# Patient Record
Sex: FEMALE | Race: WHITE | NOT HISPANIC OR LATINO | Employment: OTHER | URBAN - NONMETROPOLITAN AREA
[De-identification: names, ages, dates, MRNs, and addresses within clinical notes are randomized per-mention and may not be internally consistent; named-entity substitution may affect disease eponyms.]

---

## 2017-01-19 ENCOUNTER — OFFICE VISIT (OUTPATIENT)
Dept: FAMILY MEDICINE CLINIC | Facility: CLINIC | Age: 62
End: 2017-01-19

## 2017-01-19 VITALS
OXYGEN SATURATION: 98 % | DIASTOLIC BLOOD PRESSURE: 80 MMHG | SYSTOLIC BLOOD PRESSURE: 122 MMHG | HEIGHT: 62 IN | BODY MASS INDEX: 18.58 KG/M2 | HEART RATE: 80 BPM | WEIGHT: 101 LBS

## 2017-01-19 DIAGNOSIS — R64 CACHEXIA (HCC): ICD-10-CM

## 2017-01-19 DIAGNOSIS — F41.1 ANXIETY STATE: Primary | ICD-10-CM

## 2017-01-19 DIAGNOSIS — Z11.59 NEED FOR HEPATITIS C SCREENING TEST: ICD-10-CM

## 2017-01-19 DIAGNOSIS — F90.0 ATTENTION DEFICIT HYPERACTIVITY DISORDER (ADHD), PREDOMINANTLY INATTENTIVE TYPE: ICD-10-CM

## 2017-01-19 DIAGNOSIS — M81.0 OSTEOPOROSIS: ICD-10-CM

## 2017-01-19 DIAGNOSIS — E03.9 ACQUIRED HYPOTHYROIDISM: ICD-10-CM

## 2017-01-19 LAB
T4 FREE SERPL-MCNC: 1.06 NG/DL (ref 0.78–2.19)
TSH SERPL-ACNC: 0.97 UIU/ML (ref 0.46–4.68)

## 2017-01-19 PROCEDURE — 99214 OFFICE O/P EST MOD 30 MIN: CPT | Performed by: FAMILY MEDICINE

## 2017-01-19 PROCEDURE — 96372 THER/PROPH/DIAG INJ SC/IM: CPT | Performed by: FAMILY MEDICINE

## 2017-01-19 RX ORDER — METHYLPHENIDATE HYDROCHLORIDE 10 MG/1
10 TABLET ORAL 2 TIMES DAILY
Qty: 60 TABLET | Refills: 0 | Status: SHIPPED | OUTPATIENT
Start: 2017-01-19 | End: 2017-02-14 | Stop reason: SDUPTHER

## 2017-01-19 RX ORDER — LORAZEPAM 1 MG/1
1 TABLET ORAL 2 TIMES DAILY PRN
Qty: 45 TABLET | Refills: 2 | Status: SHIPPED | OUTPATIENT
Start: 2017-01-19 | End: 2017-04-17 | Stop reason: DRUGHIGH

## 2017-01-19 RX ORDER — ALENDRONATE SODIUM 70 MG/1
70 TABLET ORAL
Qty: 4 TABLET | Refills: 3 | Status: SHIPPED | OUTPATIENT
Start: 2017-01-19 | End: 2017-04-17

## 2017-01-19 RX ADMIN — CYANOCOBALAMIN 1000 MCG: 1000 INJECTION, SOLUTION INTRAMUSCULAR; SUBCUTANEOUS at 14:00

## 2017-01-19 NOTE — PROGRESS NOTES
Subjective:     Daysi Hanna is a 61 y.o. female who presents for follow up of anxiety.       General:  Onset of symptoms: are constant  Duration of symptoms: unknown year(s)  Concerns: multiple medical problems  Stressors: none  Current diagnosis: anxiety disorder  Response to treatment: excellent    Anxiety Symptoms:   Feeling anxious, Feeling worried and Difficulty concentrating  General Anxiety Disorder Symptoms:   Excessive anxiety or worry , Experiencing anxiety lasting more than 6 months, Anxiety is out of proportion to events, Worry is pervasive or difficult to control, Feeling restless, Fatigue, Difficulty concentrating, Feeling irritable and Feeling muscular tension   Panic Attack Symptoms:   Fear of losing emotional control and Sense of impending doom  Panic Disorder Symptoms:   none  Specific Anxiety Symptoms:   none  Associated Symptoms:   Feeling depressed and Lack of energy  Comorbid Conditions:   Hx of depression     Had a DEXA scan in November 2016.  She had osteoporosis at her femoral necks.  She has never been on osteoporosis medication before.  She has lost 5 pounds since last office visit.  She was on a special diet for having a screening colonoscopy.  Multiple polyps were found and recommended for repeat in 3 years.  Needs refills of her ADHD medicine.  Does help her function in her daily life.  No problems associated with the medication or causing insomnia.  No heart palpitations.    Preventative:  Over the past 2 weeks, have you felt down, depressed, or hopeless?No   Over the past 2 weeks, have you felt little interest or pleasure in doing things?No  Clinical depression screening refused by patient.No     On osteoporosis therapy?No     The following portions of the patient's history were reviewed and updated as appropriate: allergies, current medications, past family history, past medical history, past social history, past surgical history and problem list.    Past Medical  Hx:  Past Medical History   Diagnosis Date   • Acquired hypothyroidism    • Allergic rhinitis    • Allergy 04/17/2016     Consult, Allergy (1) - Ordered By: BENIGNO LIM ([facility])    • Anxiety state    • Attention deficit hyperactivity disorder    • Benign essential hypertension    • Chronic pain    • Congestive heart failure      primarily systrolic dysfunction EF 17-20% repeat echo 55%   • Depressive disorder    • Dyslipidemia    • Dysphagia    • Gastroesophageal reflux disease    • Generalized abdominal pain    • History of echocardiogram 03/23/2016     Echocardiogram W/ color flow 02596 (3) - Normal LV function wiht Ef of 60%.Normal RV size and function.Normal diastolic function.No significant valvular regurgitation or stenosis.   • History of echocardiogram 04/27/2012     Echocardiogram W/O color flow 64989 (1) - Normal left ventricular systolic function. EF 55%. No evidence of regional wall motion abnormalities. Abnormal relaxation of the left ventricular myocardium.   • History of EKG 03/07/2016     EKG Tracing and Interpretation 65703 (3) - Ordered By: LILLEI BRADY ([facility])    • History of mammogram 06/05/2013     MAMMOGRAM SCREENING 52068 - [facility] (1) - birads 0    • Hyperlipidemia    • Intraductal carcinoma in situ of breast      hx in past and s/p bilateral simple mastectomies      • Irritable bowel syndrome    • Low back pain    • Malaise and fatigue    • Microalbuminuria 07/16/2015     POS MICROALBUMINURIA RESULT DOC AND REVIEWED 3060F (1) - Ordered By: BENIGNO LIM ([facility])   • Mitral valve regurgitation      Mild-Moderate      • Myopia    • Non-organic sleep disorder    • Osteoporosis    • Pain management 04/17/2016     Consult, Pain Management (1) - Ordered By: BENIGNO LIM ([facility])    • Pneumococcal vaccination indicated 07/08/2016     PNEUMOC VAC/ADMIN/RCVD 4040F (11) - Ordered By: BENIGNO LIM ([facility])   • Primary fibromyalgia syndrome    • Rheumatoid arthritis        Past Surgical Hx:  Past Surgical History    Procedure Laterality Date   • Appendectomy  2008     Appendectomy (1)   • Injection of medication  07/05/2016     B12 (44) - Ordered By: BENIGNO LIM ([facility])    • Breast implant surgery  2000     Breast implantation (1)   • Injection of medication  11/12/2015     Celestone (betamethasone) (1) - Ordered By: BENIGNO LIM ([facility])    • Colonoscopy w/ polypectomy  2008     Colonoscopy remove polyps 51344 (1)    • Colonoscopy  01/20/2014     Colonoscopy, diagnostic (screening) 78772 (1)   • Vascular surgery  12/24/2014     Consult, Vascular Surgery (1) - Ordered By: BENIGNO LIM ([facility])    • Injection of medication  02/11/2016     Kenalog (2) - Ordered By: BENIGNO LIM ([facility])    • Mastectomy  09/05/2013     Mastectomy (2) - Right simple prophylactic mastectomy.   • Pap smear  06/03/2013      PAP SMEAR (1)   • Partial hysterectomy  1998      Partial hyst (1)     • Breast implant removal  2003     Remove breast implant (1)   • Excision lesion  05/01/2014     Remove chest wall lesion (1) - Excision of subcutaneous mass, left chest wall, Subcutaneous mass left chest wall, status post mastectomy.   • Breast biopsy  06/12/2013     Stereotactic breast biopsy (1) - stereotactic left mammotome biopsy with 11 gauge needle.   • Tonsillectomy  1969     Tonsillectomy (1)   • Other surgical history  10/29/2014     SONOGRAM THYROID, NECK 73921 (1) - Ordered By: BENIGNO LIM ([facility])       Health Maintenance:  Health Maintenance   Topic Date Due   • HEPATITIS C SCREENING  08/26/2016   • LIPID PANEL  09/22/2017   • MEDICARE ANNUAL WELLNESS  11/10/2017   • DXA SCAN  12/21/2018   • COLONOSCOPY  01/18/2020   • TDAP/TD VACCINES (2 - Td) 10/16/2024   • INFLUENZA VACCINE  Completed   • ZOSTER VACCINE  Completed       Current Meds:    Current Outpatient Prescriptions:   •  albuterol (PROAIR HFA) 108 (90 BASE) MCG/ACT inhaler, 2 puffs 4 (four) times a day as needed., Disp: , Rfl:   •  Beclomethasone Dipropionate (QNASL) 80 MCG/ACT aerosol solution, 2 sprays into each nostril  daily., Disp: , Rfl:   •  bisoprolol (ZEBETA) 5 MG tablet, Take 1 tablet by mouth Daily. bisoprolol fumarate 5 mg tablet, Disp: 30 tablet, Rfl: 6  •  desloratadine (CLARINEX) 5 MG tablet, Take 1 tablet by mouth Daily., Disp: 90 tablet, Rfl: 3  •  docusate sodium (COLACE) 100 MG capsule, Take 200 mg by mouth every night. at bedtime, Disp: , Rfl:   •  esomeprazole (NEXIUM) 40 MG capsule, Take 1 capsule by mouth Daily. Nexium 40 mg capsule,delayed release, Disp: 90 capsule, Rfl: 3  •  furosemide (LASIX) 20 MG tablet, Take 1 tablet by mouth Daily., Disp: 30 tablet, Rfl: 6  •  gabapentin (NEURONTIN) 300 MG capsule, Take 1 capsule by mouth 2 (two) times a day., Disp: 180 capsule, Rfl: 3  •  hydroxychloroquine (PLAQUENIL) 200 MG tablet, Take 1 tablet by mouth 2 (two) times a day., Disp: 180 tablet, Rfl: 3  •  levothyroxine (SYNTHROID) 88 MCG tablet, One tab PO Daily Name brand only medically necessary; do not substitute, Disp: 90 tablet, Rfl: 3  •  lisinopril (PRINIVIL,ZESTRIL) 2.5 MG tablet, Take 1 tablet by mouth Daily., Disp: 30 tablet, Rfl: 6  •  LORazepam (ATIVAN) 1 MG tablet, Take 1 tablet by mouth 2 (Two) Times a Day As Needed (Generalized anxiety disorder)., Disp: 45 tablet, Rfl: 2  •  lubiprostone (AMITIZA) 24 MCG capsule, Take 1 capsule by mouth 2 (Two) Times a Day., Disp: 180 capsule, Rfl: 3  •  methylphenidate (RITALIN) 10 MG tablet, Take 1 tablet by mouth 2 (Two) Times a Day., Disp: 60 tablet, Rfl: 0  •  metoclopramide (REGLAN) 10 MG tablet, Take 1 tablet by mouth Every 6 (Six) Hours As Needed (nausea)., Disp: 270 tablet, Rfl: 3  •  montelukast (SINGULAIR) 10 MG tablet, 10 mg daily., Disp: , Rfl:   •  oxyCODONE (ROXICODONE) 5 MG immediate release tablet, , Disp: , Rfl: 0  •  OxyCODONE HCl 5 MG tablet , Take 1 tablet by mouth every 6 (six) hours as needed., Disp: , Rfl:   •  OxyCODONE HCl ER (OXYCONTIN) 30 MG tablet extended-release 12 hour, Take 30 mg by mouth 2 (two) times a day. OxyContin 30 mg tablet,crush  resistant,extended release, Disp: , Rfl:   •  polyethylene glycol (MIRALAX) powder, Take 17 g by mouth 2 (Two) Times a Day., Disp: 850 g, Rfl: 3  •  traZODone (DESYREL) 150 MG tablet, Take 1 tablet by mouth Every Night. AT BEDTIME, Disp: 90 tablet, Rfl: 3  •  venlafaxine XR (EFFEXOR-XR) 75 MG 24 hr capsule, Take 1 capsule by mouth 3 (three) times a day. venlafaxine ER 75 mg capsule,extended release 24 hr, Disp: 270 capsule, Rfl: 3  •  alendronate (FOSAMAX) 70 MG tablet, Take 1 tablet by mouth Every 7 (Seven) Days., Disp: 4 tablet, Rfl: 3    Current Facility-Administered Medications:   •  cyanocobalamin injection 1,000 mcg, 1,000 mcg, Intramuscular, Q28 Days, Olimpia Robison MD, 1,000 mcg at 01/19/17 1400    Allergies:  Cephalosporins; Morphine and related; Other; Penicillins; Shellfish-derived products; Zithromax [azithromycin]; and Sulfa antibiotics    Family Hx:  Family History   Problem Relation Age of Onset   • Breast cancer Sister    • Rectal cancer Other      Colorectal cancer   • Heart disease Other    • Hypertension Other    • Thyroid disease Other      Thyroid disorder        Social History:  Social History     Social History   • Marital status:      Spouse name: N/A   • Number of children: N/A   • Years of education: N/A     Occupational History   • Not on file.     Social History Main Topics   • Smoking status: Former Smoker   • Smokeless tobacco: Never Used      Comment: quit 2010   • Alcohol use No   • Drug use: No   • Sexual activity: Not on file     Other Topics Concern   • Not on file     Social History Narrative       Review of Systems  Review of Systems   Constitutional: Positive for fatigue. Negative for fever.   HENT: Negative for ear pain and sore throat.    Eyes: Negative for pain and visual disturbance.   Respiratory: Negative for cough and shortness of breath.    Cardiovascular: Negative for chest pain and palpitations.   Gastrointestinal: Negative for abdominal pain and nausea.  "  Genitourinary: Negative for dysuria.   Musculoskeletal: Negative for arthralgias.   Skin: Negative for rash.   Neurological: Negative for dizziness, weakness and headaches.   Psychiatric/Behavioral: Negative for sleep disturbance.         Objective:     Visit Vitals   • /80 (BP Location: Left arm, Patient Position: Sitting, Cuff Size: Adult)   • Pulse 80   • Ht 62\" (157.5 cm)   • Wt 101 lb (45.8 kg)   • SpO2 98%   • BMI 18.47 kg/m2         General Appearance:    Alert, cooperative, no distress, appears stated age   Head:    Normocephalic, without obvious abnormality, atraumatic   Eyes:    PERRL, conjunctiva/corneas clear, EOM's intact   Ears:    Normal TM's and external ear canals, both ears   Nose:   Nares normal, septum midline, mucosa normal, no drainage    or sinus tenderness   Throat:   Lips, mucosa, and tongue normal; teeth and gums normal   Neck:   Supple, symmetrical, trachea midline, no adenopathy;       Back:     Symmetric, no curvature, ROM normal, full range of motion.     Lungs:     Clear to auscultation bilaterally, respirations unlabored   Chest Wall:    No tenderness or deformity    Heart:    Regular rate and rhythm, S1 and S2 normal, no murmur, rub   or gallop       Abdomen:     Soft, non-tender, bowel sounds active all four quadrants,     no masses, no organomegaly           Extremities:   Extremities normal, atraumatic, no cyanosis or edema   Pulses:   2+ and symmetric all extremities   Skin:   Skin color, texture, turgor normal, no rashes or lesions       Neurologic:   CNII-XII intact, normal strength, sensation            Assessment:     1. Anxiety state    2. Acquired hypothyroidism    3. Need for hepatitis C screening test    4. Cachexia    5. Attention deficit hyperactivity disorder (ADHD), predominantly inattentive type    6. Osteoporosis         Plan:     1.  Rx changes: Fosamax for osteoporosis.  Have asked patient not to take that at the same time as her Nexium.  Have also stated " that she needs to sit upright for 30-45 minutes after taking the medication.  If she has any problems swallowing or feels that the pill is getting stuck she is to inform office.   2.  Education: Provided reassurance. Provided reassurance and Recommended medication management  3.  Compliance at present is estimated to be excellent.  4.  Labwork before leaving today.     5. Follow up: 3 months  6.  ASHLYN # [id number] appropriate 1/19/2017    GOALS:  Weight gain 10 pounds  BARRIERS TO GOALS:  Vegetarian    Preventative:  Recommended:Immunizations are up to date.   patient is a non smoker  does not drink  plan meals, eat breakfast and have 3 meals a day    RISK SCORE: 3         This document has been electronically signed by Olimpia Robison MD on January 19, 2017 2:29 PM

## 2017-01-19 NOTE — MR AVS SNAPSHOT
Daysi Hanna   1/19/2017 1:30 PM   Office Visit    Dept Phone:  [phone]   Encounter #:  [id number]    Provider:  Olimpia Robison MD   Department:  [facility]                Your Full Care Plan              Today's Medication Changes          These changes are accurate as of: 1/19/17  4:14 PM.  If you have any questions, ask your nurse or doctor.               New Medication(s)Ordered:     alendronate 70 MG tablet   Commonly known as:  FOSAMAX   Take 1 tablet by mouth Every 7 (Seven) Days.   Started by:  Olimpia Robison MD            Where to Get Your Medications      These medications were sent to [facility]/pharmacy #[id number] - [place], [place] - [address] [place] [address] - [phone]  - [phone] [address] [address]     Phone:  [phone]     alendronate 70 MG tablet         You can get these medications from any pharmacy     Bring a paper prescription for each of these medications     LORazepam 1 MG tablet    methylphenidate 10 MG tablet                  Your Updated Medication List          This list is accurate as of: 1/19/17  4:14 PM.  Always use your most recent med list.                alendronate 70 MG tablet   Commonly known as:  FOSAMAX   Take 1 tablet by mouth Every 7 (Seven) Days.       bisoprolol 5 MG tablet   Commonly known as:  ZEBeta   Take 1 tablet by mouth Daily. bisoprolol fumarate 5 mg tablet       COLACE 100 MG capsule   Generic drug:  docusate sodium       desloratadine 5 MG tablet   Commonly known as:  CLARINEX   Take 1 tablet by mouth Daily.       esomeprazole 40 MG capsule   Commonly known as:  NEXIUM   Take 1 capsule by mouth Daily. Nexium 40 mg capsule,delayed release       furosemide 20 MG tablet   Commonly known as:  LASIX   Take 1 tablet by mouth Daily.       gabapentin 300 MG capsule   Commonly known as:  NEURONTIN   Take 1 capsule by mouth 2 (two) times a day.       hydroxychloroquine 200  MG tablet   Commonly known as:  PLAQUENIL   Take 1 tablet by mouth 2 (two) times a day.       levothyroxine 88 MCG tablet   Commonly known as:  SYNTHROID   One tab PO Daily Name brand only medically necessary; do not substitute       lisinopril 2.5 MG tablet   Commonly known as:  PRINIVIL,ZESTRIL   Take 1 tablet by mouth Daily.       LORazepam 1 MG tablet   Commonly known as:  ATIVAN   Take 1 tablet by mouth 2 (Two) Times a Day As Needed (Generalized anxiety disorder).       lubiprostone 24 MCG capsule   Commonly known as:  AMITIZA   Take 1 capsule by mouth 2 (Two) Times a Day.       methylphenidate 10 MG tablet   Commonly known as:  RITALIN   Take 1 tablet by mouth 2 (Two) Times a Day.       metoclopramide 10 MG tablet   Commonly known as:  REGLAN   Take 1 tablet by mouth Every 6 (Six) Hours As Needed (nausea).       montelukast 10 MG tablet   Commonly known as:  SINGULAIR       * OxyCODONE HCl 5 MG tablet        * OXYCONTIN 30 MG tablet extended-release 12 hour   Generic drug:  OxyCODONE HCl ER       * oxyCODONE 5 MG immediate release tablet   Commonly known as:  ROXICODONE       polyethylene glycol powder   Commonly known as:  MIRALAX   Take 17 g by mouth 2 (Two) Times a Day.       PROAIR  (90 BASE) MCG/ACT inhaler   Generic drug:  albuterol       QNASL 80 MCG/ACT aerosol solution   Generic drug:  Beclomethasone Dipropionate       traZODone 150 MG tablet   Commonly known as:  DESYREL   Take 1 tablet by mouth Every Night. AT BEDTIME       venlafaxine XR 75 MG 24 hr capsule   Commonly known as:  EFFEXOR-XR   Take 1 capsule by mouth 3 (three) times a day. venlafaxine ER 75 mg capsule,extended release 24 hr       * Notice:  This list has 3 medication(s) that are the same as other medications prescribed for you. Read the directions carefully, and ask your doctor or other care provider to review them with you.            We Performed the Following     Hepatitis C Antibody     T4, Free     TSH       You Were  Diagnosed With        Codes Comments    Anxiety state    -  Primary ICD-10-CM: F41.1  ICD-9-CM: 300.00     Acquired hypothyroidism     ICD-10-CM: E03.9  ICD-9-CM: 244.9     Need for hepatitis C screening test     ICD-10-CM: Z11.59  ICD-9-CM: V73.89     Cachexia     ICD-10-CM: R64  ICD-9-CM: 799.4     Attention deficit hyperactivity disorder (ADHD), predominantly inattentive type     ICD-10-CM: F90.0  ICD-9-CM: 314.00     Osteoporosis     ICD-10-CM: M81.0  ICD-9-CM: 733.00       Medications to be Given to You by a Medical Professional     Due       Frequency    (none) cyanocobalamin injection 1,000 mcg  Every 28 Days      Instructions     None    Patient Instructions History      Upcoming Appointments     Visit Type Date Time Department    OFFICE VISIT 1/19/2017  1:30 PM [facility] FACULTY [facility]    OFFICE VISIT 6/15/2017  1:00 PM [facility] CARDIOLOGY [facility]    NEW PATIENT 6/16/2017  8:00 AM [facility] PAIN MNGT [facility]    VISUAL FIELD 11/15/2017  3:00 PM [facility] OPHTHALMOLOGY [facility]    OV WITH VISUAL FIELD 11/15/2017  3:30 PM [facility] OPHTHALMOLOGY [facility]      MyChart Signup     Our records indicate that you have an active [employer] account.    You can view your After Visit Summary by going to [employer] and logging in with your [facility] username and password.  If you don't have a [facility] username and password but a parent or guardian has access to your record, the parent or guardian should login with their own [facility] [address] username and password and access your record to view the After Visit Summary.    If you have questions, you can email [email or web address] or call [phone] to talk to our [facility] [address] staff.  Remember, [facility] is NOT to be used for urgent needs.  For medical emergencies, dial 911.               Other Info from Your Visit           Your Appointments     Randy 15, 2017  1:00 PM CDT   Office Visit with Stuart Sears MD PhD   [facility] (--)    [address] [place] [address] [address]  [address]  [address] [facility] [address] [phone]           Arrive 15 minutes prior to appointment.            Jun 16, 2017  8:00 AM CDT   New Patient with Erwin Pagan MD   [facility] PAIN MANAGEMENT (--)    [address]  [address] [place] [id number] [phone]           Bring all previous medical records and films, along with current medications and insurance information.            Nov 15, 2017  3:00 PM CST   Visual Field with [facility] OPHTH[facility] OPHTHALMOLOGY (--)    [address] [place] [address] [address]  [address] [place] [id number] [phone]            Nov 15, 2017  3:30 PM CST   office visit with visual contreras with Elmer Blanco MD   [facility] OPHTHALMOLOGY (--)    [address] [place] [address] [address]  [address] [place] [id number] [phone]              Other Notes About Your Plan     Risk Score 3        Allergies     Cephalosporins  Nausea And Vomiting    Morphine And Related  Itching    Other  Nausea And Vomiting    Cipro    Penicillins  Hives    Shellfish-derived Products  Hives, Other (See Comments)    Other reaction(s): SOA    Zithromax [Azithromycin]  Nausea And Vomiting    Sulfa Antibiotics  Hives, Rash    Sulfa (Sulfonamide Antibiotics)      Reason for Visit     Pain chronic, all over     Med Refill     Anxiety     Osteoporosis           Vital Signs     Blood Pressure Pulse Height Weight Oxygen Saturation Body Mass Index    122/80 (BP Location: Left arm, Patient Position: Sitting, Cuff Size: Adult) 80 62\" (157.5 cm) 101 lb (45.8 kg) 98% 18.47 kg/m2    Smoking Status                   Former Smoker           Problems and Diagnoses Noted     Acquired underactive thyroid    Anxiety state    ADHD (attention deficit hyperactivity disorder)    General body deterioration    Osteoporosis    Need for hepatitis C screening test          Medications Administered     cyanocobalamin injection " 1,000 mcg                    Results     TSH      Component Value Standard Range & Units    TSH 0.97 0.46 - 4.68 uIU/ml                T4, Free      Component Value Standard Range & Units    Free T4 1.06 0.78 - 2.19 ng/dl

## 2017-01-20 LAB — HCV AB SERPL QL IA: NEGATIVE

## 2017-01-27 RX ORDER — METOCLOPRAMIDE 10 MG/1
TABLET ORAL
Qty: 90 TABLET | Refills: 3 | Status: SHIPPED | OUTPATIENT
Start: 2017-01-27 | End: 2017-04-17 | Stop reason: SDUPTHER

## 2017-02-14 DIAGNOSIS — F90.0 ATTENTION DEFICIT HYPERACTIVITY DISORDER (ADHD), PREDOMINANTLY INATTENTIVE TYPE: ICD-10-CM

## 2017-02-14 RX ORDER — METHYLPHENIDATE HYDROCHLORIDE 10 MG/1
10 TABLET ORAL 2 TIMES DAILY
Qty: 60 TABLET | Refills: 0 | Status: SHIPPED | OUTPATIENT
Start: 2017-02-14 | End: 2017-03-13 | Stop reason: SDUPTHER

## 2017-03-13 DIAGNOSIS — F90.0 ATTENTION DEFICIT HYPERACTIVITY DISORDER (ADHD), PREDOMINANTLY INATTENTIVE TYPE: ICD-10-CM

## 2017-03-13 RX ORDER — METHYLPHENIDATE HYDROCHLORIDE 10 MG/1
10 TABLET ORAL 2 TIMES DAILY
Qty: 60 TABLET | Refills: 0 | Status: SHIPPED | OUTPATIENT
Start: 2017-03-13 | End: 2017-04-10 | Stop reason: SDUPTHER

## 2017-03-13 NOTE — TELEPHONE ENCOUNTER
Patient phoned needing refills of her ADHD medication.  [facility] #[id number]        This document has been electronically signed by Olimpia Robison MD on March 13, 2017 2:59 PM

## 2017-04-10 DIAGNOSIS — F90.0 ATTENTION DEFICIT HYPERACTIVITY DISORDER (ADHD), PREDOMINANTLY INATTENTIVE TYPE: ICD-10-CM

## 2017-04-10 RX ORDER — METHYLPHENIDATE HYDROCHLORIDE 10 MG/1
10 TABLET ORAL 2 TIMES DAILY
Qty: 60 TABLET | Refills: 0 | Status: SHIPPED | OUTPATIENT
Start: 2017-04-10 | End: 2017-05-08 | Stop reason: SDUPTHER

## 2017-04-17 ENCOUNTER — OFFICE VISIT (OUTPATIENT)
Dept: FAMILY MEDICINE CLINIC | Facility: CLINIC | Age: 62
End: 2017-04-17

## 2017-04-17 VITALS
BODY MASS INDEX: 20.24 KG/M2 | OXYGEN SATURATION: 94 % | DIASTOLIC BLOOD PRESSURE: 60 MMHG | HEIGHT: 62 IN | SYSTOLIC BLOOD PRESSURE: 100 MMHG | WEIGHT: 110 LBS | HEART RATE: 77 BPM

## 2017-04-17 DIAGNOSIS — F41.1 ANXIETY STATE: Primary | ICD-10-CM

## 2017-04-17 DIAGNOSIS — M81.0 OSTEOPOROSIS: ICD-10-CM

## 2017-04-17 DIAGNOSIS — I95.2 HYPOTENSION DUE TO DRUGS: ICD-10-CM

## 2017-04-17 DIAGNOSIS — F90.0 ATTENTION DEFICIT HYPERACTIVITY DISORDER (ADHD), PREDOMINANTLY INATTENTIVE TYPE: ICD-10-CM

## 2017-04-17 PROCEDURE — 99214 OFFICE O/P EST MOD 30 MIN: CPT | Performed by: FAMILY MEDICINE

## 2017-04-17 RX ORDER — LORAZEPAM 0.5 MG/1
0.5 TABLET ORAL 2 TIMES DAILY PRN
Qty: 45 TABLET | Refills: 0 | Status: SHIPPED | OUTPATIENT
Start: 2017-04-17 | End: 2017-05-16 | Stop reason: SDUPTHER

## 2017-04-17 NOTE — PROGRESS NOTES
Subjective:     Daysi Hanna is a 61 y.o. female who presents for follow up of anxiety.  She takes Ativan no more than once a day.  She's been doing this since she was diagnosed with breast cancer.     General:  Onset of symptoms: are constant  Duration of symptoms: unknown year(s)  Concerns: multiple medical problems  Stressors: none  Current diagnosis: anxiety disorder  Response to treatment: excellent    Anxiety Symptoms:   Feeling anxious, Feeling worried and Difficulty concentrating  General Anxiety Disorder Symptoms:   Excessive anxiety or worry , Experiencing anxiety lasting more than 6 months, Anxiety is out of proportion to events, Worry is pervasive or difficult to control, Feeling restless, Fatigue, Difficulty concentrating, Feeling irritable and Feeling muscular tension   Panic Attack Symptoms:   Fear of losing emotional control and Sense of impending doom  Panic Disorder Symptoms:   none  Specific Anxiety Symptoms:   none  Associated Symptoms:   Feeling depressed and Lack of energy  Comorbid Conditions:   Hx of depression     Patient states she is overwhelmingly fatigued.  Her blood pressure at home has been low consistently.  She is asking what blood pressure medicine she could stop.  Dates that when she took Fosamax she was having problems with her esophagus and stomach burning.  She does have history of GERD severely.  She does have osteoporosis at her femoral necks.    Preventative:  Over the past 2 weeks, have you felt down, depressed, or hopeless?No   Over the past 2 weeks, have you felt little interest or pleasure in doing things?No  Clinical depression screening refused by patient.No     On osteoporosis therapy?No     The following portions of the patient's history were reviewed and updated as appropriate: allergies, current medications, past family history, past medical history, past social history, past surgical history and problem list.    Past Medical Hx:  Past Medical  History:   Diagnosis Date   • Acquired hypothyroidism    • Allergic rhinitis    • Allergy 04/17/2016    Consult, Allergy (1) - Ordered By: BENIGNO LIM ([facility])    • Anxiety state    • Attention deficit hyperactivity disorder    • Benign essential hypertension    • Chronic pain    • Congestive heart failure     primarily systrolic dysfunction EF 17-20% repeat echo 55%   • Depressive disorder    • Dyslipidemia    • Dysphagia    • Gastroesophageal reflux disease    • Generalized abdominal pain    • History of echocardiogram 03/23/2016    Echocardiogram W/ color flow 99674 (3) - Normal LV function wiht Ef of 60%.Normal RV size and function.Normal diastolic function.No significant valvular regurgitation or stenosis.   • History of echocardiogram 04/27/2012    Echocardiogram W/O color flow 27678 (1) - Normal left ventricular systolic function. EF 55%. No evidence of regional wall motion abnormalities. Abnormal relaxation of the left ventricular myocardium.   • History of EKG 03/07/2016    EKG Tracing and Interpretation 52238 (3) - Ordered By: LILLIE BRADY ([facility])    • History of mammogram 06/05/2013    MAMMOGRAM SCREENING 12628 - [facility] (1) - birads 0    • Hyperlipidemia    • Intraductal carcinoma in situ of breast     hx in past and s/p bilateral simple mastectomies      • Irritable bowel syndrome    • Low back pain    • Malaise and fatigue    • Microalbuminuria 07/16/2015    POS MICROALBUMINURIA RESULT DOC AND REVIEWED 3060F (1) - Ordered By: BENIGNO LIM ([facility])   • Mitral valve regurgitation     Mild-Moderate      • Myopia    • Non-organic sleep disorder    • Osteoporosis    • Pain management 04/17/2016    Consult, Pain Management (1) - Ordered By: BENIGNO LIM ([facility])    • Pneumococcal vaccination indicated 07/08/2016    PNEUMOC VAC/ADMIN/RCVD 4040F (11) - Ordered By: BENIGNO LIM ([facility])   • Primary fibromyalgia syndrome    • Rheumatoid arthritis        Past Surgical Hx:  Past Surgical History:   Procedure Laterality Date   •  APPENDECTOMY  2008    Appendectomy (1)   • BREAST BIOPSY  06/12/2013    Stereotactic breast biopsy (1) - stereotactic left mammotome biopsy with 11 gauge needle.   • BREAST IMPLANT REMOVAL  2003    Remove breast implant (1)   • BREAST IMPLANT SURGERY  2000    Breast implantation (1)   • COLONOSCOPY  01/20/2014    Colonoscopy, diagnostic (screening) 55049 (1)   • COLONOSCOPY W/ POLYPECTOMY  2008    Colonoscopy remove polyps 61876 (1)    • EXCISION LESION  05/01/2014    Remove chest wall lesion (1) - Excision of subcutaneous mass, left chest wall, Subcutaneous mass left chest wall, status post mastectomy.   • INJECTION OF MEDICATION  07/05/2016    B12 (44) - Ordered By: BENIGNO LIM ([facility])    • INJECTION OF MEDICATION  11/12/2015    Celestone (betamethasone) (1) - Ordered By: BENIGNO LIM ([facility])    • INJECTION OF MEDICATION  02/11/2016    Kenalog (2) - Ordered By: BENIGNO LIM ([facility])    • MASTECTOMY  09/05/2013    Mastectomy (2) - Right simple prophylactic mastectomy.   • OTHER SURGICAL HISTORY  10/29/2014    SONOGRAM THYROID, NECK 29934 (1) - Ordered By: BENIGNO LIM ([facility])   • PAP SMEAR  06/03/2013     PAP SMEAR (1)   • PARTIAL HYSTERECTOMY  1998     Partial hyst (1)     • TONSILLECTOMY  1969    Tonsillectomy (1)   • VASCULAR SURGERY  12/24/2014    Consult, Vascular Surgery (1) - Ordered By: BENIGNO LIM ([facility])        Health Maintenance:  Health Maintenance   Topic Date Due   • LIPID PANEL  09/22/2017   • MEDICARE ANNUAL WELLNESS  11/10/2017   • DXA SCAN  12/21/2018   • COLONOSCOPY  01/18/2020   • TDAP/TD VACCINES (2 - Td) 10/16/2024   • HEPATITIS C SCREENING  Completed   • INFLUENZA VACCINE  Completed   • ZOSTER VACCINE  Completed       Current Meds:    Current Outpatient Prescriptions:   •  albuterol (PROAIR HFA) 108 (90 BASE) MCG/ACT inhaler, 2 puffs 4 (four) times a day as needed., Disp: , Rfl:   •  Beclomethasone Dipropionate (QNASL) 80 MCG/ACT aerosol solution, 2 sprays into each nostril daily., Disp: , Rfl:   •  bisoprolol (ZEBETA) 5 MG  tablet, Take 1 tablet by mouth Daily. bisoprolol fumarate 5 mg tablet, Disp: 30 tablet, Rfl: 6  •  desloratadine (CLARINEX) 5 MG tablet, Take 1 tablet by mouth Daily., Disp: 90 tablet, Rfl: 3  •  docusate sodium (COLACE) 100 MG capsule, Take 200 mg by mouth every night. at bedtime, Disp: , Rfl:   •  esomeprazole (NEXIUM) 40 MG capsule, Take 1 capsule by mouth Daily. Nexium 40 mg capsule,delayed release, Disp: 90 capsule, Rfl: 3  •  gabapentin (NEURONTIN) 300 MG capsule, Take 1 capsule by mouth 2 (two) times a day., Disp: 180 capsule, Rfl: 3  •  hydroxychloroquine (PLAQUENIL) 200 MG tablet, Take 1 tablet by mouth 2 (two) times a day., Disp: 180 tablet, Rfl: 3  •  levothyroxine (SYNTHROID) 88 MCG tablet, One tab PO Daily Name brand only medically necessary; do not substitute, Disp: 90 tablet, Rfl: 3  •  lubiprostone (AMITIZA) 24 MCG capsule, Take 1 capsule by mouth 2 (Two) Times a Day., Disp: 180 capsule, Rfl: 3  •  methylphenidate (RITALIN) 10 MG tablet, Take 1 tablet by mouth 2 (Two) Times a Day., Disp: 60 tablet, Rfl: 0  •  metoclopramide (REGLAN) 10 MG tablet, Take 1 tablet by mouth Every 6 (Six) Hours As Needed (nausea)., Disp: 270 tablet, Rfl: 3  •  montelukast (SINGULAIR) 10 MG tablet, 10 mg daily., Disp: , Rfl:   •  oxyCODONE (ROXICODONE) 5 MG immediate release tablet, , Disp: , Rfl: 0  •  OxyCODONE HCl 5 MG tablet , Take 1 tablet by mouth every 6 (six) hours as needed., Disp: , Rfl:   •  OxyCODONE HCl ER (OXYCONTIN) 30 MG tablet extended-release 12 hour, Take 30 mg by mouth 2 (two) times a day. OxyContin 30 mg tablet,crush resistant,extended release, Disp: , Rfl:   •  polyethylene glycol (MIRALAX) powder, Take 17 g by mouth 2 (Two) Times a Day., Disp: 850 g, Rfl: 3  •  traZODone (DESYREL) 150 MG tablet, Take 1 tablet by mouth Every Night. AT BEDTIME, Disp: 90 tablet, Rfl: 3  •  venlafaxine XR (EFFEXOR-XR) 75 MG 24 hr capsule, Take 1 capsule by mouth 3 (three) times a day. venlafaxine ER 75 mg capsule,extended  release 24 hr, Disp: 270 capsule, Rfl: 3  •  furosemide (LASIX) 20 MG tablet, TAKE 1 TABLET BY MOUTH DAILY., Disp: 30 tablet, Rfl: 6  •  lisinopril (PRINIVIL,ZESTRIL) 2.5 MG tablet, TAKE 1 TABLET BY MOUTH DAILY., Disp: 30 tablet, Rfl: 6  •  LORazepam (ATIVAN) 0.5 MG tablet, Take 1 tablet by mouth 2 (Two) Times a Day As Needed for Anxiety., Disp: 45 tablet, Rfl: 0    Current Facility-Administered Medications:   •  cyanocobalamin injection 1,000 mcg, 1,000 mcg, Intramuscular, Q28 Days, Olimpia Robison MD, 1,000 mcg at 01/19/17 1400    Allergies:  Cephalosporins; Morphine and related; Other; Penicillins; Shellfish-derived products; Zithromax [azithromycin]; and Sulfa antibiotics    Family Hx:  Family History   Problem Relation Age of Onset   • Breast cancer Sister    • Rectal cancer Other      Colorectal cancer   • Heart disease Other    • Hypertension Other    • Thyroid disease Other      Thyroid disorder        Social History:  Social History     Social History   • Marital status:      Spouse name: N/A   • Number of children: N/A   • Years of education: N/A     Occupational History   • Not on file.     Social History Main Topics   • Smoking status: Former Smoker   • Smokeless tobacco: Never Used      Comment: quit 2010   • Alcohol use No   • Drug use: No   • Sexual activity: Not on file     Other Topics Concern   • Not on file     Social History Narrative       Review of Systems  Review of Systems   Constitutional: Positive for fatigue. Negative for fever.   HENT: Negative for ear pain and sore throat.    Eyes: Negative for pain and visual disturbance.   Respiratory: Negative for cough and shortness of breath.    Cardiovascular: Negative for chest pain and palpitations.   Gastrointestinal: Negative for abdominal pain and nausea.   Genitourinary: Negative for dysuria.   Musculoskeletal: Negative for arthralgias.   Skin: Negative for rash.   Neurological: Negative for dizziness, weakness and headaches.  "  Psychiatric/Behavioral: Negative for sleep disturbance.         Objective:     /60 (BP Location: Left arm, Patient Position: Sitting, Cuff Size: Adult)  Pulse 77  Ht 62\" (157.5 cm)  Wt 110 lb (49.9 kg)  SpO2 94%  BMI 20.12 kg/m2      General Appearance:    Alert, cooperative, no distress, appears stated age   Head:    Normocephalic, without obvious abnormality, atraumatic   Eyes:    PERRL, conjunctiva/corneas clear, EOM's intact   Ears:    Normal TM's and external ear canals, both ears   Nose:   Nares normal, septum midline, mucosa normal, no drainage    or sinus tenderness   Throat:   Lips, mucosa, and tongue normal; teeth and gums normal   Neck:   Supple, symmetrical, trachea midline, no adenopathy;       Back:     Symmetric, no curvature, ROM normal, full range of motion.     Lungs:     Clear to auscultation bilaterally, respirations unlabored   Chest Wall:    No tenderness or deformity    Heart:    Regular rate and rhythm, S1 and S2 normal, no murmur, rub   or gallop       Abdomen:     Soft, non-tender, bowel sounds active all four quadrants,     no masses, no organomegaly           Extremities:   Extremities normal, atraumatic, no cyanosis or edema   Pulses:   2+ and symmetric all extremities   Skin:   Skin color, texture, turgor normal, no rashes or lesions       Neurologic:   CNII-XII intact, normal strength, sensation            Assessment:     1. Anxiety state    2. Attention deficit hyperactivity disorder (ADHD), predominantly inattentive type    3. Hypotension due to drugs    4. Osteoporosis         Plan:     1.  Rx changes: Will decrease ativan to 0.5mg BID. Have discussed continuing to taper off the medication completely due to increased risk of death with combination of narcotic medication. Patient unable to tolerate fosamax. Will consider addition of injection medication at next visit. Stop Lisinopril.    2.  Education: Provided reassurance. Provided reassurance and Recommended medication " management  3.  Compliance at present is estimated to be excellent.  4.  Labwork before leaving today.     5. Follow up: 1 month  6.  ASHLYN #[id number] 4/17/2017 appropriate  7. Discussed with Dr. Sears about discontinuation of lisinopril for hypotension.      GOALS:  Weight gain 10 pounds  BARRIERS TO GOALS:  Vegetarian    Preventative:  Recommended:Immunizations are up to date.   patient is a non smoker  does not drink  plan meals, eat breakfast and have 3 meals a day    RISK SCORE: 3         This document has been electronically signed by Olimpia Robison MD on April 20, 2017 8:42 AM

## 2017-04-18 RX ORDER — FUROSEMIDE 20 MG/1
TABLET ORAL
Qty: 30 TABLET | Refills: 6 | Status: SHIPPED | OUTPATIENT
Start: 2017-04-18 | End: 2017-12-13 | Stop reason: SDUPTHER

## 2017-04-18 RX ORDER — LISINOPRIL 2.5 MG/1
TABLET ORAL
Qty: 30 TABLET | Refills: 6 | Status: SHIPPED | OUTPATIENT
Start: 2017-04-18 | End: 2017-06-28

## 2017-04-20 PROBLEM — I95.9 HYPOTENSION: Status: ACTIVE | Noted: 2017-04-20

## 2017-05-01 RX ORDER — BISOPROLOL FUMARATE 5 MG/1
TABLET, FILM COATED ORAL
Qty: 30 TABLET | Refills: 6 | Status: SHIPPED | OUTPATIENT
Start: 2017-05-01 | End: 2017-12-13 | Stop reason: SDUPTHER

## 2017-05-08 DIAGNOSIS — F90.0 ATTENTION DEFICIT HYPERACTIVITY DISORDER (ADHD), PREDOMINANTLY INATTENTIVE TYPE: ICD-10-CM

## 2017-05-08 RX ORDER — METHYLPHENIDATE HYDROCHLORIDE 10 MG/1
10 TABLET ORAL 2 TIMES DAILY
Qty: 60 TABLET | Refills: 0 | Status: SHIPPED | OUTPATIENT
Start: 2017-05-08 | End: 2017-06-07 | Stop reason: SDUPTHER

## 2017-05-16 ENCOUNTER — OFFICE VISIT (OUTPATIENT)
Dept: FAMILY MEDICINE CLINIC | Facility: CLINIC | Age: 62
End: 2017-05-16

## 2017-05-16 VITALS
HEART RATE: 65 BPM | HEIGHT: 62 IN | WEIGHT: 106 LBS | BODY MASS INDEX: 19.51 KG/M2 | DIASTOLIC BLOOD PRESSURE: 60 MMHG | OXYGEN SATURATION: 96 % | SYSTOLIC BLOOD PRESSURE: 100 MMHG

## 2017-05-16 DIAGNOSIS — R53.81 MALAISE AND FATIGUE: ICD-10-CM

## 2017-05-16 DIAGNOSIS — F41.1 ANXIETY STATE: Primary | ICD-10-CM

## 2017-05-16 DIAGNOSIS — R64 CACHEXIA (HCC): ICD-10-CM

## 2017-05-16 DIAGNOSIS — I50.22 CHRONIC SYSTOLIC CONGESTIVE HEART FAILURE (HCC): ICD-10-CM

## 2017-05-16 DIAGNOSIS — R53.83 MALAISE AND FATIGUE: ICD-10-CM

## 2017-05-16 DIAGNOSIS — M81.0 OSTEOPOROSIS: ICD-10-CM

## 2017-05-16 PROCEDURE — 96372 THER/PROPH/DIAG INJ SC/IM: CPT | Performed by: FAMILY MEDICINE

## 2017-05-16 PROCEDURE — 99214 OFFICE O/P EST MOD 30 MIN: CPT | Performed by: FAMILY MEDICINE

## 2017-05-16 RX ORDER — LORAZEPAM 0.5 MG/1
0.5 TABLET ORAL 2 TIMES DAILY PRN
Qty: 45 TABLET | Refills: 2 | Status: SHIPPED | OUTPATIENT
Start: 2017-05-16 | End: 2017-08-15

## 2017-05-16 RX ADMIN — CYANOCOBALAMIN 1000 MCG: 1000 INJECTION, SOLUTION INTRAMUSCULAR; SUBCUTANEOUS at 11:41

## 2017-05-31 DIAGNOSIS — M81.0 OSTEOPOROSIS: Primary | ICD-10-CM

## 2017-06-07 DIAGNOSIS — F90.0 ATTENTION DEFICIT HYPERACTIVITY DISORDER (ADHD), PREDOMINANTLY INATTENTIVE TYPE: ICD-10-CM

## 2017-06-07 RX ORDER — METHYLPHENIDATE HYDROCHLORIDE 10 MG/1
10 TABLET ORAL 2 TIMES DAILY
Qty: 60 TABLET | Refills: 0 | Status: SHIPPED | OUTPATIENT
Start: 2017-06-07 | End: 2017-07-05 | Stop reason: SDUPTHER

## 2017-06-07 NOTE — TELEPHONE ENCOUNTER
Patient has ongoing ADHD. [facility] #[id number]        This document has been electronically signed by Olimpia Robison MD on June 7, 2017 10:44 AM

## 2017-06-16 ENCOUNTER — OFFICE VISIT (OUTPATIENT)
Dept: PAIN MEDICINE | Facility: CLINIC | Age: 62
End: 2017-06-16

## 2017-06-16 ENCOUNTER — APPOINTMENT (OUTPATIENT)
Dept: LAB | Facility: HOSPITAL | Age: 62
End: 2017-06-16

## 2017-06-16 VITALS
SYSTOLIC BLOOD PRESSURE: 120 MMHG | BODY MASS INDEX: 20.63 KG/M2 | WEIGHT: 112.1 LBS | HEIGHT: 62 IN | DIASTOLIC BLOOD PRESSURE: 68 MMHG

## 2017-06-16 DIAGNOSIS — G89.29 CHRONIC LOW BACK PAIN, UNSPECIFIED BACK PAIN LATERALITY, WITH SCIATICA PRESENCE UNSPECIFIED: Primary | ICD-10-CM

## 2017-06-16 DIAGNOSIS — M54.5 CHRONIC LOW BACK PAIN, UNSPECIFIED BACK PAIN LATERALITY, WITH SCIATICA PRESENCE UNSPECIFIED: Primary | ICD-10-CM

## 2017-06-16 DIAGNOSIS — M47.817 LUMBOSACRAL SPONDYLOSIS WITHOUT MYELOPATHY: ICD-10-CM

## 2017-06-16 DIAGNOSIS — M79.18 MYOFACIAL MUSCLE PAIN: ICD-10-CM

## 2017-06-16 DIAGNOSIS — Z79.891 LONG TERM (CURRENT) USE OF OPIATE ANALGESIC: ICD-10-CM

## 2017-06-16 PROBLEM — M54.50 CHRONIC LOW BACK PAIN: Status: ACTIVE | Noted: 2017-06-16

## 2017-06-16 PROCEDURE — 99204 OFFICE O/P NEW MOD 45 MIN: CPT | Performed by: PAIN MEDICINE

## 2017-06-16 PROCEDURE — G0481 DRUG TEST DEF 8-14 CLASSES: HCPCS | Performed by: NURSE PRACTITIONER

## 2017-06-16 PROCEDURE — 80307 DRUG TEST PRSMV CHEM ANLYZR: CPT | Performed by: NURSE PRACTITIONER

## 2017-06-16 NOTE — PROGRESS NOTES
Daysi Hanna is a 61 y.o. female.   1955    HPI:   Location: {pain location:22681}  Quality: {pain descriptor:22012}  Severity: {0-10:17009}/10  Timing: {PAIN ASSESSMENT:59335}  Alleviating: {Pain (activities that relieve):17515}  Aggravating: {agg factors:71853}      The following portions of the patient's history were reviewed by me and updated as appropriate: allergies, current medications, past family history, past medical history, past social history, past surgical history and problem list.    Past Medical History:   Diagnosis Date   • Acquired hypothyroidism    • Allergic rhinitis    • Allergy 04/17/2016    Consult, Allergy (1) - Ordered By: BENIGNO LIM ([facility])    • Anxiety state    • Attention deficit hyperactivity disorder    • Benign essential hypertension    • Chronic pain    • Congestive heart failure     primarily systrolic dysfunction EF 17-20% repeat echo 55%   • Depressive disorder    • Dyslipidemia    • Dysphagia    • Gastroesophageal reflux disease    • Generalized abdominal pain    • History of echocardiogram 03/23/2016    Echocardiogram W/ color flow 31248 (3) - Normal LV function wiht Ef of 60%.Normal RV size and function.Normal diastolic function.No significant valvular regurgitation or stenosis.   • History of echocardiogram 04/27/2012    Echocardiogram W/O color flow 43171 (1) - Normal left ventricular systolic function. EF 55%. No evidence of regional wall motion abnormalities. Abnormal relaxation of the left ventricular myocardium.   • History of EKG 03/07/2016    EKG Tracing and Interpretation 43837 (3) - Ordered By: LILLIE BRADY ([facility])    • History of mammogram 06/05/2013    MAMMOGRAM SCREENING 94638 - [facility] (1) - birads 0    • Hyperlipidemia    • Intraductal carcinoma in situ of breast     hx in past and s/p bilateral simple mastectomies      • Irritable bowel syndrome    • Low back pain    • Malaise and fatigue    • Microalbuminuria 07/16/2015    POS  MICROALBUMINURIA RESULT DOC AND REVIEWED 3060F (1) - Ordered By: BENIGNO LIM ([facility])   • Mitral valve regurgitation     Mild-Moderate      • Myopia    • Non-organic sleep disorder    • Osteoporosis    • Pain management 04/17/2016    Consult, Pain Management (1) - Ordered By: BENIGNO LIM ([facility])    • Pneumococcal vaccination indicated 07/08/2016    PNEUMOC VAC/ADMIN/RCVD 4040F (11) - Ordered By: BENIGNO LMI ([facility])   • Primary fibromyalgia syndrome    • Rheumatoid arthritis        Social History     Social History   • Marital status:      Spouse name: N/A   • Number of children: N/A   • Years of education: N/A     Occupational History   • Not on file.     Social History Main Topics   • Smoking status: Former Smoker   • Smokeless tobacco: Never Used      Comment: quit 2010   • Alcohol use No   • Drug use: No   • Sexual activity: Not on file     Other Topics Concern   • Not on file     Social History Narrative       Family History   Problem Relation Age of Onset   • Breast cancer Sister    • Rectal cancer Other      Colorectal cancer   • Heart disease Other    • Hypertension Other    • Thyroid disease Other      Thyroid disorder         Current Outpatient Prescriptions:   •  albuterol (PROAIR HFA) 108 (90 BASE) MCG/ACT inhaler, 2 puffs 4 (four) times a day as needed., Disp: , Rfl:   •  Beclomethasone Dipropionate (QNASL) 80 MCG/ACT aerosol solution, 2 sprays into each nostril daily., Disp: , Rfl:   •  bisoprolol (ZEBeta) 5 MG tablet, TAKE 1 TABLET BY MOUTH DAILY., Disp: 30 tablet, Rfl: 6  •  desloratadine (CLARINEX) 5 MG tablet, Take 1 tablet by mouth Daily., Disp: 90 tablet, Rfl: 3  •  docusate sodium (COLACE) 100 MG capsule, Take 200 mg by mouth every night. at bedtime, Disp: , Rfl:   •  esomeprazole (NEXIUM) 40 MG capsule, Take 1 capsule by mouth Daily. Nexium 40 mg capsule,delayed release, Disp: 90 capsule, Rfl: 3  •  furosemide (LASIX) 20 MG tablet, TAKE 1 TABLET BY MOUTH DAILY., Disp: 30 tablet, Rfl: 6  •  gabapentin  (NEURONTIN) 300 MG capsule, Take 1 capsule by mouth 2 (two) times a day., Disp: 180 capsule, Rfl: 3  •  hydroxychloroquine (PLAQUENIL) 200 MG tablet, Take 1 tablet by mouth 2 (two) times a day., Disp: 180 tablet, Rfl: 3  •  levothyroxine (SYNTHROID) 88 MCG tablet, One tab PO Daily Name brand only medically necessary; do not substitute, Disp: 90 tablet, Rfl: 3  •  lisinopril (PRINIVIL,ZESTRIL) 2.5 MG tablet, TAKE 1 TABLET BY MOUTH DAILY., Disp: 30 tablet, Rfl: 6  •  LORazepam (ATIVAN) 0.5 MG tablet, Take 1 tablet by mouth 2 (Two) Times a Day As Needed for Anxiety., Disp: 45 tablet, Rfl: 2  •  lubiprostone (AMITIZA) 24 MCG capsule, Take 1 capsule by mouth 2 (Two) Times a Day., Disp: 180 capsule, Rfl: 3  •  methylphenidate (RITALIN) 10 MG tablet, Take 1 tablet by mouth 2 (Two) Times a Day., Disp: 60 tablet, Rfl: 0  •  metoclopramide (REGLAN) 10 MG tablet, Take 1 tablet by mouth Every 6 (Six) Hours As Needed (nausea)., Disp: 270 tablet, Rfl: 3  •  montelukast (SINGULAIR) 10 MG tablet, 10 mg daily., Disp: , Rfl:   •  oxyCODONE (ROXICODONE) 5 MG immediate release tablet, , Disp: , Rfl: 0  •  OxyCODONE HCl 5 MG tablet , Take 1 tablet by mouth every 6 (six) hours as needed., Disp: , Rfl:   •  OxyCODONE HCl ER (OXYCONTIN) 30 MG tablet extended-release 12 hour, Take 30 mg by mouth 2 (two) times a day. OxyContin 30 mg tablet,crush resistant,extended release, Disp: , Rfl:   •  polyethylene glycol (MIRALAX) powder, Take 17 g by mouth 2 (Two) Times a Day., Disp: 850 g, Rfl: 3  •  traZODone (DESYREL) 150 MG tablet, Take 1 tablet by mouth Every Night. AT BEDTIME, Disp: 90 tablet, Rfl: 3  •  venlafaxine XR (EFFEXOR-XR) 75 MG 24 hr capsule, Take 1 capsule by mouth 3 (three) times a day. venlafaxine ER 75 mg capsule,extended release 24 hr, Disp: 270 capsule, Rfl: 3    Current Facility-Administered Medications:   •  cyanocobalamin injection 1,000 mcg, 1,000 mcg, Intramuscular, Q28 Days, Olimpia Robison MD, 1,000 mcg at 05/16/17  1141    Allergies   Allergen Reactions   • Cephalosporins Nausea And Vomiting   • Morphine And Related Itching   • Other Nausea And Vomiting     Cipro   • Penicillins Hives   • Shellfish-Derived Products Hives and Other (See Comments)     Other reaction(s): SOA   • Zithromax [Azithromycin] Nausea And Vomiting   • Sulfa Antibiotics Hives and Rash     Sulfa (Sulfonamide Antibiotics)       Review of Systems  All systems reviewed and negative except for above.    Physical Exam    There are no diagnoses linked to this encounter.    Medication: {meds:63186}    Interventional: {interventional:49050}    Rehab: {rehab:87304}    Behavioral: No aberrant behavior noted. ASHLYN Report #  was reviewed and is consistent with stated history    Urine drug screen {urinalysis:49781}          This document has been electronically signed by Bebe Subramanian MA on June 16, 2017 8:21 AM

## 2017-06-16 NOTE — PROGRESS NOTES
"Daysi Hanna is a 61 y.o. female.   1955    HPI:   Location: lower back  Quality: burning and aching  Severity: 5/10  Timing: constant  Alleviating: pain medication  Aggravating: increased activity      Patient referred to pain management via Dr. Robison related to chronic lower back pain with reported right leg involvement. Pt with on and off lower back pain for years. Pt states 1996, right leg went numb. Pt had MRI done and sent to Neurologists. Physical Therapy and injections with some effectiveness. No loss of bowel or bladder.  Eventually had anterior lumbar back surgery- pt states \"suppose to cut the nerve, but apparently on fusion\". Surgery lasted for a few years, and back pain with right leg pain came back. PCP controlled pain with pain med and some injections up to 2004 (injections not working any more)-- Dr. Hartman ([facility]- anesthesia). Pt seen Dr. Rodriguez until 2013- moved to [place] from [place], Dr. Robison took over and sent to Dr. Milla MIKE. Pt states \" Dr. Loyola moving next month to [place] and drive will be too long\". Pt follows up with Dr. Sears via [facility]. MRI 2008 in [facility]- Patient will bring Disc and summary next visit.       The following portions of the patient's history were reviewed by me and updated as appropriate: allergies, current medications, past family history, past medical history, past social history, past surgical history and problem list.    Past Medical History:   Diagnosis Date   • Acquired hypothyroidism    • Allergic rhinitis    • Allergy 04/17/2016    Consult, Allergy (1) - Ordered By: BENIGNO ROBISON ([facility])    • Anxiety state    • Attention deficit hyperactivity disorder    • Benign essential hypertension    • Chronic pain    • Chronic pain disorder    • Congestive heart failure     primarily systrolic dysfunction EF 17-20% repeat echo 55%   • Depressive disorder    • Dyslipidemia    • Dysphagia    • Gastroesophageal reflux disease    • " Generalized abdominal pain    • History of echocardiogram 03/23/2016    Echocardiogram W/ color flow 88497 (3) - Normal LV function wiht Ef of 60%.Normal RV size and function.Normal diastolic function.No significant valvular regurgitation or stenosis.   • History of echocardiogram 04/27/2012    Echocardiogram W/O color flow 31088 (1) - Normal left ventricular systolic function. EF 55%. No evidence of regional wall motion abnormalities. Abnormal relaxation of the left ventricular myocardium.   • History of EKG 03/07/2016    EKG Tracing and Interpretation 52412 (3) - Ordered By: LILLIE BRADY ([facility])    • History of mammogram 06/05/2013    MAMMOGRAM SCREENING 58147 - [facility] (1) - birads 0    • Hyperlipidemia    • Intraductal carcinoma in situ of breast     hx in past and s/p bilateral simple mastectomies      • Irritable bowel syndrome    • Low back pain    • Malaise and fatigue    • Microalbuminuria 07/16/2015    POS MICROALBUMINURIA RESULT DOC AND REVIEWED 3060F (1) - Ordered By: BENIGNO LIM ([facility])   • Mitral valve regurgitation     Mild-Moderate      • Myopia    • Non-organic sleep disorder    • Osteoporosis    • Pain management 04/17/2016    Consult, Pain Management (1) - Ordered By: BENIGNO LIM ([facility])    • Pneumococcal vaccination indicated 07/08/2016    PNEUMOC VAC/ADMIN/RCVD 4040F (11) - [facility]   • Primary fibromyalgia syndrome    • Rheumatoid arthritis        Social History     Social History   • Marital status:      Spouse name: N/A   • Number of children: N/A   • Years of education: N/A     Occupational History   • Not on file.     Social History Main Topics   • Smoking status: Former Smoker   • Smokeless tobacco: Never Used      Comment: quit 2010   • Alcohol use No   • Drug use: No   • Sexual activity: Defer     Other Topics Concern   • Not on file     Social History Narrative       Family History   Problem Relation Age of Onset   • Breast cancer Sister    • Rectal cancer Other       Colorectal cancer   • Heart disease Other    • Hypertension Other    • Thyroid disease Other      Thyroid disorder   • Hypertension Mother    • Migraines Mother    • Osteoporosis Mother    • Diabetes Father    • Coronary artery disease Father    • Hyperlipidemia Father    • Cancer Maternal Aunt    • COPD Paternal Grandmother    • COPD Paternal Grandfather          Current Outpatient Prescriptions:   •  albuterol (PROAIR HFA) 108 (90 BASE) MCG/ACT inhaler, 2 puffs 4 (four) times a day as needed., Disp: , Rfl:   •  Beclomethasone Dipropionate (QNASL) 80 MCG/ACT aerosol solution, 2 sprays into each nostril daily., Disp: , Rfl:   •  bisoprolol (ZEBeta) 5 MG tablet, TAKE 1 TABLET BY MOUTH DAILY., Disp: 30 tablet, Rfl: 6  •  desloratadine (CLARINEX) 5 MG tablet, Take 1 tablet by mouth Daily., Disp: 90 tablet, Rfl: 3  •  docusate sodium (COLACE) 100 MG capsule, Take 200 mg by mouth every night. at bedtime, Disp: , Rfl:   •  esomeprazole (NEXIUM) 40 MG capsule, Take 1 capsule by mouth Daily. Nexium 40 mg capsule,delayed release, Disp: 90 capsule, Rfl: 3  •  furosemide (LASIX) 20 MG tablet, TAKE 1 TABLET BY MOUTH DAILY., Disp: 30 tablet, Rfl: 6  •  gabapentin (NEURONTIN) 300 MG capsule, Take 1 capsule by mouth 2 (two) times a day., Disp: 180 capsule, Rfl: 3  •  hydroxychloroquine (PLAQUENIL) 200 MG tablet, Take 1 tablet by mouth 2 (two) times a day., Disp: 180 tablet, Rfl: 3  •  levothyroxine (SYNTHROID) 88 MCG tablet, One tab PO Daily Name brand only medically necessary; do not substitute, Disp: 90 tablet, Rfl: 3  •  lisinopril (PRINIVIL,ZESTRIL) 2.5 MG tablet, TAKE 1 TABLET BY MOUTH DAILY., Disp: 30 tablet, Rfl: 6  •  LORazepam (ATIVAN) 0.5 MG tablet, Take 1 tablet by mouth 2 (Two) Times a Day As Needed for Anxiety., Disp: 45 tablet, Rfl: 2  •  lubiprostone (AMITIZA) 24 MCG capsule, Take 1 capsule by mouth 2 (Two) Times a Day., Disp: 180 capsule, Rfl: 3  •  methylphenidate (RITALIN) 10 MG tablet, Take 1 tablet by mouth  2 (Two) Times a Day., Disp: 60 tablet, Rfl: 0  •  metoclopramide (REGLAN) 10 MG tablet, Take 1 tablet by mouth Every 6 (Six) Hours As Needed (nausea)., Disp: 270 tablet, Rfl: 3  •  montelukast (SINGULAIR) 10 MG tablet, 10 mg daily., Disp: , Rfl:   •  OxyCODONE HCl 5 MG tablet , Take 1 tablet by mouth every 6 (six) hours as needed., Disp: , Rfl:   •  OxyCODONE HCl ER (OXYCONTIN) 30 MG tablet extended-release 12 hour, Take 30 mg by mouth 2 (two) times a day. OxyContin 30 mg tablet,crush resistant,extended release, Disp: , Rfl:   •  polyethylene glycol (MIRALAX) powder, Take 17 g by mouth 2 (Two) Times a Day., Disp: 850 g, Rfl: 3  •  traZODone (DESYREL) 150 MG tablet, Take 1 tablet by mouth Every Night. AT BEDTIME, Disp: 90 tablet, Rfl: 3  •  venlafaxine XR (EFFEXOR-XR) 75 MG 24 hr capsule, Take 1 capsule by mouth 3 (three) times a day. venlafaxine ER 75 mg capsule,extended release 24 hr, Disp: 270 capsule, Rfl: 3  •  oxyCODONE (ROXICODONE) 5 MG immediate release tablet, , Disp: , Rfl: 0    Current Facility-Administered Medications:   •  cyanocobalamin injection 1,000 mcg, 1,000 mcg, Intramuscular, Q28 Days, Olimpia Robison MD, 1,000 mcg at 05/16/17 1141    Allergies   Allergen Reactions   • Cephalosporins Nausea And Vomiting   • Morphine And Related Itching   • Other Nausea And Vomiting     Cipro   • Penicillins Hives   • Shellfish-Derived Products Hives and Other (See Comments)     Other reaction(s): SOA   • Zithromax [Azithromycin] Nausea And Vomiting   • Sulfa Antibiotics Hives and Rash     Sulfa (Sulfonamide Antibiotics)       Review of Systems   Cardiovascular:        Chf     Gastrointestinal:        Ulcer     Psychiatric/Behavioral: Positive for dysphoric mood. The patient is nervous/anxious.    All other systems reviewed and are negative.    All review of systems reviewed and negative except for above.    Physical Exam   Constitutional: She is oriented to person, place, and time. She appears well-developed  and well-nourished. No distress.   HENT:   Head: Normocephalic and atraumatic.   Eyes: Conjunctivae and EOM are normal. Pupils are equal, round, and reactive to light.   Neck: Normal range of motion. Neck supple.   Cardiovascular: Normal rate and regular rhythm.    Pulmonary/Chest: Effort normal. No respiratory distress.   Abdominal: Soft.   Musculoskeletal:        Lumbar back: She exhibits decreased range of motion ( Flex 60 deg and 20 deg ext with FL SRI) and tenderness ( Midline and sri SI joint Tenderness).   Neurological: She is alert and oriented to person, place, and time. She has normal reflexes. She displays no tremor. She displays no seizure activity. Coordination and gait normal.   Reflex Scores:       Tricep reflexes are 2+ on the right side and 2+ on the left side.       Bicep reflexes are 2+ on the right side and 2+ on the left side.       Brachioradialis reflexes are 2+ on the right side and 2+ on the left side.       Patellar reflexes are 2+ on the right side and 2+ on the left side.       Achilles reflexes are 2+ on the right side and 2+ on the left side.  Mild SLR sri R greater than left    Upper arm strength 5/5    Lower leg strength 5/5   Skin: Skin is warm and dry. No rash noted. No pallor.   Psychiatric: She has a normal mood and affect. Her behavior is normal. Judgment normal. She expresses no homicidal and no suicidal ideation. She expresses no suicidal plans and no homicidal plans.   Nursing note and vitals reviewed.      Daysi was seen today for back pain.    Diagnoses and all orders for this visit:    Chronic low back pain, unspecified back pain laterality, with sciatica presence unspecified    Lumbosacral spondylosis without myelopathy    Myofacial muscle pain    Long term (current) use of opiate analgesic  -     ToxASSURE Select 13 (MW)        Medication: None at this time. Pt with appt with Dr. Loyola next month.  Patient is basically establish care with Dr. Tapia, states that she  is trying to come here because of his practice leaving to go to [place] and the drive is long.  Informed patient that I need notes and images before Pt is accepted, and a possibility of reduction of medication in future visits related to pain control efficacy.  Patient appears to be very understanding of this discussion, patient wishes to continue and will sign for Dr. Regina malin and obtain images and summaries of lumbar spine from [facility].     Interventional:  Pt will sign for Dr. Milla malin. Pt will obtain 2008 MRI lumbar spine [facility].  Patient is a candidate for lumbar injections which is discussed with patient. MARIS and any neurological impairments discussed with patient that may need of emergency evaluation.      Rehab: PT may be a portion of pain management.     Behavioral: No aberrant behavior noted. ASHLYN Report # [id number]  was reviewed and is consistent with stated history    Urine drug screen Ordered today to test for drugs of abuse and prescribed medications. Will eval with UDS, notes and images by next visit- Must call 3 days before appt to make sure notes arrival.    ORT: 2  Pt with hx Depression. NO SI thoughts or SI attempts.     PHQ-9: 4          This document has been electronically signed by STACY Chavez on June 16, 2017 9:18 PM          This document has been electronically signed by STACY Chavez on June 16, 2017 9:18 PM

## 2017-06-22 ENCOUNTER — TELEPHONE (OUTPATIENT)
Dept: FAMILY MEDICINE CLINIC | Facility: CLINIC | Age: 62
End: 2017-06-22

## 2017-06-23 DIAGNOSIS — I42.9 CARDIOMYOPATHY (HCC): Primary | ICD-10-CM

## 2017-06-23 RX ORDER — LORAZEPAM 0.5 MG/1
TABLET ORAL
Qty: 45 TABLET | Refills: 0 | OUTPATIENT
Start: 2017-06-23

## 2017-06-24 LAB — CONV REPORT SUMMARY: NORMAL

## 2017-06-27 DIAGNOSIS — I50.9 HEART FAILURE, UNSPECIFIED HEART FAILURE CHRONICITY, UNSPECIFIED HEART FAILURE TYPE: Primary | ICD-10-CM

## 2017-06-28 ENCOUNTER — OFFICE VISIT (OUTPATIENT)
Dept: CARDIOLOGY | Facility: CLINIC | Age: 62
End: 2017-06-28

## 2017-06-28 VITALS
HEIGHT: 62 IN | BODY MASS INDEX: 20.89 KG/M2 | SYSTOLIC BLOOD PRESSURE: 130 MMHG | HEART RATE: 77 BPM | OXYGEN SATURATION: 96 % | DIASTOLIC BLOOD PRESSURE: 90 MMHG | WEIGHT: 113.5 LBS

## 2017-06-28 DIAGNOSIS — I34.0 NON-RHEUMATIC MITRAL REGURGITATION: ICD-10-CM

## 2017-06-28 DIAGNOSIS — R07.2 PRECORDIAL PAIN: ICD-10-CM

## 2017-06-28 DIAGNOSIS — I42.8 NONISCHEMIC CARDIOMYOPATHY (HCC): Primary | ICD-10-CM

## 2017-06-28 DIAGNOSIS — R06.09 DYSPNEA ON EXERTION: ICD-10-CM

## 2017-06-28 PROCEDURE — 99214 OFFICE O/P EST MOD 30 MIN: CPT | Performed by: INTERNAL MEDICINE

## 2017-06-28 PROCEDURE — 93000 ELECTROCARDIOGRAM COMPLETE: CPT | Performed by: INTERNAL MEDICINE

## 2017-06-28 NOTE — PROGRESS NOTES
Cardiovascular Medicine   Stuart Sears M.D., Ph.D.    The patient returns to cardiology clinic for follow-up of the following cardiac problems:    PROBLEM LIST:    1. 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 EF initially 15%  b. Recently 55%  2. Dyslipidemia  3. Breast CA  a. Mastectomy  4. Moderate MR--now mild  a. LVEDd: 43mm  b. LVEDs: 26mm  b. EF: 55%  5. HTN  a. DD  6. Noncardiac CP  a. DSE low-risk, 2014  7. HLD  A. Statin intolerance--unclear SEs    Cardiomyopathy and Mild MR  She returns today for her viral cardiomyopathy.  Her EF is now low normal at 55%.   She is tolerating the Bisoprolol. She remains on Lasix.  We discontinued her Lisinopril because of orthostasis. She states that she believes she needs the Lasix because of lower extremity edema.  Her most recent echocardiogram continued to demonstrate mild MR.  Her symptoms have worsened. Her dyspnea has worsened to class III. She has had some mild swelling in her abdomen. She has had intermittent LE edema.     Chest pain  She has been having some intermittent CP. This is non-exertional. This is a pain that actually leaves her chest sore when it leaves.     Current Outpatient Prescriptions on File Prior to Visit   Medication Sig Dispense Refill   • albuterol (PROAIR HFA) 108 (90 BASE) MCG/ACT inhaler 2 puffs 4 (four) times a day as needed.     • Beclomethasone Dipropionate (QNASL) 80 MCG/ACT aerosol solution 2 sprays into each nostril daily.     • bisoprolol (ZEBeta) 5 MG tablet TAKE 1 TABLET BY MOUTH DAILY. 30 tablet 6   • desloratadine (CLARINEX) 5 MG tablet Take 1 tablet by mouth Daily. 90 tablet 3   • docusate sodium (COLACE) 100 MG capsule Take 200 mg by mouth every night. at bedtime     • esomeprazole (NEXIUM) 40 MG capsule Take 1 capsule by mouth Daily. Nexium 40 mg capsule,delayed release 90 capsule 3   • furosemide (LASIX) 20 MG tablet TAKE 1 TABLET BY MOUTH DAILY. 30 tablet 6   • gabapentin (NEURONTIN) 300 MG capsule Take 1 capsule by mouth 2 (two)  times a day. 180 capsule 3   • hydroxychloroquine (PLAQUENIL) 200 MG tablet Take 1 tablet by mouth 2 (two) times a day. 180 tablet 3   • levothyroxine (SYNTHROID) 88 MCG tablet One tab PO Daily Name brand only medically necessary; do not substitute 90 tablet 3   • lisinopril (PRINIVIL,ZESTRIL) 2.5 MG tablet TAKE 1 TABLET BY MOUTH DAILY. 30 tablet 6   • LORazepam (ATIVAN) 0.5 MG tablet Take 1 tablet by mouth 2 (Two) Times a Day As Needed for Anxiety. 45 tablet 2   • lubiprostone (AMITIZA) 24 MCG capsule Take 1 capsule by mouth 2 (Two) Times a Day. 180 capsule 3   • methylphenidate (RITALIN) 10 MG tablet Take 1 tablet by mouth 2 (Two) Times a Day. 60 tablet 0   • metoclopramide (REGLAN) 10 MG tablet Take 1 tablet by mouth Every 6 (Six) Hours As Needed (nausea). 270 tablet 3   • montelukast (SINGULAIR) 10 MG tablet 10 mg daily.     • oxyCODONE (ROXICODONE) 5 MG immediate release tablet   0   • OxyCODONE HCl 5 MG tablet  Take 1 tablet by mouth every 6 (six) hours as needed.     • OxyCODONE HCl ER (OXYCONTIN) 30 MG tablet extended-release 12 hour Take 30 mg by mouth 2 (two) times a day. OxyContin 30 mg tablet,crush resistant,extended release     • polyethylene glycol (MIRALAX) powder Take 17 g by mouth 2 (Two) Times a Day. 850 g 3   • traZODone (DESYREL) 150 MG tablet Take 1 tablet by mouth Every Night. AT BEDTIME 90 tablet 3   • venlafaxine XR (EFFEXOR-XR) 75 MG 24 hr capsule Take 1 capsule by mouth 3 (three) times a day. venlafaxine ER 75 mg capsule,extended release 24 hr 270 capsule 3     Current Facility-Administered Medications on File Prior to Visit   Medication Dose Route Frequency Provider Last Rate Last Dose   • cyanocobalamin injection 1,000 mcg  1,000 mcg Intramuscular Q28 Days Olimpia Robison MD   1,000 mcg at 05/16/17 1141     Allergies   Allergen Reactions   • Cephalosporins Nausea And Vomiting   • Morphine And Related Itching   • Other Nausea And Vomiting     Cipro   • Penicillins Hives   •  Shellfish-Derived Products Hives and Other (See Comments)     Other reaction(s): SOA   • Zithromax [Azithromycin] Nausea And Vomiting   • Sulfa Antibiotics Hives and Rash     Sulfa (Sulfonamide Antibiotics)     Social History     Social History   • Marital status:      Spouse name: N/A   • Number of children: N/A   • Years of education: N/A     Occupational History   • Not on file.     Social History Main Topics   • Smoking status: Former Smoker   • Smokeless tobacco: Never Used      Comment: quit 2010   • Alcohol use No   • Drug use: No   • Sexual activity: Defer     Other Topics Concern   • Not on file     Social History Narrative       Physical Exam:  Vitals:    06/28/17 1453   BP: 130/90   Pulse: 77   SpO2: 96%     Body mass index is 20.76 kg/(m^2).      Physical Exam:  Vitals:    06/28/17 1453   BP: 130/90   Pulse: 77   SpO2: 96%     Body mass index is 20.76 kg/(m^2).    GEN: alert, appears stated age and cooperative  Body Habitus: well-nourished  HEENT: Head: Normocephalic, no lesions, without obvious abnormality.  Neck / Thyroid: Supple, no masses, nodes, nodules or enlargement. No arcus senilis, xanthelasma or xanthomas.   JVP: 6 cm of water at 45 degrees HJR: absent      Carotid:  Upstroke: easily palpated bilaterally Volume: Normal.    Carotid Bruit:  None  Subclavian Bruit: Not present.    Chest:  Normal Excursion: Good    I:E: Good  Pulmonary:clear to auscultation, no wheezes, rales or rhonchi, symmetric air entry      Precordium:  No palpable heaves or thrusts. P2 is not palpable.   Ralph:  normal size and placement Palpable S4: Not present.   Heart rate: normal  Heart Rhythm: regular     Heart Sounds: S1: normal intensity  S2: normal intensity  S3: absent   S4: absent  Opening Snap: absent  A2-OS:  N/A  Pericardial rub: absent    Ejection click: None      Murmurs: Systolic: none  Diastolic: none  Extremity: no edema, cyanosis  Trophic changes: None   Pallor on elevation: Absent.    Rubor on  dependency: None  Pulses: Right radial artery has 2+ (normal) pulse and Left radial artery has 2+ (normal) pulse      DATA:    Holter, 2016  1. Normal average heart rate with sinus mechanism. 2. No evidence of chronotropic incompetence. 3. Normal burden of ventricular and supraventricular ectopic events. 4. Asymptomatic Holter.     EF 55%  Pseudo-normal diastolic dysfunction  Left atrium severely dilated  Mild mitral regurgitation  MPS: Low risk    RECOMMENDATIONS:       1. Viral CM. Initial EF in 2000 15%. Most recently increased to 55%. NYHA stage C; Class III. Symptoms have worsened.   -Bisoprolol, Lasix  -Echo, 6MWT, and PFTs    2.  Mild MR. EF: 55% with known CM. LVIDd-43mm.   Monitor for the development of symptoms  Plan on TTE 2019    3. Chest pain syndrome. The pain complaints are atypical.   The patient is not able to exercise. Reason for inability to exercise: respiratory condition(s): exercise intolerance. EKG is Normal.  -Risks/Benefits discussed.   -A hand out was provided on the type of stress test. Questions answered.   -I have asked the patient to call 911 or be seen in the ED for further CP complaints.   -Will plan on further evaluation with Lexiscan.    4.  Tobacco status: Patient is a former smoker.      Follow-up: One month    Thank you for allowing us to participate in the care of your patient. Please do not hesitate to contact me with any questions.           This document has been electronically signed by Stuart Sears MD PhD on June 28, 2017 2:48 PM

## 2017-07-05 ENCOUNTER — TELEPHONE (OUTPATIENT)
Dept: FAMILY MEDICINE CLINIC | Facility: CLINIC | Age: 62
End: 2017-07-05

## 2017-07-05 DIAGNOSIS — F90.0 ATTENTION DEFICIT HYPERACTIVITY DISORDER (ADHD), PREDOMINANTLY INATTENTIVE TYPE: ICD-10-CM

## 2017-07-05 RX ORDER — METHYLPHENIDATE HYDROCHLORIDE 10 MG/1
10 TABLET ORAL 2 TIMES DAILY
Qty: 60 TABLET | Refills: 0 | Status: SHIPPED | OUTPATIENT
Start: 2017-07-05 | End: 2017-08-02 | Stop reason: SDUPTHER

## 2017-07-05 NOTE — TELEPHONE ENCOUNTER
Patient has ongoing adhd. [facility] #[id number]. Appropriate.        This document has been electronically signed by Olimpia Robison MD on July 5, 2017 2:16 PM

## 2017-07-10 ENCOUNTER — TELEPHONE (OUTPATIENT)
Dept: CARDIOLOGY | Facility: CLINIC | Age: 62
End: 2017-07-10

## 2017-07-10 DIAGNOSIS — R07.2 PRECORDIAL PAIN: Primary | ICD-10-CM

## 2017-07-14 ENCOUNTER — HOSPITAL ENCOUNTER (OUTPATIENT)
Dept: CARDIOLOGY | Facility: HOSPITAL | Age: 62
Discharge: HOME | End: 2017-07-14
Attending: INTERNAL MEDICINE

## 2017-07-14 ENCOUNTER — APPOINTMENT (OUTPATIENT)
Dept: CARDIOLOGY | Facility: HOSPITAL | Age: 62
End: 2017-07-14
Attending: INTERNAL MEDICINE

## 2017-07-14 ENCOUNTER — HOSPITAL ENCOUNTER (OUTPATIENT)
Dept: PULMONOLOGY | Facility: HOSPITAL | Age: 62
Discharge: HOME | End: 2017-07-14
Attending: INTERNAL MEDICINE | Admitting: INTERNAL MEDICINE

## 2017-07-14 LAB
BH CV STRESS BP STAGE 1: NORMAL
BH CV STRESS BP STAGE 2: NORMAL
BH CV STRESS DOSE DOBUTAMINE STAGE 1: 10
BH CV STRESS DOSE DOBUTAMINE STAGE 2: 20
BH CV STRESS DOSE DOBUTAMINE STAGE 3: 30
BH CV STRESS DOSE DOBUTAMINE STAGE 4: 40
BH CV STRESS DURATION MIN STAGE 1: 2
BH CV STRESS DURATION MIN STAGE 2: 2
BH CV STRESS DURATION MIN STAGE 3: 2
BH CV STRESS DURATION MIN STAGE 4: 2
BH CV STRESS DURATION SEC STAGE 1: 0
BH CV STRESS DURATION SEC STAGE 2: 0
BH CV STRESS DURATION SEC STAGE 3: 0
BH CV STRESS DURATION SEC STAGE 4: 0
BH CV STRESS ECHO POST STRESS EJECTION FRACTION EF: 70 %
BH CV STRESS HR STAGE 1: 69
BH CV STRESS HR STAGE 2: 75
BH CV STRESS HR STAGE 3: 126
BH CV STRESS HR STAGE 4: 141
BH CV STRESS PROTOCOL 1: NORMAL
BH CV STRESS RECOVERY BP: NORMAL MMHG
BH CV STRESS RECOVERY HR: 93 BPM
BH CV STRESS STAGE 1: 1
BH CV STRESS STAGE 2: 2
BH CV STRESS STAGE 3: 3
BH CV STRESS STAGE 4: 4
BH CV STRESS STAGE 5: 5
MAXIMAL PREDICTED HEART RATE: 158 BPM
PERCENT MAX PREDICTED HR: 89.24 %
STRESS BASELINE BP: NORMAL MMHG
STRESS BASELINE HR: 71 BPM
STRESS PERCENT HR: 105 %
STRESS POST ESTIMATED WORKLOAD: 1 METS
STRESS POST EXERCISE DUR MIN: 8 MIN
STRESS POST EXERCISE DUR SEC: 40 SEC
STRESS POST PEAK BP: NORMAL MMHG
STRESS POST PEAK HR: 141 BPM
STRESS TARGET HR: 134 BPM

## 2017-07-14 PROCEDURE — 93351 STRESS TTE COMPLETE: CPT | Performed by: INTERNAL MEDICINE

## 2017-07-14 PROCEDURE — 94729 DIFFUSING CAPACITY: CPT | Performed by: INTERNAL MEDICINE

## 2017-07-14 PROCEDURE — 25010000002 DOBUTAMINE PER 250 MG

## 2017-07-14 PROCEDURE — 94010 BREATHING CAPACITY TEST: CPT | Performed by: INTERNAL MEDICINE

## 2017-07-14 PROCEDURE — 94727 GAS DIL/WSHOT DETER LNG VOL: CPT | Performed by: INTERNAL MEDICINE

## 2017-07-14 PROCEDURE — 93350 STRESS TTE ONLY: CPT

## 2017-07-14 PROCEDURE — 93017 CV STRESS TEST TRACING ONLY: CPT

## 2017-07-14 PROCEDURE — 94010 BREATHING CAPACITY TEST: CPT

## 2017-07-14 PROCEDURE — 94727 GAS DIL/WSHOT DETER LNG VOL: CPT

## 2017-07-14 PROCEDURE — 94729 DIFFUSING CAPACITY: CPT

## 2017-07-14 RX ORDER — ATROPINE SULFATE 1 MG/ML
1 INJECTION, SOLUTION INTRAMUSCULAR; INTRAVENOUS; SUBCUTANEOUS ONCE
Status: COMPLETED | OUTPATIENT
Start: 2017-07-14 | End: 2017-07-14

## 2017-07-14 RX ORDER — 0.9 % SODIUM CHLORIDE 0.9 %
10 VIAL (ML) INJECTION AS NEEDED
Status: DISCONTINUED | OUTPATIENT
Start: 2017-07-14 | End: 2017-07-15 | Stop reason: HOSPADM

## 2017-07-14 RX ADMIN — ATROPINE SULFATE 1 MG: 1 INJECTION, SOLUTION INTRAMUSCULAR; INTRAVENOUS; SUBCUTANEOUS at 13:52

## 2017-07-14 RX ADMIN — SODIUM CHLORIDE 10 ML: 9 INJECTION INTRAMUSCULAR; INTRAVENOUS; SUBCUTANEOUS at 13:50

## 2017-07-14 RX ADMIN — SODIUM CHLORIDE 10 ML: 9 INJECTION INTRAMUSCULAR; INTRAVENOUS; SUBCUTANEOUS at 13:53

## 2017-07-14 RX ADMIN — SODIUM CHLORIDE 10 ML: 9 INJECTION INTRAMUSCULAR; INTRAVENOUS; SUBCUTANEOUS at 14:02

## 2017-08-01 ENCOUNTER — TELEPHONE (OUTPATIENT)
Dept: FAMILY MEDICINE CLINIC | Facility: CLINIC | Age: 62
End: 2017-08-01

## 2017-08-01 DIAGNOSIS — F90.0 ATTENTION DEFICIT HYPERACTIVITY DISORDER (ADHD), PREDOMINANTLY INATTENTIVE TYPE: ICD-10-CM

## 2017-08-01 NOTE — TELEPHONE ENCOUNTER
PATIENT CALLED FOR REFILL: RITALIN 10 MG    THANK YOU    Patient has ongoing ADHD. [facility] #[id number]        This document has been electronically signed by Olimpia Robison MD on August 2, 2017 11:18 AM

## 2017-08-02 LAB
BH CV ECHO MEAS - ACS: 1.9 CM
BH CV ECHO MEAS - AO ISTHMUS: 2.4 CM
BH CV ECHO MEAS - AO MAX PG (FULL): 7.9 MMHG
BH CV ECHO MEAS - AO MAX PG: 10.6 MMHG
BH CV ECHO MEAS - AO MEAN PG (FULL): 5 MMHG
BH CV ECHO MEAS - AO MEAN PG: 6 MMHG
BH CV ECHO MEAS - AO ROOT AREA: 7.1 CM^2
BH CV ECHO MEAS - AO ROOT DIAM: 3 CM
BH CV ECHO MEAS - AO V2 MAX: 163 CM/SEC
BH CV ECHO MEAS - AO V2 MEAN: 118 CM/SEC
BH CV ECHO MEAS - AO V2 VTI: 36.6 CM
BH CV ECHO MEAS - ASC AORTA: 2.8 CM
BH CV ECHO MEAS - AVA(I,A): 1.6 CM^2
BH CV ECHO MEAS - AVA(I,D): 1.6 CM^2
BH CV ECHO MEAS - AVA(V,A): 1.8 CM^2
BH CV ECHO MEAS - AVA(V,D): 1.8 CM^2
BH CV ECHO MEAS - EDV(CUBED): 85.2 ML
BH CV ECHO MEAS - EDV(TEICH): 87.7 ML
BH CV ECHO MEAS - EF(CUBED): 68.3 %
BH CV ECHO MEAS - EF(TEICH): 60.1 %
BH CV ECHO MEAS - ESV(CUBED): 27 ML
BH CV ECHO MEAS - ESV(TEICH): 35 ML
BH CV ECHO MEAS - FS: 31.8 %
BH CV ECHO MEAS - IVS/LVPW: 1.1
BH CV ECHO MEAS - IVSD: 1 CM
BH CV ECHO MEAS - LA DIMENSION: 3.6 CM
BH CV ECHO MEAS - LA/AO: 1.2
BH CV ECHO MEAS - LV MASS(C)D: 137.8 GRAMS
BH CV ECHO MEAS - LV MAX PG: 2.8 MMHG
BH CV ECHO MEAS - LV MEAN PG: 1 MMHG
BH CV ECHO MEAS - LV V1 MAX: 83 CM/SEC
BH CV ECHO MEAS - LV V1 MEAN: 54.4 CM/SEC
BH CV ECHO MEAS - LV V1 VTI: 16.9 CM
BH CV ECHO MEAS - LVIDD: 4.4 CM
BH CV ECHO MEAS - LVIDS: 3 CM
BH CV ECHO MEAS - LVOT AREA (M): 3.5 CM^2
BH CV ECHO MEAS - LVOT AREA: 3.5 CM^2
BH CV ECHO MEAS - LVOT DIAM: 2.1 CM
BH CV ECHO MEAS - LVPWD: 0.9 CM
BH CV ECHO MEAS - MR MAX PG: 69.9 MMHG
BH CV ECHO MEAS - MR MAX VEL: 418 CM/SEC
BH CV ECHO MEAS - MV A MAX VEL: 71.6 CM/SEC
BH CV ECHO MEAS - MV DEC SLOPE: 608 CM/SEC^2
BH CV ECHO MEAS - MV E MAX VEL: 89.3 CM/SEC
BH CV ECHO MEAS - MV E/A: 1.2
BH CV ECHO MEAS - MV MAX PG: 4.4 MMHG
BH CV ECHO MEAS - MV MEAN PG: 1 MMHG
BH CV ECHO MEAS - MV P1/2T MAX VEL: 111 CM/SEC
BH CV ECHO MEAS - MV P1/2T: 53.5 MSEC
BH CV ECHO MEAS - MV V2 MAX: 105 CM/SEC
BH CV ECHO MEAS - MV V2 MEAN: 55.2 CM/SEC
BH CV ECHO MEAS - MV V2 VTI: 23.1 CM
BH CV ECHO MEAS - MVA P1/2T LCG: 2 CM^2
BH CV ECHO MEAS - MVA(P1/2T): 4.1 CM^2
BH CV ECHO MEAS - MVA(VTI): 2.5 CM^2
BH CV ECHO MEAS - PA MAX PG: 2.6 MMHG
BH CV ECHO MEAS - PA V2 MAX: 80.2 CM/SEC
BH CV ECHO MEAS - RAP SYSTOLE: 5 MMHG
BH CV ECHO MEAS - RVDD: 2.6 CM
BH CV ECHO MEAS - RVSP: 21.5 MMHG
BH CV ECHO MEAS - SV(AO): 258.7 ML
BH CV ECHO MEAS - SV(CUBED): 58.2 ML
BH CV ECHO MEAS - SV(LVOT): 58.5 ML
BH CV ECHO MEAS - SV(TEICH): 52.7 ML
BH CV ECHO MEAS - TR MAX VEL: 203 CM/SEC

## 2017-08-02 RX ORDER — METHYLPHENIDATE HYDROCHLORIDE 10 MG/1
10 TABLET ORAL 2 TIMES DAILY
Qty: 60 TABLET | Refills: 0 | Status: SHIPPED | OUTPATIENT
Start: 2017-08-02 | End: 2017-08-30 | Stop reason: SDUPTHER

## 2017-08-08 ENCOUNTER — OFFICE VISIT (OUTPATIENT)
Dept: CARDIOLOGY | Facility: CLINIC | Age: 62
End: 2017-08-08

## 2017-08-08 VITALS
SYSTOLIC BLOOD PRESSURE: 130 MMHG | DIASTOLIC BLOOD PRESSURE: 76 MMHG | HEIGHT: 62 IN | HEART RATE: 79 BPM | WEIGHT: 116.38 LBS | OXYGEN SATURATION: 97 % | BODY MASS INDEX: 21.42 KG/M2

## 2017-08-08 DIAGNOSIS — R07.89 CHEST PAIN, NON-CARDIAC: ICD-10-CM

## 2017-08-08 DIAGNOSIS — I34.0 NON-RHEUMATIC MITRAL REGURGITATION: Chronic | ICD-10-CM

## 2017-08-08 DIAGNOSIS — Z86.79 HISTORY OF CARDIOMYOPATHY: Primary | ICD-10-CM

## 2017-08-08 PROCEDURE — 99214 OFFICE O/P EST MOD 30 MIN: CPT | Performed by: NURSE PRACTITIONER

## 2017-08-08 NOTE — PROGRESS NOTES
Subjective:     Results (echo and stress)      Shortness of Breath   This is a chronic problem. The current episode started more than 1 year ago. The problem occurs every several days. The problem has been gradually improving. Associated symptoms include leg swelling. Pertinent negatives include no abdominal pain, chest pain, claudication, fever, rash or wheezing. She has tried body position changes for the symptoms.     PROBLEM LIST:    1. 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 EF initially 15%  b. Recently 55%  2. Dyslipidemia  3. Breast CA  a. Mastectomy  4. Moderate MR--now mild  a. LVEDd: 43mm  b. LVEDs: 26mm  b. EF: 55%  5. HTN  a. DD  6. Noncardiac CP  a. DSE low-risk, 2014  7. HLD  A. Statin intolerance--unclear SEs    Cardiomyopathy and Mild MR  She returns today for her viral cardiomyopathy.  Her EF is now low normal at 55%.   She is tolerating the Bisoprolol. She remains on Lasix.  We discontinued her Lisinopril because of orthostasis. She states that she believes she needs the Lasix because of lower extremity edema.  Her most recent echocardiogram continued to demonstrate mild MR.  Her symptoms have worsened. Her dyspnea has worsened to class III. She has had some mild swelling in her abdomen. She has had intermittent LE edema. All of which are not present at time of examination.    Chest pain  She has been having some intermittent CP. This is non-exertional. The pain comes and goes. It is just a pressure. Her chest can be sore at times after it occurs.     Review of Systems   Constitution: Negative for chills, decreased appetite, fever and weakness.   HENT: Negative.    Eyes: Negative.    Cardiovascular: Positive for dyspnea on exertion and leg swelling. Negative for chest pain, claudication, irregular heartbeat and palpitations.   Respiratory: Negative for cough, shortness of breath and wheezing.    Endocrine: Negative.    Skin: Negative for dry skin, flushing and rash.   Musculoskeletal: Negative for falls and  myalgias.   Gastrointestinal: Negative for abdominal pain, change in bowel habit and melena.   Genitourinary: Negative for frequency and hematuria.   Neurological: Negative for dizziness, light-headedness and loss of balance.   Psychiatric/Behavioral: Negative for altered mental status and memory loss. The patient is not nervous/anxious.        Current Outpatient Prescriptions   Medication Sig Dispense Refill   • albuterol (PROAIR HFA) 108 (90 BASE) MCG/ACT inhaler 2 puffs 4 (four) times a day as needed.     • Beclomethasone Dipropionate (QNASL) 80 MCG/ACT aerosol solution 2 sprays into each nostril daily.     • bisoprolol (ZEBeta) 5 MG tablet TAKE 1 TABLET BY MOUTH DAILY. 30 tablet 6   • desloratadine (CLARINEX) 5 MG tablet Take 1 tablet by mouth Daily. 90 tablet 3   • docusate sodium (COLACE) 100 MG capsule Take 200 mg by mouth every night. at bedtime     • esomeprazole (NEXIUM) 40 MG capsule Take 1 capsule by mouth Daily. Nexium 40 mg capsule,delayed release 90 capsule 3   • furosemide (LASIX) 20 MG tablet TAKE 1 TABLET BY MOUTH DAILY. 30 tablet 6   • gabapentin (NEURONTIN) 300 MG capsule Take 1 capsule by mouth 2 (two) times a day. 180 capsule 3   • hydroxychloroquine (PLAQUENIL) 200 MG tablet Take 1 tablet by mouth 2 (two) times a day. 180 tablet 3   • levothyroxine (SYNTHROID) 88 MCG tablet One tab PO Daily Name brand only medically necessary; do not substitute 90 tablet 3   • LORazepam (ATIVAN) 0.5 MG tablet Take 1 tablet by mouth 2 (Two) Times a Day As Needed for Anxiety. 45 tablet 2   • lubiprostone (AMITIZA) 24 MCG capsule Take 1 capsule by mouth 2 (Two) Times a Day. 180 capsule 3   • methylphenidate (RITALIN) 10 MG tablet Take 1 tablet by mouth 2 (Two) Times a Day. 60 tablet 0   • metoclopramide (REGLAN) 10 MG tablet Take 1 tablet by mouth Every 6 (Six) Hours As Needed (nausea). 270 tablet 3   • montelukast (SINGULAIR) 10 MG tablet 10 mg daily.     • oxyCODONE (ROXICODONE) 5 MG immediate release tablet    "0   • OxyCODONE HCl 5 MG tablet  Take 1 tablet by mouth every 6 (six) hours as needed.     • OxyCODONE HCl ER (OXYCONTIN) 30 MG tablet extended-release 12 hour Take 30 mg by mouth 2 (two) times a day. OxyContin 30 mg tablet,crush resistant,extended release     • polyethylene glycol (MIRALAX) powder Take 17 g by mouth 2 (Two) Times a Day. 850 g 3   • traZODone (DESYREL) 150 MG tablet Take 1 tablet by mouth Every Night. AT BEDTIME 90 tablet 3   • venlafaxine XR (EFFEXOR-XR) 75 MG 24 hr capsule Take 1 capsule by mouth 3 (three) times a day. venlafaxine ER 75 mg capsule,extended release 24 hr 270 capsule 3     Current Facility-Administered Medications   Medication Dose Route Frequency Provider Last Rate Last Dose   • cyanocobalamin injection 1,000 mcg  1,000 mcg Intramuscular Q28 Days Olimpia Robison MD   1,000 mcg at 05/16/17 1141        Objective:     Vitals:    08/08/17 1421   BP: 130/76   BP Location: Left arm   Patient Position: Sitting   Cuff Size: Adult   Pulse: 79   SpO2: 97%   Weight: 116 lb 6 oz (52.8 kg)   Height: 62\" (157.5 cm)          Physical Exam   Constitutional: She is oriented to person, place, and time. She appears well-developed and well-nourished. No distress.   HENT:   Head: Normocephalic.   Neck: No JVD present.   Cardiovascular: Normal rate, regular rhythm, S1 normal, S2 normal, normal heart sounds and intact distal pulses.    No murmur heard.  Pulmonary/Chest: Effort normal and breath sounds normal. No respiratory distress. She has no wheezes. She has no rales.   Abdominal: Soft. Bowel sounds are normal.   Musculoskeletal: Normal range of motion. She exhibits no edema.   Neurological: She is alert and oriented to person, place, and time.   Skin: Skin is warm and dry. No erythema.   Psychiatric: She has a normal mood and affect. Her behavior is normal. Judgment and thought content normal.       Cardiographics  Echocardiogram: 8/2/2017  Interpretation Summary   · Left Ventricle: Left " ventricular systolic function is normal. Estimated EF appears to be in the range of 51 - 55%  · Left ventricular diastolic function is normal.  · Right Ventricle: Normal right ventricular cavity size, wall thickness, systolic function and septal motion noted  · Mild aortic valve regurgitation is present.  · Mild-to-moderate mitral valve regurgitation is present.  · No evidence of pulmonary hypertension is present  · There is no evidence of pericardial effusion         ECG:  Normal sinus rhythm  Septal infarct , age undetermined  Abnormal ECG    Confirmed by FROYLAN BRADY MD     Stress Testing:   Dobutamine Stress Echo 7/14/17  Interpretation Summary   · Dobutamine stress echocardiogram  · Left Ventricle: Left ventricular systolic function is normal. Estimated EF appears to be in the range of 56 - 60%.  · All left ventricular wall segments contract normally.  · Stress Description: A pharmacological stress test was performed using dobutamine without low-level exercise.  · Stress Echo - Peak Stress Findings: Post stress images with adequate visualization of myocardium to assess for ischemia  · Normal stress echo with no significant echocardiographic evidence for myocardial ischemia       PFT's  Study date: 7/14/17     Spirometry  Spirometry demonstrates mild airway obstruction.     Post Bronchodilator  N/A     Lung Volume  Patient difficulty with the testing maneuver makes the lung volume measurements unreliable. Lung volumes normal.     Diffusion  The diffusing capacity for carbon monoxide (DLCO), a reflection of alveolar-capillary gas transport, appears to be milldy reduced. However, hemoglobin (Hb) levels are not available in the pulmonary function laboratory the thus the potential effect of anemia on reducing carbon monoxide uptake cannot be ruled out.     Study Comparison  N/A     Conclusion  Mild obstruction and mildly reduced diffusing capacity. Lung volumes appear normal but patient difficulty with  maneuvers may make results unreliable.    Lab Review   Followed by PCP    The following portions of the patient's history were reviewed and updated as appropriate: allergies, current medications, past family history, past medical history, past social history, past surgical history and problem list.     Assessment/Plan:      Diagnosis Plan   1. History of viral cardiomyopathy  Initial EF in 2000 15%. Most recently increased to 55%. NYHA stage C; Class II.  -Bisoprolol 5mg, Lasix 20mg    Appears to have made a recovery from her CHF. I counseled her on excessive Lasix use. She complains of hypotension and orthostasis- however keeps taking lasix instead of Lisinopril. She has been stable on the same medications for a month. Will continue current regimen.         2. Chest pain, non-cardiac  Dobutamine stress echo negative in 2014. Negative in 2017. No evidence of cardiac source.  She has a chronic pain condition exacerbated by RA, OA, and fibro.       3. Non-rheumatic mitral regurgitation  EF: 55% with known CM. LVIDd-43mm.   Monitor for the development of symptoms  Plan on TTE 2019         Follow up in 6 months or should the need arise to be seen sooner.

## 2017-08-15 ENCOUNTER — OFFICE VISIT (OUTPATIENT)
Dept: FAMILY MEDICINE CLINIC | Facility: CLINIC | Age: 62
End: 2017-08-15

## 2017-08-15 ENCOUNTER — LAB (OUTPATIENT)
Dept: LAB | Facility: HOSPITAL | Age: 62
End: 2017-08-15

## 2017-08-15 VITALS
WEIGHT: 110 LBS | DIASTOLIC BLOOD PRESSURE: 60 MMHG | OXYGEN SATURATION: 97 % | HEIGHT: 62 IN | SYSTOLIC BLOOD PRESSURE: 100 MMHG | BODY MASS INDEX: 20.24 KG/M2 | HEART RATE: 78 BPM

## 2017-08-15 DIAGNOSIS — F41.1 ANXIETY STATE: Primary | ICD-10-CM

## 2017-08-15 DIAGNOSIS — F32.A DEPRESSIVE DISORDER: ICD-10-CM

## 2017-08-15 DIAGNOSIS — Z79.899 MEDICATION MANAGEMENT: ICD-10-CM

## 2017-08-15 DIAGNOSIS — F90.9 ATTENTION DEFICIT HYPERACTIVITY DISORDER (ADHD), UNSPECIFIED ADHD TYPE: Primary | ICD-10-CM

## 2017-08-15 DIAGNOSIS — F90.0 ATTENTION DEFICIT HYPERACTIVITY DISORDER (ADHD), PREDOMINANTLY INATTENTIVE TYPE: ICD-10-CM

## 2017-08-15 DIAGNOSIS — Z79.899 LONG TERM USE OF DRUG: ICD-10-CM

## 2017-08-15 PROCEDURE — G0481 DRUG TEST DEF 8-14 CLASSES: HCPCS | Performed by: FAMILY MEDICINE

## 2017-08-15 PROCEDURE — 96372 THER/PROPH/DIAG INJ SC/IM: CPT | Performed by: FAMILY MEDICINE

## 2017-08-15 PROCEDURE — 99214 OFFICE O/P EST MOD 30 MIN: CPT | Performed by: FAMILY MEDICINE

## 2017-08-15 PROCEDURE — 80307 DRUG TEST PRSMV CHEM ANLYZR: CPT | Performed by: FAMILY MEDICINE

## 2017-08-15 RX ORDER — LORAZEPAM 0.5 MG/1
0.5 TABLET ORAL DAILY
Qty: 45 TABLET | Refills: 2 | Status: SHIPPED | OUTPATIENT
Start: 2017-08-15 | End: 2017-08-15 | Stop reason: SDUPTHER

## 2017-08-15 RX ORDER — LORAZEPAM 0.5 MG/1
0.5 TABLET ORAL DAILY
Qty: 30 TABLET | Refills: 2 | Status: SHIPPED | OUTPATIENT
Start: 2017-08-15 | End: 2017-11-13 | Stop reason: SDUPTHER

## 2017-08-15 RX ORDER — VENLAFAXINE HYDROCHLORIDE 150 MG/1
150 CAPSULE, EXTENDED RELEASE ORAL DAILY
Qty: 90 CAPSULE | Refills: 3 | Status: SHIPPED | OUTPATIENT
Start: 2017-08-15 | End: 2018-08-03 | Stop reason: SDUPTHER

## 2017-08-15 RX ORDER — VENLAFAXINE HYDROCHLORIDE 75 MG/1
75 CAPSULE, EXTENDED RELEASE ORAL DAILY
Qty: 90 CAPSULE | Refills: 3 | Status: SHIPPED | OUTPATIENT
Start: 2017-08-15 | End: 2018-08-03 | Stop reason: SDUPTHER

## 2017-08-15 RX ADMIN — CYANOCOBALAMIN 1000 MCG: 1000 INJECTION, SOLUTION INTRAMUSCULAR; SUBCUTANEOUS at 10:51

## 2017-08-16 PROBLEM — R64 CACHEXIA (HCC): Status: RESOLVED | Noted: 2017-01-19 | Resolved: 2017-08-16

## 2017-08-16 NOTE — PROGRESS NOTES
Subjective:     Daysi Pa is a 61 y.o. female who presents for follow up of anxiety.     General:  Onset of symptoms: are constant  Duration of symptoms: unknown year(s)  Concerns: multiple medical problems  Stressors: none  Current diagnosis: anxiety disorder  Response to treatment: excellent    Anxiety Symptoms:   Feeling anxious, Feeling worried and Difficulty concentrating  General Anxiety Disorder Symptoms:   Excessive anxiety or worry , Experiencing anxiety lasting more than 6 months, Anxiety is out of proportion to events, Worry is pervasive or difficult to control, Feeling restless, Fatigue, Difficulty concentrating, Feeling irritable and Feeling muscular tension   Panic Attack Symptoms:   Fear of losing emotional control and Sense of impending doom  Panic Disorder Symptoms:   none  Specific Anxiety Symptoms:   none  Associated Symptoms:   Feeling depressed and Lack of energy  Comorbid Conditions:   Hx of depression   Patient had low back injections a few days ago and had relief for about 3 days. It has caused her anxiety to increase.  She is still struggling with weight loss and fatigue. She has good days and bad days.  ADHD is well controlled with current dose of ritalin.   Preventative:  Over the past 2 weeks, have you felt down, depressed, or hopeless?No   Over the past 2 weeks, have you felt little interest or pleasure in doing things?No  Clinical depression screening refused by patient.No     On osteoporosis therapy?No     The following portions of the patient's history were reviewed and updated as appropriate: allergies, current medications, past family history, past medical history, past social history, past surgical history and problem list.    Past Medical Hx:  Past Medical History:   Diagnosis Date   • Acquired hypothyroidism    • Allergic rhinitis    • Allergy 04/17/2016    Consult, Allergy (1) - Ordered By: BENIGNO LIM ([facility])    • Anxiety state    • Attention deficit hyperactivity  disorder    • Benign essential hypertension    • Chronic pain    • Chronic pain disorder    • Congestive heart failure     primarily systrolic dysfunction EF 17-20% repeat echo 55%   • Depressive disorder    • Dyslipidemia    • Dysphagia    • Gastroesophageal reflux disease    • Generalized abdominal pain    • History of echocardiogram 03/23/2016    Echocardiogram W/ color flow 12784 (3) - Normal LV function wiht Ef of 60%.Normal RV size and function.Normal diastolic function.No significant valvular regurgitation or stenosis.   • History of echocardiogram 04/27/2012    Echocardiogram W/O color flow 81294 (1) - Normal left ventricular systolic function. EF 55%. No evidence of regional wall motion abnormalities. Abnormal relaxation of the left ventricular myocardium.   • History of EKG 03/07/2016    EKG Tracing and Interpretation 52564 (3) - Ordered By: LILLIE BRADY ([facility])    • History of mammogram 06/05/2013    MAMMOGRAM SCREENING 05594 - [facility] (1) - birads 0    • Hyperlipidemia    • Intraductal carcinoma in situ of breast     hx in past and s/p bilateral simple mastectomies      • Irritable bowel syndrome    • Low back pain    • Malaise and fatigue    • Microalbuminuria 07/16/2015    POS MICROALBUMINURIA RESULT DOC AND REVIEWED 3060F (1) - Ordered By: BENIGNO LIM ([facility])   • Mitral valve regurgitation     Mild-Moderate      • Myopia    • Non-organic sleep disorder    • Osteoporosis    • Pain management 04/17/2016    Consult, Pain Management (1) - Ordered By: BENIGNO LIM ([facility])    • Pneumococcal vaccination indicated 07/08/2016    PNEUMOC VAC/ADMIN/RCVD 4040F (11) - Ordered By: BENIGNO LIM ([facility])   • Primary fibromyalgia syndrome    • Rheumatoid arthritis        Past Surgical Hx:  Past Surgical History:   Procedure Laterality Date   • APPENDECTOMY  2008    Appendectomy (1)   • BREAST BIOPSY  06/12/2013    Stereotactic breast biopsy (1) - stereotactic left mammotome biopsy with 11 gauge needle.   • BREAST IMPLANT  REMOVAL  2003    Remove breast implant (1)   • BREAST IMPLANT SURGERY  2000    Breast implantation (1)   • COLONOSCOPY  01/20/2014    Colonoscopy, diagnostic (screening) 25810 (1)   • COLONOSCOPY W/ POLYPECTOMY  2008    Colonoscopy remove polyps 24114 (1)    • EXCISION LESION  05/01/2014    Remove chest wall lesion (1) - Excision of subcutaneous mass, left chest wall, Subcutaneous mass left chest wall, status post mastectomy.   • INJECTION OF MEDICATION  07/05/2016    B12 (44) - Ordered By: BENIGNO LIM ([facility])    • INJECTION OF MEDICATION  11/12/2015    Celestone (betamethasone) (1) - Ordered By: BENIGNO LIM ([facility])    • INJECTION OF MEDICATION  02/11/2016    Kenalog (2) - Ordered By: BENIGNO LIM ([facility])    • MASTECTOMY  09/05/2013    Mastectomy (2) - Right simple prophylactic mastectomy.   • OTHER SURGICAL HISTORY  10/29/2014    SONOGRAM THYROID, NECK 76711 (1) - Ordered By: BENIGNO LIM ([facility])   • PAP SMEAR  06/03/2013     PAP SMEAR (1)   • PARTIAL HYSTERECTOMY  1998     Partial hyst (1)     • TONSILLECTOMY  1969    Tonsillectomy (1)   • VASCULAR SURGERY  12/24/2014    Consult, Vascular Surgery (1) - Ordered By: BENIGNO LIM ([facility])        Health Maintenance:  Health Maintenance   Topic Date Due   • INFLUENZA VACCINE  09/01/2017   • LIPID PANEL  09/22/2017   • MEDICARE ANNUAL WELLNESS  11/10/2017   • DXA SCAN  12/21/2018   • COLONOSCOPY  01/18/2020   • TDAP/TD VACCINES (2 - Td) 10/16/2024   • HEPATITIS C SCREENING  Completed   • ZOSTER VACCINE  Completed       Current Meds:    Current Outpatient Prescriptions:   •  albuterol (PROAIR HFA) 108 (90 BASE) MCG/ACT inhaler, 2 puffs 4 (four) times a day as needed., Disp: , Rfl:   •  Beclomethasone Dipropionate (QNASL) 80 MCG/ACT aerosol solution, 2 sprays into each nostril daily., Disp: , Rfl:   •  bisoprolol (ZEBeta) 5 MG tablet, TAKE 1 TABLET BY MOUTH DAILY., Disp: 30 tablet, Rfl: 6  •  desloratadine (CLARINEX) 5 MG tablet, Take 1 tablet by mouth Daily., Disp: 90 tablet, Rfl: 3  •  docusate sodium  (COLACE) 100 MG capsule, Take 200 mg by mouth every night. at bedtime, Disp: , Rfl:   •  esomeprazole (NEXIUM) 40 MG capsule, Take 1 capsule by mouth Daily. Nexium 40 mg capsule,delayed release, Disp: 90 capsule, Rfl: 3  •  furosemide (LASIX) 20 MG tablet, TAKE 1 TABLET BY MOUTH DAILY., Disp: 30 tablet, Rfl: 6  •  gabapentin (NEURONTIN) 300 MG capsule, Take 1 capsule by mouth 2 (two) times a day., Disp: 180 capsule, Rfl: 3  •  hydroxychloroquine (PLAQUENIL) 200 MG tablet, Take 1 tablet by mouth 2 (two) times a day., Disp: 180 tablet, Rfl: 3  •  levothyroxine (SYNTHROID) 88 MCG tablet, One tab PO Daily Name brand only medically necessary; do not substitute, Disp: 90 tablet, Rfl: 3  •  lubiprostone (AMITIZA) 24 MCG capsule, Take 1 capsule by mouth 2 (Two) Times a Day., Disp: 180 capsule, Rfl: 3  •  methylphenidate (RITALIN) 10 MG tablet, Take 1 tablet by mouth 2 (Two) Times a Day., Disp: 60 tablet, Rfl: 0  •  metoclopramide (REGLAN) 10 MG tablet, Take 1 tablet by mouth Every 6 (Six) Hours As Needed (nausea)., Disp: 270 tablet, Rfl: 3  •  montelukast (SINGULAIR) 10 MG tablet, 10 mg daily., Disp: , Rfl:   •  oxyCODONE (ROXICODONE) 5 MG immediate release tablet, , Disp: , Rfl: 0  •  OxyCODONE HCl 5 MG tablet , Take 1 tablet by mouth every 6 (six) hours as needed., Disp: , Rfl:   •  OxyCODONE HCl ER (OXYCONTIN) 30 MG tablet extended-release 12 hour, Take 30 mg by mouth 2 (two) times a day. OxyContin 30 mg tablet,crush resistant,extended release, Disp: , Rfl:   •  polyethylene glycol (MIRALAX) powder, Take 17 g by mouth 2 (Two) Times a Day., Disp: 850 g, Rfl: 3  •  traZODone (DESYREL) 150 MG tablet, Take 1 tablet by mouth Every Night. AT BEDTIME, Disp: 90 tablet, Rfl: 3  •  LORazepam (ATIVAN) 0.5 MG tablet, Take 1 tablet by mouth Daily., Disp: 30 tablet, Rfl: 2  •  venlafaxine XR (EFFEXOR XR) 150 MG 24 hr capsule, Take 1 capsule by mouth Daily., Disp: 90 capsule, Rfl: 3  •  venlafaxine XR (EFFEXOR XR) 75 MG 24 hr capsule,  Take 1 capsule by mouth Daily., Disp: 90 capsule, Rfl: 3    Current Facility-Administered Medications:   •  cyanocobalamin injection 1,000 mcg, 1,000 mcg, Intramuscular, Q28 Days, Olimpia Robison MD, 1,000 mcg at 08/15/17 1051    Allergies:  Cephalosporins; Morphine and related; Other; Penicillins; Shellfish-derived products; Zithromax [azithromycin]; and Sulfa antibiotics    Family Hx:  Family History   Problem Relation Age of Onset   • Breast cancer Sister    • Rectal cancer Other      Colorectal cancer   • Heart disease Other    • Hypertension Other    • Thyroid disease Other      Thyroid disorder   • Hypertension Mother    • Migraines Mother    • Osteoporosis Mother    • Diabetes Father    • Coronary artery disease Father    • Hyperlipidemia Father    • Cancer Maternal Aunt    • COPD Paternal Grandmother    • COPD Paternal Grandfather         Social History:  Social History     Social History   • Marital status:      Spouse name: N/A   • Number of children: N/A   • Years of education: N/A     Occupational History   • Not on file.     Social History Main Topics   • Smoking status: Former Smoker   • Smokeless tobacco: Never Used      Comment: quit 2010   • Alcohol use No   • Drug use: No   • Sexual activity: Defer     Other Topics Concern   • Not on file     Social History Narrative       Review of Systems  Review of Systems   Constitutional: Positive for fatigue and unexpected weight change. Negative for fever.   HENT: Negative for ear pain and sore throat.    Eyes: Negative for pain and visual disturbance.   Respiratory: Negative for cough and shortness of breath.    Cardiovascular: Negative for chest pain and palpitations.   Gastrointestinal: Negative for abdominal pain and nausea.   Genitourinary: Negative for dysuria.   Musculoskeletal: Negative for arthralgias.   Skin: Negative for rash.   Neurological: Negative for dizziness, weakness and headaches.   Psychiatric/Behavioral: Negative for sleep  "disturbance. The patient is nervous/anxious.          Objective:     /60 (BP Location: Left arm, Patient Position: Sitting, Cuff Size: Adult)  Pulse 78  Ht 62\" (157.5 cm)  Wt 110 lb (49.9 kg)  SpO2 97%  BMI 20.12 kg/m2      General Appearance:    Alert, cooperative, no distress, appears stated age   Head:    Normocephalic, without obvious abnormality, atraumatic   Eyes:    PERRL, conjunctiva/corneas clear, EOM's intact   Ears:    Normal TM's and external ear canals, both ears   Nose:   Nares normal, mucosa normal, no drainage    or sinus tenderness   Throat:   Lips, mucosa, and tongue normal; teeth and gums normal   Neck:   Supple, symmetrical, trachea midline, no adenopathy;       Back:     Symmetric, no curvature, ROM normal, full range of motion.     Lungs:     Clear to auscultation bilaterally, respirations unlabored   Chest Wall:    No tenderness breasts surgically removed    Heart:    Regular rate and rhythm, S1 and S2 normal, no murmur, rub   or gallop       Abdomen:     Soft, non-tender, bowel sounds active all four quadrants,     no masses, no organomegaly           Extremities:   Extremities normal, atraumatic, no cyanosis or edema clubbing of all hands   Pulses:  2+ dorsalis pedis   Skin:   Skin color, texture, turgor normal, no rashes or lesions       Neurologic:   CNII-XII intact, normal strength, sensation            Assessment:     1. Anxiety state    2. Medication management    3. Attention deficit hyperactivity disorder (ADHD), predominantly inattentive type    4. Depressive disorder         Plan:     1.  Rx changes: Decrease ativan to no more frequent than once a day with intentions of completely discontinuing.    2.  Education: Provided reassurance. Provided reassurance and Recommended medication management  3.  Compliance at present is estimated to be excellent.  4.  Labwork before leaving today.     5. Follow up: 3 months  6.  AS[facility] # [id number] 8/14/2017 appropriate UDS obtained " today          GOALS:  Weight gain 10 pounds  BARRIERS TO GOALS:  Vegetarian    Preventative:  Recommended:Immunizations are up to date.   patient is a non smoker  does not drink  plan meals, eat breakfast and have 3 meals a day    RISK SCORE: 3         This document has been electronically signed by Olimpia Robison MD on August 16, 2017 2:54 PM

## 2017-08-22 RX ORDER — GABAPENTIN 300 MG/1
CAPSULE ORAL
Qty: 180 CAPSULE | OUTPATIENT
Start: 2017-08-22

## 2017-08-22 RX ORDER — GABAPENTIN 300 MG/1
300 CAPSULE ORAL 2 TIMES DAILY
Qty: 60 CAPSULE | Refills: 2 | Status: SHIPPED | OUTPATIENT
Start: 2017-08-22 | End: 2017-11-13 | Stop reason: SDUPTHER

## 2017-08-23 LAB — CONV REPORT SUMMARY: NORMAL

## 2017-08-24 DIAGNOSIS — Z79.899 LONG TERM USE OF DRUG: Primary | ICD-10-CM

## 2017-08-24 PROCEDURE — G0480 DRUG TEST DEF 1-7 CLASSES: HCPCS | Performed by: FAMILY MEDICINE

## 2017-08-30 ENCOUNTER — TELEPHONE (OUTPATIENT)
Dept: FAMILY MEDICINE CLINIC | Facility: CLINIC | Age: 62
End: 2017-08-30

## 2017-08-30 DIAGNOSIS — F90.0 ATTENTION DEFICIT HYPERACTIVITY DISORDER (ADHD), PREDOMINANTLY INATTENTIVE TYPE: ICD-10-CM

## 2017-08-30 LAB
ME-PHENIDATE SERPL-MCNC: 101.5 NG/ML
PPAA SERPL-MCNC: 5917.2 NG/ML
SPECIMEN STATUS: NORMAL

## 2017-08-30 RX ORDER — METHYLPHENIDATE HYDROCHLORIDE 10 MG/1
10 TABLET ORAL 2 TIMES DAILY
Qty: 60 TABLET | Refills: 0 | Status: SHIPPED | OUTPATIENT
Start: 2017-08-30 | End: 2017-09-27 | Stop reason: SDUPTHER

## 2017-08-30 NOTE — TELEPHONE ENCOUNTER
Patient has ongoing ADHD. [facility] #[id number]         This document has been electronically signed by Olimpia Robison MD on August 30, 2017 1:07 PM

## 2017-09-05 ENCOUNTER — PROCEDURE VISIT (OUTPATIENT)
Dept: CARDIOLOGY | Facility: CLINIC | Age: 62
End: 2017-09-05

## 2017-09-05 DIAGNOSIS — I50.22 CHRONIC SYSTOLIC CONGESTIVE HEART FAILURE (HCC): ICD-10-CM

## 2017-09-05 PROCEDURE — 94620 PR PULMONARY STRESS TESTING,SIMPLE: CPT | Performed by: INTERNAL MEDICINE

## 2017-09-05 RX ORDER — VENLAFAXINE HYDROCHLORIDE 75 MG/1
CAPSULE, EXTENDED RELEASE ORAL
Qty: 270 CAPSULE | Refills: 3 | Status: SHIPPED | OUTPATIENT
Start: 2017-09-05 | End: 2017-11-13

## 2017-09-05 RX ORDER — HYDROXYCHLOROQUINE SULFATE 200 MG/1
TABLET, FILM COATED ORAL
Qty: 180 TABLET | Refills: 3 | Status: SHIPPED | OUTPATIENT
Start: 2017-09-05 | End: 2018-08-22 | Stop reason: SDUPTHER

## 2017-09-05 NOTE — PROGRESS NOTES
Daysi Pa  Procedure: 6 Minute Walk Test   9/5/17  Indication:dyspnea    Pretest: BP:118 80               HR:80               Sa02:94               Dyspnea:0               Fatigue:3    Post Test: BP:128/78               HR:80               Sa02:98               Dyspnea:3               Fatigue:3    First 6MWT:yes    Supplemental oxygen during test:no    Stopped before 6 minutes:no    Pauses:none    Results in distance walked:448.46    Did individual experience any pain or discomfort:no    I was present for the above test and agree with the data.     NYHA Functional Class II    Stuart Sears MD PhD

## 2017-09-27 DIAGNOSIS — F90.0 ATTENTION DEFICIT HYPERACTIVITY DISORDER (ADHD), PREDOMINANTLY INATTENTIVE TYPE: ICD-10-CM

## 2017-09-27 RX ORDER — METHYLPHENIDATE HYDROCHLORIDE 10 MG/1
10 TABLET ORAL 2 TIMES DAILY
Qty: 60 TABLET | Refills: 0 | Status: SHIPPED | OUTPATIENT
Start: 2017-09-27 | End: 2017-10-30 | Stop reason: SDUPTHER

## 2017-09-27 RX ORDER — ESOMEPRAZOLE MAGNESIUM 40 MG/1
CAPSULE, DELAYED RELEASE ORAL
Qty: 90 CAPSULE | Refills: 3 | OUTPATIENT
Start: 2017-09-27

## 2017-09-27 NOTE — TELEPHONE ENCOUNTER
Patient has ongoing ADHD. [facility] #[id number] appropriate.        This document has been electronically signed by Olimpia Robison MD on September 27, 2017 3:53 PM   s

## 2017-09-28 RX ORDER — TRAZODONE HYDROCHLORIDE 150 MG/1
TABLET ORAL
Qty: 90 TABLET | Refills: 3 | Status: SHIPPED | OUTPATIENT
Start: 2017-09-28 | End: 2018-09-29 | Stop reason: SDUPTHER

## 2017-10-02 ENCOUNTER — TELEPHONE (OUTPATIENT)
Dept: FAMILY MEDICINE CLINIC | Facility: CLINIC | Age: 62
End: 2017-10-02

## 2017-10-02 RX ORDER — ESOMEPRAZOLE MAGNESIUM 40 MG/1
CAPSULE, DELAYED RELEASE ORAL
Qty: 90 CAPSULE | Refills: 3 | Status: SHIPPED | OUTPATIENT
Start: 2017-10-02 | End: 2018-09-29 | Stop reason: SDUPTHER

## 2017-10-02 NOTE — TELEPHONE ENCOUNTER
DR LIM    PT CALLED NEEDING REFILL ON ESOMETRAZOLE MAGNESIUM. USES [facility], NEEDS 90 DAY SUPPLY OR INS WONT PAY

## 2017-10-24 RX ORDER — LUBIPROSTONE 24 UG/1
CAPSULE, GELATIN COATED ORAL
Qty: 180 CAPSULE | Refills: 3 | Status: SHIPPED | OUTPATIENT
Start: 2017-10-24 | End: 2018-10-02 | Stop reason: SDUPTHER

## 2017-10-30 ENCOUNTER — TELEPHONE (OUTPATIENT)
Dept: FAMILY MEDICINE CLINIC | Facility: CLINIC | Age: 62
End: 2017-10-30

## 2017-10-30 DIAGNOSIS — F90.0 ATTENTION DEFICIT HYPERACTIVITY DISORDER (ADHD), PREDOMINANTLY INATTENTIVE TYPE: ICD-10-CM

## 2017-10-30 RX ORDER — METHYLPHENIDATE HYDROCHLORIDE 10 MG/1
10 TABLET ORAL 2 TIMES DAILY
Qty: 60 TABLET | Refills: 0 | Status: SHIPPED | OUTPATIENT
Start: 2017-10-30 | End: 2017-11-13 | Stop reason: SDUPTHER

## 2017-10-30 NOTE — TELEPHONE ENCOUNTER
Patient has ongoing adhd.   [facility] #[id number]           This document has been electronically signed by Olimpia Robison MD on October 30, 2017 2:52 PM

## 2017-10-31 RX ORDER — DESLORATADINE 5 MG/1
5 TABLET ORAL DAILY
Qty: 90 TABLET | Refills: 3 | Status: SHIPPED | OUTPATIENT
Start: 2017-10-31 | End: 2017-10-31 | Stop reason: SDUPTHER

## 2017-10-31 RX ORDER — DESLORATADINE 5 MG/1
5 TABLET ORAL DAILY
Qty: 90 TABLET | Refills: 3 | Status: SHIPPED | OUTPATIENT
Start: 2017-10-31 | End: 2018-10-22 | Stop reason: SDUPTHER

## 2017-11-02 RX ORDER — LEVOTHYROXINE SODIUM 88 MCG
TABLET ORAL
Qty: 90 TABLET | Refills: 3 | Status: SHIPPED | OUTPATIENT
Start: 2017-11-02 | End: 2018-05-16 | Stop reason: SDUPTHER

## 2017-11-02 RX ORDER — DESLORATADINE 5 MG/1
TABLET ORAL
Qty: 90 TABLET | Refills: 3 | Status: SHIPPED | OUTPATIENT
Start: 2017-11-02 | End: 2017-11-13

## 2017-11-13 ENCOUNTER — APPOINTMENT (OUTPATIENT)
Dept: LAB | Facility: HOSPITAL | Age: 62
End: 2017-11-13

## 2017-11-13 ENCOUNTER — OFFICE VISIT (OUTPATIENT)
Dept: FAMILY MEDICINE CLINIC | Facility: CLINIC | Age: 62
End: 2017-11-13

## 2017-11-13 VITALS
HEIGHT: 62 IN | DIASTOLIC BLOOD PRESSURE: 82 MMHG | BODY MASS INDEX: 22.08 KG/M2 | WEIGHT: 120 LBS | HEART RATE: 73 BPM | OXYGEN SATURATION: 96 % | SYSTOLIC BLOOD PRESSURE: 138 MMHG

## 2017-11-13 DIAGNOSIS — M05.731 RHEUMATOID ARTHRITIS INVOLVING BOTH WRISTS WITH POSITIVE RHEUMATOID FACTOR (HCC): ICD-10-CM

## 2017-11-13 DIAGNOSIS — F90.0 ATTENTION DEFICIT HYPERACTIVITY DISORDER (ADHD), PREDOMINANTLY INATTENTIVE TYPE: ICD-10-CM

## 2017-11-13 DIAGNOSIS — E03.9 ACQUIRED HYPOTHYROIDISM: ICD-10-CM

## 2017-11-13 DIAGNOSIS — E78.2 MIXED HYPERLIPIDEMIA: ICD-10-CM

## 2017-11-13 DIAGNOSIS — M81.0 AGE-RELATED OSTEOPOROSIS WITHOUT CURRENT PATHOLOGICAL FRACTURE: ICD-10-CM

## 2017-11-13 DIAGNOSIS — I10 ESSENTIAL HYPERTENSION: ICD-10-CM

## 2017-11-13 DIAGNOSIS — M05.732 RHEUMATOID ARTHRITIS INVOLVING BOTH WRISTS WITH POSITIVE RHEUMATOID FACTOR (HCC): ICD-10-CM

## 2017-11-13 DIAGNOSIS — F41.1 ANXIETY STATE: Primary | ICD-10-CM

## 2017-11-13 DIAGNOSIS — Z23 NEED FOR IMMUNIZATION AGAINST INFLUENZA: ICD-10-CM

## 2017-11-13 LAB
ALBUMIN SERPL-MCNC: 4.4 G/DL (ref 3.4–4.8)
ALBUMIN UR-MCNC: <0.6 MG/L
ALBUMIN/GLOB SERPL: 1.4 G/DL (ref 1.1–1.8)
ALP SERPL-CCNC: 82 U/L (ref 38–126)
ALT SERPL W P-5'-P-CCNC: 31 U/L (ref 9–52)
ANION GAP SERPL CALCULATED.3IONS-SCNC: 14 MMOL/L (ref 5–15)
ARTICHOKE IGE QN: 155 MG/DL (ref 1–129)
AST SERPL-CCNC: 30 U/L (ref 14–36)
BASOPHILS # BLD AUTO: 0.03 10*3/MM3 (ref 0–0.2)
BASOPHILS NFR BLD AUTO: 0.6 % (ref 0–2)
BILIRUB SERPL-MCNC: 0.3 MG/DL (ref 0.2–1.3)
BUN BLD-MCNC: 8 MG/DL (ref 7–21)
BUN/CREAT SERPL: 9.1 (ref 7–25)
CALCIUM SPEC-SCNC: 9.4 MG/DL (ref 8.4–10.2)
CHLORIDE SERPL-SCNC: 91 MMOL/L (ref 95–110)
CHOLEST SERPL-MCNC: 257 MG/DL (ref 0–199)
CO2 SERPL-SCNC: 30 MMOL/L (ref 22–31)
CREAT BLD-MCNC: 0.88 MG/DL (ref 0.5–1)
CREAT UR-MCNC: 103.1 MG/DL
DEPRECATED RDW RBC AUTO: 40.1 FL (ref 36.4–46.3)
EOSINOPHIL # BLD AUTO: 0.08 10*3/MM3 (ref 0–0.7)
EOSINOPHIL NFR BLD AUTO: 1.5 % (ref 0–7)
ERYTHROCYTE [DISTWIDTH] IN BLOOD BY AUTOMATED COUNT: 12.6 % (ref 11.5–14.5)
GFR SERPL CREATININE-BSD FRML MDRD: 65 ML/MIN/1.73 (ref 45–104)
GLOBULIN UR ELPH-MCNC: 3.1 GM/DL (ref 2.3–3.5)
GLUCOSE BLD-MCNC: 121 MG/DL (ref 60–100)
HCT VFR BLD AUTO: 35.5 % (ref 35–45)
HDLC SERPL-MCNC: 52 MG/DL (ref 60–200)
HGB BLD-MCNC: 12.2 G/DL (ref 12–15.5)
IMM GRANULOCYTES # BLD: 0.02 10*3/MM3 (ref 0–0.02)
IMM GRANULOCYTES NFR BLD: 0.4 % (ref 0–0.5)
LDLC/HDLC SERPL: 2.48 {RATIO} (ref 0–3.22)
LYMPHOCYTES # BLD AUTO: 1.69 10*3/MM3 (ref 0.6–4.2)
LYMPHOCYTES NFR BLD AUTO: 31.7 % (ref 10–50)
MCH RBC QN AUTO: 29.7 PG (ref 26.5–34)
MCHC RBC AUTO-ENTMCNC: 34.4 G/DL (ref 31.4–36)
MCV RBC AUTO: 86.4 FL (ref 80–98)
MICROALBUMIN/CREAT UR: NORMAL MG/G (ref 0–30)
MONOCYTES # BLD AUTO: 0.86 10*3/MM3 (ref 0–0.9)
MONOCYTES NFR BLD AUTO: 16.1 % (ref 0–12)
NEUTROPHILS # BLD AUTO: 2.65 10*3/MM3 (ref 2–8.6)
NEUTROPHILS NFR BLD AUTO: 49.7 % (ref 37–80)
PLATELET # BLD AUTO: 368 10*3/MM3 (ref 150–450)
PMV BLD AUTO: 10.5 FL (ref 8–12)
POTASSIUM BLD-SCNC: 3.5 MMOL/L (ref 3.5–5.1)
PROT SERPL-MCNC: 7.5 G/DL (ref 6.3–8.6)
RBC # BLD AUTO: 4.11 10*6/MM3 (ref 3.77–5.16)
SODIUM BLD-SCNC: 135 MMOL/L (ref 137–145)
T4 FREE SERPL-MCNC: 0.96 NG/DL (ref 0.78–2.19)
TRIGL SERPL-MCNC: 380 MG/DL (ref 20–199)
TSH SERPL DL<=0.05 MIU/L-ACNC: 5.86 MIU/ML (ref 0.46–4.68)
WBC NRBC COR # BLD: 5.33 10*3/MM3 (ref 3.2–9.8)

## 2017-11-13 PROCEDURE — 99214 OFFICE O/P EST MOD 30 MIN: CPT | Performed by: FAMILY MEDICINE

## 2017-11-13 PROCEDURE — 85025 COMPLETE CBC W/AUTO DIFF WBC: CPT | Performed by: FAMILY MEDICINE

## 2017-11-13 PROCEDURE — 36415 COLL VENOUS BLD VENIPUNCTURE: CPT | Performed by: FAMILY MEDICINE

## 2017-11-13 PROCEDURE — 90686 IIV4 VACC NO PRSV 0.5 ML IM: CPT | Performed by: FAMILY MEDICINE

## 2017-11-13 PROCEDURE — 84439 ASSAY OF FREE THYROXINE: CPT | Performed by: FAMILY MEDICINE

## 2017-11-13 PROCEDURE — 84443 ASSAY THYROID STIM HORMONE: CPT | Performed by: FAMILY MEDICINE

## 2017-11-13 PROCEDURE — G0008 ADMIN INFLUENZA VIRUS VAC: HCPCS | Performed by: FAMILY MEDICINE

## 2017-11-13 PROCEDURE — 82570 ASSAY OF URINE CREATININE: CPT | Performed by: FAMILY MEDICINE

## 2017-11-13 PROCEDURE — 80053 COMPREHEN METABOLIC PANEL: CPT | Performed by: FAMILY MEDICINE

## 2017-11-13 PROCEDURE — 82043 UR ALBUMIN QUANTITATIVE: CPT | Performed by: FAMILY MEDICINE

## 2017-11-13 PROCEDURE — 96372 THER/PROPH/DIAG INJ SC/IM: CPT | Performed by: FAMILY MEDICINE

## 2017-11-13 PROCEDURE — 80061 LIPID PANEL: CPT | Performed by: FAMILY MEDICINE

## 2017-11-13 RX ORDER — METOCLOPRAMIDE 10 MG/1
10 TABLET ORAL EVERY 6 HOURS PRN
Qty: 270 TABLET | Refills: 3 | Status: SHIPPED | OUTPATIENT
Start: 2017-11-13 | End: 2018-07-31 | Stop reason: SDUPTHER

## 2017-11-13 RX ORDER — METHYLPHENIDATE HYDROCHLORIDE 10 MG/1
10 TABLET ORAL 2 TIMES DAILY
Qty: 60 TABLET | Refills: 0 | Status: SHIPPED | OUTPATIENT
Start: 2017-11-13 | End: 2017-12-28 | Stop reason: SDUPTHER

## 2017-11-13 RX ORDER — BUSPIRONE HYDROCHLORIDE 10 MG/1
10 TABLET ORAL 2 TIMES DAILY PRN
Qty: 60 TABLET | Refills: 5 | Status: SHIPPED | OUTPATIENT
Start: 2017-11-13 | End: 2018-03-07 | Stop reason: SDUPTHER

## 2017-11-13 RX ORDER — LORAZEPAM 0.5 MG/1
0.5 TABLET ORAL DAILY
Qty: 25 TABLET | Refills: 2 | Status: SHIPPED | OUTPATIENT
Start: 2017-11-13 | End: 2018-05-20

## 2017-11-13 RX ORDER — GABAPENTIN 300 MG/1
300 CAPSULE ORAL 2 TIMES DAILY
Qty: 180 CAPSULE | Refills: 0 | Status: SHIPPED | OUTPATIENT
Start: 2017-11-13 | End: 2018-02-19 | Stop reason: SDUPTHER

## 2017-11-13 RX ADMIN — CYANOCOBALAMIN 1000 MCG: 1000 INJECTION, SOLUTION INTRAMUSCULAR; SUBCUTANEOUS at 09:42

## 2017-11-13 NOTE — PROGRESS NOTES
Subjective:     Daysi Pa is a 62 y.o. female who presents for follow up of anxiety.     General:  Onset of symptoms: are constant  Duration of symptoms: unknown year(s)  Concerns: multiple medical problems  Stressors: none  Current diagnosis: anxiety disorder  Response to treatment: excellent    Anxiety Symptoms:   Feeling anxious, Feeling worried and Difficulty concentrating  General Anxiety Disorder Symptoms:   Excessive anxiety or worry , Experiencing anxiety lasting more than 6 months, Anxiety is out of proportion to events, Worry is pervasive or difficult to control, Feeling restless, Fatigue, Difficulty concentrating, Feeling irritable and Feeling muscular tension   Panic Attack Symptoms:   Fear of losing emotional control and Sense of impending doom  Panic Disorder Symptoms:   none  Specific Anxiety Symptoms:   none  Associated Symptoms:   Feeling depressed and Lack of energy  Comorbid Conditions:   Hx of depression   Patient had a series of low back injections with steroids. It has caused her to gain weight. She feels miserable almost to the point she can't breathe when she bends over because of the weight.    Hypothyroidism  Daysi Pa is a 62 y.o. female who presents for follow up of hypothyroidism. Current symptoms: change in energy level, nervousness and weight changes . Patient denies diarrhea. Symptoms have waxed and waned. She is taking her medication daily.  She just has overwhelming fatigue nearly daily.      Patient desires influenza vaccination.     Patient could not tolerate fosamax due to her esophagitis. She has never heard anything back getting Prolia for her osteoporosis. She has a history of breast cancer as well.    Preventative:  Over the past 2 weeks, have you felt down, depressed, or hopeless?No   Over the past 2 weeks, have you felt little interest or pleasure in doing things?No  Clinical depression screening refused by patient.No     On  osteoporosis therapy?No     The following portions of the patient's history were reviewed and updated as appropriate: allergies, current medications, past family history, past medical history, past social history, past surgical history and problem list.    Past Medical Hx:  Past Medical History:   Diagnosis Date   • Acquired hypothyroidism    • Allergic rhinitis    • Allergy 04/17/2016    Consult, Allergy (1) - Ordered By: BENIGNO LIM ([facility])    • Anxiety state    • Attention deficit hyperactivity disorder    • Benign essential hypertension    • Chronic pain    • Chronic pain disorder    • Congestive heart failure     primarily systrolic dysfunction EF 17-20% repeat echo 55%   • Depressive disorder    • Dyslipidemia    • Dysphagia    • Gastroesophageal reflux disease    • Generalized abdominal pain    • History of echocardiogram 03/23/2016    Echocardiogram W/ color flow 02100 (3) - Normal LV function wiht Ef of 60%.Normal RV size and function.Normal diastolic function.No significant valvular regurgitation or stenosis.   • History of echocardiogram 04/27/2012    Echocardiogram W/O color flow 14047 (1) - Normal left ventricular systolic function. EF 55%. No evidence of regional wall motion abnormalities. Abnormal relaxation of the left ventricular myocardium.   • History of EKG 03/07/2016    EKG Tracing and Interpretation 49711 (3) - Ordered By: LILLIE BRADY ([facility])    • History of mammogram 06/05/2013    MAMMOGRAM SCREENING 42712 - [facility] (1) - birads 0    • Hyperlipidemia    • Intraductal carcinoma in situ of breast     hx in past and s/p bilateral simple mastectomies      • Irritable bowel syndrome    • Low back pain    • Malaise and fatigue    • Microalbuminuria 07/16/2015    POS MICROALBUMINURIA RESULT DOC AND REVIEWED 3060F (1) - Ordered By: BENIGNO LIM ([facility])   • Mitral valve regurgitation     Mild-Moderate      • Myopia    • Non-organic sleep disorder    • Osteoporosis    • Pain management 04/17/2016     Consult, Pain Management (1) - Ordered By: BENIGNO LIM ([facility])    • Pneumococcal vaccination indicated 07/08/2016    PNEUMOC VAC/ADMIN/RCVD 4040F (11) - Ordered By: BENIGNO LIM ([facility])   • Primary fibromyalgia syndrome    • Rheumatoid arthritis        Past Surgical Hx:  Past Surgical History:   Procedure Laterality Date   • APPENDECTOMY  2008    Appendectomy (1)   • BREAST BIOPSY  06/12/2013    Stereotactic breast biopsy (1) - stereotactic left mammotome biopsy with 11 gauge needle.   • BREAST IMPLANT REMOVAL  2003    Remove breast implant (1)   • BREAST IMPLANT SURGERY  2000    Breast implantation (1)   • COLONOSCOPY  01/20/2014    Colonoscopy, diagnostic (screening) 09912 (1)   • COLONOSCOPY W/ POLYPECTOMY  2008    Colonoscopy remove polyps 06319 (1)    • EXCISION LESION  05/01/2014    Remove chest wall lesion (1) - Excision of subcutaneous mass, left chest wall, Subcutaneous mass left chest wall, status post mastectomy.   • INJECTION OF MEDICATION  07/05/2016    B12 (44) - Ordered By: BENIGNO LIM ([facility])    • INJECTION OF MEDICATION  11/12/2015    Celestone (betamethasone) (1) - Ordered By: BENIGNO LIM ([facility])    • INJECTION OF MEDICATION  02/11/2016    Kenalog (2) - Ordered By: BENIGNO LIM ([facility])    • MASTECTOMY  09/05/2013    Mastectomy (2) - Right simple prophylactic mastectomy.   • OTHER SURGICAL HISTORY  10/29/2014    SONOGRAM THYROID, NECK 60428 (1) - Ordered By: BENIGNO LIM ([facility])   • PAP SMEAR  06/03/2013     PAP SMEAR (1)   • PARTIAL HYSTERECTOMY  1998     Partial hyst (1)     • TONSILLECTOMY  1969    Tonsillectomy (1)   • VASCULAR SURGERY  12/24/2014    Consult, Vascular Surgery (1) - Ordered By: BENIGNO LIM ([facility])        Health Maintenance:  Health Maintenance   Topic Date Due   • INFLUENZA VACCINE  08/01/2017   • LIPID PANEL  09/22/2017   • MEDICARE ANNUAL WELLNESS  11/10/2017   • DXA SCAN  12/21/2018   • COLONOSCOPY  01/18/2020   • TDAP/TD VACCINES (2 - Td) 10/16/2024   • HEPATITIS C SCREENING  Completed   • ZOSTER VACCINE  Completed        Current Meds:    Current Outpatient Prescriptions:   •  AMITIZA 24 MCG capsule, TAKE 1 CAPSULE BY MOUTH 2 (TWO) TIMES A DAY., Disp: 180 capsule, Rfl: 3  •  bisoprolol (ZEBeta) 5 MG tablet, TAKE 1 TABLET BY MOUTH DAILY., Disp: 30 tablet, Rfl: 6  •  desloratadine (CLARINEX) 5 MG tablet, Take 1 tablet by mouth Daily., Disp: 90 tablet, Rfl: 3  •  docusate sodium (COLACE) 100 MG capsule, Take 200 mg by mouth every night. at bedtime, Disp: , Rfl:   •  esomeprazole (nexIUM) 40 MG capsule, TAKE ONE CAPSULE BY MOUTH EVERY DAY, Disp: 90 capsule, Rfl: 3  •  furosemide (LASIX) 20 MG tablet, TAKE 1 TABLET BY MOUTH DAILY., Disp: 30 tablet, Rfl: 6  •  gabapentin (NEURONTIN) 300 MG capsule, Take 1 capsule by mouth 2 (Two) Times a Day., Disp: 180 capsule, Rfl: 0  •  hydroxychloroquine (PLAQUENIL) 200 MG tablet, TAKE 1 TABLET BY MOUTH 2 (TWO) TIMES A DAY., Disp: 180 tablet, Rfl: 3  •  LORazepam (ATIVAN) 0.5 MG tablet, Take 1 tablet by mouth Daily., Disp: 25 tablet, Rfl: 2  •  methylphenidate (RITALIN) 10 MG tablet, Take 1 tablet by mouth 2 (Two) Times a Day., Disp: 60 tablet, Rfl: 0  •  metoclopramide (REGLAN) 10 MG tablet, Take 1 tablet by mouth Every 6 (Six) Hours As Needed (nausea)., Disp: 270 tablet, Rfl: 3  •  montelukast (SINGULAIR) 10 MG tablet, 10 mg daily., Disp: , Rfl:   •  OxyCODONE HCl 5 MG tablet , Take 1 tablet by mouth every 6 (six) hours as needed., Disp: , Rfl:   •  OxyCODONE HCl ER (OXYCONTIN) 30 MG tablet extended-release 12 hour, Take 30 mg by mouth 2 (two) times a day. OxyContin 30 mg tablet,crush resistant,extended release, Disp: , Rfl:   •  polyethylene glycol (MIRALAX) powder, Take 17 g by mouth 2 (Two) Times a Day., Disp: 850 g, Rfl: 3  •  SYNTHROID 88 MCG tablet, TAKE 1 TABLET BY MOUTH EVERY DAY, Disp: 90 tablet, Rfl: 3  •  traZODone (DESYREL) 150 MG tablet, TAKE 1 TABLET BY MOUTH EVERY NIGHT. AT BEDTIME, Disp: 90 tablet, Rfl: 3  •  venlafaxine XR (EFFEXOR XR) 150 MG 24 hr capsule, Take 1 capsule by  mouth Daily., Disp: 90 capsule, Rfl: 3  •  venlafaxine XR (EFFEXOR XR) 75 MG 24 hr capsule, Take 1 capsule by mouth Daily., Disp: 90 capsule, Rfl: 3  •  albuterol (PROAIR HFA) 108 (90 BASE) MCG/ACT inhaler, 2 puffs 4 (four) times a day as needed., Disp: , Rfl:   •  busPIRone (BUSPAR) 10 MG tablet, Take 1 tablet by mouth 2 (Two) Times a Day As Needed (anxiety)., Disp: 60 tablet, Rfl: 5    Current Facility-Administered Medications:   •  cyanocobalamin injection 1,000 mcg, 1,000 mcg, Intramuscular, Q28 Days, Olimpia Robison MD, 1,000 mcg at 11/13/17 0942    Allergies:  Cephalosporins; Morphine and related; Other; Penicillins; Shellfish-derived products; Zithromax [azithromycin]; and Sulfa antibiotics    Family Hx:  Family History   Problem Relation Age of Onset   • Breast cancer Sister    • Rectal cancer Other      Colorectal cancer   • Heart disease Other    • Hypertension Other    • Thyroid disease Other      Thyroid disorder   • Hypertension Mother    • Migraines Mother    • Osteoporosis Mother    • Diabetes Father    • Coronary artery disease Father    • Hyperlipidemia Father    • Cancer Maternal Aunt    • COPD Paternal Grandmother    • COPD Paternal Grandfather         Social History:  Social History     Social History   • Marital status:      Spouse name: N/A   • Number of children: N/A   • Years of education: N/A     Occupational History   • Not on file.     Social History Main Topics   • Smoking status: Former Smoker   • Smokeless tobacco: Never Used      Comment: quit 2010   • Alcohol use No   • Drug use: No   • Sexual activity: Defer     Other Topics Concern   • Not on file     Social History Narrative       Review of Systems  Review of Systems   Constitutional: Positive for fatigue and unexpected weight change. Negative for fever.   HENT: Negative for ear pain and sore throat.    Eyes: Negative for pain and visual disturbance.   Respiratory: Negative for cough and shortness of breath.   "  Cardiovascular: Negative for chest pain and palpitations.   Gastrointestinal: Negative for abdominal pain and nausea.   Genitourinary: Negative for dysuria.   Musculoskeletal: Positive for arthralgias, back pain, joint swelling and myalgias.   Skin: Negative for rash.   Neurological: Negative for dizziness, weakness and headaches.   Psychiatric/Behavioral: Negative for sleep disturbance. The patient is nervous/anxious.          Objective:     /82 (BP Location: Left arm, Patient Position: Sitting, Cuff Size: Adult)  Pulse 73  Ht 62\" (157.5 cm)  Wt 120 lb (54.4 kg)  SpO2 96%  BMI 21.95 kg/m2      General Appearance:    Alert, cooperative, no distress, appears stated age   Head:    Normocephalic, without obvious abnormality, atraumatic   Eyes:    PERRL, conjunctiva/corneas clear, EOM's intact   Ears:    Normal TM's and external ear canals, both ears   Nose:   Nares normal, mucosa normal, no drainage    or sinus tenderness   Throat:   Lips, mucosa, and tongue normal; teeth and gums normal   Neck:   Supple, symmetrical, trachea midline, no adenopathy;       Back:     Symmetric, no curvature, ROM normal but with pain, full range of motion.     Lungs:     Clear to auscultation bilaterally, respirations unlabored   Chest Wall:    No tenderness breasts surgically removed    Heart:    Regular rate and rhythm, S1 and S2 normal, no murmur, rub   or gallop       Abdomen:     Soft, non-tender, bowel sounds active all four quadrants,     no masses, no organomegaly           Extremities:   atraumatic, no cyanosis or edema clubbing of fingers of both  hands   Pulses:  2+ dorsalis pedis   Skin:   Skin color, texture, turgor normal, no rashes or lesions       Neurologic:   CNII-XII intact, normal strength, sensation            Assessment:     1. Anxiety state    2. Need for immunization against influenza    3. Acquired hypothyroidism    4. Essential hypertension    5. Mixed hyperlipidemia    6. Attention deficit " hyperactivity disorder (ADHD), predominantly inattentive type    7. Age-related osteoporosis without current pathological fracture    8. Rheumatoid arthritis involving both wrists with positive rheumatoid factor         Plan:     1.  Rx changes: Start buspar up to 3 times a day. Ativan will be decreased to 25 pills for the  month with transition to buspar.     2.  Education: Provided reassurance. Provided reassurance and Recommended medication management  3.  Compliance at present is estimated to be excellent.  4.  Labwork before leaving today.     5. Follow up: 3 months  6.  ASHLYN # [id number] appropriate 11/13/2017  7. Office to try to get Prolia for patient.            GOALS:Less anxiety  BARRIERS TO GOALS:  Chronicity and multiple medical problems    Preventative:  Recommended:Immunizations are up to date.   patient is a non smoker  does not drink  plan meals, eat breakfast and have 3 meals a day    RISK SCORE: 3         This document has been electronically signed by Olimpia Robison MD on November 13, 2017 2:42 PM

## 2017-11-17 ENCOUNTER — INFUSION (OUTPATIENT)
Dept: ONCOLOGY | Facility: HOSPITAL | Age: 62
End: 2017-11-17

## 2017-11-17 DIAGNOSIS — R73.9 HYPERGLYCEMIA: Primary | ICD-10-CM

## 2017-11-17 DIAGNOSIS — M81.0 AGE-RELATED OSTEOPOROSIS WITHOUT CURRENT PATHOLOGICAL FRACTURE: Primary | ICD-10-CM

## 2017-11-17 DIAGNOSIS — E03.9 ACQUIRED HYPOTHYROIDISM: ICD-10-CM

## 2017-11-17 PROCEDURE — 96372 THER/PROPH/DIAG INJ SC/IM: CPT | Performed by: HOSPITALIST

## 2017-11-17 PROCEDURE — 25010000002 DENOSUMAB 60 MG/ML SOLUTION: Performed by: FAMILY MEDICINE

## 2017-11-17 RX ADMIN — DENOSUMAB 60 MG: 60 INJECTION SUBCUTANEOUS at 08:18

## 2017-12-13 RX ORDER — FUROSEMIDE 20 MG/1
20 TABLET ORAL DAILY
Qty: 30 TABLET | Refills: 3 | Status: SHIPPED | OUTPATIENT
Start: 2017-12-13 | End: 2018-07-02 | Stop reason: SDUPTHER

## 2017-12-13 RX ORDER — BISOPROLOL FUMARATE 5 MG/1
5 TABLET, FILM COATED ORAL DAILY
Qty: 30 TABLET | Refills: 6 | Status: SHIPPED | OUTPATIENT
Start: 2017-12-13 | End: 2018-07-02 | Stop reason: SDUPTHER

## 2017-12-14 ENCOUNTER — LAB (OUTPATIENT)
Dept: LAB | Facility: HOSPITAL | Age: 62
End: 2017-12-14

## 2017-12-14 DIAGNOSIS — R73.9 HYPERGLYCEMIA: ICD-10-CM

## 2017-12-14 DIAGNOSIS — E03.9 ACQUIRED HYPOTHYROIDISM: ICD-10-CM

## 2017-12-14 LAB
HBA1C MFR BLD: 5.6 % (ref 4–5.6)
T4 FREE SERPL-MCNC: 1.09 NG/DL (ref 0.78–2.19)
TSH SERPL DL<=0.05 MIU/L-ACNC: 1.96 MIU/ML (ref 0.46–4.68)

## 2017-12-14 PROCEDURE — 84439 ASSAY OF FREE THYROXINE: CPT

## 2017-12-14 PROCEDURE — 83036 HEMOGLOBIN GLYCOSYLATED A1C: CPT

## 2017-12-14 PROCEDURE — 36415 COLL VENOUS BLD VENIPUNCTURE: CPT

## 2017-12-14 PROCEDURE — 84443 ASSAY THYROID STIM HORMONE: CPT

## 2017-12-19 RX ORDER — POLYETHYLENE GLYCOL 3350 17 G/17G
POWDER, FOR SOLUTION ORAL
Qty: 1530 G | Refills: 2 | Status: ON HOLD | OUTPATIENT
Start: 2017-12-19 | End: 2020-01-21

## 2017-12-26 ENCOUNTER — TELEPHONE (OUTPATIENT)
Dept: FAMILY MEDICINE CLINIC | Facility: CLINIC | Age: 62
End: 2017-12-26

## 2017-12-27 NOTE — TELEPHONE ENCOUNTER
This cannot be called into the pharmacy. Find out if she wants to get it when she comes to appointment on Thursday. Otherwise I can print it out today.          This document has been electronically signed by Olimpia Robison MD on December 27, 2017 9:19 AM

## 2017-12-28 ENCOUNTER — OFFICE VISIT (OUTPATIENT)
Dept: FAMILY MEDICINE CLINIC | Facility: CLINIC | Age: 62
End: 2017-12-28

## 2017-12-28 VITALS
DIASTOLIC BLOOD PRESSURE: 80 MMHG | OXYGEN SATURATION: 98 % | BODY MASS INDEX: 22.63 KG/M2 | TEMPERATURE: 98.9 F | SYSTOLIC BLOOD PRESSURE: 110 MMHG | HEART RATE: 97 BPM | WEIGHT: 123 LBS | HEIGHT: 62 IN

## 2017-12-28 DIAGNOSIS — F90.0 ATTENTION DEFICIT HYPERACTIVITY DISORDER (ADHD), PREDOMINANTLY INATTENTIVE TYPE: ICD-10-CM

## 2017-12-28 DIAGNOSIS — J10.1 INFLUENZA A: Primary | ICD-10-CM

## 2017-12-28 DIAGNOSIS — J06.9 ACUTE URI: ICD-10-CM

## 2017-12-28 LAB
EXPIRATION DATE: ABNORMAL
FLUAV AG NPH QL: POSITIVE
FLUBV AG NPH QL: NEGATIVE
INTERNAL CONTROL: ABNORMAL
Lab: ABNORMAL

## 2017-12-28 PROCEDURE — 99214 OFFICE O/P EST MOD 30 MIN: CPT | Performed by: FAMILY MEDICINE

## 2017-12-28 PROCEDURE — 87804 INFLUENZA ASSAY W/OPTIC: CPT | Performed by: FAMILY MEDICINE

## 2017-12-28 RX ORDER — GUAIFENESIN AND CODEINE PHOSPHATE 100; 10 MG/5ML; MG/5ML
10 SOLUTION ORAL 3 TIMES DAILY PRN
Qty: 180 ML | Refills: 1 | Status: SHIPPED | OUTPATIENT
Start: 2017-12-28 | End: 2018-01-29

## 2017-12-28 RX ORDER — METHYLPHENIDATE HYDROCHLORIDE 10 MG/1
10 TABLET ORAL EVERY 12 HOURS SCHEDULED
Qty: 60 TABLET | Refills: 0 | Status: SHIPPED | OUTPATIENT
Start: 2017-12-28 | End: 2018-01-29 | Stop reason: SDUPTHER

## 2017-12-28 NOTE — PROGRESS NOTES
Subjective:     Daysi Pa is a 62 y.o. female   Upper Respiratory Infection  Patient complains of symptoms of a URI. Symptoms include achiness, congestion, low grade fever and non productive cough. Onset of symptoms was 6 days ago, and has been unchanged since that time. Treatment to date: none. Patient had sick contact of her .  Patient did received influenza vaccination.        Patient also needs refills for her adhd which helps her focus. She is stable on ritalin.           Preventative:  Over the past 2 weeks, have you felt down, depressed, or hopeless?No   Over the past 2 weeks, have you felt little interest or pleasure in doing things?No  Clinical depression screening refused by patient.No     On osteoporosis therapy?No     The following portions of the patient's history were reviewed and updated as appropriate: allergies, current medications, past family history, past medical history, past social history, past surgical history and problem list.    Past Medical Hx:  Past Medical History:   Diagnosis Date   • Acquired hypothyroidism    • Allergic rhinitis    • Allergy 04/17/2016    Consult, Allergy (1) - Ordered By: BENIGNO LIM ([facility])    • Anxiety state    • Attention deficit hyperactivity disorder    • Benign essential hypertension    • Chronic pain    • Chronic pain disorder    • Congestive heart failure     primarily systrolic dysfunction EF 17-20% repeat echo 55%   • Depressive disorder    • Dyslipidemia    • Dysphagia    • Gastroesophageal reflux disease    • Generalized abdominal pain    • History of echocardiogram 03/23/2016    Echocardiogram W/ color flow 97546 (3) - Normal LV function wiht Ef of 60%.Normal RV size and function.Normal diastolic function.No significant valvular regurgitation or stenosis.   • History of echocardiogram 04/27/2012    Echocardiogram W/O color flow 69712 (1) - Normal left ventricular systolic function. EF 55%. No evidence of regional wall motion  abnormalities. Abnormal relaxation of the left ventricular myocardium.   • History of EKG 03/07/2016    EKG Tracing and Interpretation 51969 (3) - Ordered By: LILLIE BRADY ([facility])    • History of mammogram 06/05/2013    MAMMOGRAM SCREENING 00244 - [facility] (1) - birads 0    • Hyperlipidemia    • Intraductal carcinoma in situ of breast     hx in past and s/p bilateral simple mastectomies      • Irritable bowel syndrome    • Low back pain    • Malaise and fatigue    • Microalbuminuria 07/16/2015    POS MICROALBUMINURIA RESULT DOC AND REVIEWED 3060F (1) - Ordered By: BENIGNO LIM ([facility])   • Mitral valve regurgitation     Mild-Moderate      • Myopia    • Non-organic sleep disorder    • Osteoporosis    • Pain management 04/17/2016    Consult, Pain Management (1) - Ordered By: BENIGNO LIM ([facility])    • Pneumococcal vaccination indicated 07/08/2016    PNEUMOC VAC/ADMIN/RCVD 4040F (11) - Ordered By: BENIGNO LIM ([facility])   • Primary fibromyalgia syndrome    • Rheumatoid arthritis        Past Surgical Hx:  Past Surgical History:   Procedure Laterality Date   • APPENDECTOMY  2008    Appendectomy (1)   • BREAST BIOPSY  06/12/2013    Stereotactic breast biopsy (1) - stereotactic left mammotome biopsy with 11 gauge needle.   • BREAST IMPLANT REMOVAL  2003    Remove breast implant (1)   • BREAST IMPLANT SURGERY  2000    Breast implantation (1)   • COLONOSCOPY  01/20/2014    Colonoscopy, diagnostic (screening) 60066 (1)   • COLONOSCOPY W/ POLYPECTOMY  2008    Colonoscopy remove polyps 36616 (1)    • EXCISION LESION  05/01/2014    Remove chest wall lesion (1) - Excision of subcutaneous mass, left chest wall, Subcutaneous mass left chest wall, status post mastectomy.   • INJECTION OF MEDICATION  07/05/2016    B12 (44) - Ordered By: BENIGNO LIM ([facility])    • INJECTION OF MEDICATION  11/12/2015    Celestone (betamethasone) (1) - Ordered By: BENIGNO LIM ([facility])    • INJECTION OF MEDICATION  02/11/2016    Kenalog (2) - Ordered By: BENIGNO LIM ([facility])    • MASTECTOMY   09/05/2013    Mastectomy (2) - Right simple prophylactic mastectomy.   • OTHER SURGICAL HISTORY  10/29/2014    SONOGRAM THYROID, NECK 54843 (1) - Ordered By: BENIGNO LIM ([facility])   • PAP SMEAR  06/03/2013     PAP SMEAR (1)   • PARTIAL HYSTERECTOMY  1998     Partial hyst (1)     • TONSILLECTOMY  1969    Tonsillectomy (1)   • VASCULAR SURGERY  12/24/2014    Consult, Vascular Surgery (1) - Ordered By: BENIGNO LIM ([facility])        Health Maintenance:  Health Maintenance   Topic Date Due   • MEDICARE ANNUAL WELLNESS  11/10/2017   • LIPID PANEL  11/13/2018   • DXA SCAN  12/21/2018   • COLONOSCOPY  01/18/2020   • TDAP/TD VACCINES (2 - Td) 10/16/2024   • HEPATITIS C SCREENING  Completed   • INFLUENZA VACCINE  Completed   • ZOSTER VACCINE  Completed       Current Meds:    Current Outpatient Prescriptions:   •  albuterol (PROAIR HFA) 108 (90 BASE) MCG/ACT inhaler, 2 puffs 4 (four) times a day as needed., Disp: , Rfl:   •  AMITIZA 24 MCG capsule, TAKE 1 CAPSULE BY MOUTH 2 (TWO) TIMES A DAY., Disp: 180 capsule, Rfl: 3  •  bisoprolol (ZEBeta) 5 MG tablet, Take 1 tablet by mouth Daily., Disp: 30 tablet, Rfl: 6  •  busPIRone (BUSPAR) 10 MG tablet, Take 1 tablet by mouth 2 (Two) Times a Day As Needed (anxiety)., Disp: 60 tablet, Rfl: 5  •  desloratadine (CLARINEX) 5 MG tablet, Take 1 tablet by mouth Daily., Disp: 90 tablet, Rfl: 3  •  docusate sodium (COLACE) 100 MG capsule, Take 200 mg by mouth every night. at bedtime, Disp: , Rfl:   •  esomeprazole (nexIUM) 40 MG capsule, TAKE ONE CAPSULE BY MOUTH EVERY DAY, Disp: 90 capsule, Rfl: 3  •  furosemide (LASIX) 20 MG tablet, Take 1 tablet by mouth Daily., Disp: 30 tablet, Rfl: 3  •  gabapentin (NEURONTIN) 300 MG capsule, Take 1 capsule by mouth 2 (Two) Times a Day., Disp: 180 capsule, Rfl: 0  •  hydroxychloroquine (PLAQUENIL) 200 MG tablet, TAKE 1 TABLET BY MOUTH 2 (TWO) TIMES A DAY., Disp: 180 tablet, Rfl: 3  •  LORazepam (ATIVAN) 0.5 MG tablet, Take 1 tablet by mouth Daily., Disp: 25 tablet, Rfl:  2  •  methylphenidate (RITALIN) 10 MG tablet, Take 1 tablet by mouth Every 12 (Twelve) Hours., Disp: 60 tablet, Rfl: 0  •  metoclopramide (REGLAN) 10 MG tablet, Take 1 tablet by mouth Every 6 (Six) Hours As Needed (nausea)., Disp: 270 tablet, Rfl: 3  •  montelukast (SINGULAIR) 10 MG tablet, 10 mg daily., Disp: , Rfl:   •  OxyCODONE HCl 5 MG tablet , Take 1 tablet by mouth every 6 (six) hours as needed., Disp: , Rfl:   •  OxyCODONE HCl ER (OXYCONTIN) 30 MG tablet extended-release 12 hour, Take 30 mg by mouth 2 (two) times a day. OxyContin 30 mg tablet,crush resistant,extended release, Disp: , Rfl:   •  pitavastatin calcium (LIVALO) 2 MG tablet tablet, Take 1 tablet by mouth Every Night., Disp: 30 tablet, Rfl: 1  •  polyethylene glycol (MIRALAX) powder, TAKE 17 GRAMS TWICE A DAY, Disp: 1530 g, Rfl: 2  •  SYNTHROID 88 MCG tablet, TAKE 1 TABLET BY MOUTH EVERY DAY, Disp: 90 tablet, Rfl: 3  •  traZODone (DESYREL) 150 MG tablet, TAKE 1 TABLET BY MOUTH EVERY NIGHT. AT BEDTIME, Disp: 90 tablet, Rfl: 3  •  venlafaxine XR (EFFEXOR XR) 150 MG 24 hr capsule, Take 1 capsule by mouth Daily., Disp: 90 capsule, Rfl: 3  •  venlafaxine XR (EFFEXOR XR) 75 MG 24 hr capsule, Take 1 capsule by mouth Daily., Disp: 90 capsule, Rfl: 3  •  guaifenesin-codeine (GUAIFENESIN AC) 100-10 MG/5ML liquid, Take 10 mL by mouth 3 (Three) Times a Day As Needed for Cough., Disp: 180 mL, Rfl: 1    Current Facility-Administered Medications:   •  cyanocobalamin injection 1,000 mcg, 1,000 mcg, Intramuscular, Q28 Days, Olimpia Robison MD, 1,000 mcg at 11/13/17 0942    Allergies:  Cephalosporins; Morphine and related; Other; Penicillins; Shellfish-derived products; Zithromax [azithromycin]; and Sulfa antibiotics    Family Hx:  Family History   Problem Relation Age of Onset   • Breast cancer Sister    • Rectal cancer Other      Colorectal cancer   • Heart disease Other    • Hypertension Other    • Thyroid disease Other      Thyroid disorder   • Hypertension  "Mother    • Migraines Mother    • Osteoporosis Mother    • Diabetes Father    • Coronary artery disease Father    • Hyperlipidemia Father    • Cancer Maternal Aunt    • COPD Paternal Grandmother    • COPD Paternal Grandfather         Social History:  Social History     Social History   • Marital status:      Spouse name: N/A   • Number of children: N/A   • Years of education: N/A     Occupational History   • Not on file.     Social History Main Topics   • Smoking status: Former Smoker   • Smokeless tobacco: Never Used      Comment: quit 2010   • Alcohol use No   • Drug use: No   • Sexual activity: Defer     Other Topics Concern   • Not on file     Social History Narrative       Review of Systems  Review of Systems   Constitutional: Positive for chills, fatigue, fever and unexpected weight change.   HENT: Negative for ear pain and sore throat.    Eyes: Negative for pain and visual disturbance.   Respiratory: Positive for cough. Negative for shortness of breath.    Cardiovascular: Negative for chest pain and palpitations.   Gastrointestinal: Negative for abdominal pain and nausea.   Genitourinary: Negative for dysuria.   Musculoskeletal: Positive for arthralgias, back pain, joint swelling and myalgias.   Skin: Negative for rash.   Neurological: Negative for dizziness, weakness and headaches.   Psychiatric/Behavioral: Negative for sleep disturbance. The patient is nervous/anxious.          Objective:     /80  Pulse 97  Temp 98.9 °F (37.2 °C)  Ht 157.5 cm (62\")  Wt 55.8 kg (123 lb)  SpO2 98%  BMI 22.5 kg/m2      General Appearance:    Alert, cooperative, no distress, appears stated age, appears ill   Head:    Normocephalic, without obvious abnormality, atraumatic   Eyes:    PERRL, conjunctiva/corneas clear, EOM's intact   Ears:    Normal TM's and external ear canals, both ears   Nose:   Nares normal, mucosa normal, no drainage    or sinus tenderness   Throat:   Lips, mucosa, and tongue normal; teeth " and gums normal, slightly dry oral mucosa   Neck:   Supple, symmetrical, trachea midline, no adenopathy;       Back:     Symmetric, no curvature, ROM normal but with pain, full range of motion.     Lungs:     Clear to auscultation bilaterally, respirations unlabored   Chest Wall:    No tenderness breasts surgically removed    Heart:    Regular rate and rhythm, S1 and S2 normal, no murmur, rub   or gallop       Abdomen:     Soft, non-tender, bowel sounds active all four quadrants,     no masses, no organomegaly           Extremities:   atraumatic, no cyanosis or edema clubbing of fingers of both  hands   Pulses:  2+ dorsalis pedis   Skin:   Skin color, texture, turgor normal, no rashes or lesions       Neurologic:   CNII-XII intact, normal strength, sensation            Assessment:     1. Influenza A    2. Attention deficit hyperactivity disorder (ADHD), predominantly inattentive type    3. Acute URI         Plan:     1.  Rx changes: Patient is outside of the window for tamiflu. Codeine cough syrup and supportive care.  Refilled ritalin.    2.  Education: Provided reassurance. Provided reassurance and Recommended medication management  3.  Compliance at present is estimated to be excellent.  4.   Follow up: as scheduled  5.   ASHLYN # [id number] appropriate 12/26/2017      GOALS:Less body aches  BARRIERS TO GOALS:  Acute illness  Preventative:  Recommended medicare wellness, subsequent visit.   Recommended:Immunizations are up to date.   patient is a non smoker  does not drink  plan meals, eat breakfast and have 3 meals a day    RISK SCORE: 3         This document has been electronically signed by Olimpia Robison MD on December 28, 2017 1:45 PM

## 2018-01-11 ENCOUNTER — TELEPHONE (OUTPATIENT)
Dept: FAMILY MEDICINE CLINIC | Facility: CLINIC | Age: 63
End: 2018-01-11

## 2018-01-11 NOTE — TELEPHONE ENCOUNTER
Patient called and requested a refill on her Livalo to [facility]. She requested that we send in a 90 day supply because that is what ins prefers.     Thank You.

## 2018-01-29 ENCOUNTER — OFFICE VISIT (OUTPATIENT)
Dept: FAMILY MEDICINE CLINIC | Facility: CLINIC | Age: 63
End: 2018-01-29

## 2018-01-29 VITALS
BODY MASS INDEX: 22.08 KG/M2 | HEART RATE: 89 BPM | SYSTOLIC BLOOD PRESSURE: 138 MMHG | HEIGHT: 62 IN | WEIGHT: 120 LBS | DIASTOLIC BLOOD PRESSURE: 82 MMHG | OXYGEN SATURATION: 98 %

## 2018-01-29 DIAGNOSIS — Z87.891 PERSONAL HISTORY OF TOBACCO USE: ICD-10-CM

## 2018-01-29 DIAGNOSIS — Z00.00 MEDICARE ANNUAL WELLNESS VISIT, SUBSEQUENT: Primary | ICD-10-CM

## 2018-01-29 DIAGNOSIS — Z79.899 LONG-TERM USE OF HIGH-RISK MEDICATION: ICD-10-CM

## 2018-01-29 DIAGNOSIS — F90.0 ATTENTION DEFICIT HYPERACTIVITY DISORDER (ADHD), PREDOMINANTLY INATTENTIVE TYPE: ICD-10-CM

## 2018-01-29 PROCEDURE — G0439 PPPS, SUBSEQ VISIT: HCPCS | Performed by: FAMILY MEDICINE

## 2018-01-29 RX ORDER — METHYLPHENIDATE HYDROCHLORIDE 10 MG/1
10 TABLET ORAL EVERY 12 HOURS SCHEDULED
Qty: 60 TABLET | Refills: 0 | Status: SHIPPED | OUTPATIENT
Start: 2018-01-29 | End: 2018-02-16 | Stop reason: SDUPTHER

## 2018-01-29 NOTE — PROGRESS NOTES
QUICK REFERENCE INFORMATION:  The ABCs of the Annual Wellness Visit    Subsequent Medicare Wellness Visit    HEALTH RISK ASSESSMENT    1955    Recent Hospitalizations:  No hospitalization(s) within the last year..        Current Medical Providers:  Patient Care Team:  Olimpia Robison MD as PCP - General  Stuart Sears MD PhD as Consulting Physician (Cardiology)  Gordon Tapia MD as Anesthesiologist (Pain Medicine)  Manny Santos MD as Surgeon (General Surgery)  Jay Aragon MD as Consulting Physician (Gastroenterology)        Smoking Status:  History   Smoking Status   • Former Smoker   Smokeless Tobacco   • Never Used     Comment: quit 2010       Alcohol Consumption:  History   Alcohol Use No       Depression Screen:   PHQ-2/PHQ-9 Depression Screening 1/29/2018   Little interest or pleasure in doing things 0   Feeling down, depressed, or hopeless 0   Trouble falling or staying asleep, or sleeping too much 1   Feeling tired or having little energy 2   Poor appetite or overeating 0   Feeling bad about yourself - or that you are a failure or have let yourself or your family down 0   Trouble concentrating on things, such as reading the newspaper or watching television 0   Moving or speaking so slowly that other people could have noticed. Or the opposite - being so fidgety or restless that you have been moving around a lot more than usual 0   Thoughts that you would be better off dead, or of hurting yourself in some way 0   Total Score 3   If you checked off any problems, how difficult have these problems made it for you to do your work, take care of things at home, or get along with other people? Not difficult at all       Health Habits and Functional and Cognitive Screening:  Functional & Cognitive Status 1/29/2018   Do  you have difficulty preparing food and eating? No   Do you have difficulty bathing yourself, getting dressed or grooming yourself? No   Do you have difficulty using the toilet? No   Do you have difficulty moving around from place to place? No   Do you have trouble with steps or getting out of a bed or a chair? No   In the past year have you fallen or experienced a near fall? No   Do you need help using the phone?  No   Are you deaf or do you have serious difficulty hearing?  No   Do you need help with transportation? No   Do you need help shopping? No   Do you need help preparing meals?  No   Do you need help with housework?  No   Do you need help with laundry? No   Do you need help taking your medications? No   Do you need help managing money? No   Do you have difficulty concentrating, remembering or making decisions? -           Does the patient have evidence of cognitive impairment? No    Aspirin use counseling: Does not need ASA (and currently is not on it)      Recent Lab Results:  CMP:  Lab Results   Component Value Date    BUN 8 11/13/2017    CREATININE 0.88 11/13/2017    EGFRIFNONA 65 11/13/2017    BCR 9.1 11/13/2017     (L) 11/13/2017    K 3.5 11/13/2017    CO2 30.0 11/13/2017    CALCIUM 9.4 11/13/2017    ALBUMIN 4.40 11/13/2017    LABIL2 1.4 11/13/2017    BILITOT 0.3 11/13/2017    ALKPHOS 82 11/13/2017    AST 30 11/13/2017    ALT 31 11/13/2017     Lipid Panel:  Lab Results   Component Value Date    CHOL 257 (H) 11/13/2017    TRIG 380 (H) 11/13/2017    HDL 52 (L) 11/13/2017    LDLCALC 101 09/22/2016    LDLDIRECT 155 (H) 11/13/2017    LDLHDL 2.48 11/13/2017     HbA1c:  Lab Results   Component Value Date    HGBA1C 5.6 12/14/2017       Visual Acuity:  No exam data present   Patient scheduled to see Ophthalmologist, last eye exam 11/2016    Age-appropriate Screening Schedule:  Refer to the list below for future screening recommendations based on patient's age, sex and/or medical conditions. Orders for  these recommended tests are listed in the plan section. The patient has been provided with a written plan.    Health Maintenance   Topic Date Due   • LIPID PANEL  11/13/2018   • DXA SCAN  12/21/2018   • COLONOSCOPY  01/18/2020   • TDAP/TD VACCINES (2 - Td) 10/16/2024   • INFLUENZA VACCINE  Completed   • ZOSTER VACCINE  Completed        Subjective   History of Present Illness    Daysi Pa is a 62 y.o. female who presents for an Subsequent Wellness Visit.    The following portions of the patient's history were reviewed and updated as appropriate: allergies, current medications, past family history, past medical history, past social history, past surgical history and problem list.    Outpatient Medications Prior to Visit   Medication Sig Dispense Refill   • albuterol (PROAIR HFA) 108 (90 BASE) MCG/ACT inhaler 2 puffs 4 (four) times a day as needed.     • AMITIZA 24 MCG capsule TAKE 1 CAPSULE BY MOUTH 2 (TWO) TIMES A DAY. 180 capsule 3   • bisoprolol (ZEBeta) 5 MG tablet Take 1 tablet by mouth Daily. 30 tablet 6   • busPIRone (BUSPAR) 10 MG tablet Take 1 tablet by mouth 2 (Two) Times a Day As Needed (anxiety). 60 tablet 5   • desloratadine (CLARINEX) 5 MG tablet Take 1 tablet by mouth Daily. 90 tablet 3   • docusate sodium (COLACE) 100 MG capsule Take 200 mg by mouth every night. at bedtime     • esomeprazole (nexIUM) 40 MG capsule TAKE ONE CAPSULE BY MOUTH EVERY DAY 90 capsule 3   • furosemide (LASIX) 20 MG tablet Take 1 tablet by mouth Daily. 30 tablet 3   • gabapentin (NEURONTIN) 300 MG capsule Take 1 capsule by mouth 2 (Two) Times a Day. 180 capsule 0   • hydroxychloroquine (PLAQUENIL) 200 MG tablet TAKE 1 TABLET BY MOUTH 2 (TWO) TIMES A DAY. 180 tablet 3   • LORazepam (ATIVAN) 0.5 MG tablet Take 1 tablet by mouth Daily. 25 tablet 2   • metoclopramide (REGLAN) 10 MG tablet Take 1 tablet by mouth Every 6 (Six) Hours As Needed (nausea). 270 tablet 3   • montelukast (SINGULAIR) 10 MG tablet 10 mg  daily.     • OxyCODONE HCl 5 MG tablet  Take 1 tablet by mouth every 6 (six) hours as needed.     • OxyCODONE HCl ER (OXYCONTIN) 30 MG tablet extended-release 12 hour Take 30 mg by mouth 2 (two) times a day. OxyContin 30 mg tablet,crush resistant,extended release     • pitavastatin calcium (LIVALO) 2 MG tablet tablet Take 1 tablet by mouth Every Night. 90 tablet 3   • polyethylene glycol (MIRALAX) powder TAKE 17 GRAMS TWICE A DAY 1530 g 2   • SYNTHROID 88 MCG tablet TAKE 1 TABLET BY MOUTH EVERY DAY (Patient taking differently: TAKE 1 TABLET BY MOUTH EVERY DAY (name brand only)) 90 tablet 3   • traZODone (DESYREL) 150 MG tablet TAKE 1 TABLET BY MOUTH EVERY NIGHT. AT BEDTIME 90 tablet 3   • venlafaxine XR (EFFEXOR XR) 150 MG 24 hr capsule Take 1 capsule by mouth Daily. 90 capsule 3   • venlafaxine XR (EFFEXOR XR) 75 MG 24 hr capsule Take 1 capsule by mouth Daily. 90 capsule 3   • methylphenidate (RITALIN) 10 MG tablet Take 1 tablet by mouth Every 12 (Twelve) Hours. 60 tablet 0   • guaifenesin-codeine (GUAIFENESIN AC) 100-10 MG/5ML liquid Take 10 mL by mouth 3 (Three) Times a Day As Needed for Cough. 180 mL 1     Facility-Administered Medications Prior to Visit   Medication Dose Route Frequency Provider Last Rate Last Dose   • cyanocobalamin injection 1,000 mcg  1,000 mcg Intramuscular Q28 Days Olimpia Robison MD   1,000 mcg at 11/13/17 0942       Patient Active Problem List   Diagnosis   • Heart failure   • Hyperlipidemia   • Hypertension   • Mitral regurgitation   • Rheumatoid arthritis   • Primary fibromyalgia syndrome   • Osteoporosis   • Malaise and fatigue   • Irritable bowel syndrome   • Intraductal carcinoma in situ of breast   • Gastroesophageal reflux disease   • Dyslipidemia   • Depressive disorder   • Congestive heart failure   • Chronic pain   • Attention deficit hyperactivity disorder   • Anxiety state   • Allergic rhinitis   • Acquired hypothyroidism   • Hypotension   • Chronic low back pain   •  "Lumbosacral spondylosis without myelopathy   • Myofacial muscle pain   • Long term (current) use of opiate analgesic   • Chest pain, non-cardiac   • History of cardiomyopathy       Advance Care Planning:  has NO advance directive - information provided to the patient today    Identification of Risk Factors:  Risk factors include: weight , increased fall risk, chronic pain and polypharmacy.    Review of Systems    Compared to one year ago, the patient feels her physical health is worse.  Compared to one year ago, the patient feels her mental health is the same.    Objective     Physical Exam    Vitals:    01/29/18 1529   BP: 138/82   BP Location: Right arm   Cuff Size: Adult   Pulse: 89   SpO2: 98%   Weight: 54.4 kg (120 lb)   Height: 157.5 cm (62.01\")   PainSc:   6   PainLoc: Generalized       Body mass index is 21.94 kg/(m^2).  Discussed the patient's BMI with her. BMI is within normal parameters. No follow-up required.    Assessment/Plan   Patient Self-Management and Personalized Health Advice  The patient has been provided with information about: designing advance directives and preventive services including:   · Advance directive.    Visit Diagnoses:    ICD-10-CM ICD-9-CM   1. Medicare annual wellness visit, subsequent Z00.00 V70.0   2. Personal history of tobacco use Z87.891 V15.82   3. Long-term use of high-risk medication Z79.899 V58.69   4. Attention deficit hyperactivity disorder (ADHD), predominantly inattentive type F90.0 314.00       Orders Placed This Encounter   Procedures   • CT Chest Low Dose Wo     Standing Status:   Future     Standing Expiration Date:   1/29/2019   • Ambulatory Referral to Ophthalmology     Referral Priority:   Routine     Referral Type:   Consultation     Referral Reason:   Specialty Services Required     Referred to Provider:   Cesar Cordon MD     Requested Specialty:   Ophthalmology   Patient has a 30 pack year history of smoking. Quit in 2010. She would be willing to " have further studies if required if something found on low dose CT.   She has been given the [facility] brochure on Low Dose Lung CT.     Outpatient Encounter Prescriptions as of 1/29/2018   Medication Sig Dispense Refill   • albuterol (PROAIR HFA) 108 (90 BASE) MCG/ACT inhaler 2 puffs 4 (four) times a day as needed.     • AMITIZA 24 MCG capsule TAKE 1 CAPSULE BY MOUTH 2 (TWO) TIMES A DAY. 180 capsule 3   • bisoprolol (ZEBeta) 5 MG tablet Take 1 tablet by mouth Daily. 30 tablet 6   • busPIRone (BUSPAR) 10 MG tablet Take 1 tablet by mouth 2 (Two) Times a Day As Needed (anxiety). 60 tablet 5   • desloratadine (CLARINEX) 5 MG tablet Take 1 tablet by mouth Daily. 90 tablet 3   • docusate sodium (COLACE) 100 MG capsule Take 200 mg by mouth every night. at bedtime     • esomeprazole (nexIUM) 40 MG capsule TAKE ONE CAPSULE BY MOUTH EVERY DAY 90 capsule 3   • furosemide (LASIX) 20 MG tablet Take 1 tablet by mouth Daily. 30 tablet 3   • gabapentin (NEURONTIN) 300 MG capsule Take 1 capsule by mouth 2 (Two) Times a Day. 180 capsule 0   • hydroxychloroquine (PLAQUENIL) 200 MG tablet TAKE 1 TABLET BY MOUTH 2 (TWO) TIMES A DAY. 180 tablet 3   • LORazepam (ATIVAN) 0.5 MG tablet Take 1 tablet by mouth Daily. 25 tablet 2   • methylphenidate (RITALIN) 10 MG tablet Take 1 tablet by mouth Every 12 (Twelve) Hours. 60 tablet 0   • metoclopramide (REGLAN) 10 MG tablet Take 1 tablet by mouth Every 6 (Six) Hours As Needed (nausea). 270 tablet 3   • montelukast (SINGULAIR) 10 MG tablet 10 mg daily.     • OxyCODONE HCl 5 MG tablet  Take 1 tablet by mouth every 6 (six) hours as needed.     • OxyCODONE HCl ER (OXYCONTIN) 30 MG tablet extended-release 12 hour Take 30 mg by mouth 2 (two) times a day. OxyContin 30 mg tablet,crush resistant,extended release     • pitavastatin calcium (LIVALO) 2 MG tablet tablet Take 1 tablet by mouth Every Night. 90 tablet 3   • polyethylene glycol (MIRALAX) powder TAKE 17 GRAMS TWICE A DAY 1530 g 2   •  SYNTHROID 88 MCG tablet TAKE 1 TABLET BY MOUTH EVERY DAY (Patient taking differently: TAKE 1 TABLET BY MOUTH EVERY DAY (name brand only)) 90 tablet 3   • traZODone (DESYREL) 150 MG tablet TAKE 1 TABLET BY MOUTH EVERY NIGHT. AT BEDTIME 90 tablet 3   • venlafaxine XR (EFFEXOR XR) 150 MG 24 hr capsule Take 1 capsule by mouth Daily. 90 capsule 3   • venlafaxine XR (EFFEXOR XR) 75 MG 24 hr capsule Take 1 capsule by mouth Daily. 90 capsule 3   • [DISCONTINUED] methylphenidate (RITALIN) 10 MG tablet Take 1 tablet by mouth Every 12 (Twelve) Hours. 60 tablet 0   • [DISCONTINUED] guaifenesin-codeine (GUAIFENESIN AC) 100-10 MG/5ML liquid Take 10 mL by mouth 3 (Three) Times a Day As Needed for Cough. 180 mL 1     Facility-Administered Encounter Medications as of 1/29/2018   Medication Dose Route Frequency Provider Last Rate Last Dose   • cyanocobalamin injection 1,000 mcg  1,000 mcg Intramuscular Q28 Days Olimpia Robison MD   1,000 mcg at 11/13/17 0942     [facility] #[id number] 1/29/2018 appropriate with stated history     Reviewed use of high risk medication in the elderly: yes  Reviewed for potential of harmful drug interactions in the elderly: yes    Follow Up:  Return in about 1 year (around 1/29/2019) for Medicare Wellness.     An After Visit Summary and PPPS with all of these plans were given to the patient.  Please refer to scanned documents for full details of office visit.    Goals Addressed        Patient Stated    • Exercise 150 minutes per week (moderate activity) (pt-stated)                  Barriers to goal: RA and chronic pain        Risk Score 3        This document has been electronically signed by Olimpia Robison MD on January 30, 2018 7:34 AM

## 2018-02-06 ENCOUNTER — APPOINTMENT (OUTPATIENT)
Dept: CT IMAGING | Facility: HOSPITAL | Age: 63
End: 2018-02-06
Attending: FAMILY MEDICINE

## 2018-02-14 ENCOUNTER — APPOINTMENT (OUTPATIENT)
Dept: CT IMAGING | Facility: HOSPITAL | Age: 63
End: 2018-02-14
Attending: FAMILY MEDICINE

## 2018-02-16 ENCOUNTER — OFFICE VISIT (OUTPATIENT)
Dept: FAMILY MEDICINE CLINIC | Facility: CLINIC | Age: 63
End: 2018-02-16

## 2018-02-16 VITALS
SYSTOLIC BLOOD PRESSURE: 120 MMHG | HEART RATE: 77 BPM | OXYGEN SATURATION: 98 % | DIASTOLIC BLOOD PRESSURE: 70 MMHG | HEIGHT: 62 IN | WEIGHT: 120 LBS | BODY MASS INDEX: 22.08 KG/M2

## 2018-02-16 DIAGNOSIS — F90.0 ATTENTION DEFICIT HYPERACTIVITY DISORDER (ADHD), PREDOMINANTLY INATTENTIVE TYPE: ICD-10-CM

## 2018-02-16 DIAGNOSIS — F41.1 ANXIETY STATE: Primary | ICD-10-CM

## 2018-02-16 PROCEDURE — 99214 OFFICE O/P EST MOD 30 MIN: CPT | Performed by: FAMILY MEDICINE

## 2018-02-16 RX ORDER — METHYLPHENIDATE HYDROCHLORIDE 10 MG/1
10 TABLET ORAL EVERY 12 HOURS SCHEDULED
Qty: 60 TABLET | Refills: 0 | Status: SHIPPED | OUTPATIENT
Start: 2018-02-16 | End: 2018-03-22 | Stop reason: SDUPTHER

## 2018-02-16 NOTE — PROGRESS NOTES
Subjective:     Daysi Pa is a 62 y.o. female who presents for follow up of ADHD. She is currently taking ritalin which helps her focus to complete her daily tasks.  She feels the medication is working well.      She continues to follow pain management in [place], [place]. The group she sees will be coming to [place]. She is interested in transferring back to [facility].      Anxiety  Patient is here for evaluation of anxiety.  She has the following anxiety symptoms: none. Onset of symptoms was approximately several years ago.  Symptoms have been stable since that time. She denies current suicidal and homicidal ideation. Family history significant for no psychiatric illness.Possible organic causes contributing are: none. Risk factors: negative life event cancer Previous treatment includes medication Ativan.   She complains of the following medication side effects: none. She feels she is doing well with her anxiety at this point.      The following portions of the patient's history were reviewed and updated as appropriate: allergies, current medications, past family history, past medical history, past social history, past surgical history and problem list.    Past Medical Hx:  Past Medical History:   Diagnosis Date   • Acquired hypothyroidism    • Allergic rhinitis    • Allergy 04/17/2016    Consult, Allergy (1) - Ordered By: BENIGNO LIM ([facility])    • Anxiety state    • Attention deficit hyperactivity disorder    • Benign essential hypertension    • Chronic pain    • Chronic pain disorder    • Congestive heart failure     primarily systrolic dysfunction EF 17-20% repeat echo 55%   • Depressive disorder    • Dyslipidemia    • Dysphagia    • Gastroesophageal reflux disease    • Generalized abdominal pain    • History of echocardiogram 03/23/2016    Echocardiogram W/ color flow 29685 (3) - Normal LV function wiht Ef of 60%.Normal RV size and function.Normal diastolic function.No significant valvular  regurgitation or stenosis.   • History of echocardiogram 04/27/2012    Echocardiogram W/O color flow 82448 (1) - Normal left ventricular systolic function. EF 55%. No evidence of regional wall motion abnormalities. Abnormal relaxation of the left ventricular myocardium.   • History of EKG 03/07/2016    EKG Tracing and Interpretation 44137 (3) - Ordered By: LILLIE BRADY ([facility])    • History of mammogram 06/05/2013    MAMMOGRAM SCREENING 36603 - [facility] (1) - birads 0    • Hyperlipidemia    • Intraductal carcinoma in situ of breast     hx in past and s/p bilateral simple mastectomies      • Irritable bowel syndrome    • Low back pain    • Malaise and fatigue    • Microalbuminuria 07/16/2015    POS MICROALBUMINURIA RESULT DOC AND REVIEWED 3060F (1) - Ordered By: BENIGNO LIM ([facility])   • Mitral valve regurgitation     Mild-Moderate      • Myopia    • Non-organic sleep disorder    • Osteoporosis    • Pain management 04/17/2016    Consult, Pain Management (1) - Ordered By: BENIGNO LIM ([facility])    • Pneumococcal vaccination indicated 07/08/2016    PNEUMOC VAC/ADMIN/RCVD 4040F (11) - Ordered By: BENIGNO LIM ([facility])   • Primary fibromyalgia syndrome    • Rheumatoid arthritis        Past Surgical Hx:  Past Surgical History:   Procedure Laterality Date   • APPENDECTOMY  2008    Appendectomy (1)   • BREAST BIOPSY  06/12/2013    Stereotactic breast biopsy (1) - stereotactic left mammotome biopsy with 11 gauge needle.   • BREAST IMPLANT REMOVAL  2003    Remove breast implant (1)   • BREAST IMPLANT SURGERY  2000    Breast implantation (1)   • COLONOSCOPY  01/20/2014    Colonoscopy, diagnostic (screening) 46045 (1)   • COLONOSCOPY W/ POLYPECTOMY  2008    Colonoscopy remove polyps 69981 (1)    • EXCISION LESION  05/01/2014    Remove chest wall lesion (1) - Excision of subcutaneous mass, left chest wall, Subcutaneous mass left chest wall, status post mastectomy.   • INJECTION OF MEDICATION  07/05/2016    B12 (44) - Ordered By: BENIGNO LIM ([facility])     • INJECTION OF MEDICATION  11/12/2015    Celestone (betamethasone) (1) - Ordered By: BENIGNO LIM ([facility])    • INJECTION OF MEDICATION  02/11/2016    Kenalog (2) - Ordered By: BENIGNO LIM ([facility])    • MASTECTOMY  09/05/2013    Mastectomy (2) - Right simple prophylactic mastectomy.   • OTHER SURGICAL HISTORY  10/29/2014    SONOGRAM THYROID, NECK 92928 (1) - Ordered By: BENIGNO LIM ([facility])   • PAP SMEAR  06/03/2013     PAP SMEAR (1)   • PARTIAL HYSTERECTOMY  1998     Partial hyst (1)     • TONSILLECTOMY  1969    Tonsillectomy (1)   • VASCULAR SURGERY  12/24/2014    Consult, Vascular Surgery (1) - Ordered By: BENIGNO LIM ([facility])        Health Maintenance:  Health Maintenance   Topic Date Due   • LIPID PANEL  11/13/2018   • DXA SCAN  12/21/2018   • MEDICARE ANNUAL WELLNESS  01/29/2019   • COLONOSCOPY  01/18/2020   • TDAP/TD VACCINES (2 - Td) 10/16/2024   • HEPATITIS C SCREENING  Completed   • INFLUENZA VACCINE  Completed   • ZOSTER VACCINE  Completed       Current Meds:    Current Outpatient Prescriptions:   •  albuterol (PROAIR HFA) 108 (90 BASE) MCG/ACT inhaler, 2 puffs 4 (four) times a day as needed., Disp: , Rfl:   •  AMITIZA 24 MCG capsule, TAKE 1 CAPSULE BY MOUTH 2 (TWO) TIMES A DAY., Disp: 180 capsule, Rfl: 3  •  bisoprolol (ZEBeta) 5 MG tablet, Take 1 tablet by mouth Daily., Disp: 30 tablet, Rfl: 6  •  busPIRone (BUSPAR) 10 MG tablet, Take 1 tablet by mouth 2 (Two) Times a Day As Needed (anxiety)., Disp: 60 tablet, Rfl: 5  •  desloratadine (CLARINEX) 5 MG tablet, Take 1 tablet by mouth Daily., Disp: 90 tablet, Rfl: 3  •  docusate sodium (COLACE) 100 MG capsule, Take 200 mg by mouth every night. at bedtime, Disp: , Rfl:   •  esomeprazole (nexIUM) 40 MG capsule, TAKE ONE CAPSULE BY MOUTH EVERY DAY, Disp: 90 capsule, Rfl: 3  •  furosemide (LASIX) 20 MG tablet, Take 1 tablet by mouth Daily., Disp: 30 tablet, Rfl: 3  •  gabapentin (NEURONTIN) 300 MG capsule, Take 1 capsule by mouth 2 (Two) Times a Day., Disp: 180 capsule, Rfl: 0  •   hydroxychloroquine (PLAQUENIL) 200 MG tablet, TAKE 1 TABLET BY MOUTH 2 (TWO) TIMES A DAY., Disp: 180 tablet, Rfl: 3  •  LORazepam (ATIVAN) 0.5 MG tablet, Take 1 tablet by mouth Daily., Disp: 25 tablet, Rfl: 2  •  methylphenidate (RITALIN) 10 MG tablet, Take 1 tablet by mouth Every 12 (Twelve) Hours., Disp: 60 tablet, Rfl: 0  •  metoclopramide (REGLAN) 10 MG tablet, Take 1 tablet by mouth Every 6 (Six) Hours As Needed (nausea)., Disp: 270 tablet, Rfl: 3  •  montelukast (SINGULAIR) 10 MG tablet, 10 mg daily., Disp: , Rfl:   •  OxyCODONE HCl 5 MG tablet , Take 1 tablet by mouth every 6 (six) hours as needed., Disp: , Rfl:   •  OxyCODONE HCl ER (OXYCONTIN) 30 MG tablet extended-release 12 hour, Take 30 mg by mouth 2 (two) times a day. OxyContin 30 mg tablet,crush resistant,extended release, Disp: , Rfl:   •  pitavastatin calcium (LIVALO) 2 MG tablet tablet, Take 1 tablet by mouth Every Night., Disp: 90 tablet, Rfl: 3  •  polyethylene glycol (MIRALAX) powder, TAKE 17 GRAMS TWICE A DAY, Disp: 1530 g, Rfl: 2  •  SYNTHROID 88 MCG tablet, TAKE 1 TABLET BY MOUTH EVERY DAY (Patient taking differently: TAKE 1 TABLET BY MOUTH EVERY DAY (name brand only)), Disp: 90 tablet, Rfl: 3  •  traZODone (DESYREL) 150 MG tablet, TAKE 1 TABLET BY MOUTH EVERY NIGHT. AT BEDTIME, Disp: 90 tablet, Rfl: 3  •  venlafaxine XR (EFFEXOR XR) 150 MG 24 hr capsule, Take 1 capsule by mouth Daily., Disp: 90 capsule, Rfl: 3  •  venlafaxine XR (EFFEXOR XR) 75 MG 24 hr capsule, Take 1 capsule by mouth Daily., Disp: 90 capsule, Rfl: 3    Current Facility-Administered Medications:   •  cyanocobalamin injection 1,000 mcg, 1,000 mcg, Intramuscular, Q28 Days, Olimpia Robison MD, 1,000 mcg at 11/13/17 0942    Allergies:  Cephalosporins; Morphine and related; Other; Penicillins; Shellfish-derived products; Zithromax [azithromycin]; and Sulfa antibiotics    Family Hx:  Family History   Problem Relation Age of Onset   • Breast cancer Sister    • Rectal cancer Other  "     Colorectal cancer   • Heart disease Other    • Hypertension Other    • Thyroid disease Other      Thyroid disorder   • Hypertension Mother    • Migraines Mother    • Osteoporosis Mother    • Diabetes Father    • Coronary artery disease Father    • Hyperlipidemia Father    • Cancer Maternal Aunt    • COPD Paternal Grandmother    • COPD Paternal Grandfather         Social History:  Social History     Social History   • Marital status:      Spouse name: N/A   • Number of children: N/A   • Years of education: N/A     Occupational History   • Not on file.     Social History Main Topics   • Smoking status: Former Smoker     Years: 30.00   • Smokeless tobacco: Never Used      Comment: PT SMOKED FOR 30 YEARS AND QUIT IN 2010   • Alcohol use No   • Drug use: No   • Sexual activity: Defer     Other Topics Concern   • Not on file     Social History Narrative       Review of Systems  Review of Systems   Constitutional: Positive for fatigue and unexpected weight change. Negative for fever.   HENT: Negative for ear pain and sore throat.    Eyes: Negative for pain and visual disturbance.   Respiratory: Negative for cough and shortness of breath.    Cardiovascular: Negative for chest pain and palpitations.   Gastrointestinal: Negative for abdominal pain and nausea.   Genitourinary: Negative for dysuria.   Musculoskeletal: Positive for arthralgias, back pain, joint swelling and myalgias.   Skin: Negative for rash.   Neurological: Negative for dizziness, weakness and headaches.   Psychiatric/Behavioral: Negative for sleep disturbance. The patient is nervous/anxious.          Objective:     /70 (BP Location: Right arm, Cuff Size: Adult)  Pulse 77  Ht 157.5 cm (62.01\")  Wt 54.4 kg (120 lb)  SpO2 98%  BMI 21.94 kg/m2      General Appearance:    Alert, cooperative, no distress, appears stated age   Head:    Normocephalic, without obvious abnormality, atraumatic   Eyes:    PERRL, conjunctiva/corneas clear, EOM's " intact   Ears:    Normal TM's and external ear canals, both ears   Nose:   Nares normal, mucosa normal, no drainage    or sinus tenderness   Throat:   Lips, mucosa, and tongue normal; teeth and gums normal   Neck:   Supple, symmetrical, trachea midline, no adenopathy;       Back:     Symmetric, no curvature, ROM normal but with pain, full range of motion.     Lungs:     Clear to auscultation bilaterally, respirations unlabored   Chest Wall:    No tenderness breasts surgically removed    Heart:    Regular rate and rhythm, S1 and S2 normal, no murmur, rub   or gallop       Abdomen:     Soft, non-tender, bowel sounds active all four quadrants,     no masses, no organomegaly           Extremities:   atraumatic, no cyanosis or edema, clubbing of fingers of both  hands   Pulses:  2+ dorsalis pedis   Skin:   Skin color, texture, turgor normal, no rashes or lesions       Neurologic:   CNII-XII intact, normal strength, sensation            Assessment:     1. Anxiety state    2. Attention deficit hyperactivity disorder (ADHD), predominantly inattentive type         Plan:     1.  Rx changes: Continue current dose of ritalin.    2.  Education: Provided reassurance. Provided reassurance and Recommended medication management  3.  Compliance at present is estimated to be excellent.  4. Follow up: 3 months and as needed  5.  AS[facility] # [id number] appropriate 02/15/2018            GOALS:Less anxiety  BARRIERS TO GOALS:  Chronicity and multiple medical problems    Preventative:  Recommended:Immunizations are up to date.   patient is a non smoker  does not drink  plan meals, eat breakfast and have 3 meals a day    RISK SCORE: 3         This document has been electronically signed by Olimpia Robison MD on February 17, 2018 2:49 PM

## 2018-02-19 ENCOUNTER — TELEPHONE (OUTPATIENT)
Dept: FAMILY MEDICINE CLINIC | Facility: CLINIC | Age: 63
End: 2018-02-19

## 2018-02-19 RX ORDER — GABAPENTIN 300 MG/1
300 CAPSULE ORAL 2 TIMES DAILY
Qty: 180 CAPSULE | Refills: 0 | Status: SHIPPED | OUTPATIENT
Start: 2018-02-19 | End: 2018-05-16 | Stop reason: SDUPTHER

## 2018-02-19 NOTE — TELEPHONE ENCOUNTER
Please let her know that rx was faxed to pharmacy.        This document has been electronically signed by Olimpia Robison MD on February 19, 2018 3:54 PM   s

## 2018-02-19 NOTE — TELEPHONE ENCOUNTER
Patient forgot to ask for her gabapentin at her recent office visit. Will print out rx and have MA fax it to the pharmacy.        This document has been electronically signed by Olimpia Robison MD on February 19, 2018 3:36 PM

## 2018-02-28 ENCOUNTER — TELEPHONE (OUTPATIENT)
Dept: GENERAL RADIOLOGY | Facility: HOSPITAL | Age: 63
End: 2018-02-28

## 2018-03-07 RX ORDER — BUSPIRONE HYDROCHLORIDE 10 MG/1
10 TABLET ORAL 2 TIMES DAILY PRN
Qty: 180 TABLET | Refills: 3 | Status: SHIPPED | OUTPATIENT
Start: 2018-03-07 | End: 2019-08-05

## 2018-03-19 RX ORDER — GABAPENTIN 300 MG/1
CAPSULE ORAL
Qty: 180 CAPSULE | Refills: 0 | OUTPATIENT
Start: 2018-03-19

## 2018-03-21 ENCOUNTER — TELEPHONE (OUTPATIENT)
Dept: FAMILY MEDICINE CLINIC | Facility: CLINIC | Age: 63
End: 2018-03-21

## 2018-03-21 DIAGNOSIS — F90.0 ATTENTION DEFICIT HYPERACTIVITY DISORDER (ADHD), PREDOMINANTLY INATTENTIVE TYPE: ICD-10-CM

## 2018-03-22 RX ORDER — METHYLPHENIDATE HYDROCHLORIDE 10 MG/1
10 TABLET ORAL EVERY 12 HOURS SCHEDULED
Qty: 60 TABLET | Refills: 0 | Status: SHIPPED | OUTPATIENT
Start: 2018-03-22 | End: 2018-04-20 | Stop reason: SDUPTHER

## 2018-03-22 NOTE — TELEPHONE ENCOUNTER
Patient has ongoing ADHD. [facility] #[id number]  At 03/22/2018 17:54:55  EST manual process.         This document has been electronically signed by Olimpia Robison MD on March 22, 2018 4:56 PM

## 2018-03-22 NOTE — TELEPHONE ENCOUNTER
Call that rx is ready to be picked up.        This document has been electronically signed by Olimpia Robison MD on March 22, 2018 5:03 PM

## 2018-04-17 ENCOUNTER — TELEPHONE (OUTPATIENT)
Dept: FAMILY MEDICINE CLINIC | Facility: CLINIC | Age: 63
End: 2018-04-17

## 2018-04-17 DIAGNOSIS — F90.0 ATTENTION DEFICIT HYPERACTIVITY DISORDER (ADHD), PREDOMINANTLY INATTENTIVE TYPE: ICD-10-CM

## 2018-04-20 ENCOUNTER — TELEPHONE (OUTPATIENT)
Dept: FAMILY MEDICINE CLINIC | Facility: CLINIC | Age: 63
End: 2018-04-20

## 2018-04-20 RX ORDER — METHYLPHENIDATE HYDROCHLORIDE 10 MG/1
10 TABLET ORAL EVERY 12 HOURS SCHEDULED
Qty: 60 TABLET | Refills: 0 | Status: SHIPPED | OUTPATIENT
Start: 2018-04-20 | End: 2018-05-16 | Stop reason: SDUPTHER

## 2018-04-20 NOTE — TELEPHONE ENCOUNTER
Patient has ongoing ADHD. [facility] is a manual process.  [facility] #[id number] on 4/20/2018. 15:28        This document has been electronically signed by Olimpia Robison MD on April 20, 2018 3:28 PM

## 2018-05-15 DIAGNOSIS — M81.0 AGE-RELATED OSTEOPOROSIS WITHOUT CURRENT PATHOLOGICAL FRACTURE: Primary | ICD-10-CM

## 2018-05-16 ENCOUNTER — OFFICE VISIT (OUTPATIENT)
Dept: FAMILY MEDICINE CLINIC | Facility: CLINIC | Age: 63
End: 2018-05-16

## 2018-05-16 ENCOUNTER — APPOINTMENT (OUTPATIENT)
Dept: LAB | Facility: HOSPITAL | Age: 63
End: 2018-05-16

## 2018-05-16 ENCOUNTER — LAB (OUTPATIENT)
Dept: LAB | Facility: HOSPITAL | Age: 63
End: 2018-05-16

## 2018-05-16 VITALS
HEART RATE: 71 BPM | DIASTOLIC BLOOD PRESSURE: 80 MMHG | WEIGHT: 119 LBS | HEIGHT: 62 IN | BODY MASS INDEX: 21.9 KG/M2 | SYSTOLIC BLOOD PRESSURE: 128 MMHG | OXYGEN SATURATION: 97 %

## 2018-05-16 DIAGNOSIS — F90.0 ATTENTION DEFICIT HYPERACTIVITY DISORDER (ADHD), PREDOMINANTLY INATTENTIVE TYPE: Primary | ICD-10-CM

## 2018-05-16 DIAGNOSIS — E03.9 ACQUIRED HYPOTHYROIDISM: ICD-10-CM

## 2018-05-16 DIAGNOSIS — E53.8 B12 DEFICIENCY: ICD-10-CM

## 2018-05-16 DIAGNOSIS — M81.0 AGE-RELATED OSTEOPOROSIS WITHOUT CURRENT PATHOLOGICAL FRACTURE: ICD-10-CM

## 2018-05-16 DIAGNOSIS — R06.83 SNORING: ICD-10-CM

## 2018-05-16 DIAGNOSIS — Z79.899 LONG TERM USE OF DRUG: ICD-10-CM

## 2018-05-16 LAB
ALBUMIN SERPL-MCNC: 4.4 G/DL (ref 3.4–4.8)
ALBUMIN/GLOB SERPL: 1.5 G/DL (ref 1.1–1.8)
ALP SERPL-CCNC: 62 U/L (ref 38–126)
ALT SERPL W P-5'-P-CCNC: 27 U/L (ref 9–52)
ANION GAP SERPL CALCULATED.3IONS-SCNC: 12 MMOL/L (ref 5–15)
AST SERPL-CCNC: 26 U/L (ref 14–36)
BILIRUB SERPL-MCNC: 0.2 MG/DL (ref 0.2–1.3)
BUN BLD-MCNC: 8 MG/DL (ref 7–21)
BUN/CREAT SERPL: 9.8 (ref 7–25)
CALCIUM SPEC-SCNC: 9.6 MG/DL (ref 8.4–10.2)
CHLORIDE SERPL-SCNC: 94 MMOL/L (ref 95–110)
CO2 SERPL-SCNC: 31 MMOL/L (ref 22–31)
CREAT BLD-MCNC: 0.82 MG/DL (ref 0.5–1)
GFR SERPL CREATININE-BSD FRML MDRD: 70 ML/MIN/1.73 (ref 45–104)
GLOBULIN UR ELPH-MCNC: 2.9 GM/DL (ref 2.3–3.5)
GLUCOSE BLD-MCNC: 91 MG/DL (ref 60–100)
MAGNESIUM SERPL-MCNC: 2 MG/DL (ref 1.6–2.3)
PHOSPHATE SERPL-MCNC: 5.2 MG/DL (ref 2.4–4.4)
POTASSIUM BLD-SCNC: 3.9 MMOL/L (ref 3.5–5.1)
PROT SERPL-MCNC: 7.3 G/DL (ref 6.3–8.6)
SODIUM BLD-SCNC: 137 MMOL/L (ref 137–145)
T4 FREE SERPL-MCNC: 0.87 NG/DL (ref 0.78–2.19)
TSH SERPL DL<=0.05 MIU/L-ACNC: 1.97 MIU/ML (ref 0.46–4.68)

## 2018-05-16 PROCEDURE — G0481 DRUG TEST DEF 8-14 CLASSES: HCPCS | Performed by: FAMILY MEDICINE

## 2018-05-16 PROCEDURE — 36415 COLL VENOUS BLD VENIPUNCTURE: CPT | Performed by: FAMILY MEDICINE

## 2018-05-16 PROCEDURE — 96372 THER/PROPH/DIAG INJ SC/IM: CPT | Performed by: FAMILY MEDICINE

## 2018-05-16 PROCEDURE — 84443 ASSAY THYROID STIM HORMONE: CPT | Performed by: FAMILY MEDICINE

## 2018-05-16 PROCEDURE — 80307 DRUG TEST PRSMV CHEM ANLYZR: CPT | Performed by: FAMILY MEDICINE

## 2018-05-16 PROCEDURE — 84439 ASSAY OF FREE THYROXINE: CPT | Performed by: FAMILY MEDICINE

## 2018-05-16 PROCEDURE — 99214 OFFICE O/P EST MOD 30 MIN: CPT | Performed by: FAMILY MEDICINE

## 2018-05-16 PROCEDURE — 83735 ASSAY OF MAGNESIUM: CPT

## 2018-05-16 PROCEDURE — 84100 ASSAY OF PHOSPHORUS: CPT

## 2018-05-16 PROCEDURE — 80053 COMPREHEN METABOLIC PANEL: CPT

## 2018-05-16 RX ORDER — CYANOCOBALAMIN 1000 UG/ML
1000 INJECTION, SOLUTION INTRAMUSCULAR; SUBCUTANEOUS ONCE
Status: COMPLETED | OUTPATIENT
Start: 2018-05-16 | End: 2018-05-16

## 2018-05-16 RX ORDER — METHYLPHENIDATE HYDROCHLORIDE 10 MG/1
10 TABLET ORAL EVERY 12 HOURS SCHEDULED
Qty: 60 TABLET | Refills: 0 | Status: SHIPPED | OUTPATIENT
Start: 2018-05-16 | End: 2018-06-21 | Stop reason: SDUPTHER

## 2018-05-16 RX ORDER — LEVOTHYROXINE SODIUM 88 MCG
88 TABLET ORAL DAILY
Qty: 90 TABLET | Refills: 3 | Status: SHIPPED | OUTPATIENT
Start: 2018-05-16 | End: 2019-07-02 | Stop reason: SDUPTHER

## 2018-05-16 RX ORDER — GABAPENTIN 300 MG/1
300 CAPSULE ORAL 2 TIMES DAILY
Qty: 180 CAPSULE | Refills: 0 | Status: SHIPPED | OUTPATIENT
Start: 2018-05-16 | End: 2018-09-17 | Stop reason: SDUPTHER

## 2018-05-16 RX ADMIN — CYANOCOBALAMIN 1000 MCG: 1000 INJECTION, SOLUTION INTRAMUSCULAR; SUBCUTANEOUS at 10:17

## 2018-05-16 NOTE — PROGRESS NOTES
Subjective:     Daysi Pa is a 62 y.o. female who presents for follow up of ADHD. She is currently taking ritalin which helps her focus to complete her daily tasks.  She feels the medication is working well.      She continues to be seen in Pain Management for pain related to her RA. She has weaned completely off the ativan at this point.   Sleep   She presents for a sleep evaluation. She complains of snoring, decreased concentration, excessive daytime sleepiness, awakening in the middle of the night because of urination, feels sleepy during the day, she dozes off and jerks to wake up. She wake up with a dry mouth and a headache.   Symptoms began several years ago, unchanged since that time.  She denies noisy environment. Previous evaluation and treatment has included none.    The following portions of the patient's history were reviewed and updated as appropriate: allergies, current medications, past family history, past medical history, past social history, past surgical history and problem list.    Past Medical Hx:  Past Medical History:   Diagnosis Date   • Acquired hypothyroidism    • Allergic rhinitis    • Allergy 04/17/2016    Consult, Allergy (1) - Ordered By: BENIGNO LIM ([facility])    • Anxiety state    • Attention deficit hyperactivity disorder    • Benign essential hypertension    • Chronic pain    • Chronic pain disorder    • Congestive heart failure     primarily systrolic dysfunction EF 17-20% repeat echo 55%   • Depressive disorder    • Dyslipidemia    • Dysphagia    • Gastroesophageal reflux disease    • Generalized abdominal pain    • History of echocardiogram 03/23/2016    Echocardiogram W/ color flow 43261 (3) - Normal LV function wiht Ef of 60%.Normal RV size and function.Normal diastolic function.No significant valvular regurgitation or stenosis.   • History of echocardiogram 04/27/2012    Echocardiogram W/O color flow 78729 (1) - Normal left ventricular systolic function. EF 55%. No  evidence of regional wall motion abnormalities. Abnormal relaxation of the left ventricular myocardium.   • History of EKG 03/07/2016    EKG Tracing and Interpretation 44987 (3) - Ordered By: LILLIE BRADY ([facility])    • History of mammogram 06/05/2013    MAMMOGRAM SCREENING 22281 - [facility] (1) - birads 0    • Hyperlipidemia    • Intraductal carcinoma in situ of breast     hx in past and s/p bilateral simple mastectomies      • Irritable bowel syndrome    • Low back pain    • Malaise and fatigue    • Microalbuminuria 07/16/2015    POS MICROALBUMINURIA RESULT DOC AND REVIEWED 3060F (1) - Ordered By: BENIGNO LIM ([facility])   • Mitral valve regurgitation     Mild-Moderate      • Myopia    • Non-organic sleep disorder    • Osteoporosis    • Pain management 04/17/2016    Consult, Pain Management (1) - Ordered By: BENIGNO LIM ([facility])    • Pneumococcal vaccination indicated 07/08/2016    PNEUMOC VAC/ADMIN/RCVD 4040F (11) - Ordered By: BENIGNO LIM ([facility])   • Primary fibromyalgia syndrome    • Rheumatoid arthritis        Past Surgical Hx:  Past Surgical History:   Procedure Laterality Date   • APPENDECTOMY  2008    Appendectomy (1)   • BREAST BIOPSY  06/12/2013    Stereotactic breast biopsy (1) - stereotactic left mammotome biopsy with 11 gauge needle.   • BREAST IMPLANT REMOVAL  2003    Remove breast implant (1)   • BREAST IMPLANT SURGERY  2000    Breast implantation (1)   • COLONOSCOPY  01/20/2014    Colonoscopy, diagnostic (screening) 57899 (1)   • COLONOSCOPY W/ POLYPECTOMY  2008    Colonoscopy remove polyps 05159 (1)    • EXCISION LESION  05/01/2014    Remove chest wall lesion (1) - Excision of subcutaneous mass, left chest wall, Subcutaneous mass left chest wall, status post mastectomy.   • INJECTION OF MEDICATION  07/05/2016    B12 (44) - Ordered By: BENIGNO LIM ([facility])    • INJECTION OF MEDICATION  11/12/2015    Celestone (betamethasone) (1) - Ordered By: BENIGNO LIM ([facility])    • INJECTION OF MEDICATION  02/11/2016    Kenalog (2) - Ordered  By: BENIGNO LIM ([facility])    • INJECTION OF MEDICATION  11/17/2017    Prolia   • MASTECTOMY  09/05/2013    Mastectomy (2) - Right simple prophylactic mastectomy.   • OTHER SURGICAL HISTORY  10/29/2014    SONOGRAM THYROID, NECK 90581 (1) - Ordered By: BENIGNO LIM ([facility])   • PAP SMEAR  06/03/2013     PAP SMEAR (1)   • PARTIAL HYSTERECTOMY  1998     Partial hyst (1)     • TONSILLECTOMY  1969    Tonsillectomy (1)   • VASCULAR SURGERY  12/24/2014    Consult, Vascular Surgery (1) - Ordered By: BENIGNO LIM ([facility])        Health Maintenance:  Health Maintenance   Topic Date Due   • ZOSTER VACCINE (2 of 3) 11/26/2014   • INFLUENZA VACCINE  08/01/2018   • LIPID PANEL  11/13/2018   • DXA SCAN  12/21/2018   • MEDICARE ANNUAL WELLNESS  01/29/2019   • COLONOSCOPY  01/18/2020   • TDAP/TD VACCINES (2 - Td) 10/16/2024   • HEPATITIS C SCREENING  Completed       Current Meds:    Current Outpatient Prescriptions:   •  albuterol (PROAIR HFA) 108 (90 BASE) MCG/ACT inhaler, 2 puffs 4 (four) times a day as needed., Disp: , Rfl:   •  AMITIZA 24 MCG capsule, TAKE 1 CAPSULE BY MOUTH 2 (TWO) TIMES A DAY., Disp: 180 capsule, Rfl: 3  •  bisoprolol (ZEBeta) 5 MG tablet, Take 1 tablet by mouth Daily., Disp: 30 tablet, Rfl: 6  •  busPIRone (BUSPAR) 10 MG tablet, Take 1 tablet by mouth 2 (Two) Times a Day As Needed (anxiety)., Disp: 180 tablet, Rfl: 3  •  desloratadine (CLARINEX) 5 MG tablet, Take 1 tablet by mouth Daily., Disp: 90 tablet, Rfl: 3  •  docusate sodium (COLACE) 100 MG capsule, Take 200 mg by mouth every night. at bedtime, Disp: , Rfl:   •  esomeprazole (nexIUM) 40 MG capsule, TAKE ONE CAPSULE BY MOUTH EVERY DAY, Disp: 90 capsule, Rfl: 3  •  furosemide (LASIX) 20 MG tablet, Take 1 tablet by mouth Daily., Disp: 30 tablet, Rfl: 3  •  gabapentin (NEURONTIN) 300 MG capsule, Take 1 capsule by mouth 2 (Two) Times a Day., Disp: 180 capsule, Rfl: 0  •  hydroxychloroquine (PLAQUENIL) 200 MG tablet, TAKE 1 TABLET BY MOUTH 2 (TWO) TIMES A DAY., Disp: 180 tablet,  Rfl: 3  •  methylphenidate (RITALIN) 10 MG tablet, Take 1 tablet by mouth Every 12 (Twelve) Hours., Disp: 60 tablet, Rfl: 0  •  metoclopramide (REGLAN) 10 MG tablet, Take 1 tablet by mouth Every 6 (Six) Hours As Needed (nausea)., Disp: 270 tablet, Rfl: 3  •  montelukast (SINGULAIR) 10 MG tablet, 10 mg daily., Disp: , Rfl:   •  OxyCODONE HCl 5 MG tablet , Take 1 tablet by mouth every 6 (six) hours as needed., Disp: , Rfl:   •  OxyCODONE HCl ER (OXYCONTIN) 30 MG tablet extended-release 12 hour, Take 30 mg by mouth 2 (two) times a day. OxyContin 30 mg tablet,crush resistant,extended release, Disp: , Rfl:   •  pitavastatin calcium (LIVALO) 2 MG tablet tablet, Take 1 tablet by mouth Every Night., Disp: 90 tablet, Rfl: 3  •  polyethylene glycol (MIRALAX) powder, TAKE 17 GRAMS TWICE A DAY, Disp: 1530 g, Rfl: 2  •  SYNTHROID 88 MCG tablet, Take 1 tablet by mouth Daily. Brand name medically necessary, Disp: 90 tablet, Rfl: 3  •  traZODone (DESYREL) 150 MG tablet, TAKE 1 TABLET BY MOUTH EVERY NIGHT. AT BEDTIME, Disp: 90 tablet, Rfl: 3  •  venlafaxine XR (EFFEXOR XR) 150 MG 24 hr capsule, Take 1 capsule by mouth Daily., Disp: 90 capsule, Rfl: 3  •  venlafaxine XR (EFFEXOR XR) 75 MG 24 hr capsule, Take 1 capsule by mouth Daily., Disp: 90 capsule, Rfl: 3    Current Facility-Administered Medications:   •  cyanocobalamin injection 1,000 mcg, 1,000 mcg, Intramuscular, Q28 Days, Olimpia Robison MD, 1,000 mcg at 11/13/17 0942    Allergies:  Cephalosporins; Morphine and related; Other; Penicillins; Shellfish-derived products; Zithromax [azithromycin]; and Sulfa antibiotics    Family Hx:  Family History   Problem Relation Age of Onset   • Breast cancer Sister    • Rectal cancer Other         Colorectal cancer   • Heart disease Other    • Hypertension Other    • Thyroid disease Other         Thyroid disorder   • Hypertension Mother    • Migraines Mother    • Osteoporosis Mother    • Diabetes Father    • Coronary artery disease Father   "  • Hyperlipidemia Father    • Cancer Maternal Aunt    • COPD Paternal Grandmother    • COPD Paternal Grandfather         Social History:  Social History     Social History   • Marital status:      Spouse name: N/A   • Number of children: N/A   • Years of education: N/A     Occupational History   • Not on file.     Social History Main Topics   • Smoking status: Former Smoker     Years: 30.00   • Smokeless tobacco: Never Used      Comment: PT SMOKED FOR 30 YEARS AND QUIT IN 2010   • Alcohol use No   • Drug use: No   • Sexual activity: Defer     Other Topics Concern   • Not on file     Social History Narrative   • No narrative on file       Review of Systems  Review of Systems   Constitutional: Positive for fatigue and unexpected weight change. Negative for fever.   HENT: Negative for ear pain and sore throat.    Eyes: Negative for pain and visual disturbance.   Respiratory: Negative for cough and shortness of breath.    Cardiovascular: Negative for chest pain and palpitations.   Gastrointestinal: Negative for abdominal pain and nausea.   Genitourinary: Negative for dysuria.   Musculoskeletal: Positive for arthralgias, back pain, joint swelling and myalgias.   Skin: Negative for rash.   Neurological: Negative for dizziness, weakness and headaches.   Psychiatric/Behavioral: Negative for sleep disturbance. The patient is nervous/anxious.          Objective:     /80 (BP Location: Right arm, Cuff Size: Adult)   Pulse 71   Ht 157.5 cm (62.01\")   Wt 54 kg (119 lb)   SpO2 97%   BMI 21.76 kg/m²       General Appearance:    Alert, cooperative, no distress, appears stated age   Head:    Normocephalic, without obvious abnormality, atraumatic   Eyes:    PERRL, conjunctiva/corneas clear, EOM's intact   Ears:    Normal TM's and external ear canals, both ears   Nose:   Nares normal, mucosa normal, no drainage    or sinus tenderness   Throat:   Lips, mucosa, and tongue normal; teeth and gums normal   Neck:   Supple, " symmetrical, trachea midline, no adenopathy;       Back:     Symmetric, no curvature, ROM normal but with pain, full range of motion.     Lungs:     Clear to auscultation bilaterally, respirations unlabored   Chest Wall:    No tenderness breasts surgically removed    Heart:    Regular rate and rhythm, S1 and S2 normal, no murmur, rub   or gallop       Abdomen:     Soft, non-tender, bowel sounds active all four quadrants,     no masses, no organomegaly           Extremities:   atraumatic, no cyanosis or edema, clubbing of fingers of both  hands   Pulses:  2+ dorsalis pedis   Skin:   Skin color, texture, turgor normal, no rashes or lesions       Neurologic:   CNII-XII intact, normal strength, sensation            Assessment:     1. Attention deficit hyperactivity disorder (ADHD), predominantly inattentive type    2. Snoring    3. Long term use of drug    4. Acquired hypothyroidism    5. B12 deficiency         Plan:     1.  Rx changes: Continue current medications.    2.  Education: Provided reassurance. Provided reassurance and Recommended medication management  3.  Compliance at present is estimated to be excellent.  4. Follow up: 3 months and as needed  5.  [facility] # [id number] appropriate 05/16/2018  6. Referral to Sleep Medicine for possible sleep apnea.   7. Labwork before leaving today.           Goals        Exercise    • Exercise 150 minutes per week (moderate activity) (pt-stated)            Barriers to goal: RA and chronic pain         Lifestyle    • Other (pt-stated)            Less fatigue  Barriers to goals: Multiple medical problems                Preventative:  Patient's Body mass index is 21.76 kg/m². BMI is within normal parameters. No follow-up required.  Recommended:Will need shingrix.   patient is a non smoker  does not drink  plan meals, eat breakfast and have 3 meals a day    RISK SCORE: 3         This document has been electronically signed by Olimpia Robison MD on May 20, 2018 8:13 PM

## 2018-05-18 ENCOUNTER — INFUSION (OUTPATIENT)
Dept: ONCOLOGY | Facility: HOSPITAL | Age: 63
End: 2018-05-18

## 2018-05-18 DIAGNOSIS — M81.0 AGE-RELATED OSTEOPOROSIS WITHOUT CURRENT PATHOLOGICAL FRACTURE: Primary | ICD-10-CM

## 2018-05-18 PROCEDURE — 96372 THER/PROPH/DIAG INJ SC/IM: CPT | Performed by: INTERNAL MEDICINE

## 2018-05-18 PROCEDURE — 25010000002 DENOSUMAB 60 MG/ML SOLUTION: Performed by: FAMILY MEDICINE

## 2018-05-18 RX ADMIN — DENOSUMAB 60 MG: 60 INJECTION SUBCUTANEOUS at 08:25

## 2018-05-21 LAB
7-AMINOCLONAZEPAM UR: NOT DETECTED NG/MG CREAT
A-OH ALPRAZ/CREAT UR: NOT DETECTED NG/MG CREAT
ALFENTANIL UR QL: NOT DETECTED NG/MG CREAT
ALPHA-HYDROXYTRIAZOLAM, URINE: NOT DETECTED NG/MG CREAT
AMOBARBITAL UR: NOT DETECTED
AMPHET UR QL CFM: NOT DETECTED NG/MG CREAT
AMPHETAMINES UR QL SCN: NEGATIVE
BARBITAL UR QL CFM: NOT DETECTED
BARBITURATES UR QL SCN: NEGATIVE
BENZODIAZ UR QL SCN: NEGATIVE
BENZOYLECGONINE UR: NOT DETECTED NG/MG CREAT
BUPRENORPHINE UR QL: NEGATIVE
BUPRENORPHINE UR QL: NOT DETECTED NG/MG CREAT
BUTABARBITAL UR QL: NOT DETECTED
BUTALBITAL UR QL: NOT DETECTED
CANNABINOIDS UR QL CFM: NEGATIVE
CLONAZEPAM UR QL: NOT DETECTED NG/MG CREAT
COCAETHYLENE UR QL CFM: NOT DETECTED NG/MG CREAT
COCAINE UR QL CFM: NEGATIVE
CODEINE UR QL: NOT DETECTED NG/MG CREAT
CONV COCAINE, UR: NOT DETECTED NG/MG CREAT
CONV REPORT SUMMARY: NORMAL
CREAT 24H UR-MCNC: 42 MG/DL
DESALKYLFLURAZ/CREAT UR: NOT DETECTED NG/MG CREAT
DIAZEPAM UR-MCNC: NOT DETECTED NG/MG CREAT
DIHYDROCODEINE UR: NOT DETECTED NG/MG CREAT
EDDP SERPL QL: NOT DETECTED NG/MG CREAT
ETHANOL SCREEN URINE (REF): NEGATIVE
ETHANOL UR-MCNC: NOT DETECTED G/DL
FENTANYL UR QL: NEGATIVE
FENTANYL+NORFENTANYL UR QL SCN: NOT DETECTED NG/MG CREAT
HX OF MEDICATION USE: NORMAL
HYDROCODONE UR QL: NOT DETECTED NG/MG CREAT
HYDROMORPHONE UR QL: NOT DETECTED NG/MG CREAT
LEVEL OF DETECTION:: NORMAL
LORAZEPAM UR-MCNC: NOT DETECTED NG/MG CREAT
LORAZEPAM/CREAT UR: NOT DETECTED NG/MG CREAT
MDA SERPLBLD-MCNC: NOT DETECTED NG/MG CREAT
MDMA UR QL SCN: NOT DETECTED NG/MG CREAT
MEPHOBARBITAL UR QL CFM: NOT DETECTED
METHADONE BLD QL SCN: NEGATIVE
METHADONE, URINE: NOT DETECTED NG/MG CREAT
METHAMPHETAMINE UR: NOT DETECTED NG/MG CREAT
MIDAZOLAM UR-MCNC: NOT DETECTED NG/MG CREAT
MORPHINE UR QL: NOT DETECTED NG/MG CREAT
N-DESMETHYLTRAMADOL, U: NOT DETECTED
NARCOTICS UR: NEGATIVE
NORBUPRENORPHINE SERPLBLD-MCNC: NOT DETECTED NG/MG CREAT
NORCODEINE UR-MCNC: NOT DETECTED NG/MG CREAT
NORDIAZEPAM SERPL CFM-MCNC: NOT DETECTED NG/MG CREAT
NORFENTANYL UR: NOT DETECTED NG/MG CREAT
NOROXYMORPHONE: 424 NG/MG CREAT
O-DESMETHYLTRAMADOL, UR: NOT DETECTED
OPIATES UR QL CFM: NEGATIVE
OPIATES, URINE, NORHYDROCODONE: NOT DETECTED NG/MG CREAT
OPIATES, URINE, NOROXYCODONE: NORMAL NG/MG CREAT
OXAZEPAM UR QL: NOT DETECTED NG/MG CREAT
OXYCODONE UR QL: NORMAL
OXYCODONE UR: 1548 NG/MG CREAT
OXYMORPHONE UR: 1495 NG/MG CREAT
PENTOBARBITAL UR: NOT DETECTED
PHENOBARB UR QL: NOT DETECTED
SECOBARBITAL UR QL: NOT DETECTED
SUFENTANIL UR QL: NOT DETECTED NG/MG CREAT
TAPENTADOL SERPLBLD-MCNC: NEGATIVE NG/ML
TAPENTADOL UR-MCNC: NOT DETECTED NG/MG CREAT
TEMAZEPAM UR QL CFM: NOT DETECTED NG/MG CREAT
THC UR CFM-MCNC: NOT DETECTED NG/MG CREAT
THIOPENTAL UR QL CFM: NOT DETECTED
TRAMADOL UR: NOT DETECTED

## 2018-06-18 ENCOUNTER — HOSPITAL ENCOUNTER (OUTPATIENT)
Dept: GENERAL RADIOLOGY | Facility: HOSPITAL | Age: 63
Discharge: HOME | End: 2018-06-18
Admitting: PAIN MEDICINE

## 2018-06-18 DIAGNOSIS — M54.41 RIGHT-SIDED LOW BACK PAIN WITH SCIATICA, SCIATICA LATERALITY UNSPECIFIED, UNSPECIFIED CHRONICITY: ICD-10-CM

## 2018-06-18 PROCEDURE — 72110 X-RAY EXAM L-2 SPINE 4/>VWS: CPT

## 2018-06-19 ENCOUNTER — TELEPHONE (OUTPATIENT)
Dept: FAMILY MEDICINE CLINIC | Facility: CLINIC | Age: 63
End: 2018-06-19

## 2018-06-19 DIAGNOSIS — F90.0 ATTENTION DEFICIT HYPERACTIVITY DISORDER (ADHD), PREDOMINANTLY INATTENTIVE TYPE: ICD-10-CM

## 2018-06-21 RX ORDER — METHYLPHENIDATE HYDROCHLORIDE 10 MG/1
10 TABLET ORAL EVERY 12 HOURS SCHEDULED
Qty: 60 TABLET | Refills: 0 | Status: SHIPPED | OUTPATIENT
Start: 2018-06-21 | End: 2018-07-18 | Stop reason: SDUPTHER

## 2018-06-21 NOTE — TELEPHONE ENCOUNTER
Patient has ongoing ADHD. [facility] #[id number] 6/20/2018 appropriate.        This document has been electronically signed by Olimpia Robison MD on June 21, 2018 6:58 AM

## 2018-07-02 RX ORDER — FUROSEMIDE 20 MG/1
20 TABLET ORAL DAILY
Qty: 30 TABLET | Refills: 3 | Status: SHIPPED | OUTPATIENT
Start: 2018-07-02 | End: 2018-10-05 | Stop reason: SDUPTHER

## 2018-07-02 RX ORDER — BISOPROLOL FUMARATE 5 MG/1
5 TABLET, FILM COATED ORAL DAILY
Qty: 30 TABLET | Refills: 3 | Status: SHIPPED | OUTPATIENT
Start: 2018-07-02 | End: 2019-10-09 | Stop reason: SDUPTHER

## 2018-07-17 ENCOUNTER — TELEPHONE (OUTPATIENT)
Dept: FAMILY MEDICINE CLINIC | Facility: CLINIC | Age: 63
End: 2018-07-17

## 2018-07-17 DIAGNOSIS — F90.0 ATTENTION DEFICIT HYPERACTIVITY DISORDER (ADHD), PREDOMINANTLY INATTENTIVE TYPE: ICD-10-CM

## 2018-07-18 RX ORDER — METHYLPHENIDATE HYDROCHLORIDE 10 MG/1
10 TABLET ORAL EVERY 12 HOURS SCHEDULED
Qty: 60 TABLET | Refills: 0 | Status: SHIPPED | OUTPATIENT
Start: 2018-07-18 | End: 2018-08-17 | Stop reason: SDUPTHER

## 2018-07-18 NOTE — TELEPHONE ENCOUNTER
Patient has ongoing ADHD.  [facility] #[id number]   Appropriate        This document has been electronically signed by Olimpia Robison MD on July 18, 2018 7:52 AM

## 2018-07-31 RX ORDER — METOCLOPRAMIDE 10 MG/1
TABLET ORAL
Qty: 270 TABLET | Refills: 2 | Status: SHIPPED | OUTPATIENT
Start: 2018-07-31 | End: 2019-02-14 | Stop reason: SDUPTHER

## 2018-08-03 RX ORDER — VENLAFAXINE HYDROCHLORIDE 75 MG/1
75 CAPSULE, EXTENDED RELEASE ORAL DAILY
Qty: 90 CAPSULE | Refills: 3 | Status: SHIPPED | OUTPATIENT
Start: 2018-08-03 | End: 2018-10-26 | Stop reason: SDUPTHER

## 2018-08-03 RX ORDER — VENLAFAXINE HYDROCHLORIDE 150 MG/1
150 CAPSULE, EXTENDED RELEASE ORAL DAILY
Qty: 90 CAPSULE | Refills: 3 | Status: SHIPPED | OUTPATIENT
Start: 2018-08-03 | End: 2018-10-26 | Stop reason: SDUPTHER

## 2018-08-13 ENCOUNTER — TELEPHONE (OUTPATIENT)
Dept: FAMILY MEDICINE CLINIC | Facility: CLINIC | Age: 63
End: 2018-08-13

## 2018-08-16 ENCOUNTER — TELEPHONE (OUTPATIENT)
Dept: FAMILY MEDICINE CLINIC | Facility: CLINIC | Age: 63
End: 2018-08-16

## 2018-08-16 NOTE — TELEPHONE ENCOUNTER
PT CALLED AGAIN ABOUT HER methylphenidate (RITALIN) 10 MG tablet. SHE WILL BE OUT TOMORROW.    THANKS,  LEIGH

## 2018-08-16 NOTE — TELEPHONE ENCOUNTER
Called the patient at the request of Dr. De Souza to let her know that she would need to be seen in order to get the medication refilled she requested. Dr. De Souza said he would be more than happy to fill the medication, just to tell the patient to come to walkins either today or tomorrow.     Pt was very disagreeable to this and proceeded to become very upset that she couldn't get her medicine and that she couldn't get an appointment with Dr. Robison.

## 2018-08-17 ENCOUNTER — OFFICE VISIT (OUTPATIENT)
Dept: FAMILY MEDICINE CLINIC | Facility: CLINIC | Age: 63
End: 2018-08-17

## 2018-08-17 VITALS
HEIGHT: 62 IN | BODY MASS INDEX: 23.41 KG/M2 | OXYGEN SATURATION: 99 % | WEIGHT: 127.2 LBS | DIASTOLIC BLOOD PRESSURE: 80 MMHG | SYSTOLIC BLOOD PRESSURE: 122 MMHG | HEART RATE: 71 BPM

## 2018-08-17 DIAGNOSIS — F90.0 ATTENTION DEFICIT HYPERACTIVITY DISORDER (ADHD), PREDOMINANTLY INATTENTIVE TYPE: ICD-10-CM

## 2018-08-17 RX ORDER — METHYLPHENIDATE HYDROCHLORIDE 10 MG/1
10 TABLET ORAL EVERY 12 HOURS SCHEDULED
Qty: 60 TABLET | Refills: 0 | Status: SHIPPED | OUTPATIENT
Start: 2018-08-17 | End: 2018-09-17 | Stop reason: SDUPTHER

## 2018-08-17 NOTE — PROGRESS NOTES
Subjective:     Daysi Pa is a 62 y.o. female who presents for follow up for ADHD    Patient reports that her symptoms are currently stable on her current dose of ADHD medications. She is on Ritalin 10 mg with good response. She reports that she can tell when she has not been on her medications, she has been out for several days, and that she has improved focus and concentration with treatment important for daily activities.   Patient reports that she continues to have pain from her RA for which she follows with pain management. She reports all over discomfort rated a 6/10 today in multiple joints.        Past Medical Hx:  Past Medical History:   Diagnosis Date   • Acquired hypothyroidism    • Allergic rhinitis    • Allergy 04/17/2016    Consult, Allergy (1) - Ordered By: BENIGNO LIM ([facility])    • Anxiety state    • Attention deficit hyperactivity disorder    • Benign essential hypertension    • Chronic pain    • Chronic pain disorder    • Congestive heart failure (CMS/HCC)     primarily systrolic dysfunction EF 17-20% repeat echo 55%   • Depressive disorder    • Dyslipidemia    • Dysphagia    • Gastroesophageal reflux disease    • Generalized abdominal pain    • History of echocardiogram 03/23/2016    Echocardiogram W/ color flow 87484 (3) - Normal LV function wiht Ef of 60%.Normal RV size and function.Normal diastolic function.No significant valvular regurgitation or stenosis.   • History of echocardiogram 04/27/2012    Echocardiogram W/O color flow 45347 (1) - Normal left ventricular systolic function. EF 55%. No evidence of regional wall motion abnormalities. Abnormal relaxation of the left ventricular myocardium.   • History of EKG 03/07/2016    EKG Tracing and Interpretation 11943 (3) - Ordered By: LILLIE BRADY ([facility])    • History of mammogram 06/05/2013    MAMMOGRAM SCREENING 49128 - [facility] (1) - birads 0    • Hyperlipidemia    • Intraductal carcinoma in situ of breast     hx in past  and s/p bilateral simple mastectomies      • Irritable bowel syndrome    • Low back pain    • Malaise and fatigue    • Microalbuminuria 07/16/2015    POS MICROALBUMINURIA RESULT DOC AND REVIEWED 3060F (1) - Ordered By: BENIGNO LIM ([facility])   • Mitral valve regurgitation     Mild-Moderate      • Myopia    • Non-organic sleep disorder    • Osteoporosis    • Pain management 04/17/2016    Consult, Pain Management (1) - Ordered By: BENIGNO LIM ([facility])    • Pneumococcal vaccination indicated 07/08/2016    PNEUMOC VAC/ADMIN/RCVD 4040F (11) - Ordered By: BENIGNO LIM ([facility])   • Primary fibromyalgia syndrome    • Rheumatoid arthritis (CMS/HCC)        Past Surgical Hx:  Past Surgical History:   Procedure Laterality Date   • APPENDECTOMY  2008    Appendectomy (1)   • BREAST BIOPSY  06/12/2013    Stereotactic breast biopsy (1) - stereotactic left mammotome biopsy with 11 gauge needle.   • BREAST IMPLANT REMOVAL  2003    Remove breast implant (1)   • BREAST IMPLANT SURGERY  2000    Breast implantation (1)   • COLONOSCOPY  01/20/2014    Colonoscopy, diagnostic (screening) 69925 (1)   • COLONOSCOPY W/ POLYPECTOMY  2008    Colonoscopy remove polyps 36406 (1)    • EXCISION LESION  05/01/2014    Remove chest wall lesion (1) - Excision of subcutaneous mass, left chest wall, Subcutaneous mass left chest wall, status post mastectomy.   • INJECTION OF MEDICATION  07/05/2016    B12 (44) - Ordered By: BENIGNO LIM ([facility])    • INJECTION OF MEDICATION  11/12/2015    Celestone (betamethasone) (1) - Ordered By: BENIGNO LIM ([facility])    • INJECTION OF MEDICATION  02/11/2016    Kenalog (2) - [facility]    • INJECTION OF MEDICATION  11/17/2017    Prolia   • MASTECTOMY  09/05/2013    Mastectomy (2) - Right simple prophylactic mastectomy.   • OTHER SURGICAL HISTORY  10/29/2014    SONOGRAM THYROID, NECK 05523 (1) - [facility]   • PAP SMEAR  06/03/2013     PAP SMEAR (1)   • PARTIAL HYSTERECTOMY  1998     Partial hyst (1)     • TONSILLECTOMY  1969     Tonsillectomy (1)   • VASCULAR SURGERY  12/24/2014    Consult, Vascular Surgery (1) - Ordered By: BENIGNO LIM ([facility])        Health Maintenance:  Health Maintenance   Topic Date Due   • ZOSTER VACCINE (2 of 3) 11/26/2014   • INFLUENZA VACCINE  08/01/2018   • LIPID PANEL  11/13/2018   • DXA SCAN  12/21/2018   • MEDICARE ANNUAL WELLNESS  01/29/2019   • COLONOSCOPY  01/18/2020   • TDAP/TD VACCINES (2 - Td) 10/16/2024   • HEPATITIS C SCREENING  Completed       Current Meds:    Current Outpatient Prescriptions:   •  albuterol (PROAIR HFA) 108 (90 BASE) MCG/ACT inhaler, 2 puffs 4 (four) times a day as needed., Disp: , Rfl:   •  AMITIZA 24 MCG capsule, TAKE 1 CAPSULE BY MOUTH 2 (TWO) TIMES A DAY., Disp: 180 capsule, Rfl: 3  •  bisoprolol (ZEBeta) 5 MG tablet, Take 1 tablet by mouth Daily., Disp: 30 tablet, Rfl: 3  •  busPIRone (BUSPAR) 10 MG tablet, Take 1 tablet by mouth 2 (Two) Times a Day As Needed (anxiety)., Disp: 180 tablet, Rfl: 3  •  desloratadine (CLARINEX) 5 MG tablet, Take 1 tablet by mouth Daily., Disp: 90 tablet, Rfl: 3  •  docusate sodium (COLACE) 100 MG capsule, Take 200 mg by mouth every night. at bedtime, Disp: , Rfl:   •  esomeprazole (nexIUM) 40 MG capsule, TAKE ONE CAPSULE BY MOUTH EVERY DAY, Disp: 90 capsule, Rfl: 3  •  furosemide (LASIX) 20 MG tablet, Take 1 tablet by mouth Daily., Disp: 30 tablet, Rfl: 3  •  gabapentin (NEURONTIN) 300 MG capsule, Take 1 capsule by mouth 2 (Two) Times a Day., Disp: 180 capsule, Rfl: 0  •  hydroxychloroquine (PLAQUENIL) 200 MG tablet, TAKE 1 TABLET BY MOUTH 2 (TWO) TIMES A DAY., Disp: 180 tablet, Rfl: 3  •  methylphenidate (RITALIN) 10 MG tablet, Take 1 tablet by mouth Every 12 (Twelve) Hours., Disp: 60 tablet, Rfl: 0  •  metoclopramide (REGLAN) 10 MG tablet, TAKE 1 TABLET BY MOUTH EVERY 6 HOURS AS NEEDED (NAUSEA)., Disp: 270 tablet, Rfl: 2  •  montelukast (SINGULAIR) 10 MG tablet, 10 mg daily., Disp: , Rfl:   •  OxyCODONE HCl 5 MG tablet , Take 1 tablet by mouth every 6  (six) hours as needed., Disp: , Rfl:   •  OxyCODONE HCl ER (OXYCONTIN) 30 MG tablet extended-release 12 hour, Take 30 mg by mouth 2 (two) times a day. OxyContin 30 mg tablet,crush resistant,extended release, Disp: , Rfl:   •  pitavastatin calcium (LIVALO) 2 MG tablet tablet, Take 1 tablet by mouth Every Night., Disp: 90 tablet, Rfl: 3  •  polyethylene glycol (MIRALAX) powder, TAKE 17 GRAMS TWICE A DAY, Disp: 1530 g, Rfl: 2  •  SYNTHROID 88 MCG tablet, Take 1 tablet by mouth Daily. Brand name medically necessary, Disp: 90 tablet, Rfl: 3  •  traZODone (DESYREL) 150 MG tablet, TAKE 1 TABLET BY MOUTH EVERY NIGHT. AT BEDTIME, Disp: 90 tablet, Rfl: 3  •  venlafaxine XR (EFFEXOR-XR) 150 MG 24 hr capsule, TAKE 1 CAPSULE BY MOUTH DAILY., Disp: 90 capsule, Rfl: 3  •  venlafaxine XR (EFFEXOR-XR) 75 MG 24 hr capsule, TAKE 1 CAPSULE BY MOUTH DAILY., Disp: 90 capsule, Rfl: 3    Current Facility-Administered Medications:   •  cyanocobalamin injection 1,000 mcg, 1,000 mcg, Intramuscular, Q28 Days[facility], 1,000 mcg at 11/13/17 0942    Allergies:  Cephalosporins; Erythromycin; Morphine and related; Other; Penicillins; Shellfish-derived products; Zithromax [azithromycin]; Zolpidem; Hydrocodone-acetaminophen; and Sulfa antibiotics    Family Hx:  Family History   Problem Relation Age of Onset   • Breast cancer Sister    • Rectal cancer Other         Colorectal cancer   • Heart disease Other    • Hypertension Other    • Thyroid disease Other         Thyroid disorder   • Hypertension Mother    • Migraines Mother    • Osteoporosis Mother    • Diabetes Father    • Coronary artery disease Father    • Hyperlipidemia Father    • Cancer Maternal Aunt    • COPD Paternal Grandmother    • COPD Paternal Grandfather         Social History:  Social History     Social History   • Marital status:      Spouse name: N/A   • Number of children: N/A   • Years of education: N/A     Occupational History   • Not on file.     Social  "History Main Topics   • Smoking status: Former Smoker     Years: 30.00     Quit date: 8/17/2008   • Smokeless tobacco: Never Used      Comment: PT SMOKED FOR 30 YEARS AND QUIT IN 2010   • Alcohol use No   • Drug use: No   • Sexual activity: Defer     Other Topics Concern   • Not on file     Social History Narrative   • No narrative on file       Review of Systems  Review of Systems   Constitutional: Negative for activity change, appetite change, fatigue and fever.   HENT: Negative for ear pain and sore throat.    Eyes: Negative for pain and visual disturbance.   Respiratory: Negative for cough and shortness of breath.    Cardiovascular: Negative for chest pain and palpitations.   Gastrointestinal: Negative for abdominal pain and nausea.   Endocrine: Negative for cold intolerance and heat intolerance.   Genitourinary: Negative for difficulty urinating and dysuria.   Musculoskeletal: Positive for arthralgias and myalgias. Negative for gait problem.   Skin: Negative for color change and rash.   Neurological: Negative for dizziness, weakness and headaches.   Hematological: Negative for adenopathy. Does not bruise/bleed easily.   Psychiatric/Behavioral: Positive for sleep disturbance. Negative for agitation and confusion.       Objective:     /80   Pulse 71   Ht 157.5 cm (62\")   Wt 57.7 kg (127 lb 3.2 oz)   SpO2 99%   Breastfeeding? No   BMI 23.27 kg/m²     Physical Exam   Constitutional: She is oriented to person, place, and time. She appears well-developed and well-nourished.   HENT:   Head: Normocephalic and atraumatic.   Eyes: Pupils are equal, round, and reactive to light. Conjunctivae, EOM and lids are normal.   Neck: Normal range of motion. Neck supple.   Cardiovascular: Normal rate, regular rhythm and normal heart sounds.  Exam reveals no gallop and no friction rub.    No murmur heard.  Pulmonary/Chest: Effort normal and breath sounds normal.   Abdominal: Soft. Normal appearance and bowel sounds are " normal. There is no tenderness.   Musculoskeletal: Normal range of motion.   Neurological: She is alert and oriented to person, place, and time.   Skin: Skin is warm, dry and intact.   Psychiatric: She has a normal mood and affect. Her speech is normal and behavior is normal. Judgment and thought content normal. Cognition and memory are normal.       No exam data present  Assessment/Plan:     Daysi was seen today for adhd.    Diagnoses and all orders for this visit:    Attention deficit hyperactivity disorder (ADHD), predominantly inattentive type  -     methylphenidate (RITALIN) 10 MG tablet; Take 1 tablet by mouth Every 12 (Twelve) Hours.           Follow-up:     Return in 3 months (on 11/17/2018), or with Dr. Robison.      Goals        Patient Stated    • Exercise 150 minutes per week (moderate activity) (pt-stated)            Barriers to goal: RA and chronic pain      • Other (pt-stated)            Less fatigue  Barriers to goals: Multiple medical problems            Preventative:    Vaccines Recommended at this visit:   Shingrix    Screenings Recommended at this visit:  No Screenings offered today. Patient is up to date on all screenings at this time.     Smoking Status:  Patient is a former smoker.    Alcohol Intake:  Patient does not drink    Patient's Body mass index is 23.27 kg/m². BMI is within normal parameters. No follow-up required.        RISK SCORE: 4      Signature:  Radha Pollack MD [facility] PGY3  [place] [address]  Office: [phone]          This document has been electronically signed by Radha Pollack MD on August 21, 2018 3:34 PM

## 2018-08-22 RX ORDER — HYDROXYCHLOROQUINE SULFATE 200 MG/1
TABLET, FILM COATED ORAL
Qty: 180 TABLET | Refills: 3 | Status: SHIPPED | OUTPATIENT
Start: 2018-08-22 | End: 2019-05-30 | Stop reason: SDUPTHER

## 2018-09-06 DIAGNOSIS — I42.9 CARDIOMYOPATHY, UNSPECIFIED TYPE (HCC): Primary | ICD-10-CM

## 2018-09-07 ENCOUNTER — OFFICE VISIT (OUTPATIENT)
Dept: CARDIOLOGY | Facility: CLINIC | Age: 63
End: 2018-09-07

## 2018-09-07 VITALS
WEIGHT: 123.3 LBS | HEART RATE: 78 BPM | BODY MASS INDEX: 22.69 KG/M2 | OXYGEN SATURATION: 96 % | DIASTOLIC BLOOD PRESSURE: 76 MMHG | SYSTOLIC BLOOD PRESSURE: 116 MMHG | HEIGHT: 62 IN

## 2018-09-07 DIAGNOSIS — I42.8 NONISCHEMIC CARDIOMYOPATHY (HCC): Primary | ICD-10-CM

## 2018-09-07 DIAGNOSIS — I35.1 NONRHEUMATIC AORTIC VALVE INSUFFICIENCY: ICD-10-CM

## 2018-09-07 DIAGNOSIS — I34.0 NON-RHEUMATIC MITRAL REGURGITATION: Chronic | ICD-10-CM

## 2018-09-07 PROBLEM — I95.9 HYPOTENSION: Status: RESOLVED | Noted: 2017-04-20 | Resolved: 2018-09-07

## 2018-09-07 PROBLEM — R07.89 CHEST PAIN, NON-CARDIAC: Status: RESOLVED | Noted: 2017-08-08 | Resolved: 2018-09-07

## 2018-09-07 PROBLEM — Z86.79 HISTORY OF CARDIOMYOPATHY: Status: RESOLVED | Noted: 2017-08-08 | Resolved: 2018-09-07

## 2018-09-07 PROCEDURE — 99214 OFFICE O/P EST MOD 30 MIN: CPT | Performed by: INTERNAL MEDICINE

## 2018-09-07 PROCEDURE — 93000 ELECTROCARDIOGRAM COMPLETE: CPT | Performed by: INTERNAL MEDICINE

## 2018-09-07 NOTE — PROGRESS NOTES
Cardiovascular Medicine   Stuart Sears M.D., Ph.D., [facility]    The patient returns to cardiology clinic for follow-up of the following cardiac problems:    PROBLEM LIST:    1. 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 EF initially 15%  b. Recently 55%  2. Dyslipidemia  3. Breast CA  a. Mastectomy  4. MR  5. JD Pa is a 62 y.o. female who returns to clinic today.  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  She has been able to tolerate bisoprolol but no addition of ace inhibition because of orthostasis.  She does continue to use furosemide because of lower extremity edema.  Patient has stable dyspnea.  Stable lower extremity edema.  She's medication compliant.  Denies side effects.  Most recent TTE showed normalization of left ventricular ejection fraction.  Concerning her mitral regurgitation and aortic insufficiency her last TTE was in 2017.  Her next surveillance TTE is due in 2019.        Current Outpatient Prescriptions on File Prior to Visit   Medication Sig Dispense Refill   • albuterol (PROAIR HFA) 108 (90 BASE) MCG/ACT inhaler 2 puffs 4 (four) times a day as needed.     • AMITIZA 24 MCG capsule TAKE 1 CAPSULE BY MOUTH 2 (TWO) TIMES A DAY. 180 capsule 3   • bisoprolol (ZEBeta) 5 MG tablet Take 1 tablet by mouth Daily. 30 tablet 3   • busPIRone (BUSPAR) 10 MG tablet Take 1 tablet by mouth 2 (Two) Times a Day As Needed (anxiety). 180 tablet 3   • desloratadine (CLARINEX) 5 MG tablet Take 1 tablet by mouth Daily. 90 tablet 3   • docusate sodium (COLACE) 100 MG capsule Take 200 mg by mouth every night. at bedtime     • esomeprazole (nexIUM) 40 MG capsule TAKE ONE CAPSULE BY MOUTH EVERY DAY 90 capsule 3   • furosemide (LASIX) 20 MG tablet Take 1 tablet by mouth Daily. 30 tablet 3   • gabapentin (NEURONTIN) 300 MG capsule Take 1 capsule by mouth 2 (Two) Times a Day. 180 capsule 0   • hydroxychloroquine (PLAQUENIL) 200 MG tablet TAKE 1 TABLET BY MOUTH 2 (TWO) TIMES A DAY. 180 tablet 3   •  methylphenidate (RITALIN) 10 MG tablet Take 1 tablet by mouth Every 12 (Twelve) Hours. 60 tablet 0   • metoclopramide (REGLAN) 10 MG tablet TAKE 1 TABLET BY MOUTH EVERY 6 HOURS AS NEEDED (NAUSEA). 270 tablet 2   • montelukast (SINGULAIR) 10 MG tablet 10 mg daily.     • OxyCODONE HCl 5 MG tablet  Take 1 tablet by mouth every 6 (six) hours as needed.     • OxyCODONE HCl ER (OXYCONTIN) 30 MG tablet extended-release 12 hour Take 30 mg by mouth 2 (two) times a day. OxyContin 30 mg tablet,crush resistant,extended release     • pitavastatin calcium (LIVALO) 2 MG tablet tablet Take 1 tablet by mouth Every Night. 90 tablet 3   • polyethylene glycol (MIRALAX) powder TAKE 17 GRAMS TWICE A DAY 1530 g 2   • SYNTHROID 88 MCG tablet Take 1 tablet by mouth Daily. Brand name medically necessary 90 tablet 3   • traZODone (DESYREL) 150 MG tablet TAKE 1 TABLET BY MOUTH EVERY NIGHT. AT BEDTIME 90 tablet 3   • venlafaxine XR (EFFEXOR-XR) 150 MG 24 hr capsule TAKE 1 CAPSULE BY MOUTH DAILY. 90 capsule 3   • venlafaxine XR (EFFEXOR-XR) 75 MG 24 hr capsule TAKE 1 CAPSULE BY MOUTH DAILY. 90 capsule 3     Current Facility-Administered Medications on File Prior to Visit   Medication Dose Route Frequency Provider Last Rate Last Dose   • cyanocobalamin injection 1,000 mcg  1,000 mcg Intramuscular Q28 Days Olimpia Robison MD   1,000 mcg at 11/13/17 0942     Allergies   Allergen Reactions   • Cephalosporins Nausea And Vomiting   • Erythromycin Nausea Only   • Morphine And Related Itching   • Other Nausea And Vomiting     Cipro   • Penicillins Hives   • Shellfish-Derived Products Hives and Other (See Comments)     Other reaction(s): SOA   • Zithromax [Azithromycin] Nausea And Vomiting and Hives   • Zolpidem Unknown (See Comments)   • Hydrocodone-Acetaminophen Anxiety   • Sulfa Antibiotics Hives and Rash     Sulfa (Sulfonamide Antibiotics)     Social History     Social History   • Marital status:      Spouse name: N/A   • Number of  children: N/A   • Years of education: N/A     Occupational History   • Not on file.     Social History Main Topics   • Smoking status: Former Smoker     Years: 30.00     Quit date: 8/17/2008   • Smokeless tobacco: Never Used      Comment: PT SMOKED FOR 30 YEARS AND QUIT IN 2010   • Alcohol use No   • Drug use: No   • Sexual activity: Defer     Other Topics Concern   • Not on file     Social History Narrative   • No narrative on file         Physical Exam:  Vitals:    09/07/18 0802   BP: 116/76   Pulse: 78   SpO2: 96%     Body mass index is 22.55 kg/m².    GEN: alert, appears stated age and cooperative  Body Habitus: well-nourished  HEENT: Head: Normocephalic, no lesions, without obvious abnormality.  JVP: 6 cm of water at 45 degrees HJR: absent      [place]:  normal size and placement Palpable S4: Not present.   Heart rate: normal  Heart Rhythm: regular     Heart Sounds: S1: normal intensity  S2: normal intensity  S3: absent   S4: absent  Opening Snap: absent  A2-OS:  N/A  Pericardial rub: absent    Ejection click: None      Murmurs: Systolic: none  Diastolic: none  Extremity: no edema, cyanosis      DATA:        EKG: NSR. NS T wave abnormality.       RECOMMENDATIONS:        Diagnosis Plan   1. Nonischemic cardiomyopathy (CMS/HCC).  NYHA stage C.  Functional class II.  Today, euvolemic and perfused.  She's had normalization of her left ventricular ejection fraction.   · Bisoprolol, furosemide   · Low sodium diet   · Call for concerning symptoms    2. Multi-valvular disease: Mitral regurgitation and aortic insufficiency.  ACC stage B.   Clinical surveillance, TTE 2019

## 2018-09-07 NOTE — PATIENT INSTRUCTIONS
Dr. Sears has recommended a Six-Minute Walk Test    What Is the Six-Minute Walk Test?    The American [employer] Society describes the six-minute walk test as a measure of functional status or fitness. It is used as a simple measure of aerobic exercise capacity. The results of this test may or may not lead your doctor to do more sophisticated measures of your heart and lung function. During this test, you walk at your normal pace for six minutes. This test can be used to monitor your response to treatments for heart, lung and other health problems. This test is commonly used for people with pulmonary hypertension, interstitial lung disease, pre-lung transplant evaluation or COPD.      What to Expect When Doing the Test      Preparing for your test:  · Wear clothes and shoes that are comfortable.  · You may use your usual walking aids such as a cane or walker, if needed.  · It is okay to eat a light meal prior to your test.  · Take your usual medications.  · Do not exercise within two hours of testing.      During the test:  · The nilsa will measure your blood pressure, pulse and oxygen level usually with a pulse oximeter before you start to walk.  · You should be given the following instructions: The object of the test is to walk as far as possible for six minutes. You will walk at your normal pace to a chair or cone, and turn around. And you continue to walk back and forth for six minutes.  · Let the staff know if you are having chest pain or breathing difficulty.  · It is acceptable to slow down, rest or stop. After every minute interval, you will be given an update.      Safety:  · The nilsa will watch to see if you have breathing difficulty or chest pain.  · Oxygen and other supplies will be nearby if you need them.      Understanding the Results  The results of your test are then compared to what is known to be normal for people in your weight, height, gender and age categories. They can be used to estimate  response to treatment, especially if repeated after a time interval, for instance six months or a year later. After your test, your provider may change your medication or exercise program based on your results.      What Are the Risks?  This is a low-risk medical evaluation. Medical help is easily available while the test is being done.          This content was developed in partnership with the [employer], the philanthropic arm of the [employer].  Approved by Scientific and Medical Editorial Review Panel. Last reviewed May 31, 2017.

## 2018-09-11 ENCOUNTER — TELEPHONE (OUTPATIENT)
Dept: FAMILY MEDICINE CLINIC | Facility: CLINIC | Age: 63
End: 2018-09-11

## 2018-09-17 ENCOUNTER — APPOINTMENT (OUTPATIENT)
Dept: LAB | Facility: HOSPITAL | Age: 63
End: 2018-09-17

## 2018-09-17 ENCOUNTER — OFFICE VISIT (OUTPATIENT)
Dept: FAMILY MEDICINE CLINIC | Facility: CLINIC | Age: 63
End: 2018-09-17

## 2018-09-17 VITALS
SYSTOLIC BLOOD PRESSURE: 110 MMHG | WEIGHT: 117 LBS | BODY MASS INDEX: 21.53 KG/M2 | OXYGEN SATURATION: 96 % | DIASTOLIC BLOOD PRESSURE: 60 MMHG | HEART RATE: 81 BPM | HEIGHT: 62 IN

## 2018-09-17 DIAGNOSIS — R53.83 MALAISE AND FATIGUE: ICD-10-CM

## 2018-09-17 DIAGNOSIS — Z79.899 LONG TERM USE OF DRUG: ICD-10-CM

## 2018-09-17 DIAGNOSIS — M05.732 RHEUMATOID ARTHRITIS INVOLVING BOTH WRISTS WITH POSITIVE RHEUMATOID FACTOR (HCC): ICD-10-CM

## 2018-09-17 DIAGNOSIS — F90.0 ATTENTION DEFICIT HYPERACTIVITY DISORDER (ADHD), PREDOMINANTLY INATTENTIVE TYPE: Primary | ICD-10-CM

## 2018-09-17 DIAGNOSIS — M05.731 RHEUMATOID ARTHRITIS INVOLVING BOTH WRISTS WITH POSITIVE RHEUMATOID FACTOR (HCC): ICD-10-CM

## 2018-09-17 DIAGNOSIS — R53.81 MALAISE AND FATIGUE: ICD-10-CM

## 2018-09-17 PROCEDURE — 99214 OFFICE O/P EST MOD 30 MIN: CPT | Performed by: FAMILY MEDICINE

## 2018-09-17 PROCEDURE — 80307 DRUG TEST PRSMV CHEM ANLYZR: CPT | Performed by: FAMILY MEDICINE

## 2018-09-17 PROCEDURE — 96372 THER/PROPH/DIAG INJ SC/IM: CPT | Performed by: FAMILY MEDICINE

## 2018-09-17 PROCEDURE — G0481 DRUG TEST DEF 8-14 CLASSES: HCPCS | Performed by: FAMILY MEDICINE

## 2018-09-17 RX ORDER — GABAPENTIN 300 MG/1
300 CAPSULE ORAL 2 TIMES DAILY
Qty: 60 CAPSULE | Refills: 0 | Status: SHIPPED | OUTPATIENT
Start: 2018-09-17 | End: 2018-10-17 | Stop reason: SDUPTHER

## 2018-09-17 RX ORDER — METHYLPHENIDATE HYDROCHLORIDE 10 MG/1
10 TABLET ORAL EVERY 12 HOURS SCHEDULED
Qty: 60 TABLET | Refills: 0 | Status: SHIPPED | OUTPATIENT
Start: 2018-09-17 | End: 2018-10-11 | Stop reason: SDUPTHER

## 2018-09-17 RX ADMIN — CYANOCOBALAMIN 1000 MCG: 1000 INJECTION, SOLUTION INTRAMUSCULAR; SUBCUTANEOUS at 11:15

## 2018-09-17 NOTE — PROGRESS NOTES
Subjective:     Daysi Pa is a 63 y.o. female who presents for follow up of ADHD. She is currently taking ritalin which helps her focus to complete her daily tasks.  She feels the medication is working well.     She has intentionally lost about 10 pounds since May 2018.    She continues to be seen in Pain Management for pain related to her RA. She has been receiving injections which she feels really helps her.    Sleep   She presents for a sleep evaluation. She complains of snoring, decreased concentration, excessive daytime sleepiness, awakening in the middle of the night because of urination, feels sleepy during the day, she dozes off and jerks to wake up. She wake up with a dry mouth and a headache.   Symptoms began several years ago, unchanged since that time.  She denies noisy environment. Previous evaluation and treatment has included none. Unfortunately, she had to cancel her sleep medicine appointment due to a death in the family.  She has not rescheduled the appointment yet.     The following portions of the patient's history were reviewed and updated as appropriate: allergies, current medications, past family history, past medical history, past social history, past surgical history and problem list.    Past Medical Hx:  Past Medical History:   Diagnosis Date   • Acquired hypothyroidism    • Allergic rhinitis    • Allergy 04/17/2016    Consult, Allergy (1) - Ordered By: BENIGNO LIM ([facility])    • Anxiety state    • Attention deficit hyperactivity disorder    • Benign essential hypertension    • Chronic pain    • Chronic pain disorder    • Congestive heart failure (CMS/HCC)     primarily systrolic dysfunction EF 17-20% repeat echo 55%   • Depressive disorder    • Dyslipidemia    • Dysphagia    • Gastroesophageal reflux disease    • Generalized abdominal pain    • History of echocardiogram 03/23/2016    Echocardiogram W/ color flow 76401 (3) - Normal LV function wiht Ef of 60%.Normal RV size and  function.Normal diastolic function.No significant valvular regurgitation or stenosis.   • History of echocardiogram 04/27/2012    Echocardiogram W/O color flow 38366 (1) - Normal left ventricular systolic function. EF 55%. No evidence of regional wall motion abnormalities. Abnormal relaxation of the left ventricular myocardium.   • History of EKG 03/07/2016    EKG Tracing and Interpretation 71339 (3) - Ordered By: LILLIE BRADY ([facility])    • History of mammogram 06/05/2013    MAMMOGRAM SCREENING 49519 - [facility] (1) - birads 0    • Hyperlipidemia    • Intraductal carcinoma in situ of breast     hx in past and s/p bilateral simple mastectomies      • Irritable bowel syndrome    • Low back pain    • Malaise and fatigue    • Microalbuminuria 07/16/2015    POS MICROALBUMINURIA RESULT DOC AND REVIEWED 3060F (1) - Ordered By: BENIGNO LIM ([facility])   • Mitral valve regurgitation     Mild-Moderate      • Myopia    • Non-organic sleep disorder    • Osteoporosis    • Pain management 04/17/2016    Consult, Pain Management (1) - Ordered By: BENIGNO LIM ([facility])    • Pneumococcal vaccination indicated 07/08/2016    PNEUMOC VAC/ADMIN/RCVD 4040F (11) - Ordered By: BENIGNO LIM ([facility])   • Primary fibromyalgia syndrome    • Rheumatoid arthritis (CMS/HCC)        Past Surgical Hx:  Past Surgical History:   Procedure Laterality Date   • APPENDECTOMY  2008    Appendectomy (1)   • BREAST BIOPSY  06/12/2013    Stereotactic breast biopsy (1) - stereotactic left mammotome biopsy with 11 gauge needle.   • BREAST IMPLANT REMOVAL  2003    Remove breast implant (1)   • BREAST IMPLANT SURGERY  2000    Breast implantation (1)   • COLONOSCOPY  01/20/2014    Colonoscopy, diagnostic (screening) 40001 (1)   • COLONOSCOPY W/ POLYPECTOMY  2008    Colonoscopy remove polyps 02034 (1)    • EXCISION LESION  05/01/2014    Remove chest wall lesion (1) - Excision of subcutaneous mass, left chest wall, Subcutaneous mass left chest wall, status post mastectomy.   •  INJECTION OF MEDICATION  07/05/2016    B12 (44) - Ordered By: BENIGNO LIM ([facility])    • INJECTION OF MEDICATION  11/12/2015    Celestone (betamethasone) (1) - Ordered By: BENIGNO LIM ([facility])    • INJECTION OF MEDICATION  02/11/2016    Kenalog (2) - Ordered By: BENIGNO LIM ([facility])    • INJECTION OF MEDICATION  11/17/2017    Prolia   • MASTECTOMY  09/05/2013    Mastectomy (2) - Right simple prophylactic mastectomy.   • OTHER SURGICAL HISTORY  10/29/2014    SONOGRAM THYROID, NECK 23234 (1) - Ordered By: BENIGNO LIM ([facility])   • PAP SMEAR  06/03/2013     PAP SMEAR (1)   • PARTIAL HYSTERECTOMY  1998     Partial hyst (1)     • TONSILLECTOMY  1969    Tonsillectomy (1)   • VASCULAR SURGERY  12/24/2014    Consult, Vascular Surgery (1) - Ordered By: BENIGNO LIM ([facility])        Health Maintenance:  Health Maintenance   Topic Date Due   • ZOSTER VACCINE (2 of 3) 11/26/2014   • INFLUENZA VACCINE  08/01/2018   • LIPID PANEL  11/13/2018   • DXA SCAN  12/21/2018   • MEDICARE ANNUAL WELLNESS  01/29/2019   • COLONOSCOPY  01/18/2020   • TDAP/TD VACCINES (2 - Td) 10/16/2024   • HEPATITIS C SCREENING  Completed       Current Meds:    Current Outpatient Prescriptions:   •  albuterol (PROAIR HFA) 108 (90 BASE) MCG/ACT inhaler, 2 puffs 4 (four) times a day as needed., Disp: , Rfl:   •  AMITIZA 24 MCG capsule, TAKE 1 CAPSULE BY MOUTH 2 (TWO) TIMES A DAY., Disp: 180 capsule, Rfl: 3  •  bisoprolol (ZEBeta) 5 MG tablet, Take 1 tablet by mouth Daily., Disp: 30 tablet, Rfl: 3  •  busPIRone (BUSPAR) 10 MG tablet, Take 1 tablet by mouth 2 (Two) Times a Day As Needed (anxiety)., Disp: 180 tablet, Rfl: 3  •  desloratadine (CLARINEX) 5 MG tablet, Take 1 tablet by mouth Daily., Disp: 90 tablet, Rfl: 3  •  docusate sodium (COLACE) 100 MG capsule, Take 200 mg by mouth every night. at bedtime, Disp: , Rfl:   •  esomeprazole (nexIUM) 40 MG capsule, TAKE ONE CAPSULE BY MOUTH EVERY DAY, Disp: 90 capsule, Rfl: 3  •  furosemide (LASIX) 20 MG tablet, Take 1 tablet by mouth Daily., Disp: 30  tablet, Rfl: 3  •  gabapentin (NEURONTIN) 300 MG capsule, Take 1 capsule by mouth 2 (Two) Times a Day., Disp: 60 capsule, Rfl: 0  •  hydroxychloroquine (PLAQUENIL) 200 MG tablet, TAKE 1 TABLET BY MOUTH 2 (TWO) TIMES A DAY., Disp: 180 tablet, Rfl: 3  •  methylphenidate (RITALIN) 10 MG tablet, Take 1 tablet by mouth Every 12 (Twelve) Hours., Disp: 60 tablet, Rfl: 0  •  metoclopramide (REGLAN) 10 MG tablet, TAKE 1 TABLET BY MOUTH EVERY 6 HOURS AS NEEDED (NAUSEA)., Disp: 270 tablet, Rfl: 2  •  montelukast (SINGULAIR) 10 MG tablet, 10 mg daily., Disp: , Rfl:   •  OxyCODONE HCl 5 MG tablet , Take 1 tablet by mouth every 6 (six) hours as needed., Disp: , Rfl:   •  OxyCODONE HCl ER (OXYCONTIN) 30 MG tablet extended-release 12 hour, Take 30 mg by mouth 2 (two) times a day. OxyContin 30 mg tablet,crush resistant,extended release, Disp: , Rfl:   •  pitavastatin calcium (LIVALO) 2 MG tablet tablet, Take 1 tablet by mouth Every Night., Disp: 90 tablet, Rfl: 3  •  polyethylene glycol (MIRALAX) powder, TAKE 17 GRAMS TWICE A DAY, Disp: 1530 g, Rfl: 2  •  SYNTHROID 88 MCG tablet, Take 1 tablet by mouth Daily. Brand name medically necessary, Disp: 90 tablet, Rfl: 3  •  traZODone (DESYREL) 150 MG tablet, TAKE 1 TABLET BY MOUTH EVERY NIGHT. AT BEDTIME, Disp: 90 tablet, Rfl: 3  •  venlafaxine XR (EFFEXOR-XR) 150 MG 24 hr capsule, TAKE 1 CAPSULE BY MOUTH DAILY., Disp: 90 capsule, Rfl: 3  •  venlafaxine XR (EFFEXOR-XR) 75 MG 24 hr capsule, TAKE 1 CAPSULE BY MOUTH DAILY., Disp: 90 capsule, Rfl: 3    Current Facility-Administered Medications:   •  cyanocobalamin injection 1,000 mcg, 1,000 mcg, Intramuscular, Q28 Days, [facility], Olimpia Linares MD, 1,000 mcg at 09/17/18 1115    Allergies:  Cephalosporins; Erythromycin; Morphine and related; Other; Penicillins; Shellfish-derived products; Zithromax [azithromycin]; Zolpidem; Hydrocodone-acetaminophen; and Sulfa antibiotics    Family Hx:  Family History   Problem Relation Age of Onset   • Breast  "cancer Sister    • Rectal cancer Other         Colorectal cancer   • Heart disease Other    • Hypertension Other    • Thyroid disease Other         Thyroid disorder   • Hypertension Mother    • Migraines Mother    • Osteoporosis Mother    • Diabetes Father    • Coronary artery disease Father    • Hyperlipidemia Father    • Cancer Maternal Aunt    • COPD Paternal Grandmother    • COPD Paternal Grandfather         Social History:  Social History     Social History   • Marital status:      Spouse name: N/A   • Number of children: N/A   • Years of education: N/A     Occupational History   • Not on file.     Social History Main Topics   • Smoking status: Former Smoker     Years: 30.00     Quit date: 8/17/2008   • Smokeless tobacco: Never Used      Comment: PT SMOKED FOR 30 YEARS AND QUIT IN 2010   • Alcohol use No   • Drug use: No   • Sexual activity: Defer     Other Topics Concern   • Not on file     Social History Narrative   • No narrative on file       Review of Systems  Review of Systems   Constitutional: Positive for fatigue and unexpected weight change. Negative for fever.   HENT: Negative for ear pain and sore throat.    Eyes: Negative for pain and visual disturbance.   Respiratory: Negative for cough and shortness of breath.    Cardiovascular: Negative for chest pain and palpitations.   Gastrointestinal: Negative for abdominal pain and nausea.   Genitourinary: Negative for dysuria.   Musculoskeletal: Positive for arthralgias, back pain, joint swelling and myalgias.   Skin: Negative for rash.   Neurological: Negative for dizziness, weakness and headaches.   Psychiatric/Behavioral: Positive for sleep disturbance. The patient is nervous/anxious.          Objective:     /60 (BP Location: Right arm)   Pulse 81   Ht 157.5 cm (62.01\")   Wt 53.1 kg (117 lb)   SpO2 96%   BMI 21.39 kg/m²       General Appearance:    Alert, cooperative, no distress, appears stated age   Head:    Normocephalic, without " obvious abnormality, atraumatic   Eyes:    PERRL, conjunctiva/corneas clear, EOM's intact   Ears:    Normal TM's and external ear canals, both ears   Nose:   Nares normal, mucosa normal, no drainage    or sinus tenderness   Throat:   Lips, mucosa, and tongue normal; teeth and gums normal   Neck:   Supple, symmetrical, trachea midline, no adenopathy;       Back:     Symmetric, no curvature, ROM normal but with pain, full range of motion.     Lungs:     Clear to auscultation bilaterally, respirations unlabored   Chest Wall:    No tenderness breasts surgically removed    Heart:    Regular rate and rhythm, S1 and S2 normal, no murmur, rub   or gallop       Abdomen:     Soft, non-tender, bowel sounds active all four quadrants,     no masses, no organomegaly           Extremities:   atraumatic, no cyanosis or edema, clubbing of fingers of both  hands   Pulses:  2+ dorsalis pedis   Skin:   Skin color, texture, turgor normal, no rashes or lesions       Neurologic:   CNII-XII intact, normal strength, sensation            Assessment:     1. Attention deficit hyperactivity disorder (ADHD), predominantly inattentive type    2. Long term use of drug    3. Malaise and fatigue    4. Rheumatoid arthritis involving both wrists with positive rheumatoid factor (CMS/[facility])         Plan:     1.  Rx changes: Continue current medications.    2.  Education: Provided reassurance. Provided reassurance and Recommended medication management  3.  Compliance at present is estimated to be excellent.  4. Follow up: 3 months and as needed  5.  [facility] # [id number] appropriate with history 09/17/2018  6. Strongly encouraged to reschedule sleep medicine appointment.         Goals        Exercise    • Exercise 150 minutes per week (moderate activity) (pt-stated)            Barriers to goal: RA and chronic pain         Lifestyle    • Other (pt-stated)            Less fatigue  Barriers to goals: Multiple medical problems                 Preventative:  Patient's Body mass index is 21.39 kg/m². BMI is within normal parameters. No follow-up required.  Recommended:Influenza and Will need shingrix.   patient is a non smoker  does not drink  plan meals, eat breakfast and have 3 meals a day    RISK SCORE: 3         This document has been electronically signed by Olimpia Robison MD on September 27, 2018 7:17 AM

## 2018-09-22 LAB — CONV REPORT SUMMARY: NORMAL

## 2018-09-25 DIAGNOSIS — Z79.899 LONG TERM USE OF DRUG: Primary | ICD-10-CM

## 2018-10-01 RX ORDER — ESOMEPRAZOLE MAGNESIUM 40 MG/1
CAPSULE, DELAYED RELEASE ORAL
Qty: 90 CAPSULE | Refills: 3 | Status: SHIPPED | OUTPATIENT
Start: 2018-10-01 | End: 2019-09-30 | Stop reason: SDUPTHER

## 2018-10-01 RX ORDER — TRAZODONE HYDROCHLORIDE 150 MG/1
TABLET ORAL
Qty: 90 TABLET | Refills: 3 | Status: SHIPPED | OUTPATIENT
Start: 2018-10-01 | End: 2019-10-07 | Stop reason: SDUPTHER

## 2018-10-02 RX ORDER — LUBIPROSTONE 24 UG/1
CAPSULE, GELATIN COATED ORAL
Qty: 180 CAPSULE | Refills: 3 | Status: SHIPPED | OUTPATIENT
Start: 2018-10-02 | End: 2019-09-30 | Stop reason: SDUPTHER

## 2018-10-05 RX ORDER — FUROSEMIDE 20 MG/1
TABLET ORAL
Qty: 30 TABLET | Refills: 3 | Status: SHIPPED | OUTPATIENT
Start: 2018-10-05 | End: 2019-08-28 | Stop reason: SDUPTHER

## 2018-10-11 DIAGNOSIS — F90.0 ATTENTION DEFICIT HYPERACTIVITY DISORDER (ADHD), PREDOMINANTLY INATTENTIVE TYPE: ICD-10-CM

## 2018-10-11 RX ORDER — METHYLPHENIDATE HYDROCHLORIDE 10 MG/1
10 TABLET ORAL EVERY 12 HOURS SCHEDULED
Qty: 60 TABLET | Refills: 0 | Status: SHIPPED | OUTPATIENT
Start: 2018-10-11 | End: 2018-11-09 | Stop reason: SDUPTHER

## 2018-10-11 NOTE — PROGRESS NOTES
Patient has ongoing ADHD and continues to benefit from medication. [facility] #[id number] appropriate 10/10/2018. Have sent rx electronically to [facility].      This document has been electronically signed by Olimpia Robison MD on October 11, 2018 6:51 AM

## 2018-10-17 RX ORDER — GABAPENTIN 300 MG/1
300 CAPSULE ORAL 2 TIMES DAILY
Qty: 180 CAPSULE | Refills: 0 | Status: SHIPPED | OUTPATIENT
Start: 2018-10-17

## 2018-10-22 RX ORDER — DESLORATADINE 5 MG/1
5 TABLET ORAL DAILY
Qty: 90 TABLET | Refills: 3 | Status: SHIPPED | OUTPATIENT
Start: 2018-10-22 | End: 2020-01-02

## 2018-10-26 RX ORDER — VENLAFAXINE HYDROCHLORIDE 150 MG/1
150 CAPSULE, EXTENDED RELEASE ORAL DAILY
Qty: 90 CAPSULE | Refills: 3 | Status: SHIPPED | OUTPATIENT
Start: 2018-10-26 | End: 2019-11-20 | Stop reason: SDUPTHER

## 2018-10-26 RX ORDER — VENLAFAXINE HYDROCHLORIDE 75 MG/1
75 CAPSULE, EXTENDED RELEASE ORAL DAILY
Qty: 90 CAPSULE | Refills: 3 | Status: SHIPPED | OUTPATIENT
Start: 2018-10-26 | End: 2019-11-20 | Stop reason: SDUPTHER

## 2018-11-07 ENCOUNTER — TELEPHONE (OUTPATIENT)
Dept: FAMILY MEDICINE CLINIC | Facility: CLINIC | Age: 63
End: 2018-11-07

## 2018-11-07 DIAGNOSIS — F90.0 ATTENTION DEFICIT HYPERACTIVITY DISORDER (ADHD), PREDOMINANTLY INATTENTIVE TYPE: ICD-10-CM

## 2018-11-09 RX ORDER — METHYLPHENIDATE HYDROCHLORIDE 10 MG/1
10 TABLET ORAL EVERY 12 HOURS SCHEDULED
Qty: 60 TABLET | Refills: 0 | Status: SHIPPED | OUTPATIENT
Start: 2018-11-09 | End: 2018-12-11 | Stop reason: SDUPTHER

## 2018-11-09 NOTE — TELEPHONE ENCOUNTER
Patient has ongoing ADHD.  Continues to do well with the medication. [facility] #[id number] 11/09/2018. Sent electronically.      This document has been electronically signed by Olimpia Robison MD on November 9, 2018 2:38 PM

## 2018-11-19 ENCOUNTER — TELEPHONE (OUTPATIENT)
Dept: ONCOLOGY | Facility: HOSPITAL | Age: 63
End: 2018-11-19

## 2018-11-19 ENCOUNTER — INFUSION (OUTPATIENT)
Dept: ONCOLOGY | Facility: HOSPITAL | Age: 63
End: 2018-11-19

## 2018-11-19 NOTE — CODE DOCUMENTATION
Spoke with pt about prolia orders. Told her there were currently no orders in for her prolia and that we are trying to get a hold of dr lei to put new orders in if she wants pt to continue with prolia. She states that she does and she sees her every three months. Pt told she was more than welcome to wait in the lobby while we got ahold of her or she can reschedule. She states that she will reschedule and gave number of [phone] to contact her.

## 2018-11-19 NOTE — TELEPHONE ENCOUNTER
Pt informed that a message had been sent to dr lei earlier this morning and just spoke to her nurse at her office and left a message with her. She states understanding and states to just call her back when we get them.

## 2018-11-26 ENCOUNTER — TELEPHONE (OUTPATIENT)
Dept: ONCOLOGY | Facility: HOSPITAL | Age: 63
End: 2018-11-26

## 2018-11-26 ENCOUNTER — DOCUMENTATION (OUTPATIENT)
Dept: ONCOLOGY | Facility: HOSPITAL | Age: 63
End: 2018-11-26

## 2018-11-26 DIAGNOSIS — M81.0 OSTEOPOROSIS, UNSPECIFIED OSTEOPOROSIS TYPE, UNSPECIFIED PATHOLOGICAL FRACTURE PRESENCE: Primary | ICD-10-CM

## 2018-11-26 NOTE — TELEPHONE ENCOUNTER
Spoke to pt about the update on her prolia orders from dr lei office. Let her know that we have spoke to her office several times and dr lei is out of office today and they wont be able to schedule her until we get the orders but that we will get her back in for her shot as soon as we get those. She states understanding.

## 2018-11-26 NOTE — CODE DOCUMENTATION
11/26/2018 0909: Spoke with dr lei nurse, Inna, who states she spoke with joey last Wednesday on 11/21 who told her that the pt had already received her shot. Explained that I was not sure of the confusion but that the pt has not had injection since may and she is due for it now. She states that dr lei stated and that they dont know how to put the order in that they dont use therapy plans much. Explained that it would have to be put in has a therapy plan and then dr lei would be able to sign it. She also stated that dr lei is out of office today so it will be tmrw before she can get the plan entered.

## 2018-12-04 ENCOUNTER — APPOINTMENT (OUTPATIENT)
Dept: ONCOLOGY | Facility: HOSPITAL | Age: 63
End: 2018-12-04

## 2018-12-10 ENCOUNTER — TELEPHONE (OUTPATIENT)
Dept: FAMILY MEDICINE CLINIC | Facility: CLINIC | Age: 63
End: 2018-12-10

## 2018-12-10 DIAGNOSIS — F90.0 ATTENTION DEFICIT HYPERACTIVITY DISORDER (ADHD), PREDOMINANTLY INATTENTIVE TYPE: ICD-10-CM

## 2018-12-10 NOTE — TELEPHONE ENCOUNTER
Pt called and is needing a refill on methylphenidate (RITALIN) 10 MG tablet and would like it called into [facility].     Thank You,    Rosy

## 2018-12-11 RX ORDER — METHYLPHENIDATE HYDROCHLORIDE 10 MG/1
10 TABLET ORAL EVERY 12 HOURS SCHEDULED
Qty: 60 TABLET | Refills: 0 | Status: SHIPPED | OUTPATIENT
Start: 2018-12-11 | End: 2019-01-07 | Stop reason: SDUPTHER

## 2018-12-11 NOTE — TELEPHONE ENCOUNTER
Patient has ongoing ADHD. [facility] #[id number]   Appropriate      This document has been electronically signed by Olimpia Robison MD on December 11, 2018 2:30 PM

## 2018-12-12 ENCOUNTER — INFUSION (OUTPATIENT)
Dept: ONCOLOGY | Facility: HOSPITAL | Age: 63
End: 2018-12-12

## 2018-12-12 DIAGNOSIS — M81.0 OSTEOPOROSIS, UNSPECIFIED OSTEOPOROSIS TYPE, UNSPECIFIED PATHOLOGICAL FRACTURE PRESENCE: Primary | ICD-10-CM

## 2018-12-12 DIAGNOSIS — M81.0 AGE-RELATED OSTEOPOROSIS WITHOUT CURRENT PATHOLOGICAL FRACTURE: ICD-10-CM

## 2018-12-12 LAB
ALBUMIN SERPL-MCNC: 4.7 G/DL (ref 3.4–4.8)
ALBUMIN/GLOB SERPL: 1.7 G/DL (ref 1.1–1.8)
ALP SERPL-CCNC: 80 U/L (ref 38–126)
ALT SERPL W P-5'-P-CCNC: 17 U/L (ref 9–52)
ANION GAP SERPL CALCULATED.3IONS-SCNC: 12 MMOL/L (ref 5–15)
AST SERPL-CCNC: 21 U/L (ref 14–36)
BILIRUB SERPL-MCNC: 0.3 MG/DL (ref 0.2–1.3)
BUN BLD-MCNC: 10 MG/DL (ref 7–21)
BUN/CREAT SERPL: 12.8 (ref 7–25)
CALCIUM SPEC-SCNC: 9.4 MG/DL (ref 8.4–10.2)
CHLORIDE SERPL-SCNC: 93 MMOL/L (ref 95–110)
CO2 SERPL-SCNC: 30 MMOL/L (ref 22–31)
CREAT BLD-MCNC: 0.78 MG/DL (ref 0.5–1)
GFR SERPL CREATININE-BSD FRML MDRD: 74 ML/MIN/1.73 (ref 45–104)
GLOBULIN UR ELPH-MCNC: 2.7 GM/DL (ref 2.3–3.5)
GLUCOSE BLD-MCNC: 80 MG/DL (ref 60–100)
MAGNESIUM SERPL-MCNC: 2.2 MG/DL (ref 1.6–2.3)
PHOSPHATE SERPL-MCNC: 4.3 MG/DL (ref 2.4–4.4)
POTASSIUM BLD-SCNC: 3.7 MMOL/L (ref 3.5–5.1)
PROT SERPL-MCNC: 7.4 G/DL (ref 6.3–8.6)
SODIUM BLD-SCNC: 135 MMOL/L (ref 137–145)

## 2018-12-12 PROCEDURE — 80053 COMPREHEN METABOLIC PANEL: CPT | Performed by: FAMILY MEDICINE

## 2018-12-12 PROCEDURE — 36415 COLL VENOUS BLD VENIPUNCTURE: CPT | Performed by: NURSE PRACTITIONER

## 2018-12-12 PROCEDURE — 84100 ASSAY OF PHOSPHORUS: CPT | Performed by: FAMILY MEDICINE

## 2018-12-12 PROCEDURE — 25010000002 DENOSUMAB 60 MG/ML SOLUTION: Performed by: NURSE PRACTITIONER

## 2018-12-12 PROCEDURE — 96372 THER/PROPH/DIAG INJ SC/IM: CPT | Performed by: NURSE PRACTITIONER

## 2018-12-12 PROCEDURE — 83735 ASSAY OF MAGNESIUM: CPT | Performed by: FAMILY MEDICINE

## 2018-12-12 RX ADMIN — DENOSUMAB 60 MG: 60 INJECTION SUBCUTANEOUS at 09:55

## 2018-12-12 NOTE — PATIENT INSTRUCTIONS
Denosumab injection  What is this medicine?  DENOSUMAB (den oh michelle mab) slows bone breakdown. Prolia is used to treat osteoporosis in women after menopause and in men. Xgeva is used to treat a high calcium level due to cancer and to prevent bone fractures and other bone problems caused by multiple myeloma or cancer bone metastases. Xgeva is also used to treat giant cell tumor of the bone.  This medicine may be used for other purposes; ask your health care provider or pharmacist if you have questions.  COMMON BRAND NAME(S): Prolia, XGEVA  What should I tell my health care provider before I take this medicine?  They need to know if you have any of these conditions:  -dental disease  -having surgery or tooth extraction  -infection  -kidney disease  -low levels of calcium or Vitamin D in the blood  -malnutrition  -on hemodialysis  -skin conditions or sensitivity  -thyroid or parathyroid disease  -an unusual reaction to denosumab, other medicines, foods, dyes, or preservatives  -pregnant or trying to get pregnant  -breast-feeding  How should I use this medicine?  This medicine is for injection under the skin. It is given by a health care professional in a hospital or clinic setting.  If you are getting Prolia, a special MedGuide will be given to you by the pharmacist with each prescription and refill. Be sure to read this information carefully each time.  For Prolia, talk to your pediatrician regarding the use of this medicine in children. Special care may be needed. For Xgeva, talk to your pediatrician regarding the use of this medicine in children. While this drug may be prescribed for children as young as 13 years for selected conditions, precautions do apply.  Overdosage: If you think you have taken too much of this medicine contact a poison control center or emergency room at once.  NOTE: This medicine is only for you. Do not share this medicine with others.  What if I miss a dose?  It is important not to miss your  dose. Call your doctor or health care professional if you are unable to keep an appointment.  What may interact with this medicine?  Do not take this medicine with any of the following medications:  -other medicines containing denosumab  This medicine may also interact with the following medications:  -medicines that lower your chance of fighting infection  -steroid medicines like prednisone or cortisone  This list may not describe all possible interactions. Give your health care provider a list of all the medicines, herbs, non-prescription drugs, or dietary supplements you use. Also tell them if you smoke, drink alcohol, or use illegal drugs. Some items may interact with your medicine.  What should I watch for while using this medicine?  Visit your doctor or health care professional for regular checks on your progress. Your doctor or health care professional may order blood tests and other tests to see how you are doing.  Call your doctor or health care professional for advice if you get a fever, chills or sore throat, or other symptoms of a cold or flu. Do not treat yourself. This drug may decrease your body's ability to fight infection. Try to avoid being around people who are sick.  You should make sure you get enough calcium and vitamin D while you are taking this medicine, unless your doctor tells you not to. Discuss the foods you eat and the vitamins you take with your health care professional.  See your dentist regularly. Brush and floss your teeth as directed. Before you have any dental work done, tell your dentist you are receiving this medicine.  Do not become pregnant while taking this medicine or for 5 months after stopping it. Talk with your doctor or health care professional about your birth control options while taking this medicine. Women should inform their doctor if they wish to become pregnant or think they might be pregnant. There is a potential for serious side effects to an unborn child. Talk  to your health care professional or pharmacist for more information.  What side effects may I notice from receiving this medicine?  Side effects that you should report to your doctor or health care professional as soon as possible:  -allergic reactions like skin rash, itching or hives, swelling of the face, lips, or tongue  -bone pain  -breathing problems  -dizziness  -jaw pain, especially after dental work  -redness, blistering, peeling of the skin  -signs and symptoms of infection like fever or chills; cough; sore throat; pain or trouble passing urine  -signs of low calcium like fast heartbeat, muscle cramps or muscle pain; pain, tingling, numbness in the hands or feet; seizures  -unusual bleeding or bruising  -unusually weak or tired  Side effects that usually do not require medical attention (report to your doctor or health care professional if they continue or are bothersome):  -constipation  -diarrhea  -headache  -joint pain  -loss of appetite  -muscle pain  -runny nose  -tiredness  -upset stomach  This list may not describe all possible side effects. Call your doctor for medical advice about side effects. You may report side effects to FDA at 3-253-FDA-3377.  Where should I keep my medicine?  This medicine is only given in a clinic, doctor's office, or other health care setting and will not be stored at home.  NOTE: This sheet is a summary. It may not cover all possible information. If you have questions about this medicine, talk to your doctor, pharmacist, or health care provider.  © 2018 [employer] (2018-01-09 19:17:21)

## 2018-12-17 ENCOUNTER — APPOINTMENT (OUTPATIENT)
Dept: LAB | Facility: HOSPITAL | Age: 63
End: 2018-12-17

## 2018-12-17 ENCOUNTER — OFFICE VISIT (OUTPATIENT)
Dept: FAMILY MEDICINE CLINIC | Facility: CLINIC | Age: 63
End: 2018-12-17

## 2018-12-17 VITALS
WEIGHT: 128.13 LBS | DIASTOLIC BLOOD PRESSURE: 78 MMHG | SYSTOLIC BLOOD PRESSURE: 122 MMHG | HEART RATE: 65 BPM | OXYGEN SATURATION: 98 % | HEIGHT: 62 IN | BODY MASS INDEX: 23.58 KG/M2

## 2018-12-17 DIAGNOSIS — K58.1 IRRITABLE BOWEL SYNDROME WITH CONSTIPATION: ICD-10-CM

## 2018-12-17 DIAGNOSIS — F90.0 ATTENTION DEFICIT HYPERACTIVITY DISORDER (ADHD), PREDOMINANTLY INATTENTIVE TYPE: Primary | ICD-10-CM

## 2018-12-17 DIAGNOSIS — E78.2 MIXED HYPERLIPIDEMIA: ICD-10-CM

## 2018-12-17 DIAGNOSIS — R53.83 MALAISE AND FATIGUE: ICD-10-CM

## 2018-12-17 DIAGNOSIS — Z79.899 LONG TERM USE OF DRUG: ICD-10-CM

## 2018-12-17 DIAGNOSIS — E03.9 ACQUIRED HYPOTHYROIDISM: ICD-10-CM

## 2018-12-17 DIAGNOSIS — R53.81 MALAISE AND FATIGUE: ICD-10-CM

## 2018-12-17 DIAGNOSIS — E53.8 B12 DEFICIENCY: ICD-10-CM

## 2018-12-17 LAB
ARTICHOKE IGE QN: 137 MG/DL (ref 1–129)
CHOLEST SERPL-MCNC: 252 MG/DL (ref 0–199)
DEPRECATED RDW RBC AUTO: 38.2 FL (ref 36.4–46.3)
ERYTHROCYTE [DISTWIDTH] IN BLOOD BY AUTOMATED COUNT: 12.4 % (ref 11.5–14.5)
HCT VFR BLD AUTO: 36 % (ref 35–45)
HDLC SERPL-MCNC: 62 MG/DL (ref 60–200)
HGB BLD-MCNC: 12.6 G/DL (ref 12–15.5)
LDLC/HDLC SERPL: 2.33 {RATIO} (ref 0–3.22)
MCH RBC QN AUTO: 29.8 PG (ref 26.5–34)
MCHC RBC AUTO-ENTMCNC: 35 G/DL (ref 31.4–36)
MCV RBC AUTO: 85.1 FL (ref 80–98)
PLATELET # BLD AUTO: 371 10*3/MM3 (ref 150–450)
PMV BLD AUTO: 11.3 FL (ref 8–12)
RBC # BLD AUTO: 4.23 10*6/MM3 (ref 3.77–5.16)
T4 FREE SERPL-MCNC: 1.06 NG/DL (ref 0.78–2.19)
TRIGL SERPL-MCNC: 227 MG/DL (ref 20–199)
TSH SERPL DL<=0.05 MIU/L-ACNC: 1.61 MIU/ML (ref 0.46–4.68)
WBC NRBC COR # BLD: 5.78 10*3/MM3 (ref 3.2–9.8)

## 2018-12-17 PROCEDURE — 36415 COLL VENOUS BLD VENIPUNCTURE: CPT | Performed by: FAMILY MEDICINE

## 2018-12-17 PROCEDURE — G0481 DRUG TEST DEF 8-14 CLASSES: HCPCS | Performed by: FAMILY MEDICINE

## 2018-12-17 PROCEDURE — 99214 OFFICE O/P EST MOD 30 MIN: CPT | Performed by: FAMILY MEDICINE

## 2018-12-17 PROCEDURE — 84439 ASSAY OF FREE THYROXINE: CPT | Performed by: FAMILY MEDICINE

## 2018-12-17 PROCEDURE — 84443 ASSAY THYROID STIM HORMONE: CPT | Performed by: FAMILY MEDICINE

## 2018-12-17 PROCEDURE — 80061 LIPID PANEL: CPT | Performed by: FAMILY MEDICINE

## 2018-12-17 PROCEDURE — 85027 COMPLETE CBC AUTOMATED: CPT | Performed by: FAMILY MEDICINE

## 2018-12-17 PROCEDURE — 80307 DRUG TEST PRSMV CHEM ANLYZR: CPT | Performed by: FAMILY MEDICINE

## 2018-12-17 RX ORDER — CYANOCOBALAMIN 1000 UG/ML
1000 INJECTION, SOLUTION INTRAMUSCULAR; SUBCUTANEOUS ONCE
Status: COMPLETED | OUTPATIENT
Start: 2018-12-17 | End: 2019-02-27

## 2018-12-17 RX ORDER — CYANOCOBALAMIN 1000 UG/ML
1000 INJECTION, SOLUTION INTRAMUSCULAR; SUBCUTANEOUS
Status: DISCONTINUED | OUTPATIENT
Start: 2018-12-17 | End: 2018-12-17

## 2018-12-20 LAB
7-AMINOCLONAZEPAM UR: NOT DETECTED NG/MG CREAT
A-OH ALPRAZ/CREAT UR: NOT DETECTED NG/MG CREAT
ALFENTANIL UR QL: NOT DETECTED NG/MG CREAT
ALPHA-HYDROXYTRIAZOLAM, URINE: NOT DETECTED NG/MG CREAT
AMOBARBITAL UR: NOT DETECTED
AMPHET UR QL CFM: NOT DETECTED NG/MG CREAT
AMPHETAMINES UR QL SCN: NEGATIVE
BARBITAL UR QL CFM: NOT DETECTED
BARBITURATES UR QL SCN: NEGATIVE
BENZODIAZ UR QL SCN: NEGATIVE
BENZOYLECGONINE UR: NOT DETECTED NG/MG CREAT
BUPRENORPHINE UR QL: NEGATIVE
BUPRENORPHINE UR QL: NOT DETECTED NG/MG CREAT
BUTABARBITAL UR QL: NOT DETECTED
BUTALBITAL UR QL: NOT DETECTED
CANNABINOIDS UR QL CFM: NEGATIVE
CLONAZEPAM UR QL: NOT DETECTED NG/MG CREAT
COCAETHYLENE UR QL CFM: NOT DETECTED NG/MG CREAT
COCAINE UR QL CFM: NEGATIVE
CODEINE UR QL: NOT DETECTED NG/MG CREAT
CONV COCAINE, UR: NOT DETECTED NG/MG CREAT
CONV REPORT SUMMARY: NORMAL
CREAT 24H UR-MCNC: 31 MG/DL
DESALKYLFLURAZ/CREAT UR: NOT DETECTED NG/MG CREAT
DIAZEPAM UR-MCNC: NOT DETECTED NG/MG CREAT
DIHYDROCODEINE UR: NOT DETECTED NG/MG CREAT
EDDP SERPL QL: NOT DETECTED NG/MG CREAT
ETHANOL SCREEN URINE (REF): NEGATIVE
ETHANOL UR-MCNC: NOT DETECTED G/DL
FENTANYL UR QL: NEGATIVE
FENTANYL+NORFENTANYL UR QL SCN: NOT DETECTED NG/MG CREAT
HX OF MEDICATION USE: NORMAL
HYDROCODONE UR QL: NOT DETECTED NG/MG CREAT
HYDROMORPHONE UR QL: NOT DETECTED NG/MG CREAT
LEVEL OF DETECTION:: NORMAL
LORAZEPAM UR-MCNC: NOT DETECTED NG/MG CREAT
LORAZEPAM/CREAT UR: NOT DETECTED NG/MG CREAT
MDA SERPLBLD-MCNC: NOT DETECTED NG/MG CREAT
MDMA UR QL SCN: NOT DETECTED NG/MG CREAT
MEPHOBARBITAL UR QL CFM: NOT DETECTED
METHADONE BLD QL SCN: NEGATIVE
METHADONE, URINE: NOT DETECTED NG/MG CREAT
METHAMPHETAMINE UR: NOT DETECTED NG/MG CREAT
MIDAZOLAM UR-MCNC: NOT DETECTED NG/MG CREAT
MORPHINE UR QL: NOT DETECTED NG/MG CREAT
N-DESMETHYLTRAMADOL, U: NOT DETECTED NG/MG CREAT
NARCOTICS UR: NEGATIVE
NORBUPRENORPHINE SERPLBLD-MCNC: NOT DETECTED NG/MG CREAT
NORCODEINE UR-MCNC: NOT DETECTED NG/MG CREAT
NORDIAZEPAM SERPL CFM-MCNC: NOT DETECTED NG/MG CREAT
NORFENTANYL UR: NOT DETECTED NG/MG CREAT
NOROXYMORPHONE: 174 NG/MG CREAT
O-DESMETHYLTRAMADOL, UR: NOT DETECTED NG/MG CREAT
OPIATES UR QL CFM: NEGATIVE
OPIATES, URINE, NORHYDROCODONE: NOT DETECTED NG/MG CREAT
OPIATES, URINE, NOROXYCODONE: 5655 NG/MG CREAT
OXAZEPAM UR QL: NOT DETECTED NG/MG CREAT
OXYCODONE UR QL: NORMAL
OXYCODONE UR: 710 NG/MG CREAT
OXYMORPHONE UR: 390 NG/MG CREAT
PENTOBARBITAL UR: NOT DETECTED
PHENOBARB UR QL: NOT DETECTED
SECOBARBITAL UR QL: NOT DETECTED
SUFENTANIL UR QL: NOT DETECTED NG/MG CREAT
TAPENTADOL SERPLBLD-MCNC: NEGATIVE NG/ML
TAPENTADOL UR-MCNC: NOT DETECTED NG/MG CREAT
TEMAZEPAM UR QL CFM: NOT DETECTED NG/MG CREAT
THC UR CFM-MCNC: NOT DETECTED NG/MG CREAT
THIOPENTAL UR QL CFM: NOT DETECTED
TRAMADOL UR: NOT DETECTED NG/MG CREAT

## 2018-12-20 NOTE — PROGRESS NOTES
Subjective:     Daysi Pa is a 63 y.o. female who presents for follow up of ADHD. She is currently taking ritalin which helps her focus to complete her daily tasks.  She feels the medication is working well.   She has gained 11 pounds since last office.      She continues to be seen in Pain Management for pain related to her RA. She has been receiving injections which she feels really helps her.  She does have constipation with hard stools. She may have a bowel movement every other day. She is a vegetarian.  She is not taking any fiber supplementation.      She had to go off her livalo for making her not feel right.  She does have a shell fish allergy and is concerned about taking OTC fish oil due to oyster shells being used.  She is due for her cholesterol to be checked.      The following portions of the patient's history were reviewed and updated as appropriate: allergies, current medications, past family history, past medical history, past social history, past surgical history and problem list.    Past Medical Hx:  Past Medical History:   Diagnosis Date   • Acquired hypothyroidism    • Allergic rhinitis    • Allergy 04/17/2016    Consult, Allergy (1) - Ordered By: BENIGNO LIM ([facility])    • Anxiety state    • Attention deficit hyperactivity disorder    • Benign essential hypertension    • Chronic pain    • Chronic pain disorder    • Congestive heart failure (CMS/HCC)     primarily systrolic dysfunction EF 17-20% repeat echo 55%   • Depressive disorder    • Dyslipidemia    • Dysphagia    • Gastroesophageal reflux disease    • Generalized abdominal pain    • History of echocardiogram 03/23/2016    Echocardiogram W/ color flow 20967 (3) - Normal LV function wiht Ef of 60%.Normal RV size and function.Normal diastolic function.No significant valvular regurgitation or stenosis.   • History of echocardiogram 04/27/2012    Echocardiogram W/O color flow 10223 (1) - Normal left ventricular systolic function.  EF 55%. No evidence of regional wall motion abnormalities. Abnormal relaxation of the left ventricular myocardium.   • History of EKG 03/07/2016    EKG Tracing and Interpretation 66390 (3) - Ordered By: LILLIE BRADY ([facility])    • History of mammogram 06/05/2013    MAMMOGRAM SCREENING 76349 - [facility] (1) - birads 0    • Hyperlipidemia    • Intraductal carcinoma in situ of breast     hx in past and s/p bilateral simple mastectomies      • Irritable bowel syndrome    • Low back pain    • Malaise and fatigue    • Microalbuminuria 07/16/2015    POS MICROALBUMINURIA RESULT DOC AND REVIEWED 3060F (1) - Ordered By: BENIGNO LIM ([facility])   • Mitral valve regurgitation     Mild-Moderate      • Myopia    • Non-organic sleep disorder    • Osteoporosis    • Pain management 04/17/2016    Consult, Pain Management (1) - Ordered By: BENIGNO LIM ([facility])    • Pneumococcal vaccination indicated 07/08/2016    PNEUMOC VAC/ADMIN/RCVD 4040F (11) - Ordered By: BENIGNO LIM ([facility])   • Primary fibromyalgia syndrome    • Rheumatoid arthritis (CMS/HCC)        Past Surgical Hx:  Past Surgical History:   Procedure Laterality Date   • APPENDECTOMY  2008    Appendectomy (1)   • BREAST BIOPSY  06/12/2013    Stereotactic breast biopsy (1) - stereotactic left mammotome biopsy with 11 gauge needle.   • BREAST IMPLANT REMOVAL  2003    Remove breast implant (1)   • BREAST IMPLANT SURGERY  2000    Breast implantation (1)   • COLONOSCOPY  01/20/2014    Colonoscopy, diagnostic (screening) 05829 (1)   • COLONOSCOPY W/ POLYPECTOMY  2008    Colonoscopy remove polyps 40613 (1)    • EXCISION LESION  05/01/2014    Remove chest wall lesion (1) - Excision of subcutaneous mass, left chest wall, Subcutaneous mass left chest wall, status post mastectomy.   • INJECTION OF MEDICATION  07/05/2016    B12 (44) - Ordered By: BENIGNO LIM ([facility])    • INJECTION OF MEDICATION  11/12/2015    Celestone (betamethasone) (1) - Ordered By: BENIGNO LIM ([facility])    • INJECTION OF MEDICATION  02/11/2016     Kenalog (2) - Ordered By: BENIGNO LIM ([facility])    • INJECTION OF MEDICATION  11/17/2017    Prolia   • MASTECTOMY  09/05/2013    Mastectomy (2) - Right simple prophylactic mastectomy.   • OTHER SURGICAL HISTORY  10/29/2014    SONOGRAM THYROID, NECK 19029 (1) - Ordered By: BENIGNO LIM ([facility])   • PAP SMEAR  06/03/2013     PAP SMEAR (1)   • PARTIAL HYSTERECTOMY  1998     Partial hyst (1)     • TONSILLECTOMY  1969    Tonsillectomy (1)   • VASCULAR SURGERY  12/24/2014    Consult, Vascular Surgery (1) - Ordered By: BENIGNO LIM ([facility])        Health Maintenance:  Health Maintenance   Topic Date Due   • HEPATITIS A VACCINE ADULT (1 of 2) 09/22/1973   • ZOSTER VACCINE (2 of 3) 11/26/2014   • DXA SCAN  12/21/2018   • MEDICARE ANNUAL WELLNESS  01/29/2019   • LIPID PANEL  12/17/2019   • COLONOSCOPY  01/18/2020   • TDAP/TD VACCINES (2 - Td) 10/16/2024   • HEPATITIS C SCREENING  Completed   • INFLUENZA VACCINE  Completed       Current Meds:    Current Outpatient Medications:   •  albuterol (PROAIR HFA) 108 (90 BASE) MCG/ACT inhaler, 2 puffs 4 (four) times a day as needed., Disp: , Rfl:   •  AMITIZA 24 MCG capsule, TAKE 1 CAPSULE BY MOUTH TWICE A DAY, Disp: 180 capsule, Rfl: 3  •  bisoprolol (ZEBeta) 5 MG tablet, Take 1 tablet by mouth Daily., Disp: 30 tablet, Rfl: 3  •  busPIRone (BUSPAR) 10 MG tablet, Take 1 tablet by mouth 2 (Two) Times a Day As Needed (anxiety)., Disp: 180 tablet, Rfl: 3  •  desloratadine (CLARINEX) 5 MG tablet, Take 1 tablet by mouth Daily., Disp: 90 tablet, Rfl: 3  •  docusate sodium (COLACE) 100 MG capsule, Take 200 mg by mouth every night. at bedtime, Disp: , Rfl:   •  esomeprazole (nexIUM) 40 MG capsule, TAKE ONE CAPSULE BY MOUTH EVERY DAY, Disp: 90 capsule, Rfl: 3  •  furosemide (LASIX) 20 MG tablet, TAKE 1 TABLET BY MOUTH EVERY DAY, Disp: 30 tablet, Rfl: 3  •  gabapentin (NEURONTIN) 300 MG capsule, Take 1 capsule by mouth 2 (Two) Times a Day., Disp: 180 capsule, Rfl: 0  •  hydroxychloroquine (PLAQUENIL) 200 MG tablet,  TAKE 1 TABLET BY MOUTH 2 (TWO) TIMES A DAY., Disp: 180 tablet, Rfl: 3  •  methylphenidate (RITALIN) 10 MG tablet, Take 1 tablet by mouth Every 12 (Twelve) Hours., Disp: 60 tablet, Rfl: 0  •  metoclopramide (REGLAN) 10 MG tablet, TAKE 1 TABLET BY MOUTH EVERY 6 HOURS AS NEEDED (NAUSEA)., Disp: 270 tablet, Rfl: 2  •  montelukast (SINGULAIR) 10 MG tablet, 10 mg daily., Disp: , Rfl:   •  OxyCODONE HCl 5 MG tablet , Take 1 tablet by mouth every 6 (six) hours as needed., Disp: , Rfl:   •  OxyCODONE HCl ER (OXYCONTIN) 30 MG tablet extended-release 12 hour, Take 30 mg by mouth 2 (two) times a day. OxyContin 30 mg tablet,crush resistant,extended release, Disp: , Rfl:   •  polyethylene glycol (MIRALAX) powder, TAKE 17 GRAMS TWICE A DAY, Disp: 1530 g, Rfl: 2  •  SYNTHROID 88 MCG tablet, Take 1 tablet by mouth Daily. Brand name medically necessary, Disp: 90 tablet, Rfl: 3  •  traZODone (DESYREL) 150 MG tablet, TAKE 1 TABLET BY MOUTH EVERY NIGHT AT BEDTIME, Disp: 90 tablet, Rfl: 3  •  venlafaxine XR (EFFEXOR-XR) 150 MG 24 hr capsule, Take 1 capsule by mouth Daily., Disp: 90 capsule, Rfl: 3  •  venlafaxine XR (EFFEXOR-XR) 75 MG 24 hr capsule, Take 1 capsule by mouth Daily., Disp: 90 capsule, Rfl: 3    Current Facility-Administered Medications:   •  cyanocobalamin injection 1,000 mcg, 1,000 mcg, Intramuscular, Once, Olimpia Robison MD    Allergies:  Cephalosporins; Erythromycin; Morphine and related; Other; Penicillins; Shellfish-derived products; Zithromax [azithromycin]; Zolpidem; Hydrocodone-acetaminophen; and Sulfa antibiotics    Family Hx:  Family History   Problem Relation Age of Onset   • Breast cancer Sister    • Rectal cancer Other         Colorectal cancer   • Heart disease Other    • Hypertension Other    • Thyroid disease Other         Thyroid disorder   • Hypertension Mother    • Migraines Mother    • Osteoporosis Mother    • Diabetes Father    • Coronary artery disease Father    • Hyperlipidemia Father    • Cancer  "Maternal Aunt    • COPD Paternal Grandmother    • COPD Paternal Grandfather         Social History:  Social History     Socioeconomic History   • Marital status:      Spouse name: Not on file   • Number of children: Not on file   • Years of education: Not on file   • Highest education level: Not on file   Social Needs   • Financial resource strain: Not on file   • Food insecurity - worry: Not on file   • Food insecurity - inability: Not on file   • Transportation needs - medical: Not on file   • Transportation needs - non-medical: Not on file   Occupational History   • Not on file   Tobacco Use   • Smoking status: Former Smoker     Years: 30.00     Last attempt to quit: 8/17/2008     Years since quitting: 10.3   • Smokeless tobacco: Never Used   • Tobacco comment: PT SMOKED FOR 30 YEARS AND QUIT IN 2010   Substance and Sexual Activity   • Alcohol use: No   • Drug use: No   • Sexual activity: Defer   Other Topics Concern   • Not on file   Social History Narrative   • Not on file       Review of Systems  Review of Systems   Constitutional: Positive for fatigue. Negative for fever.   HENT: Negative for ear pain and sore throat.    Eyes: Negative for pain and visual disturbance.   Respiratory: Negative for cough and shortness of breath.    Cardiovascular: Negative for chest pain and palpitations.   Gastrointestinal: Positive for constipation. Negative for abdominal pain and nausea.   Genitourinary: Negative for dysuria.   Musculoskeletal: Positive for arthralgias, back pain, joint swelling and myalgias.   Skin: Negative for rash.   Neurological: Negative for dizziness, weakness and headaches.         Objective:     /78   Pulse 65   Ht 157.5 cm (62.01\")   Wt 58.1 kg (128 lb 2 oz)   SpO2 98%   BMI 23.43 kg/m²       General Appearance:    Alert, cooperative, no distress, appears stated age   Head:    Normocephalic, without obvious abnormality, atraumatic   Eyes:    PERRL, conjunctiva/corneas clear, EOM's " intact   Ears:    Normal TM's and external ear canals, both ears   Nose:   Nares normal, mucosa normal, no drainage    or sinus tenderness   Throat:   Lips, mucosa, and tongue normal; teeth and gums normal   Neck:   Supple, symmetrical, trachea midline, no adenopathy;       Back:     Symmetric, no curvature, ROM normal but with pain, full range of motion.     Lungs:     Clear to auscultation bilaterally, respirations unlabored   Chest Wall:    No tenderness breasts surgically removed    Heart:    Regular rate and rhythm, S1 and S2 normal, no murmur, rub   or gallop       Abdomen:     Soft, non-tender, bowel sounds active all four quadrants,     no masses, no organomegaly           Extremities:   atraumatic, no cyanosis or edema, clubbing of fingers of both  hands   Pulses:  2+ dorsalis pedis   Skin:   Skin color, texture, turgor normal, no rashes or lesions       Neurologic:   CNII-XII intact, normal strength, sensation            Assessment:     1. Attention deficit hyperactivity disorder (ADHD), predominantly inattentive type    2. Long term use of drug    3. B12 deficiency    4. Acquired hypothyroidism    5. Malaise and fatigue    6. Mixed hyperlipidemia    7. Irritable bowel syndrome with constipation         Plan:     1.  Rx changes: Continue current medications.  Add OTC fiber supplementation. Stay off statin due to intolerance of multiple medications. Will check lipid panel, if elevated may consider repatha.   2.  Education: Provided reassurance. Provided reassurance and Recommended medication management  3.  Compliance at present is estimated to be excellent.  4. Follow up: 3 months and as needed  5.  [facility] # [id number] appropriate with history 12/17/2018 UDS today  6. Labwork before leaving today.         Goals        Exercise    • Exercise 150 minutes per week (moderate activity) (pt-stated)      Barriers to goal: RA and chronic pain         Lifestyle    • Other (pt-stated)      Less fatigue  Barriers to  goals: Multiple medical problems                Preventative:  Patient's Body mass index is 23.43 kg/m². BMI is within normal parameters. No follow-up required.  Recommended:Hep A and Will need shingrix.   patient is a non smoker  does not drink  plan meals, eat breakfast and have 3 meals a day    RISK SCORE: 3       This document has been electronically signed by Olimpia Robison MD on December 20, 2018 7:32 PM

## 2019-01-07 ENCOUNTER — TELEPHONE (OUTPATIENT)
Dept: FAMILY MEDICINE CLINIC | Facility: CLINIC | Age: 64
End: 2019-01-07

## 2019-01-07 DIAGNOSIS — F90.0 ATTENTION DEFICIT HYPERACTIVITY DISORDER (ADHD), PREDOMINANTLY INATTENTIVE TYPE: ICD-10-CM

## 2019-01-07 RX ORDER — METHYLPHENIDATE HYDROCHLORIDE 10 MG/1
10 TABLET ORAL EVERY 12 HOURS SCHEDULED
Qty: 60 TABLET | Refills: 0 | Status: SHIPPED | OUTPATIENT
Start: 2019-01-07 | End: 2019-02-04 | Stop reason: SDUPTHER

## 2019-01-07 NOTE — TELEPHONE ENCOUNTER
Pt called and is needing a refill on methylphenidate (RITALIN) 10 MG tablet and would like for it to be called into [facility] in [place], [place].      Thank you,    Rosy

## 2019-01-08 NOTE — PROGRESS NOTES
Patient has ongoing ADHD.  [facility] #[id number] 1/7/2019, manual process at this time.      This document has been electronically signed by Olimpia Robison MD on January 7, 2019 9:06 PM

## 2019-02-04 ENCOUNTER — TELEPHONE (OUTPATIENT)
Dept: FAMILY MEDICINE CLINIC | Facility: CLINIC | Age: 64
End: 2019-02-04

## 2019-02-04 DIAGNOSIS — F90.0 ATTENTION DEFICIT HYPERACTIVITY DISORDER (ADHD), PREDOMINANTLY INATTENTIVE TYPE: ICD-10-CM

## 2019-02-04 RX ORDER — METHYLPHENIDATE HYDROCHLORIDE 10 MG/1
10 TABLET ORAL EVERY 12 HOURS SCHEDULED
Qty: 60 TABLET | Refills: 0 | Status: SHIPPED | OUTPATIENT
Start: 2019-02-04 | End: 2019-02-27 | Stop reason: SDUPTHER

## 2019-02-04 NOTE — PROGRESS NOTES
Patient has ongoing ADHD.  Juancho is manual process #[id number] 2/4/2019   Never had aberrant Juancho.  Have sent electronically.        This document has been electronically signed by Olimpia Robison MD on February 4, 2019 4:21 PM

## 2019-02-04 NOTE — TELEPHONE ENCOUNTER
Pt called and is needing refills on her methylphenidate (RITALIN) 10 MG tablet and would like for it to be called into [facility] in [place], [place].      Thank you,      Rosy

## 2019-02-14 RX ORDER — METOCLOPRAMIDE 10 MG/1
10 TABLET ORAL 4 TIMES DAILY
Qty: 270 TABLET | Refills: 3 | Status: ON HOLD | OUTPATIENT
Start: 2019-02-14 | End: 2020-01-21

## 2019-02-27 ENCOUNTER — OFFICE VISIT (OUTPATIENT)
Dept: FAMILY MEDICINE CLINIC | Facility: CLINIC | Age: 64
End: 2019-02-27

## 2019-02-27 VITALS
OXYGEN SATURATION: 97 % | HEART RATE: 82 BPM | HEIGHT: 62 IN | DIASTOLIC BLOOD PRESSURE: 82 MMHG | WEIGHT: 126 LBS | SYSTOLIC BLOOD PRESSURE: 132 MMHG | BODY MASS INDEX: 23.19 KG/M2

## 2019-02-27 DIAGNOSIS — M81.0 AGE-RELATED OSTEOPOROSIS WITHOUT CURRENT PATHOLOGICAL FRACTURE: ICD-10-CM

## 2019-02-27 DIAGNOSIS — Z00.00 MEDICARE ANNUAL WELLNESS VISIT, SUBSEQUENT: Primary | ICD-10-CM

## 2019-02-27 DIAGNOSIS — M05.731 RHEUMATOID ARTHRITIS INVOLVING BOTH WRISTS WITH POSITIVE RHEUMATOID FACTOR (HCC): ICD-10-CM

## 2019-02-27 DIAGNOSIS — F90.0 ATTENTION DEFICIT HYPERACTIVITY DISORDER (ADHD), PREDOMINANTLY INATTENTIVE TYPE: ICD-10-CM

## 2019-02-27 DIAGNOSIS — M05.732 RHEUMATOID ARTHRITIS INVOLVING BOTH WRISTS WITH POSITIVE RHEUMATOID FACTOR (HCC): ICD-10-CM

## 2019-02-27 DIAGNOSIS — Z71.89 ADVANCED CARE PLANNING/COUNSELING DISCUSSION: ICD-10-CM

## 2019-02-27 PROCEDURE — 99497 ADVNCD CARE PLAN 30 MIN: CPT | Performed by: FAMILY MEDICINE

## 2019-02-27 PROCEDURE — 96372 THER/PROPH/DIAG INJ SC/IM: CPT | Performed by: FAMILY MEDICINE

## 2019-02-27 PROCEDURE — 90750 HZV VACC RECOMBINANT IM: CPT | Performed by: FAMILY MEDICINE

## 2019-02-27 PROCEDURE — 90471 IMMUNIZATION ADMIN: CPT | Performed by: FAMILY MEDICINE

## 2019-02-27 PROCEDURE — G0439 PPPS, SUBSEQ VISIT: HCPCS | Performed by: FAMILY MEDICINE

## 2019-02-27 RX ORDER — METHYLPHENIDATE HYDROCHLORIDE 10 MG/1
10 TABLET ORAL EVERY 12 HOURS SCHEDULED
Qty: 60 TABLET | Refills: 0 | Status: SHIPPED | OUTPATIENT
Start: 2019-02-27 | End: 2019-04-01 | Stop reason: SDUPTHER

## 2019-02-27 RX ADMIN — CYANOCOBALAMIN 1000 MCG: 1000 INJECTION, SOLUTION INTRAMUSCULAR; SUBCUTANEOUS at 09:42

## 2019-03-18 ENCOUNTER — DOCUMENTATION (OUTPATIENT)
Dept: FAMILY MEDICINE CLINIC | Facility: CLINIC | Age: 64
End: 2019-03-18

## 2019-03-18 NOTE — PROGRESS NOTES
Patient notified that her Bone denisty has increased but still is not  normal and she is to stay on the same medications.

## 2019-03-24 NOTE — ACP (ADVANCE CARE PLANNING)
2/27/2019 Have spent 16 minutes in conversation with the patient discussing advanced care planning. Patient currently has no documents that discuss her wishes. She has not really thought much about her wishes nor has she really discussed them with her .  Have given her an ACP packet with phone numbers to follow up if she chooses to complete her documentation with us.      This document has been electronically signed by Olimpia Robison MD on March 24, 2019 4:21 PM

## 2019-04-01 ENCOUNTER — TELEPHONE (OUTPATIENT)
Dept: FAMILY MEDICINE CLINIC | Facility: CLINIC | Age: 64
End: 2019-04-01

## 2019-04-01 DIAGNOSIS — F90.0 ATTENTION DEFICIT HYPERACTIVITY DISORDER (ADHD), PREDOMINANTLY INATTENTIVE TYPE: ICD-10-CM

## 2019-04-01 RX ORDER — METHYLPHENIDATE HYDROCHLORIDE 10 MG/1
10 TABLET ORAL EVERY 12 HOURS SCHEDULED
Qty: 60 TABLET | Refills: 0 | Status: SHIPPED | OUTPATIENT
Start: 2019-04-01 | End: 2019-05-01 | Stop reason: SDUPTHER

## 2019-04-01 NOTE — TELEPHONE ENCOUNTER
Pt needs refill on methylphenidate (RITALIN) 10 MG tablet. [facility].    Thanks  Anamaria

## 2019-04-29 ENCOUNTER — TELEPHONE (OUTPATIENT)
Dept: FAMILY MEDICINE CLINIC | Facility: CLINIC | Age: 64
End: 2019-04-29

## 2019-05-01 DIAGNOSIS — F90.0 ATTENTION DEFICIT HYPERACTIVITY DISORDER (ADHD), PREDOMINANTLY INATTENTIVE TYPE: ICD-10-CM

## 2019-05-01 RX ORDER — METHYLPHENIDATE HYDROCHLORIDE 10 MG/1
10 TABLET ORAL EVERY 12 HOURS SCHEDULED
Qty: 60 TABLET | Refills: 0 | Status: SHIPPED | OUTPATIENT
Start: 2019-05-01 | End: 2019-05-29 | Stop reason: SDUPTHER

## 2019-05-01 NOTE — PROGRESS NOTES
Patient has ongoing ADHD.  [facility] #[id number] appropriate 5/1/2019.       This document has been electronically signed by Olimpia Robison MD on May 1, 2019 3:09 PM

## 2019-05-08 ENCOUNTER — OFFICE VISIT (OUTPATIENT)
Dept: FAMILY MEDICINE CLINIC | Facility: CLINIC | Age: 64
End: 2019-05-08

## 2019-05-08 VITALS
BODY MASS INDEX: 23.19 KG/M2 | HEART RATE: 84 BPM | OXYGEN SATURATION: 98 % | WEIGHT: 126 LBS | DIASTOLIC BLOOD PRESSURE: 80 MMHG | HEIGHT: 62 IN | SYSTOLIC BLOOD PRESSURE: 120 MMHG

## 2019-05-08 DIAGNOSIS — E53.8 B12 DEFICIENCY: ICD-10-CM

## 2019-05-08 DIAGNOSIS — M25.511 ACUTE PAIN OF RIGHT SHOULDER: ICD-10-CM

## 2019-05-08 DIAGNOSIS — F90.0 ATTENTION DEFICIT HYPERACTIVITY DISORDER (ADHD), PREDOMINANTLY INATTENTIVE TYPE: Primary | ICD-10-CM

## 2019-05-08 DIAGNOSIS — D48.5 NEOPLASM OF UNCERTAIN BEHAVIOR OF SKIN OF FOREARM: ICD-10-CM

## 2019-05-08 PROCEDURE — 96372 THER/PROPH/DIAG INJ SC/IM: CPT | Performed by: FAMILY MEDICINE

## 2019-05-08 PROCEDURE — 90471 IMMUNIZATION ADMIN: CPT | Performed by: FAMILY MEDICINE

## 2019-05-08 PROCEDURE — 90750 HZV VACC RECOMBINANT IM: CPT | Performed by: FAMILY MEDICINE

## 2019-05-08 PROCEDURE — 99214 OFFICE O/P EST MOD 30 MIN: CPT | Performed by: FAMILY MEDICINE

## 2019-05-08 RX ORDER — METHYLPHENIDATE HYDROCHLORIDE 10 MG/1
10 TABLET ORAL EVERY 12 HOURS SCHEDULED
Qty: 60 TABLET | Refills: 0 | Status: CANCELLED | OUTPATIENT
Start: 2019-05-08

## 2019-05-08 RX ORDER — GABAPENTIN 300 MG/1
300 CAPSULE ORAL 2 TIMES DAILY
Qty: 180 CAPSULE | Refills: 0 | Status: CANCELLED | OUTPATIENT
Start: 2019-05-08

## 2019-05-08 RX ORDER — CYANOCOBALAMIN 1000 UG/ML
1000 INJECTION, SOLUTION INTRAMUSCULAR; SUBCUTANEOUS
Status: DISCONTINUED | OUTPATIENT
Start: 2019-05-08 | End: 2020-06-30

## 2019-05-08 RX ADMIN — CYANOCOBALAMIN 1000 MCG: 1000 INJECTION, SOLUTION INTRAMUSCULAR; SUBCUTANEOUS at 09:56

## 2019-05-08 NOTE — PROGRESS NOTES
Subjective:     Daysi Pa is a 63 y.o. female who presents for follow up of ADHD. She is currently taking ritalin which helps her focus to complete her daily tasks.  She feels the medication is working well.   Her weight remains stable.      She continues to be seen in Pain Management for pain related to her RA. She has been receiving injections which she feels really helps her.    Above history reviewed and remains unchanged.         Shoulder Pain  Patient complains of right shoulder pain. The symptoms began several weeks ago. Aggravating factors: repetitive activity, vacuuming,house chores. Pain is located around the acromioclavicular (AC) joint and diffusely throughout the shoulder. Discomfort is described as aching. Symptoms are exacerbated by repetitive movements and overhead movements. Evaluation to date: none. Therapy to date includes: nothing specific.   Skin Lesion  Patient complains of a skin lesion of the forearm. The lesion has been present for 1 year. Lesion has changed in several months. Symptoms associated with the lesion are: increasing diameter. Patient denies pain, drainage, infection. She has one lesion on her let arm and one on her right arm that is darkening and becoming rougher.         The following portions of the patient's history were reviewed and updated as appropriate: allergies, current medications, past family history, past medical history, past social history, past surgical history and problem list.    Past Medical Hx:  Past Medical History:   Diagnosis Date   • Acquired hypothyroidism    • Allergic rhinitis    • Allergy 04/17/2016    Consult, Allergy (1) - Ordered By: BENIGNO LIM ([facility])    • Anxiety state    • Attention deficit hyperactivity disorder    • Benign essential hypertension    • Chronic pain    • Chronic pain disorder    • Congestive heart failure (CMS/HCC)     primarily systrolic dysfunction EF 17-20% repeat echo 55%   • Depressive disorder    • Dyslipidemia     • Dysphagia    • Gastroesophageal reflux disease    • Generalized abdominal pain    • History of echocardiogram 03/23/2016    Echocardiogram W/ color flow 14964 (3) - Normal LV function wiht Ef of 60%.Normal RV size and function.Normal diastolic function.No significant valvular regurgitation or stenosis.   • History of echocardiogram 04/27/2012    Echocardiogram W/O color flow 10364 (1) - Normal left ventricular systolic function. EF 55%. No evidence of regional wall motion abnormalities. Abnormal relaxation of the left ventricular myocardium.   • History of EKG 03/07/2016    EKG Tracing and Interpretation 58062 (3) - Ordered By: LILLIE BRADY ([facility])    • History of mammogram 06/05/2013    MAMMOGRAM SCREENING 14876 - [facility] (1) - birads 0    • Hyperlipidemia    • Intraductal carcinoma in situ of breast     hx in past and s/p bilateral simple mastectomies      • Irritable bowel syndrome    • Low back pain    • Malaise and fatigue    • Microalbuminuria 07/16/2015    POS MICROALBUMINURIA RESULT DOC AND REVIEWED 3060F (1) - Ordered By: BENIGNO LIM ([facility])   • Mitral valve regurgitation     Mild-Moderate      • Myopia    • Non-organic sleep disorder    • Osteoporosis    • Pain management 04/17/2016    Consult, Pain Management (1) - Ordered By: BENIGNO LIM ([facility])    • Pneumococcal vaccination indicated 07/08/2016    PNEUMOC VAC/ADMIN/RCVD 4040F (11) - Ordered By: BENIGNO LIM ([facility])   • Primary fibromyalgia syndrome    • Rheumatoid arthritis (CMS/HCC)        Past Surgical Hx:  Past Surgical History:   Procedure Laterality Date   • APPENDECTOMY  2008    Appendectomy (1)   • BREAST BIOPSY  06/12/2013    Stereotactic breast biopsy (1) - stereotactic left mammotome biopsy with 11 gauge needle.   • BREAST IMPLANT REMOVAL  2003    Remove breast implant (1)   • BREAST IMPLANT SURGERY  2000    Breast implantation (1)   • COLONOSCOPY  01/20/2014    Colonoscopy, diagnostic (screening) 11858 (1)   • COLONOSCOPY W/ POLYPECTOMY  2008     Colonoscopy remove polyps 83292 (1)    • EXCISION LESION  05/01/2014    Remove chest wall lesion (1) - Excision of subcutaneous mass, left chest wall, Subcutaneous mass left chest wall, status post mastectomy.   • INJECTION OF MEDICATION  07/05/2016    B12 (44) - Ordered By: BENIGNO LIM ([facility])    • INJECTION OF MEDICATION  11/12/2015    Celestone (betamethasone) (1) - Ordered By: BENIGNO LIM ([facility])    • INJECTION OF MEDICATION  02/11/2016    Kenalog (2) - Ordered By: BENIGNO LIM ([facility])    • INJECTION OF MEDICATION  11/17/2017    Prolia   • MASTECTOMY  09/05/2013    Mastectomy (2) - Right simple prophylactic mastectomy.   • OTHER SURGICAL HISTORY  10/29/2014    SONOGRAM THYROID, NECK 63041 (1) - Ordered By: BENIGNO LIM ([facility])   • PAP SMEAR  06/03/2013     PAP SMEAR (1)   • PARTIAL HYSTERECTOMY  1998     Partial hyst (1)     • TONSILLECTOMY  1969    Tonsillectomy (1)   • VASCULAR SURGERY  12/24/2014    Consult, Vascular Surgery (1) - Ordered By: BENIGNO LIM ([facility])        Health Maintenance:  Health Maintenance   Topic Date Due   • ZOSTER VACCINE (3 of 3) 04/24/2019   • INFLUENZA VACCINE  08/01/2019   • LIPID PANEL  12/17/2019   • COLONOSCOPY  01/18/2020   • MEDICARE ANNUAL WELLNESS  02/27/2020   • DXA SCAN  03/11/2021   • TDAP/TD VACCINES (2 - Td) 10/16/2024   • HEPATITIS C SCREENING  Completed       Current Meds:    Current Outpatient Medications:   •  albuterol (PROAIR HFA) 108 (90 BASE) MCG/ACT inhaler, 2 puffs 4 (four) times a day as needed., Disp: , Rfl:   •  AMITIZA 24 MCG capsule, TAKE 1 CAPSULE BY MOUTH TWICE A DAY, Disp: 180 capsule, Rfl: 3  •  bisoprolol (ZEBeta) 5 MG tablet, Take 1 tablet by mouth Daily., Disp: 30 tablet, Rfl: 3  •  busPIRone (BUSPAR) 10 MG tablet, Take 1 tablet by mouth 2 (Two) Times a Day As Needed (anxiety)., Disp: 180 tablet, Rfl: 3  •  desloratadine (CLARINEX) 5 MG tablet, Take 1 tablet by mouth Daily., Disp: 90 tablet, Rfl: 3  •  docusate sodium (COLACE) 100 MG capsule, Take 200 mg by mouth every night. at  bedtime, Disp: , Rfl:   •  esomeprazole (nexIUM) 40 MG capsule, TAKE ONE CAPSULE BY MOUTH EVERY DAY, Disp: 90 capsule, Rfl: 3  •  furosemide (LASIX) 20 MG tablet, TAKE 1 TABLET BY MOUTH EVERY DAY, Disp: 30 tablet, Rfl: 3  •  gabapentin (NEURONTIN) 300 MG capsule, Take 1 capsule by mouth 2 (Two) Times a Day., Disp: 180 capsule, Rfl: 0  •  hydroxychloroquine (PLAQUENIL) 200 MG tablet, TAKE 1 TABLET BY MOUTH 2 (TWO) TIMES A DAY., Disp: 180 tablet, Rfl: 3  •  methylphenidate (RITALIN) 10 MG tablet, Take 1 tablet by mouth Every 12 (Twelve) Hours., Disp: 60 tablet, Rfl: 0  •  metoclopramide (REGLAN) 10 MG tablet, Take 1 tablet by mouth 4 (Four) Times a Day., Disp: 270 tablet, Rfl: 3  •  montelukast (SINGULAIR) 10 MG tablet, 10 mg daily., Disp: , Rfl:   •  OxyCODONE HCl 5 MG tablet , Take 1 tablet by mouth every 6 (six) hours as needed., Disp: , Rfl:   •  OxyCODONE HCl ER (OXYCONTIN) 30 MG tablet extended-release 12 hour, Take 30 mg by mouth 2 (two) times a day. OxyContin 30 mg tablet,crush resistant,extended release, Disp: , Rfl:   •  polyethylene glycol (MIRALAX) powder, TAKE 17 GRAMS TWICE A DAY, Disp: 1530 g, Rfl: 2  •  SYNTHROID 88 MCG tablet, Take 1 tablet by mouth Daily. Brand name medically necessary, Disp: 90 tablet, Rfl: 3  •  traZODone (DESYREL) 150 MG tablet, TAKE 1 TABLET BY MOUTH EVERY NIGHT AT BEDTIME, Disp: 90 tablet, Rfl: 3  •  venlafaxine XR (EFFEXOR-XR) 150 MG 24 hr capsule, Take 1 capsule by mouth Daily., Disp: 90 capsule, Rfl: 3  •  venlafaxine XR (EFFEXOR-XR) 75 MG 24 hr capsule, Take 1 capsule by mouth Daily., Disp: 90 capsule, Rfl: 3    Allergies:  Cephalosporins; Erythromycin; Morphine and related; Other; Penicillins; Shellfish-derived products; Zithromax [azithromycin]; Zolpidem; Hydrocodone-acetaminophen; and Sulfa antibiotics    Family Hx:  Family History   Problem Relation Age of Onset   • Breast cancer Sister    • Rectal cancer Other         Colorectal cancer   • Heart disease Other    •  "Hypertension Other    • Thyroid disease Other         Thyroid disorder   • Hypertension Mother    • Migraines Mother    • Osteoporosis Mother    • Diabetes Father    • Coronary artery disease Father    • Hyperlipidemia Father    • Cancer Maternal Aunt    • COPD Paternal Grandmother    • COPD Paternal Grandfather         Social History:  Social History     Socioeconomic History   • Marital status:      Spouse name: Not on file   • Number of children: Not on file   • Years of education: Not on file   • Highest education level: Not on file   Tobacco Use   • Smoking status: Former Smoker     Years: 30.00     Last attempt to quit: 8/17/2008     Years since quitting: 10.7   • Smokeless tobacco: Never Used   • Tobacco comment: PT SMOKED FOR 30 YEARS AND QUIT IN 2010   Substance and Sexual Activity   • Alcohol use: No   • Drug use: No   • Sexual activity: Defer       Review of Systems  Review of Systems   Constitutional: Positive for fatigue. Negative for fever.   HENT: Negative for ear pain and sore throat.    Eyes: Negative for pain and visual disturbance.   Respiratory: Negative for cough and shortness of breath.    Cardiovascular: Negative for chest pain and palpitations.   Gastrointestinal: Positive for constipation. Negative for abdominal pain and nausea.   Genitourinary: Negative for dysuria.   Musculoskeletal: Positive for arthralgias, back pain, joint swelling and myalgias.   Skin: Positive for color change. Negative for rash.        1 lesion on each forearm   Neurological: Negative for dizziness, weakness and headaches.         Objective:     /80 (BP Location: Left arm)   Pulse 84   Ht 157.5 cm (62.01\")   Wt 57.2 kg (126 lb)   SpO2 98%   BMI 23.04 kg/m²     Physical Exam   Constitutional: She is oriented to person, place, and time. She appears well-developed and well-nourished.   HENT:   Head: Normocephalic and atraumatic.   Right Ear: Hearing, tympanic membrane, external ear and ear canal " normal.   Left Ear: Hearing, tympanic membrane, external ear and ear canal normal.   Nose: Nose normal.   Mouth/Throat: Oropharynx is clear and moist.   Eyes: Conjunctivae and EOM are normal. Pupils are equal, round, and reactive to light.   Neck: Normal range of motion. Neck supple.   Cardiovascular: Normal rate, regular rhythm and normal heart sounds.   Pulmonary/Chest: Effort normal and breath sounds normal.   Abdominal: Soft. Bowel sounds are normal. She exhibits no distension. There is no tenderness.   Musculoskeletal: Normal range of motion.        Right shoulder: She exhibits tenderness, bony tenderness, pain and decreased strength.   Neurological: She is alert and oriented to person, place, and time.   Skin: Skin is warm and dry.        Psychiatric: She has a normal mood and affect. Her speech is normal and behavior is normal. Judgment and thought content normal. Cognition and memory are normal.                                Assessment:     No diagnosis found.     Plan:     1.  Rx changes: Continue current medications.    2.  Education: Provided reassurance. Provided reassurance and Recommended medication management  3.  Compliance at present is estimated to be excellent.  4.  Follow up: 3 months and as needed  5.  ASHLYN # [id number] 05/08/2019 appropriate with history  6. Right shoulder xray before leaving today.  7. Schedule with procedure clinic for removal of two skin lesions on forearms.   8. Shingrix and B12 injection today.         Goals        Diet    • Eat more fruits and vegetables (pt-stated)      Barriers to goals: None         Exercise    • Exercise 150 minutes per week (moderate activity) (pt-stated)      Barriers to goal: RA and chronic pain         Lifestyle    • Other (pt-stated)      Less fatigue  Barriers to goals: Multiple medical problems                Preventative:  Patient's Body mass index is 23.04 kg/m². BMI is within normal parameters. No follow-up required.  Recommended:Second  shingrix today.   patient is a non smoker  does not drink  plan meals, eat breakfast and have 3 meals a day    RISK SCORE: 3       This document has been electronically signed by Olimpia Robison MD on May 8, 2019 8:53 AM

## 2019-05-10 ENCOUNTER — DOCUMENTATION (OUTPATIENT)
Dept: FAMILY MEDICINE CLINIC | Facility: CLINIC | Age: 64
End: 2019-05-10

## 2019-05-10 NOTE — PROGRESS NOTES
I called Mrs. Hopson to let her know her Shoulder xray showed Arthritis. Dr Robison had sent the result to [facility] but it was not read.

## 2019-05-13 ENCOUNTER — HOSPITAL ENCOUNTER (OUTPATIENT)
Dept: PHYSICAL THERAPY | Facility: HOSPITAL | Age: 64
Setting detail: THERAPIES SERIES
Discharge: HOME | End: 2019-05-13

## 2019-05-13 ENCOUNTER — TRANSCRIBE ORDERS (OUTPATIENT)
Dept: PHYSICAL THERAPY | Facility: HOSPITAL | Age: 64
End: 2019-05-13

## 2019-05-13 DIAGNOSIS — M25.511 RIGHT SHOULDER PAIN, UNSPECIFIED CHRONICITY: Primary | ICD-10-CM

## 2019-05-13 PROCEDURE — 97161 PT EVAL LOW COMPLEX 20 MIN: CPT

## 2019-05-13 PROCEDURE — 97110 THERAPEUTIC EXERCISES: CPT

## 2019-05-13 NOTE — THERAPY EVALUATION
Outpatient Physical Therapy Ortho Initial Evaluation  [facility]     Patient Name: Daysi Pa  : 1955  MRN: [id number]  Today's Date: 2019      Visit Date: 2019    Patient Active Problem List   Diagnosis   • Hyperlipidemia   • Mitral regurgitation   • Rheumatoid arthritis (CMS/HCC)   • Primary fibromyalgia syndrome   • Osteoporosis   • Malaise and fatigue   • Irritable bowel syndrome with constipation   • Intraductal carcinoma in situ of breast   • Gastroesophageal reflux disease   • Dyslipidemia   • Depressive disorder   • Chronic pain   • Attention deficit hyperactivity disorder   • Anxiety state   • Allergic rhinitis   • Acquired hypothyroidism   • Chronic low back pain   • Lumbosacral spondylosis without myelopathy   • Myofacial muscle pain   • Long term (current) use of opiate analgesic   • Snoring        Past Medical History:   Diagnosis Date   • Acquired hypothyroidism    • Allergic rhinitis    • Allergy 2016    Consult, Allergy (1) - Ordered By: BENIGNO LIM ([facility])    • Anxiety state    • Attention deficit hyperactivity disorder    • Benign essential hypertension    • Chronic pain    • Chronic pain disorder    • Congestive heart failure (CMS/HCC)     primarily systrolic dysfunction EF 17-20% repeat echo 55%   • Depressive disorder    • Dyslipidemia    • Dysphagia    • Gastroesophageal reflux disease    • Generalized abdominal pain    • History of echocardiogram 2016    Echocardiogram W/ color flow 81901 (3) - Normal LV function wiht Ef of 60%.Normal RV size and function.Normal diastolic function.No significant valvular regurgitation or stenosis.   • History of echocardiogram 2012    Echocardiogram W/O color flow 58269 (1) - Normal left ventricular systolic function. EF 55%. No evidence of regional wall motion abnormalities. Abnormal relaxation of the left ventricular myocardium.   • History of EKG 2016    EKG Tracing and Interpretation 63506 (3)  - Ordered By: LILLIE BRADY ([facility])    • History of mammogram 06/05/2013    MAMMOGRAM SCREENING 69055 - [facility] (1) - birads 0    • Hyperlipidemia    • Intraductal carcinoma in situ of breast     hx in past and s/p bilateral simple mastectomies      • Irritable bowel syndrome    • Low back pain    • Malaise and fatigue    • Microalbuminuria 07/16/2015    POS MICROALBUMINURIA RESULT DOC AND REVIEWED 3060F (1) - Ordered By: BENIGNO LIM ([facility])   • Mitral valve regurgitation     Mild-Moderate      • Myopia    • Non-organic sleep disorder    • Osteoporosis    • Pain management 04/17/2016    Consult, Pain Management (1) - Ordered By: BENIGNO LIM ([facility])    • Pneumococcal vaccination indicated 07/08/2016    PNEUMOC VAC/ADMIN/RCVD 4040F (11) - Ordered By: BENIGNO LIM ([facility])   • Primary fibromyalgia syndrome    • Rheumatoid arthritis (CMS/HCC)         Past Surgical History:   Procedure Laterality Date   • APPENDECTOMY  2008    Appendectomy (1)   • BREAST BIOPSY  06/12/2013    Stereotactic breast biopsy (1) - stereotactic left mammotome biopsy with 11 gauge needle.   • BREAST IMPLANT REMOVAL  2003    Remove breast implant (1)   • BREAST IMPLANT SURGERY  2000    Breast implantation (1)   • COLONOSCOPY  01/20/2014    Colonoscopy, diagnostic (screening) 99966 (1)   • COLONOSCOPY W/ POLYPECTOMY  2008    Colonoscopy remove polyps 31646 (1)    • EXCISION LESION  05/01/2014    Remove chest wall lesion (1) - Excision of subcutaneous mass, left chest wall, Subcutaneous mass left chest wall, status post mastectomy.   • INJECTION OF MEDICATION  07/05/2016    B12 (44) - Ordered By: BENIGNO LIM ([facility])    • INJECTION OF MEDICATION  11/12/2015    Celestone (betamethasone) (1) - Ordered By: BENIGNO LIM ([facility])    • INJECTION OF MEDICATION  02/11/2016    Kenalog (2) - Ordered By: BENIGNO LIM ([facility])    • INJECTION OF MEDICATION  11/17/2017    Prolia   • MASTECTOMY  09/05/2013    Mastectomy (2) - Right simple prophylactic mastectomy.   • OTHER SURGICAL HISTORY   10/29/2014    SONOGRAM THYROID, NECK 73161 (1) - Ordered By: BENIGNO LIM ([facility])   • PAP SMEAR  06/03/2013     PAP SMEAR (1)   • PARTIAL HYSTERECTOMY  1998     Partial hyst (1)     • TONSILLECTOMY  1969    Tonsillectomy (1)   • VASCULAR SURGERY  12/24/2014    Consult, Vascular Surgery (1) - Ordered By: BENIGNO LIM ([facility])        Visit Dx:     ICD-10-CM ICD-9-CM   1. Right shoulder pain, unspecified chronicity M25.511 719.41         Patient History     Row Name 05/13/19 0800             History    Brief Description of Current Complaint  The pt states her R shoulder started hurting her 6-8 mos ago. She recalls no specific JERE. She states that ADLs including, sitting and reaching, dusting, pulling clothes out of the washer, and washing her hair all cause pain. She states her pain starts in the shoulder but goes down into the muscle of her upper arm. She states that if she obtains some exercises and can find out what is going she can probably do a lot of her care on her own. 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   -MW      Patient/Caregiver Goals  Relieve pain;Improve mobility  -MW      Hand Dominance  right-handed  -MW      What clinical tests have you had for this problem?  X-ray  -MW         Pain     Pain Location  Shoulder  -MW      Pain at Present  7  -MW      Pain at Worst  10  -MW      What Performance Factors Make the Current Problem(s) BETTER?  moving the joint around at times  -MW      Is your sleep disturbed?  Yes  -MW      Difficulties with ADL's?  yes  -MW      Difficulties with recreational activities?  yes  -MW         Fall Risk Assessment    Any falls in the past year:  No  -MW         Daily Activities    Primary Language  English  -MW      Pt Participated in POC and Goals  Yes  -MW        User Key  (r) = Recorded By, (t) = Taken By, (c) = Cosigned By    Initials Name Provider Type    Janeth Burns PT Physical Therapist          PT Ortho     Row Name 05/13/19 0800       Subjective Comments    Subjective  Comments  See hx  -MW       Precautions and Contraindications    Precautions  hx of breast cx, CHF  -MW       Posture/Observations    Posture/Observations Comments  Pt has forward head, rounded shoulders, decreased UE muscle mass, and decreased thoracic Kyphosis. The pt is severely TTP throughout R shoulder. The pt has pain w/ all attempted PROM of the R shoulder. The pt has decreased R C8 myotome. Pt has very guarded movements of the R UE. Attempted RTC, impingement, and biceps lesion testing all provoked pain.   -MW       Right Upper Ext    Rt Shoulder Abduction AROM  125 deg in scaption w/ pain from 70 deg to 110 deg  -MW    Rt Shoulder Flexion AROM  severe pain at 90 deg  -MW    Rt Shoulder External Rotation AROM  back of head  -MW    Rt Shoulder Internal Rotation AROM  sacrum  -MW       MMT (Manual Muscle Testing)    General MMT Comments  R shoulder grossly 4/5, L shoulder grossly 4+/5  -MW      User Key  (r) = Recorded By, (t) = Taken By, (c) = Cosigned By    Initials Name Provider Type    MW Janeth Ferro, PT Physical Therapist                            PT OP Goals     Row Name 05/13/19 0800          Long Term Goals    LTG Date to Achieve  06/10/19  -MW     LTG 1  pt will improve QuickDASH score by 10 pts  -MW     LTG 1 Progress  New  -MW     LTG 2  pt will improve AROM of the R shoulder to WFL for all planes  -MW     LTG 2 Progress  New  -MW     LTG 3  pr will report <4/10 R shoulder pain w/ ADLs  -MW     LTG 3 Progress  New  -MW        Time Calculation    PT Goal Re-Cert Due Date  06/03/19  -MW       User Key  (r) = Recorded By, (t) = Taken By, (c) = Cosigned By    Initials Name Provider Type    Janeth Burns, PT Physical Therapist          PT Assessment/Plan     Row Name 05/13/19 0800          PT Assessment    Functional Limitations  Decreased safety during functional activities;Performance in self-care ADL  -MW     Impairments  Muscle strength;Pain;Posture;Range of motion  -     Assessment  Comments  The pt presented today for eval and tx of R shoulder pain and responded well. Sxs/signs consistent w/ possible adhesive capsulitis of the R shoulder w/ underlying shoulder pathology. Shoulder pathological diagnosis inconclusive at this time. The pt has decreased ROM, decreased strength, and pain that are impacting their functional ability at this time. The pt would benefit from PT services to address these deficits and to ensure pt's safe recovery to PLOF and improved QOL. HEP implemented today and pt demonstrated understanding of HEP. Based on today's session and pt's motivation to improve, I anticipate she will do well w/ rehab. If she fails to improve w/ conservative tx measures it will warrant referral back to MD for f/u imaging. The pt has a significant smoking hx, as well as hx of breast cancer. Will need to monitor her sxs closely for any evolving/worsening of sxs that may suggest non-mechanical pathology.   -MW     Please refer to paper survey for additional self-reported information  Yes  -MW     Rehab Potential  Fair  -MW     Patient/caregiver participated in establishment of treatment plan and goals  Yes  -MW     Patient would benefit from skilled therapy intervention  Yes  -MW        PT Plan    PT Frequency  1x/week  -MW     Predicted Duration of Therapy Intervention (Therapy Eval)  4 weeks w/ further TBD  -MW     Planned CPT's?  PT EVAL LOW COMPLEXITY: 90162;PT RE-EVAL: 88837;PT THER PROC EA 15 MIN: 70689;PT THER ACT EA 15 MIN: 54013;PT MANUAL THERAPY EA 15 MIN: 04368;PT NEUROMUSC RE-EDUCATION EA 15 MIN: 53823;PT SELF CARE/HOME MGMT/TRAIN EA 15: 16164  -MW     PT Plan Comments  ROM focus first. Develop HEP each visit. RTC strengthening w/ TB once ROM improves. NO MODALITIES.   -MW       User Key  (r) = Recorded By, (t) = Taken By, (c) = Cosigned By    Initials Name Provider Type    Janeth Burns, PT Physical Therapist            Exercises     Row Name 05/13/19 0800             Subjective  Comments    Subjective Comments  See hx  -MW         Exercise 1    Exercise Name 1  table walkouts  -MW      Sets 1  1  -MW      Reps 1  10  -MW         Exercise 2    Exercise Name 2  Pulleys  -MW      Cueing 2  Demo  -MW      Sets 2  1  -MW      Reps 2  15  -MW      Additional Comments  slow pull  -MW         Exercise 3    Exercise Name 3  HEP development  -MW      Time 3  4'  -MW      Additional Comments  HO provided  -MW        User Key  (r) = Recorded By, (t) = Taken By, (c) = Cosigned By    Initials Name Provider Type    Janeth Burns, PT Physical Therapist                        Outcome Measure Options: Quick DASH  Quick DASH  Open a tight or new jar.: Moderate Difficulty  Do heavy household chores (e.g., wash walls, wash floors): Unable  Carry a shopping bag or briefcase: Severe Difficulty  Wash your back: Severe Difficulty  Use a knife to cut food: Mild Difficulty  Recreational activities in which you take some force or impact through your arm, should or hand (e.g. golf, hammering, tennis, etc.): Severe Difficulty  During the past week, to what extent has your arm, shoulder, or hand problem interfered with your normal social activites with family, friends, neighbors or groups?: Quite a bit  During the past week, were you limited in your work or other regular daily activities as a result of your arm, shoulder or hand problem?: Very limited  Arm, Shoulder, or hand pain: Severe  Tingling (pins and needles) in your arm, shoulder, or hand: Moderate  During the past week, how much difficulty have you had sleeping because of the pain in your arm, shoulder or hand?: Moderate Difficiculty  Number of Questions Answered: 11  Quick DASH Score: 65.91         Time Calculation:     Start Time: 0801  Stop Time: 0833  Time Calculation (min): 32 min  Total Timed Code Minutes- PT: 9 minute(s)     Therapy Charges for Today     Code Description Service Date Service Provider Modifiers Qty    32215115769 HC PT THER PROC EA  15 MIN 5/13/2019 Janeth Ferro, PT GP 1    22687241788 HC PT EVAL LOW COMPLEXITY 2 5/13/2019 Janeth Ferro, PT GP 1                   Janeth Ferro, PT  5/13/2019

## 2019-05-21 ENCOUNTER — HOSPITAL ENCOUNTER (OUTPATIENT)
Dept: PHYSICAL THERAPY | Facility: HOSPITAL | Age: 64
Setting detail: THERAPIES SERIES
Discharge: HOME | End: 2019-05-21

## 2019-05-21 DIAGNOSIS — M25.511 RIGHT SHOULDER PAIN, UNSPECIFIED CHRONICITY: Primary | ICD-10-CM

## 2019-05-21 PROCEDURE — 97110 THERAPEUTIC EXERCISES: CPT

## 2019-05-21 PROCEDURE — 97140 MANUAL THERAPY 1/> REGIONS: CPT

## 2019-05-21 NOTE — THERAPY TREATMENT NOTE
Outpatient Physical Therapy Ortho Treatment Note  [facility]     Patient Name: Daysi Pa  : 1955  MRN: [id number]  Today's Date: 2019      Visit Date: 2019     Subjective Improvement: 0%  Attendance:  2/ (medicare)  Next MD Visit : Will ask next visit  Recert Date:  19      Therapy Diagnosis:  R shoulder pain         Visit Dx:    ICD-10-CM ICD-9-CM   1. Right shoulder pain, unspecified chronicity M25.511 719.41       Patient Active Problem List   Diagnosis   • Hyperlipidemia   • Mitral regurgitation   • Rheumatoid arthritis (CMS/HCC)   • Primary fibromyalgia syndrome   • Osteoporosis   • Malaise and fatigue   • Irritable bowel syndrome with constipation   • Intraductal carcinoma in situ of breast   • Gastroesophageal reflux disease   • Dyslipidemia   • Depressive disorder   • Chronic pain   • Attention deficit hyperactivity disorder   • Anxiety state   • Allergic rhinitis   • Acquired hypothyroidism   • Chronic low back pain   • Lumbosacral spondylosis without myelopathy   • Myofacial muscle pain   • Long term (current) use of opiate analgesic   • Snoring        Past Medical History:   Diagnosis Date   • Acquired hypothyroidism    • Allergic rhinitis    • Allergy 2016    Consult, Allergy (1) - Ordered By: BENIGNO LIM ([facility])    • Anxiety state    • Attention deficit hyperactivity disorder    • Benign essential hypertension    • Chronic pain    • Chronic pain disorder    • Congestive heart failure (CMS/HCC)     primarily systrolic dysfunction EF 17-20% repeat echo 55%   • Depressive disorder    • Dyslipidemia    • Dysphagia    • Gastroesophageal reflux disease    • Generalized abdominal pain    • History of echocardiogram 2016    Echocardiogram W/ color flow 16682 (3) - Normal LV function wiht Ef of 60%.Normal RV size and function.Normal diastolic function.No significant valvular regurgitation or stenosis.   • History of echocardiogram 2012     Echocardiogram W/O color flow 06301 (1) - Normal left ventricular systolic function. EF 55%. No evidence of regional wall motion abnormalities. Abnormal relaxation of the left ventricular myocardium.   • History of EKG 03/07/2016    EKG Tracing and Interpretation 72422 (3) - Ordered By: LILLIE BRADY ([facility])    • History of mammogram 06/05/2013    MAMMOGRAM SCREENING 88839 - [facility] (1) - birads 0    • Hyperlipidemia    • Intraductal carcinoma in situ of breast     hx in past and s/p bilateral simple mastectomies      • Irritable bowel syndrome    • Low back pain    • Malaise and fatigue    • Microalbuminuria 07/16/2015    POS MICROALBUMINURIA RESULT DOC AND REVIEWED 3060F (1) - Ordered By: BENIGNO LIM ([facility])   • Mitral valve regurgitation     Mild-Moderate      • Myopia    • Non-organic sleep disorder    • Osteoporosis    • Pain management 04/17/2016    Consult, Pain Management (1) - Ordered By: BENIGNO LIM ([facility])    • Pneumococcal vaccination indicated 07/08/2016    PNEUMOC VAC/ADMIN/RCVD 4040F (11) - Ordered By: BENIGNO LIM ([facility])   • Primary fibromyalgia syndrome    • Rheumatoid arthritis (CMS/HCC)         Past Surgical History:   Procedure Laterality Date   • APPENDECTOMY  2008    Appendectomy (1)   • BREAST BIOPSY  06/12/2013    Stereotactic breast biopsy (1) - stereotactic left mammotome biopsy with 11 gauge needle.   • BREAST IMPLANT REMOVAL  2003    Remove breast implant (1)   • BREAST IMPLANT SURGERY  2000    Breast implantation (1)   • COLONOSCOPY  01/20/2014    Colonoscopy, diagnostic (screening) 94495 (1)   • COLONOSCOPY W/ POLYPECTOMY  2008    Colonoscopy remove polyps 73614 (1)    • EXCISION LESION  05/01/2014    Remove chest wall lesion (1) - Excision of subcutaneous mass, left chest wall, Subcutaneous mass left chest wall, status post mastectomy.   • INJECTION OF MEDICATION  07/05/2016    B12 (44) - Ordered By: BENIGNO LIM ([facility])    • INJECTION OF MEDICATION  11/12/2015    Celestone (betamethasone) (1) -  Ordered By: BENIGNO LIM ([facility])    • INJECTION OF MEDICATION  02/11/2016    Kenalog (2) - Ordered By: BENIGNO LIM ([facility])    • INJECTION OF MEDICATION  11/17/2017    Prolia   • MASTECTOMY  09/05/2013    Mastectomy (2) - Right simple prophylactic mastectomy.   • OTHER SURGICAL HISTORY  10/29/2014    SONOGRAM THYROID, NECK 07458 (1) - Ordered By: BENIGNO LIM ([facility])   • PAP SMEAR  06/03/2013     PAP SMEAR (1)   • PARTIAL HYSTERECTOMY  1998     Partial hyst (1)     • TONSILLECTOMY  1969    Tonsillectomy (1)   • VASCULAR SURGERY  12/24/2014    Consult, Vascular Surgery (1) - Ordered By: BENIGNO LIM ([facility])        PT Ortho     Row Name 05/21/19 0700       Precautions and Contraindications    Precautions  hx of breast cx, CHF  -       Posture/Observations    Posture/Observations Comments  Guarded R shoulder; triggers noted in R bicep   -      User Key  (r) = Recorded By, (t) = Taken By, (c) = Cosigned By    Initials Name Provider Type    Yue Moore PTA Physical Therapy Assistant                      PT Assessment/Plan     Row Name 05/21/19 0700          PT Assessment    Functional Limitations  Decreased safety during functional activities;Performance in self-care ADL  -     Impairments  Muscle strength;Pain;Posture;Range of motion  -     Assessment Comments  added scap stab and wand exercises to HEP; responded well to manual this date with decreased pain with ROM post; verbal cues needed for relaxation and decreased guarding R shoulder throughout treatment   -     Rehab Potential  Fair  -        PT Plan    PT Frequency  1x/week  -     Predicted Duration of Therapy Intervention (Therapy Eval)  4 weeks w/ further TBD  -     PT Plan Comments  Continue with manaul and ROM as tolerated; Scap stab, isometrics next  -       User Key  (r) = Recorded By, (t) = Taken By, (c) = Cosigned By    Initials Name Provider Type    Yue Moore PTA Physical Therapy Assistant          Modalities     Row Name 05/21/19 0700             Moist  Heat    MH Applied  Yes  -KH      Location  R shoulder   -KH      Rx Minutes  10 mins  -KH      MH S/P Rx  Yes  -KH        User Key  (r) = Recorded By, (t) = Taken By, (c) = Cosigned By    Initials Name Provider Type    Yue Moore PTA Physical Therapy Assistant        Exercises     Row Name 05/21/19 0700             Subjective Comments    Subjective Comments  Pt reports that her shoulder is feeling some better but it still hurts with movement.   -KH         Subjective Pain    Able to rate subjective pain?  yes  -KH      Pre-Treatment Pain Level  4  -KH         Exercise 1    Exercise Name 1  pro ll UE/LE ROM   -KH      Cueing 1  Verbal  -KH      Time 1  8' F/B  -KH      Additional Comments  level 1; seat 7  -KH         Exercise 2    Exercise Name 2  Pulleys: flex/abd  -KH      Cueing 2  Verbal  -KH      Sets 2  1  -KH      Reps 2  15ea  -KH         Exercise 3    Exercise Name 3  supine wand chest press  -KH      Cueing 3  Verbal  -KH      Sets 3  1  -KH      Reps 3  15  -KH         Exercise 4    Exercise Name 4  supine wand flexion  -KH      Cueing 4  Verbal  -KH      Sets 4  1  -KH      Reps 4  15  -KH      Additional Comments  HEP  -KH         Exercise 5    Exercise Name 5  PROM/STM  to R Shoulder  -KH      Cueing 5  Verbal  -KH      Time 5  10'  -KH         Exercise 6    Exercise Name 6  scap squeezes  -KH      Cueing 6  Verbal  -KH      Sets 6  2  -KH      Reps 6  10  -KH      Additional Comments  HEp  -KH         Exercise 7    Exercise Name 7  shoulder shrugs  -KH      Cueing 7  Verbal  -KH      Sets 7  2  -KH      Reps 7  10  -KH      Additional Comments  HEP  -KH        User Key  (r) = Recorded By, (t) = Taken By, (c) = Cosigned By    Initials Name Provider Type    Yue Moore PTA Physical Therapy Assistant                       PT OP Goals     Row Name 05/21/19 0700          Long Term Goals    LTG Date to Achieve  06/10/19  -KH     LTG 1  pt will improve QuickDASH score by 10 pts  -KH     LTG 1  Progress  Progressing  -     LTG 2  pt will improve AROM of the R shoulder to WFL for all planes  -     LTG 2 Progress  Progressing  -     LTG 3  pr will report <4/10 R shoulder pain w/ ADLs  -     LTG 3 Progress  Progressing  -        Time Calculation    PT Goal Re-Cert Due Date  06/03/19  -       User Key  (r) = Recorded By, (t) = Taken By, (c) = Cosigned By    Initials Name Provider Type    Yue Moore PTA Physical Therapy Assistant                         Time Calculation:   Start Time: 0720  Stop Time: 0808  Time Calculation (min): 48 min  Total Timed Code Minutes- PT: 38 minute(s)  Therapy Charges for Today     Code Description Service Date Service Provider Modifiers Qty    81805087914 HC PT THER SUPP EA 15 MIN 5/21/2019 Yue Yip PTA GP 1    67074022300 HC PT MANUAL THERAPY EA 15 MIN 5/21/2019 Yue Yip PTA GP 1    77771024128 HC PT THER PROC EA 15 MIN 5/21/2019 Yue Yip PTA GP 2                    Yue Yip PTA  5/21/2019

## 2019-05-23 ENCOUNTER — PROCEDURE VISIT (OUTPATIENT)
Dept: FAMILY MEDICINE CLINIC | Facility: CLINIC | Age: 64
End: 2019-05-23

## 2019-05-23 VITALS
SYSTOLIC BLOOD PRESSURE: 118 MMHG | DIASTOLIC BLOOD PRESSURE: 78 MMHG | HEIGHT: 62 IN | WEIGHT: 129 LBS | HEART RATE: 81 BPM | OXYGEN SATURATION: 98 % | BODY MASS INDEX: 23.74 KG/M2

## 2019-05-23 DIAGNOSIS — L98.9 ARM SKIN LESION, RIGHT: ICD-10-CM

## 2019-05-23 DIAGNOSIS — L98.9 ARM SKIN LESION, LEFT: Primary | ICD-10-CM

## 2019-05-23 PROCEDURE — 88305 TISSUE EXAM BY PATHOLOGIST: CPT | Performed by: PATHOLOGY

## 2019-05-23 PROCEDURE — 88305 TISSUE EXAM BY PATHOLOGIST: CPT | Performed by: STUDENT IN AN ORGANIZED HEALTH CARE EDUCATION/TRAINING PROGRAM

## 2019-05-23 RX ORDER — LIDOCAINE HYDROCHLORIDE AND EPINEPHRINE 10; 10 MG/ML; UG/ML
10 INJECTION, SOLUTION INFILTRATION; PERINEURAL ONCE
Status: DISCONTINUED | OUTPATIENT
Start: 2019-05-23 | End: 2020-06-30

## 2019-05-23 NOTE — PROGRESS NOTES
I have seen the patient.  I have reviewed the notes, assessments, and/or procedures performed by dr Skinner, I concur with her/his documentation and assessment and plan for Daysi Pa.               This document has been electronically signed by Tony Hi MD on May 23, 2019 4:47 PM

## 2019-05-23 NOTE — PROGRESS NOTES
"                    Subjective   Daysi Pa is a 63 y.o. female who presents for removal of skin lesions.    She has several skin lesions she presents to clinic for biopsy and pathology analysis.     First lesion, on right upper extremity above elbow is large, has grown immensely in the last year, has irregular borders, symmetric, and spontaneously bleeds.     Second lesion, on left upper extremity below elbow is large, has grown in the last year but not the the extent as lesion above, irregular borders, symmetric, spontaneously bleeds.    Third lesion, on left upper extremity above elbow has grown in last year but least of all, different colors, symmetric, spontaneously bleeds.    Patient reports she was a \"sun baby\" and has sun bathed without skin protection for decades. She was also a fervent user of tanning salons. She is also a former smoker.     Past Medical Hx:  Past Medical History:   Diagnosis Date   • Acquired hypothyroidism    • Allergic rhinitis    • Allergy 04/17/2016    Consult, Allergy (1) - Ordered By: BENIGNO LIM ([facility])    • Anxiety state    • Attention deficit hyperactivity disorder    • Benign essential hypertension    • Chronic pain    • Chronic pain disorder    • Congestive heart failure (CMS/HCC)     primarily systrolic dysfunction EF 17-20% repeat echo 55%   • Depressive disorder    • Dyslipidemia    • Dysphagia    • Gastroesophageal reflux disease    • Generalized abdominal pain    • History of echocardiogram 03/23/2016    Echocardiogram W/ color flow 04722 (3) - Normal LV function wiht Ef of 60%.Normal RV size and function.Normal diastolic function.No significant valvular regurgitation or stenosis.   • History of echocardiogram 04/27/2012    Echocardiogram W/O color flow 49674 (1) - Normal left ventricular systolic function. EF 55%. No evidence of regional wall motion abnormalities. Abnormal relaxation of the left ventricular myocardium.   • History of EKG 03/07/2016    EKG " Tracing and Interpretation 49801 (3) - Ordered By: LILLIE BRADY ([facility])    • History of mammogram 06/05/2013    MAMMOGRAM SCREENING 68156 - [facility] (1) - birads 0    • Hyperlipidemia    • Intraductal carcinoma in situ of breast     hx in past and s/p bilateral simple mastectomies      • Irritable bowel syndrome    • Low back pain    • Malaise and fatigue    • Microalbuminuria 07/16/2015    POS MICROALBUMINURIA RESULT DOC AND REVIEWED 3060F (1) - Ordered By: BENIGNO LIM ([facility])   • Mitral valve regurgitation     Mild-Moderate      • Myopia    • Non-organic sleep disorder    • Osteoporosis    • Pain management 04/17/2016    Consult, Pain Management (1) - Ordered By: BENIGNO LIM ([facility])    • Pneumococcal vaccination indicated 07/08/2016    PNEUMOC VAC/ADMIN/RCVD 4040F (11) - Ordered By: BENIGNO LIM ([facility])   • Primary fibromyalgia syndrome    • Rheumatoid arthritis (CMS/HCC)        Past Surgical Hx:  Past Surgical History:   Procedure Laterality Date   • APPENDECTOMY  2008    Appendectomy (1)   • BREAST BIOPSY  06/12/2013    Stereotactic breast biopsy (1) - stereotactic left mammotome biopsy with 11 gauge needle.   • BREAST IMPLANT REMOVAL  2003    Remove breast implant (1)   • BREAST IMPLANT SURGERY  2000    Breast implantation (1)   • COLONOSCOPY  01/20/2014    Colonoscopy, diagnostic (screening) 28617 (1)   • COLONOSCOPY W/ POLYPECTOMY  2008    Colonoscopy remove polyps 75335 (1)    • EXCISION LESION  05/01/2014    Remove chest wall lesion (1) - Excision of subcutaneous mass, left chest wall, Subcutaneous mass left chest wall, status post mastectomy.   • INJECTION OF MEDICATION  07/05/2016    B12 (44) - Ordered By: BENIGNO LIM ([facility])    • INJECTION OF MEDICATION  11/12/2015    Celestone (betamethasone) (1) - Ordered By: BENIGNO LIM ([facility])    • INJECTION OF MEDICATION  02/11/2016    Kenalog (2) - Ordered By: BENIGNO LIM ([facility])    • INJECTION OF MEDICATION  11/17/2017    Prolia   • MASTECTOMY  09/05/2013    Mastectomy (2) - Right simple  prophylactic mastectomy.   • OTHER SURGICAL HISTORY  10/29/2014    SONOGRAM THYROID, NECK 40987 (1) - Ordered By: BENIGNO LIM ([facility])   • PAP SMEAR  06/03/2013     PAP SMEAR (1)   • PARTIAL HYSTERECTOMY  1998     Partial hyst (1)     • TONSILLECTOMY  1969    Tonsillectomy (1)   • VASCULAR SURGERY  12/24/2014    Consult, Vascular Surgery (1) - Ordered By: BENIGNO LIM ([facility])        Health Maintenance:  Health Maintenance   Topic Date Due   • INFLUENZA VACCINE  08/01/2019   • LIPID PANEL  12/17/2019   • COLONOSCOPY  01/18/2020   • MEDICARE ANNUAL WELLNESS  02/27/2020   • DXA SCAN  03/11/2021   • TDAP/TD VACCINES (2 - Td) 10/16/2024   • HEPATITIS C SCREENING  Completed   • ZOSTER VACCINE  Completed       Current Meds:    Current Outpatient Medications:   •  albuterol (PROAIR HFA) 108 (90 BASE) MCG/ACT inhaler, 2 puffs 4 (four) times a day as needed., Disp: , Rfl:   •  AMITIZA 24 MCG capsule, TAKE 1 CAPSULE BY MOUTH TWICE A DAY, Disp: 180 capsule, Rfl: 3  •  bisoprolol (ZEBeta) 5 MG tablet, Take 1 tablet by mouth Daily., Disp: 30 tablet, Rfl: 3  •  busPIRone (BUSPAR) 10 MG tablet, Take 1 tablet by mouth 2 (Two) Times a Day As Needed (anxiety)., Disp: 180 tablet, Rfl: 3  •  desloratadine (CLARINEX) 5 MG tablet, Take 1 tablet by mouth Daily., Disp: 90 tablet, Rfl: 3  •  docusate sodium (COLACE) 100 MG capsule, Take 200 mg by mouth every night. at bedtime, Disp: , Rfl:   •  esomeprazole (nexIUM) 40 MG capsule, TAKE ONE CAPSULE BY MOUTH EVERY DAY, Disp: 90 capsule, Rfl: 3  •  furosemide (LASIX) 20 MG tablet, TAKE 1 TABLET BY MOUTH EVERY DAY, Disp: 30 tablet, Rfl: 3  •  gabapentin (NEURONTIN) 300 MG capsule, Take 1 capsule by mouth 2 (Two) Times a Day., Disp: 180 capsule, Rfl: 0  •  hydroxychloroquine (PLAQUENIL) 200 MG tablet, TAKE 1 TABLET BY MOUTH 2 (TWO) TIMES A DAY., Disp: 180 tablet, Rfl: 3  •  methylphenidate (RITALIN) 10 MG tablet, Take 1 tablet by mouth Every 12 (Twelve) Hours., Disp: 60 tablet, Rfl: 0  •  metoclopramide  (REGLAN) 10 MG tablet, Take 1 tablet by mouth 4 (Four) Times a Day., Disp: 270 tablet, Rfl: 3  •  montelukast (SINGULAIR) 10 MG tablet, 10 mg daily., Disp: , Rfl:   •  OxyCODONE HCl 5 MG tablet , Take 1 tablet by mouth every 6 (six) hours as needed., Disp: , Rfl:   •  OxyCODONE HCl ER (OXYCONTIN) 30 MG tablet extended-release 12 hour, Take 30 mg by mouth 2 (two) times a day. OxyContin 30 mg tablet,crush resistant,extended release, Disp: , Rfl:   •  polyethylene glycol (MIRALAX) powder, TAKE 17 GRAMS TWICE A DAY, Disp: 1530 g, Rfl: 2  •  SYNTHROID 88 MCG tablet, Take 1 tablet by mouth Daily. Brand name medically necessary, Disp: 90 tablet, Rfl: 3  •  traZODone (DESYREL) 150 MG tablet, TAKE 1 TABLET BY MOUTH EVERY NIGHT AT BEDTIME, Disp: 90 tablet, Rfl: 3  •  venlafaxine XR (EFFEXOR-XR) 150 MG 24 hr capsule, Take 1 capsule by mouth Daily., Disp: 90 capsule, Rfl: 3  •  venlafaxine XR (EFFEXOR-XR) 75 MG 24 hr capsule, Take 1 capsule by mouth Daily., Disp: 90 capsule, Rfl: 3    Current Facility-Administered Medications:   •  cyanocobalamin injection 1,000 mcg, 1,000 mcg, Intramuscular, Q28 Days, Olimpia Robison MD, 1,000 mcg at 05/08/19 0956  •  lidocaine-EPINEPHrine (XYLOCAINE W/EPI) 1 %-1:153835 injection 10 mL, 10 mL, Infiltration, Once, Patrick Skinner MD    Allergies:  Cephalosporins; Erythromycin; Morphine and related; Other; Penicillins; Shellfish-derived products; Zithromax [azithromycin]; Zolpidem; Hydrocodone-acetaminophen; and Sulfa antibiotics    Family Hx:  Family History   Problem Relation Age of Onset   • Breast cancer Sister    • Rectal cancer Other         Colorectal cancer   • Heart disease Other    • Hypertension Other    • Thyroid disease Other         Thyroid disorder   • Hypertension Mother    • Migraines Mother    • Osteoporosis Mother    • Diabetes Father    • Coronary artery disease Father    • Hyperlipidemia Father    • Cancer Maternal Aunt    • COPD Paternal Grandmother    • COPD  "Paternal Grandfather         Social History:  Social History     Socioeconomic History   • Marital status:      Spouse name: Not on file   • Number of children: Not on file   • Years of education: Not on file   • Highest education level: Not on file   Tobacco Use   • Smoking status: Former Smoker     Years: 30.00     Last attempt to quit: 8/17/2008     Years since quitting: 10.7   • Smokeless tobacco: Never Used   • Tobacco comment: PT SMOKED FOR 30 YEARS AND QUIT IN 2010   Substance and Sexual Activity   • Alcohol use: No   • Drug use: No   • Sexual activity: Defer       Review of Systems  Review of Systems   Constitutional: Negative for appetite change, chills, diaphoresis, fatigue and fever.   HENT: Negative for congestion, ear pain, postnasal drip, rhinorrhea and sore throat.    Eyes: Negative for pain and visual disturbance.   Respiratory: Negative for cough, chest tightness and shortness of breath.    Cardiovascular: Negative for chest pain, palpitations and leg swelling.   Gastrointestinal: Negative for abdominal distention, abdominal pain, constipation, diarrhea, nausea and vomiting.   Genitourinary: Negative for difficulty urinating, dysuria, flank pain, frequency and urgency.   Musculoskeletal: Negative for arthralgias, back pain and myalgias.   Skin: Negative for pallor and rash.   Neurological: Negative for dizziness, seizures, syncope, weakness and numbness.   Psychiatric/Behavioral: Negative for agitation, confusion, decreased concentration and dysphoric mood.            Objective:     /78   Pulse 81   Ht 157.5 cm (62\")   Wt 58.5 kg (129 lb)   SpO2 98%   BMI 23.59 kg/m²       Physical Exam   Constitutional: She is oriented to person, place, and time. She appears well-developed and well-nourished.   HENT:   Head: Normocephalic and atraumatic.   Right Ear: External ear normal.   Left Ear: External ear normal.   Nose: Nose normal.   Eyes: Conjunctivae and EOM are normal. Pupils are " equal, round, and reactive to light.   Neck: Normal range of motion. Neck supple. No tracheal deviation present. No thyromegaly present.   Cardiovascular: Normal rate, regular rhythm, normal heart sounds and intact distal pulses. Exam reveals no gallop and no friction rub.   No murmur heard.  Pulmonary/Chest: Effort normal and breath sounds normal. No respiratory distress. She has no wheezes.   Abdominal: Soft. Bowel sounds are normal. She exhibits no distension. There is no tenderness. There is no rebound and no guarding.   Musculoskeletal: Normal range of motion.   Neurological: She is alert and oriented to person, place, and time. She displays normal reflexes.   Skin: Skin is warm and dry. Capillary refill takes less than 2 seconds.   Right arm lesion above elbow is [id number], scaly in appearance, symmetric, irregular borders, color is slightly darker than surrounding skin, evolving lesion in size. No surrounding erythema.  Left arm lesion below elbow is 7.3ouz6ct, scaly, symmetric, irregular borders, color slightly darker than surrounding skin, evolving lesion in size. No surrounding erythema.  Left arm lesion above elbow is [id number], two tones of dark brown color, irregular borders, evolving size. No surrounding erythema   Psychiatric: She has a normal mood and affect. Her behavior is normal.        Right skin lesion above elbow:      Left skin lesion below elbow on forearm:      Left skin lesion above elbow:      Assessment/Plan:     1. Arm skin lesion, left    2. Arm skin lesion, right       1. Lesions infiltrated and elevated with use of 1% lidocaine with epinephrine. 3 mL of anesthetic used at each site. Right and left lesion below elbow excised using shave biopsy. Left lesion above elbow biopsied using punch biopsy tool. Hemostasis achieved with office bovie. Punch biopsy to heal by secondary intention. Wounds were dressed with bacitracin ointment and covered with sterile gauze and bandages. Advised she  keep dressing in place until tomorrow and to continue apply bacitracin ointment and bandages. Will call patient with results of biopsy.       Follow-up:     No Follow-up on file.    Goals        Patient Stated    • Eat more fruits and vegetables (pt-stated)      Barriers to goals: None      • Exercise 150 minutes per week (moderate activity) (pt-stated)      Barriers to goal: RA and chronic pain      • Other (pt-stated)      Less fatigue  Barriers to goals: Multiple medical problems            Preventative:    Vaccines Recommended at this visit:   No Vaccines recommended today. Patient is up to date on all vaccines.     Vaccines Received at this visit:  No Vaccines recommended today. Patient is up to date on all vaccines.     Screenings Recommended at this visit:  No Screenings offered today. Patient is up to date on all screenings at this time.     Screenings Ordered at this visit:  No screenings were offered today. Patient is up to date on all screenings.     Smoking Status:  Patient is a former smoker.    Alcohol Intake:  Patient does not drink    Patient's Body mass index is 23.59 kg/m². BMI is within normal parameters. No follow-up required..         RISK SCORE: 4       Patrick Skinner M.D. PGY1  [facility]  [address] [place] [address]  Office: [phone]    This document has been electronically signed by Patrick Skinner MD on May 23, 2019 4:18 PM

## 2019-05-28 ENCOUNTER — HOSPITAL ENCOUNTER (OUTPATIENT)
Dept: PHYSICAL THERAPY | Facility: HOSPITAL | Age: 64
Setting detail: THERAPIES SERIES
Discharge: HOME | End: 2019-05-28

## 2019-05-28 DIAGNOSIS — M25.511 RIGHT SHOULDER PAIN, UNSPECIFIED CHRONICITY: Primary | ICD-10-CM

## 2019-05-28 LAB
LAB AP CASE REPORT: NORMAL
LAB AP CLINICAL INFORMATION: NORMAL
PATH REPORT.FINAL DX SPEC: NORMAL
PATH REPORT.GROSS SPEC: NORMAL

## 2019-05-28 PROCEDURE — 97110 THERAPEUTIC EXERCISES: CPT

## 2019-05-28 PROCEDURE — 97140 MANUAL THERAPY 1/> REGIONS: CPT

## 2019-05-28 NOTE — THERAPY TREATMENT NOTE
Outpatient Physical Therapy Ortho Treatment Note  [facility]     Patient Name: Daysi Pa  : 1955  MRN: [id number]  Today's Date: 2019      Visit Date: 2019     Subjective Improvement: 0%  Attendance:  3/3 (medicare)  Next MD Visit : next month  Recert Date:  19      Therapy Diagnosis:  R Shoulder Pain         Visit Dx:    ICD-10-CM ICD-9-CM   1. Right shoulder pain, unspecified chronicity M25.511 719.41       Patient Active Problem List   Diagnosis   • Hyperlipidemia   • Mitral regurgitation   • Rheumatoid arthritis (CMS/HCC)   • Primary fibromyalgia syndrome   • Osteoporosis   • Malaise and fatigue   • Irritable bowel syndrome with constipation   • Intraductal carcinoma in situ of breast   • Gastroesophageal reflux disease   • Dyslipidemia   • Depressive disorder   • Chronic pain   • Attention deficit hyperactivity disorder   • Anxiety state   • Allergic rhinitis   • Acquired hypothyroidism   • Chronic low back pain   • Lumbosacral spondylosis without myelopathy   • Myofacial muscle pain   • Long term (current) use of opiate analgesic   • Snoring        Past Medical History:   Diagnosis Date   • Acquired hypothyroidism    • Allergic rhinitis    • Allergy 2016    Consult, Allergy (1) - Ordered By: BENIGNO LIM ([facility])    • Anxiety state    • Attention deficit hyperactivity disorder    • Benign essential hypertension    • Chronic pain    • Chronic pain disorder    • Congestive heart failure (CMS/HCC)     primarily systrolic dysfunction EF 17-20% repeat echo 55%   • Depressive disorder    • Dyslipidemia    • Dysphagia    • Gastroesophageal reflux disease    • Generalized abdominal pain    • History of echocardiogram 2016    Echocardiogram W/ color flow 88015 (3) - Normal LV function wiht Ef of 60%.Normal RV size and function.Normal diastolic function.No significant valvular regurgitation or stenosis.   • History of echocardiogram 2012    Echocardiogram W/O  color flow 13129 (1) - Normal left ventricular systolic function. EF 55%. No evidence of regional wall motion abnormalities. Abnormal relaxation of the left ventricular myocardium.   • History of EKG 03/07/2016    EKG Tracing and Interpretation 73042 (3) - Ordered By: LILLIE BRADY ([facility])    • History of mammogram 06/05/2013    MAMMOGRAM SCREENING 89627 - [facility] (1) - birads 0    • Hyperlipidemia    • Intraductal carcinoma in situ of breast     hx in past and s/p bilateral simple mastectomies      • Irritable bowel syndrome    • Low back pain    • Malaise and fatigue    • Microalbuminuria 07/16/2015    POS MICROALBUMINURIA RESULT DOC AND REVIEWED 3060F (1) - Ordered By: BENIGNO LIM ([facility])   • Mitral valve regurgitation     Mild-Moderate      • Myopia    • Non-organic sleep disorder    • Osteoporosis    • Pain management 04/17/2016    Consult, Pain Management (1) - Ordered By: BENIGNO LIM ([facility])    • Pneumococcal vaccination indicated 07/08/2016    PNEUMOC VAC/ADMIN/RCVD 4040F (11) - Ordered By: BENIGNO LIM ([facility])   • Primary fibromyalgia syndrome    • Rheumatoid arthritis (CMS/HCC)         Past Surgical History:   Procedure Laterality Date   • APPENDECTOMY  2008    Appendectomy (1)   • BREAST BIOPSY  06/12/2013    Stereotactic breast biopsy (1) - stereotactic left mammotome biopsy with 11 gauge needle.   • BREAST IMPLANT REMOVAL  2003    Remove breast implant (1)   • BREAST IMPLANT SURGERY  2000    Breast implantation (1)   • COLONOSCOPY  01/20/2014    Colonoscopy, diagnostic (screening) 14269 (1)   • COLONOSCOPY W/ POLYPECTOMY  2008    Colonoscopy remove polyps 35870 (1)    • EXCISION LESION  05/01/2014    Remove chest wall lesion (1) - Excision of subcutaneous mass, left chest wall, Subcutaneous mass left chest wall, status post mastectomy.   • INJECTION OF MEDICATION  07/05/2016    B12 (44) - Ordered By: BENIGNO LIM ([facility])    • INJECTION OF MEDICATION  11/12/2015    Celestone (betamethasone) (1) - Ordered By: BENIGNO LIM  ([facility])    • INJECTION OF MEDICATION  02/11/2016    Kenalog (2) - Ordered By: BENIGNO LIM ([facility])    • INJECTION OF MEDICATION  11/17/2017    Prolia   • MASTECTOMY  09/05/2013    Mastectomy (2) - Right simple prophylactic mastectomy.   • OTHER SURGICAL HISTORY  10/29/2014    SONOGRAM THYROID, NECK 69140 (1) - Ordered By: BENIGNO LIM ([facility])   • PAP SMEAR  06/03/2013     PAP SMEAR (1)   • PARTIAL HYSTERECTOMY  1998     Partial hyst (1)     • TONSILLECTOMY  1969    Tonsillectomy (1)   • VASCULAR SURGERY  12/24/2014    Consult, Vascular Surgery (1) - Ordered By: BENIGNO LIM ([facility])        PT Ortho     Row Name 05/28/19 0753       Precautions and Contraindications    Precautions  hx of breast cx, CHF  -       Posture/Observations    Posture/Observations Comments  decreased guarding noted in R shoulder  -KH      User Key  (r) = Recorded By, (t) = Taken By, (c) = Cosigned By    Initials Name Provider Type    Yue Moore PTA Physical Therapy Assistant                      PT Assessment/Plan     Row Name 05/28/19 0753          PT Assessment    Assessment Comments  added isometrics to HEP this date. Responds well to manual and has improved PROM with decreased pain this date.   -ANTWON        PT Plan    PT Frequency  1x/week  -ANTWON     Predicted Duration of Therapy Intervention (Therapy Eval)  4 weeks w/ further TBD  -     PT Plan Comments  Recheck scheduled for next week. Continue to increaase HEP each visit. Manual as needed.   -ANTWON       User Key  (r) = Recorded By, (t) = Taken By, (c) = Cosigned By    Initials Name Provider Type    Yue Moore PTA Physical Therapy Assistant          Modalities     Row Name 05/28/19 0753             Subjective Pain    Post-Treatment Pain Level  3  -KH         Moist Heat    MH Applied  Yes  -KH      Location  R shoulder   -KH      Rx Minutes  15 mins  -KH      MH S/P Rx  Yes  -ANTWON        User Key  (r) = Recorded By, (t) = Taken By, (c) = Cosigned By    Initials Name Provider Type    Yue Moore PTA  "Physical Therapy Assistant        Exercises     Row Name 05/28/19 0753             Subjective Comments    Subjective Comments  Pt reports that she had a steroid shot on Friday which seemed to really helped. Had some skin cancers removed on Thrusday.   -KH         Subjective Pain    Able to rate subjective pain?  yes  -KH      Pre-Treatment Pain Level  4  -KH      Post-Treatment Pain Level  3  -KH         Exercise 1    Exercise Name 1  pro ll UE/LE ROM   -KH      Cueing 1  Verbal  -KH      Time 1  10' F/B  -KH      Additional Comments  level 1; seat 7  -KH         Exercise 2    Exercise Name 2  pulleys flex/abd  -KH      Cueing 2  Verbal  -KH      Sets 2  1  -KH      Reps 2  20  -KH         Exercise 3    Exercise Name 3  wall walks fwd/lateral  -KH      Cueing 3  Verbal  -KH      Sets 3  1  -KH      Reps 3  10 each   -KH         Exercise 4    Exercise Name 4  scap squeezes  -KH      Cueing 4  Verbal  -KH      Sets 4  2  -KH      Reps 4  10  -KH         Exercise 5    Exercise Name 5  No Money's  -KH      Cueing 5  Verbal  -KH      Sets 5  2  -KH      Reps 5  10  -KH         Exercise 6    Exercise Name 6  shoulder isometrics 6 way  -KH      Cueing 6  Verbal  -KH      Sets 6  1  -KH      Reps 6  10  -KH      Time 6  3\" holds  -KH      Additional Comments  HEP  -KH         Exercise 7    Exercise Name 7  Manual:PROM/STM to bicep,deltoids  -KH      Time 7  10'  -KH        User Key  (r) = Recorded By, (t) = Taken By, (c) = Cosigned By    Initials Name Provider Type    Yue Moore PTA Physical Therapy Assistant                       PT OP Goals     Row Name 05/28/19 0753          Long Term Goals    LTG Date to Achieve  06/10/19  -KH     LTG 1  pt will improve QuickDASH score by 10 pts  -KH     LTG 1 Progress  Progressing  -KH     LTG 2  pt will improve AROM of the R shoulder to WFL for all planes  -KH     LTG 2 Progress  Progressing  -KH     LTG 3  pr will report <4/10 R shoulder pain w/ ADLs  -KH     LTG 3 Progress  " Progressing  -ANTWON        Time Calculation    PT Goal Re-Cert Due Date  06/03/19  -ANTWON       User Key  (r) = Recorded By, (t) = Taken By, (c) = Cosigned By    Initials Name Provider Type    Yue Moore PTA Physical Therapy Assistant                         Time Calculation:   Start Time: 0753  Stop Time: 0853  Time Calculation (min): 60 min  Total Timed Code Minutes- PT: 45 minute(s)  Therapy Charges for Today     Code Description Service Date Service Provider Modifiers Qty    70316929890 HC PT MANUAL THERAPY EA 15 MIN 5/28/2019 Yue Yip PTA GP 1    33112139127 HC PT THER PROC EA 15 MIN 5/28/2019 Yue Yip PTA GP 2    69721792185 HC PT THER SUPP EA 15 MIN 5/28/2019 Yue Yip PTA GP 1                    Yue Yip PTA  5/28/2019

## 2019-05-29 ENCOUNTER — TELEPHONE (OUTPATIENT)
Dept: FAMILY MEDICINE CLINIC | Facility: CLINIC | Age: 64
End: 2019-05-29

## 2019-05-29 DIAGNOSIS — F90.0 ATTENTION DEFICIT HYPERACTIVITY DISORDER (ADHD), PREDOMINANTLY INATTENTIVE TYPE: ICD-10-CM

## 2019-05-29 RX ORDER — METHYLPHENIDATE HYDROCHLORIDE 10 MG/1
10 TABLET ORAL EVERY 12 HOURS SCHEDULED
Qty: 60 TABLET | Refills: 0 | Status: SHIPPED | OUTPATIENT
Start: 2019-05-29 | End: 2019-06-30 | Stop reason: SDUPTHER

## 2019-05-29 NOTE — TELEPHONE ENCOUNTER
Patient has ongoing ADHD. [facility] # [id number] appropriate.      This document has been electronically signed by Olimpia Robison MD on May 29, 2019 4:20 PM

## 2019-05-30 RX ORDER — HYDROXYCHLOROQUINE SULFATE 200 MG/1
TABLET, FILM COATED ORAL
Qty: 180 TABLET | Refills: 3 | Status: SHIPPED | OUTPATIENT
Start: 2019-05-30 | End: 2019-08-05 | Stop reason: DRUGHIGH

## 2019-06-04 ENCOUNTER — HOSPITAL ENCOUNTER (OUTPATIENT)
Dept: PHYSICAL THERAPY | Facility: HOSPITAL | Age: 64
Setting detail: THERAPIES SERIES
Discharge: HOME | End: 2019-06-04

## 2019-06-04 DIAGNOSIS — M25.511 RIGHT SHOULDER PAIN, UNSPECIFIED CHRONICITY: Primary | ICD-10-CM

## 2019-06-04 PROCEDURE — 97110 THERAPEUTIC EXERCISES: CPT | Performed by: PHYSICAL THERAPIST

## 2019-06-05 NOTE — THERAPY PROGRESS REPORT/RE-CERT
Outpatient Physical Therapy Ortho Progress Note  [facility]     Patient Name: Daysi Pa  : 1955  MRN: [id number]  Today's Date: 2019      Visit Date: 2019  Attendance:  (Medicare)  Subjective Improvement: 65%  Next MD Appt: 19  Recert Date: 19    Therapy Diagnosis: R shoulder pain    Changes in Medications: none noted  Changes in MD Orders: none noted  Number of Work Days Lost: n/a     Past Medical History:   Diagnosis Date   • Acquired hypothyroidism    • Allergic rhinitis    • Allergy 2016    Consult, Allergy (1) - Ordered By: BENIGNO LIM ([facility])    • Anxiety state    • Attention deficit hyperactivity disorder    • Benign essential hypertension    • Chronic pain    • Chronic pain disorder    • Congestive heart failure (CMS/HCC)     primarily systrolic dysfunction EF 17-20% repeat echo 55%   • Depressive disorder    • Dyslipidemia    • Dysphagia    • Gastroesophageal reflux disease    • Generalized abdominal pain    • History of echocardiogram 2016    Echocardiogram W/ color flow 67746 (3) - Normal LV function wiht Ef of 60%.Normal RV size and function.Normal diastolic function.No significant valvular regurgitation or stenosis.   • History of echocardiogram 2012    Echocardiogram W/O color flow 38285 (1) - Normal left ventricular systolic function. EF 55%. No evidence of regional wall motion abnormalities. Abnormal relaxation of the left ventricular myocardium.   • History of EKG 2016    EKG Tracing and Interpretation 35817 (3) - Ordered By: LILLIE BRADY ([facility])    • History of mammogram 2013    MAMMOGRAM SCREENING 11269 - [facility] (1) - birads 0    • Hyperlipidemia    • Intraductal carcinoma in situ of breast     hx in past and s/p bilateral simple mastectomies      • Irritable bowel syndrome    • Low back pain    • Malaise and fatigue    • Microalbuminuria 2015    POS MICROALBUMINURIA RESULT DOC AND REVIEWED 4294F  (1) - Ordered By: BENIGNO LIM ([facility])   • Mitral valve regurgitation     Mild-Moderate      • Myopia    • Non-organic sleep disorder    • Osteoporosis    • Pain management 04/17/2016    Consult, Pain Management (1) - Ordered By: BENIGNO LIM ([facility])    • Pneumococcal vaccination indicated 07/08/2016    PNEUMOC VAC/ADMIN/RCVD 4040F (11) - Ordered By: BENIGNO LIM ([facility])   • Primary fibromyalgia syndrome    • Rheumatoid arthritis (CMS/HCC)         Past Surgical History:   Procedure Laterality Date   • APPENDECTOMY  2008    Appendectomy (1)   • BREAST BIOPSY  06/12/2013    Stereotactic breast biopsy (1) - stereotactic left mammotome biopsy with 11 gauge needle.   • BREAST IMPLANT REMOVAL  2003    Remove breast implant (1)   • BREAST IMPLANT SURGERY  2000    Breast implantation (1)   • COLONOSCOPY  01/20/2014    Colonoscopy, diagnostic (screening) 92408 (1)   • COLONOSCOPY W/ POLYPECTOMY  2008    Colonoscopy remove polyps 96139 (1)    • EXCISION LESION  05/01/2014    Remove chest wall lesion (1) - Excision of subcutaneous mass, left chest wall, Subcutaneous mass left chest wall, status post mastectomy.   • INJECTION OF MEDICATION  07/05/2016    B12 (44) - Ordered By: BENIGNO LIM ([facility])    • INJECTION OF MEDICATION  11/12/2015    Celestone (betamethasone) (1) - Ordered By: BENIGNO LIM ([facility])    • INJECTION OF MEDICATION  02/11/2016    Kenalog (2) - Ordered By: BENIGNO LIM ([facility])    • INJECTION OF MEDICATION  11/17/2017    Prolia   • MASTECTOMY  09/05/2013    Mastectomy (2) - Right simple prophylactic mastectomy.   • OTHER SURGICAL HISTORY  10/29/2014    SONOGRAM THYROID, NECK 39585 (1) - Ordered By: BENIGNO LIM ([facility])   • PAP SMEAR  06/03/2013     PAP SMEAR (1)   • PARTIAL HYSTERECTOMY  1998     Partial hyst (1)     • TONSILLECTOMY  1969    Tonsillectomy (1)   • VASCULAR SURGERY  12/24/2014    Consult, Vascular Surgery (1) - Ordered By: BENIGNO LIM ([facility])        Visit Dx:     ICD-10-CM ICD-9-CM   1. Right shoulder pain, unspecified chronicity M25.511 719.41              PT Ortho     Row Name 06/04/19 0900       Subjective Comments    Subjective Comments  Shoulder motion and pain is improving overall. She is able to reach up and back farther. If moves or sleeps on it wrong, she will have pain in the shoulder than lasts for a few days. Continues to have weakness in the shoulder. 65% subjective improvement. No medication changes. Next follow-up with Dr. Tapia 6/27/19.  -SS       Precautions and Contraindications    Precautions  hx of breast cx, CHF  -SS       Subjective Pain    Able to rate subjective pain?  yes  -SS    Pre-Treatment Pain Level  5  -SS    Post-Treatment Pain Level  6  -SS       Posture/Observations    Posture/Observations Comments  Forward head, rounded shoulders posture with slight increase in thoracic kyphosis. Significant pec minor shortening B.   -SS       Shoulder Impingement/Rotator Cuff Special Tests    Shoulder Impingement/Rotator Cuff Special Tests Comments  TTP lateral deltoid with taut band. Trigger point right supraspinatus. TTP R subscapularis/lat dorsi in posterior axilla. Patient reports pain with attempted manual distraction. Patient guards and does not all joint mobility testing.  -SS       Right Upper Ext    Rt Shoulder Abduction AROM  45; pain lower delt region; < 90 degrees when placed into scaption  -SS    Rt Shoulder Abduction PROM  80  -SS    Rt Shoulder Extension AROM  45; pain lateral brachium proximally  -SS    Rt Shoulder Flexion AROM  90; pain about the shoulder  -SS    Rt Shoulder Flexion PROM  90  -SS    Rt Shoulder External Rotation AROM  back of head; pain  -SS    Rt Shoulder External Rotation PROM  60; supine with shoulder abducted 30 deg; pain  -SS    Rt Shoulder Internal Rotation AROM  sacrum/lumbosacral junction; pain  -SS    Rt Shoulder Internal Rotation PROM  45; supine with shoulder abducted 30 deg; pain  -SS      User Key  (r) = Recorded By, (t) = Taken By, (c) = Cosigned By    Initials Name Provider Type    SS  Alexander Riggs, PT DPT Physical Therapist            PT OP Goals     Row Name 06/04/19 0900          Long Term Goals    LTG Date to Achieve  06/25/19  -     LTG 1  pt will improve QuickDASH score by 10 pts  -SS     LTG 1 Progress  Met  -     LTG 2  pt will improve AROM of the R shoulder to WFL for all planes  -SS     LTG 2 Progress  Not Met;Ongoing  -SS     LTG 3  pr will report <4/10 R shoulder pain w/ ADLs  -SS     LTG 3 Progress  Not Met;Ongoing  -SS        Time Calculation    PT Goal Re-Cert Due Date  06/25/19  -       User Key  (r) = Recorded By, (t) = Taken By, (c) = Cosigned By    Initials Name Provider Type    Alexander Mcnair, PT DPT Physical Therapist          PT Assessment/Plan     Row Name 06/04/19 0900          PT Assessment    Functional Limitations  Decreased safety during functional activities;Performance in self-care ADL  -SS     Impairments  Muscle strength;Pain;Posture;Range of motion  -     Assessment Comments  Patient guarded attempted joint mobility testing and joint mobilization in part due to pain and in part due to fear of injury. Limited progress.  -SS     Rehab Potential  Fair  -SS     Patient/caregiver participated in establishment of treatment plan and goals  Yes  -SS     Patient would benefit from skilled therapy intervention  Yes  -SS        PT Plan    PT Frequency  2x/week  -SS     Predicted Duration of Therapy Intervention (Therapy Eval)  2-3 additional weeks with further to be determined.  -     PT Plan Comments  A/AA/PROM, stretching, scapular/RTC/shoulder muscle strengthening, MHP.  -       User Key  (r) = Recorded By, (t) = Taken By, (c) = Cosigned By    Initials Name Provider Type    Alexander Mcnair, PT DPT Physical Therapist          Modalities     Row Name 06/04/19 0900             Moist Heat    MH Applied  Yes  -SS      Location  R shoulder  -SS      Rx Minutes  15 mins  -SS      MH S/P Rx  Yes  -SS        User Key  (r) = Recorded By,  (t) = Taken By, (c) = Cosigned By    Initials Name Provider Type    Alexander Mcnair, PT DPT Physical Therapist        Exercises     Row Name 06/04/19 0900             Subjective Comments    Subjective Comments  Shoulder motion and pain is improving overall. She is able to reach up and back farther. If moves or sleeps on it wrong, she will have pain in the shoulder than lasts for a few days. Continues to have weakness in the shoulder. 65% subjective improvement. No medication changes. Next follow-up with Dr. Tapia 6/27/19.  -SS         Subjective Pain    Able to rate subjective pain?  yes  -SS      Pre-Treatment Pain Level  5  -SS      Post-Treatment Pain Level  6  -SS         Exercise 1    Exercise Name 1  Pro2, Seat 7, U/LE/ ROM  -SS      Cueing 1  Verbal  -SS      Time 1  10 mins  -SS      Additional Comments  Level 1  -SS         Exercise 2    Exercise Name 2  Table walkaway flexion  -SS      Cueing 2  Verbal;Demo  -SS      Sets 2  1  -SS      Reps 2  20  -SS         Exercise 3    Exercise Name 3  Table shoulder circles CW/CCW with hands in 1 place  -SS      Cueing 3  Verbal;Demo  -SS      Sets 3  1  -SS      Reps 3  10 each  -SS         Exercise 4    Exercise Name 4  [place] isometric low row  -SS      Cueing 4  Verbal;Demo  -SS      Sets 4  1  -SS      Reps 4  10  -SS      Time 4  5 sec hold  -SS         Exercise 5    Exercise Name 5  Scap retraction  -SS      Cueing 5  Verbal  -SS      Sets 5  1  -SS      Reps 5  20  -SS      Time 5  3 sec hold  -SS        User Key  (r) = Recorded By, (t) = Taken By, (c) = Cosigned By    Initials Name Provider Type    Alexander Mcnair, PT DPT Physical Therapist        Outcome Measure Options: Quick DASH  Quick DASH  Open a tight or new jar.: Moderate Difficulty  Do heavy household chores (e.g., wash walls, wash floors): Severe Difficulty  Carry a shopping bag or briefcase: Mild Difficulty  Wash your back: Moderate Difficulty  Use a knife to cut food: Mild  Difficulty  Recreational activities in which you take some force or impact through your arm, should or hand (e.g. golf, hammering, tennis, etc.): Severe Difficulty  During the past week, to what extent has your arm, shoulder, or hand problem interfered with your normal social activities with family, friends, neighbors or groups?: Quite a bit  During the past week, were you limited in your work or other regular daily activities as a result of your arm, shoulder or hand problem?: Moderately Limited  Arm, Shoulder, or hand pain: Moderate  Tingling (pins and needles) in your arm, shoulder, or hand: Moderate  During the past week, how much difficulty have you had sleeping because of the pain in your arm, shoulder or hand?: Moderate Difficultly  Number of Questions Answered: 11  Quick DASH Score: 52.27         Time Calculation:     Start Time: 0935  Stop Time: 1031  Time Calculation (min): 56 min     Therapy Charges for Today     Code Description Service Date Service Provider Modifiers Qty    23453621466 HC PT THER PROC EA 15 MIN 6/4/2019 Alexander Riggs, PT DPT GP 3    71364411599 HC PT THER SUPP EA 15 MIN 6/4/2019 Alexander Riggs, PT DPT GP 1                   Alexander Riggs, PT, DPT, CHT  6/4/2019

## 2019-06-10 ENCOUNTER — HOSPITAL ENCOUNTER (OUTPATIENT)
Dept: PHYSICAL THERAPY | Facility: HOSPITAL | Age: 64
Setting detail: THERAPIES SERIES
Discharge: HOME | End: 2019-06-10

## 2019-06-10 DIAGNOSIS — M25.511 RIGHT SHOULDER PAIN, UNSPECIFIED CHRONICITY: Primary | ICD-10-CM

## 2019-06-10 PROCEDURE — 97110 THERAPEUTIC EXERCISES: CPT

## 2019-06-10 NOTE — THERAPY TREATMENT NOTE
Outpatient Physical Therapy Ortho Treatment Note  [facility]     Patient Name: Daysi Pa  : 1955  MRN: [id number]  Today's Date: 6/10/2019      Visit Date: 06/10/2019     Subjective Improvement: 65%  Attendance:   (medicare)  Next MD Visit : 19  Recert Date:  19      Therapy Diagnosis:  R shoulder pain         Visit Dx:    ICD-10-CM ICD-9-CM   1. Right shoulder pain, unspecified chronicity M25.511 719.41       Patient Active Problem List   Diagnosis   • Hyperlipidemia   • Mitral regurgitation   • Rheumatoid arthritis (CMS/HCC)   • Primary fibromyalgia syndrome   • Osteoporosis   • Malaise and fatigue   • Irritable bowel syndrome with constipation   • Intraductal carcinoma in situ of breast   • Gastroesophageal reflux disease   • Dyslipidemia   • Depressive disorder   • Chronic pain   • Attention deficit hyperactivity disorder   • Anxiety state   • Allergic rhinitis   • Acquired hypothyroidism   • Chronic low back pain   • Lumbosacral spondylosis without myelopathy   • Myofacial muscle pain   • Long term (current) use of opiate analgesic   • Snoring        Past Medical History:   Diagnosis Date   • Acquired hypothyroidism    • Allergic rhinitis    • Allergy 2016    Consult, Allergy (1) - Ordered By: BENIGNO LIM ([facility])    • Anxiety state    • Attention deficit hyperactivity disorder    • Benign essential hypertension    • Chronic pain    • Chronic pain disorder    • Congestive heart failure (CMS/HCC)     primarily systrolic dysfunction EF 17-20% repeat echo 55%   • Depressive disorder    • Dyslipidemia    • Dysphagia    • Gastroesophageal reflux disease    • Generalized abdominal pain    • History of echocardiogram 2016    Echocardiogram W/ color flow 72867 (3) - Normal LV function wiht Ef of 60%.Normal RV size and function.Normal diastolic function.No significant valvular regurgitation or stenosis.   • History of echocardiogram 2012    Echocardiogram W/O  color flow 32676 (1) - Normal left ventricular systolic function. EF 55%. No evidence of regional wall motion abnormalities. Abnormal relaxation of the left ventricular myocardium.   • History of EKG 03/07/2016    EKG Tracing and Interpretation 91065 (3) - Ordered By: LILLIE BRADY ([facility])    • History of mammogram 06/05/2013    MAMMOGRAM SCREENING 89234 - [facility] (1) - birads 0    • Hyperlipidemia    • Intraductal carcinoma in situ of breast     hx in past and s/p bilateral simple mastectomies      • Irritable bowel syndrome    • Low back pain    • Malaise and fatigue    • Microalbuminuria 07/16/2015    POS MICROALBUMINURIA RESULT DOC AND REVIEWED 3060F (1) - Ordered By: BENIGNO LIM ([facility])   • Mitral valve regurgitation     Mild-Moderate      • Myopia    • Non-organic sleep disorder    • Osteoporosis    • Pain management 04/17/2016    Consult, Pain Management (1) - Ordered By: BENIGNO LIM ([facility])    • Pneumococcal vaccination indicated 07/08/2016    PNEUMOC VAC/ADMIN/RCVD 4040F (11) - Ordered By: BENIGNO LIM ([facility])   • Primary fibromyalgia syndrome    • Rheumatoid arthritis (CMS/HCC)         Past Surgical History:   Procedure Laterality Date   • APPENDECTOMY  2008    Appendectomy (1)   • BREAST BIOPSY  06/12/2013    Stereotactic breast biopsy (1) - stereotactic left mammotome biopsy with 11 gauge needle.   • BREAST IMPLANT REMOVAL  2003    Remove breast implant (1)   • BREAST IMPLANT SURGERY  2000    Breast implantation (1)   • COLONOSCOPY  01/20/2014    Colonoscopy, diagnostic (screening) 80533 (1)   • COLONOSCOPY W/ POLYPECTOMY  2008    Colonoscopy remove polyps 64283 (1)    • EXCISION LESION  05/01/2014    Remove chest wall lesion (1) - Excision of subcutaneous mass, left chest wall, Subcutaneous mass left chest wall, status post mastectomy.   • INJECTION OF MEDICATION  07/05/2016    B12 (44) - Ordered By: BENIGNO LIM ([facility])    • INJECTION OF MEDICATION  11/12/2015    Celestone (betamethasone) (1) - Ordered By: BENIGNO LIM  ([facility])    • INJECTION OF MEDICATION  02/11/2016    Kenalog (2) - Ordered By: BENIGNO LIM ([facility])    • INJECTION OF MEDICATION  11/17/2017    Prolia   • MASTECTOMY  09/05/2013    Mastectomy (2) - Right simple prophylactic mastectomy.   • OTHER SURGICAL HISTORY  10/29/2014    SONOGRAM THYROID, NECK 18150 (1) - Ordered By: BENIGNO LIM ([facility])   • PAP SMEAR  06/03/2013     PAP SMEAR (1)   • PARTIAL HYSTERECTOMY  1998     Partial hyst (1)     • TONSILLECTOMY  1969    Tonsillectomy (1)   • VASCULAR SURGERY  12/24/2014    Consult, Vascular Surgery (1) - Ordered By: BENIGNO LIM ([facility])        PT Ortho     Row Name 06/10/19 0761       Precautions and Contraindications    Precautions  hx of breast cx, CHF  -       Posture/Observations    Posture/Observations Comments  Forward head, rounded shoulders posture with slight increase in thoracic kyphosis. Significant pec minor shortening B.   -      User Key  (r) = Recorded By, (t) = Taken By, (c) = Cosigned By    Initials Name Provider Type    Yue Moore PTA Physical Therapy Assistant                      PT Assessment/Plan     Row Name 06/10/19 0723          PT Assessment    Assessment Comments  continues with pain in the right shoulder with AAROM but able to tolerate. added tband exercises to HEP this dates.   -        PT Plan    PT Frequency  2x/week  -ANTWON     Predicted Duration of Therapy Intervention (Therapy Eval)  2-3 additional weeks w/ further TBD  -     PT Plan Comments  Manual next; Continue with A/AA/PROM, scap strength as able. MHP at end of treatment  -       User Key  (r) = Recorded By, (t) = Taken By, (c) = Cosigned By    Initials Name Provider Type    Yue Moore PTA Physical Therapy Assistant          Modalities     Row Name 06/10/19 9919             Subjective Pain    Post-Treatment Pain Level  5  -KH         Moist Heat    MH Applied  Yes  -ANTWON      Location  R shoulder  -KH      Rx Minutes  15 mins  -KH      MH S/P Rx  Yes  -        User Key  (r) = Recorded By,  "(t) = Taken By, (c) = Cosigned By    Initials Name Provider Type    Yue Moore PTA Physical Therapy Assistant        Exercises     Row Name 06/10/19 0723             Subjective Comments    Subjective Comments  Just had a bad couple of days last week and shoulder went back to like it was prior to starting PT; Had a good weekend  -KH         Subjective Pain    Able to rate subjective pain?  yes  -KH      Pre-Treatment Pain Level  5  -KH      Post-Treatment Pain Level  5  -KH         Exercise 1    Exercise Name 1  pro ll UE strength/ROM  -KH      Cueing 1  Verbal  -KH      Time 1  10'  -KH      Additional Comments  level 2; seat 5  -KH         Exercise 2    Exercise Name 2  pulley's flexion/abd  -KH      Cueing 2  Verbal  -KH      Sets 2  2  -KH      Reps 2  10  -KH         Exercise 3    Exercise Name 3  t-band mid rows  -KH      Cueing 3  Verbal  -KH      Sets 3  2  -KH      Reps 3  10  -KH      Additional Comments  yellow  -KH         Exercise 4    Exercise Name 4  t-band lat pulls  -KH      Cueing 4  Verbal  -KH      Sets 4  2  -KH      Reps 4  10  -KH      Additional Comments  yellow  -KH         Exercise 5    Exercise Name 5  table wipes (plynth table)  -KH      Cueing 5  Verbal  -KH      Sets 5  2  -KH      Reps 5  10  -KH      Additional Comments  flexion,cw.ccw  -KH         Exercise 6    Exercise Name 6  doorway stretch (hands low)  -KH      Cueing 6  Verbal  -KH      Sets 6  3  -KH      Time 6  30\"  -KH         Exercise 7    Exercise Name 7  table wipes (scaption)  -KH      Cueing 7  Verbal  -KH      Sets 7  2  -KH      Reps 7  10  -KH        User Key  (r) = Recorded By, (t) = Taken By, (c) = Cosigned By    Initials Name Provider Type    Yue Moore PTA Physical Therapy Assistant                       PT OP Goals     Row Name 06/10/19 0723          Long Term Goals    LTG Date to Achieve  06/25/19  -KH     LTG 1  pt will improve QuickDASH score by 10 pts  -KH     LTG 1 Progress  Met  -KH     LTG 2  pt " will improve AROM of the R shoulder to WFL for all planes  -     LTG 2 Progress  Not Met;Ongoing  -     LTG 3  pr will report <4/10 R shoulder pain w/ ADLs  -     LTG 3 Progress  Not Met;Ongoing  -        Time Calculation    PT Goal Re-Cert Due Date  06/25/19  -       User Key  (r) = Recorded By, (t) = Taken By, (c) = Cosigned By    Initials Name Provider Type    Yue Moore PTA Physical Therapy Assistant                         Time Calculation:   Start Time: 0723  Stop Time: 0816  Time Calculation (min): 53 min  Total Timed Code Minutes- PT: 38 minute(s)  Therapy Charges for Today     Code Description Service Date Service Provider Modifiers Qty    49168884191 HC PT THER SUPP EA 15 MIN 6/10/2019 Yue Yip PTA GP 1    07233676529 HC PT THER PROC EA 15 MIN 6/10/2019 Yue Yip PTA GP 3                    Yue Yip PTA  6/10/2019

## 2019-06-11 ENCOUNTER — HOSPITAL ENCOUNTER (OUTPATIENT)
Dept: PHYSICAL THERAPY | Facility: HOSPITAL | Age: 64
Setting detail: THERAPIES SERIES
Discharge: HOME | End: 2019-06-11

## 2019-06-11 DIAGNOSIS — M25.511 RIGHT SHOULDER PAIN, UNSPECIFIED CHRONICITY: Primary | ICD-10-CM

## 2019-06-11 PROCEDURE — 97110 THERAPEUTIC EXERCISES: CPT

## 2019-06-11 PROCEDURE — 97140 MANUAL THERAPY 1/> REGIONS: CPT

## 2019-06-11 NOTE — THERAPY TREATMENT NOTE
Outpatient Physical Therapy Ortho Treatment Note  [facility]     Patient Name: Daysi Pa  : 1955  MRN: [id number]  Today's Date: 2019      Visit Date: 2019     Subjective Improvement: 65%  Attendance:   (medicare)  Next MD Visit : 19  Recert Date:  19        Therapy Diagnosis:  R shoulder pain         Visit Dx:    ICD-10-CM ICD-9-CM   1. Right shoulder pain, unspecified chronicity M25.511 719.41       Patient Active Problem List   Diagnosis   • Hyperlipidemia   • Mitral regurgitation   • Rheumatoid arthritis (CMS/HCC)   • Primary fibromyalgia syndrome   • Osteoporosis   • Malaise and fatigue   • Irritable bowel syndrome with constipation   • Intraductal carcinoma in situ of breast   • Gastroesophageal reflux disease   • Dyslipidemia   • Depressive disorder   • Chronic pain   • Attention deficit hyperactivity disorder   • Anxiety state   • Allergic rhinitis   • Acquired hypothyroidism   • Chronic low back pain   • Lumbosacral spondylosis without myelopathy   • Myofacial muscle pain   • Long term (current) use of opiate analgesic   • Snoring        Past Medical History:   Diagnosis Date   • Acquired hypothyroidism    • Allergic rhinitis    • Allergy 2016    Consult, Allergy (1) - Ordered By: BENIGNO LIM ([facility])    • Anxiety state    • Attention deficit hyperactivity disorder    • Benign essential hypertension    • Chronic pain    • Chronic pain disorder    • Congestive heart failure (CMS/HCC)     primarily systrolic dysfunction EF 17-20% repeat echo 55%   • Depressive disorder    • Dyslipidemia    • Dysphagia    • Gastroesophageal reflux disease    • Generalized abdominal pain    • History of echocardiogram 2016    Echocardiogram W/ color flow 45381 (3) - Normal LV function wiht Ef of 60%.Normal RV size and function.Normal diastolic function.No significant valvular regurgitation or stenosis.   • History of echocardiogram 2012    Echocardiogram  W/O color flow 08485 (1) - Normal left ventricular systolic function. EF 55%. No evidence of regional wall motion abnormalities. Abnormal relaxation of the left ventricular myocardium.   • History of EKG 03/07/2016    EKG Tracing and Interpretation 35445 (3) - Ordered By: LILLIE BRADY ([facility])    • History of mammogram 06/05/2013    MAMMOGRAM SCREENING 47676 - [facility] (1) - birads 0    • Hyperlipidemia    • Intraductal carcinoma in situ of breast     hx in past and s/p bilateral simple mastectomies      • Irritable bowel syndrome    • Low back pain    • Malaise and fatigue    • Microalbuminuria 07/16/2015    POS MICROALBUMINURIA RESULT DOC AND REVIEWED 3060F (1) - Ordered By: BENIGNO LIM ([facility])   • Mitral valve regurgitation     Mild-Moderate      • Myopia    • Non-organic sleep disorder    • Osteoporosis    • Pain management 04/17/2016    Consult, Pain Management (1) - Ordered By: BENIGNO LIM ([facility])    • Pneumococcal vaccination indicated 07/08/2016    PNEUMOC VAC/ADMIN/RCVD 4040F (11) - Ordered By: BENIGNO LIM ([facility])   • Primary fibromyalgia syndrome    • Rheumatoid arthritis (CMS/HCC)         Past Surgical History:   Procedure Laterality Date   • APPENDECTOMY  2008    Appendectomy (1)   • BREAST BIOPSY  06/12/2013    Stereotactic breast biopsy (1) - stereotactic left mammotome biopsy with 11 gauge needle.   • BREAST IMPLANT REMOVAL  2003    Remove breast implant (1)   • BREAST IMPLANT SURGERY  2000    Breast implantation (1)   • COLONOSCOPY  01/20/2014    Colonoscopy, diagnostic (screening) 11925 (1)   • COLONOSCOPY W/ POLYPECTOMY  2008    Colonoscopy remove polyps 55604 (1)    • EXCISION LESION  05/01/2014    Remove chest wall lesion (1) - Excision of subcutaneous mass, left chest wall, Subcutaneous mass left chest wall, status post mastectomy.   • INJECTION OF MEDICATION  07/05/2016    B12 (44) - Ordered By: BENIGNO LIM ([facility])    • INJECTION OF MEDICATION  11/12/2015    Celestone (betamethasone) (1) - Ordered By: BENIGNO LIM  ([facility])    • INJECTION OF MEDICATION  02/11/2016    Kenalog (2) - Ordered By: BENIGNO LIM ([facility])    • INJECTION OF MEDICATION  11/17/2017    Prolia   • MASTECTOMY  09/05/2013    Mastectomy (2) - Right simple prophylactic mastectomy.   • OTHER SURGICAL HISTORY  10/29/2014    SONOGRAM THYROID, NECK 21264 (1) - Ordered By: BENIGNO LIM ([facility])   • PAP SMEAR  06/03/2013     PAP SMEAR (1)   • PARTIAL HYSTERECTOMY  1998     Partial hyst (1)     • TONSILLECTOMY  1969    Tonsillectomy (1)   • VASCULAR SURGERY  12/24/2014    Consult, Vascular Surgery (1) - Ordered By: BENIGNO LIM ([facility])        PT Ortho     Row Name 06/11/19 0900       Precautions and Contraindications    Precautions  hx of breast cx, CHF  -       Posture/Observations    Posture/Observations Comments  Forward head, rounded shoulders posture with slight increase in thoracic kyphosis. Significant pec minor shortening B.   -    Row Name 06/10/19 0723       Precautions and Contraindications    Precautions  hx of breast cx, CHF  -       Posture/Observations    Posture/Observations Comments  Forward head, rounded shoulders posture with slight increase in thoracic kyphosis. Significant pec minor shortening B.   -      User Key  (r) = Recorded By, (t) = Taken By, (c) = Cosigned By    Initials Name Provider Type    Yue Moore, WALTER Physical Therapy Assistant                      PT Assessment/Plan     Row Name 06/11/19 0900          PT Assessment    Assessment Comments  VC needed to help decrease guarding thoughout treatment. good tolerance to treatment this date; Responded well to manual with decreased tightness in the upper trap on R   -KH        PT Plan    PT Frequency  2x/week  -     Predicted Duration of Therapy Intervention (Therapy Eval)  2-3 additional weeks w/ further TBD  -     PT Plan Comments  Continue with manual and strength as pt allows. Montior guarding  -       User Key  (r) = Recorded By, (t) = Taken By, (c) = Cosigned By    Initials Name Provider Type     Yue Moore PTA Physical Therapy Assistant          Modalities     Row Name 06/11/19 0900             Subjective Pain    Pre-Treatment Pain Level  6  -KH         Moist Heat    MH Applied  Yes  -KH      Location  R shoulder  -KH      Rx Minutes  15 mins  -KH      MH S/P Rx  Yes  -KH        User Key  (r) = Recorded By, (t) = Taken By, (c) = Cosigned By    Initials Name Provider Type    ANTWON PhiYue PTA Physical Therapy Assistant        Exercises     Row Name 06/11/19 0900             Subjective Comments    Subjective Comments  Pt reports that she is really sore across her upper shoulder today. Thinks that she may have done an exercise wrong or was guarding.   -KH         Subjective Pain    Able to rate subjective pain?  yes  -KH      Pre-Treatment Pain Level  6  -KH      Post-Treatment Pain Level  4  -KH         Exercise 1    Exercise Name 1  pro ll UE strength/ROM  -KH      Cueing 1  Verbal  -KH      Time 1  10'  -KH      Additional Comments  level 2; seat 5  -KH         Exercise 2    Exercise Name 2  pulley's flexion/abd  -KH      Cueing 2  Verbal  -KH      Sets 2  2  -KH      Reps 2  10  -KH         Exercise 3    Exercise Name 3  Manual: STM to upper trap R and PROM with gentle jt mobes to R shoulder  -KH      Cueing 3  Verbal  -KH      Time 3  15'  -KH         Exercise 4    Exercise Name 4  supine serratus punches  -KH      Cueing 4  Verbal  -KH      Sets 4  2  -KH      Reps 4  10  -KH         Exercise 5    Exercise Name 5  supine serratus circles cw/ccw  -KH      Cueing 5  Verbal  -KH      Sets 5  2  -KH      Reps 5  10  -KH         Exercise 6    Exercise Name 6  prone rows  -KH      Cueing 6  Verbal  -KH      Sets 6  2  -KH      Reps 6  10  -KH         Exercise 7    Exercise Name 7  prone shoulder extensions  -KH      Cueing 7  Verbal  -KH      Sets 7  2  -KH      Reps 7  10  -KH         Exercise 8    Exercise Name 8  supine shoulder AROM flexion to 90  -KH      Cueing 8  Verbal  -KH      Sets 8  2   -      Reps 8  10  -      Additional Comments  1# db  -        User Key  (r) = Recorded By, (t) = Taken By, (c) = Cosigned By    Initials Name Provider Type    Yue Moore PTA Physical Therapy Assistant                       PT OP Goals     Row Name 06/11/19 0900          Long Term Goals    LTG Date to Achieve  06/25/19  -     LTG 1  pt will improve QuickDASH score by 10 pts  -     LTG 1 Progress  Met  -     LTG 2  pt will improve AROM of the R shoulder to WFL for all planes  -     LTG 2 Progress  Not Met;Ongoing  -     LTG 3  pr will report <4/10 R shoulder pain w/ ADLs  -     LTG 3 Progress  Not Met;Ongoing  -        Time Calculation    PT Goal Re-Cert Due Date  06/25/19  -       User Key  (r) = Recorded By, (t) = Taken By, (c) = Cosigned By    Initials Name Provider Type    Yue Moore PTA Physical Therapy Assistant                         Time Calculation:   Start Time: 0925  Stop Time: 1020  Time Calculation (min): 55 min  Total Timed Code Minutes- PT: 40 minute(s)  Therapy Charges for Today     Code Description Service Date Service Provider Modifiers Qty    70548549518 HC PT MANUAL THERAPY EA 15 MIN 6/11/2019 Yue Yip PTA GP 1    51991839212 HC PT THER PROC EA 15 MIN 6/11/2019 Yue Yip PTA GP 2    01840163980 HC PT THER SUPP EA 15 MIN 6/11/2019 Yue Yip PTA GP 1                    Yue Yip PTA  6/11/2019

## 2019-06-12 ENCOUNTER — INFUSION (OUTPATIENT)
Dept: ONCOLOGY | Facility: HOSPITAL | Age: 64
End: 2019-06-12

## 2019-06-12 DIAGNOSIS — M81.0 OSTEOPOROSIS, UNSPECIFIED OSTEOPOROSIS TYPE, UNSPECIFIED PATHOLOGICAL FRACTURE PRESENCE: Primary | ICD-10-CM

## 2019-06-12 DIAGNOSIS — M81.0 AGE-RELATED OSTEOPOROSIS WITHOUT CURRENT PATHOLOGICAL FRACTURE: ICD-10-CM

## 2019-06-12 LAB
CALCIUM SPEC-SCNC: 9.1 MG/DL (ref 8.6–10.5)
MAGNESIUM SERPL-MCNC: 2 MG/DL (ref 1.6–2.4)
PHOSPHATE SERPL-MCNC: 3.6 MG/DL (ref 2.5–4.5)

## 2019-06-12 PROCEDURE — 83735 ASSAY OF MAGNESIUM: CPT | Performed by: NURSE PRACTITIONER

## 2019-06-12 PROCEDURE — 82310 ASSAY OF CALCIUM: CPT | Performed by: NURSE PRACTITIONER

## 2019-06-12 PROCEDURE — 96372 THER/PROPH/DIAG INJ SC/IM: CPT | Performed by: NURSE PRACTITIONER

## 2019-06-12 PROCEDURE — 25010000002 DENOSUMAB 60 MG/ML SOLUTION PREFILLED SYRINGE: Performed by: NURSE PRACTITIONER

## 2019-06-12 PROCEDURE — 84100 ASSAY OF PHOSPHORUS: CPT | Performed by: NURSE PRACTITIONER

## 2019-06-12 RX ADMIN — DENOSUMAB 60 MG: 60 INJECTION SUBCUTANEOUS at 08:52

## 2019-06-12 NOTE — PATIENT INSTRUCTIONS
Denosumab injection  What is this medicine?  DENOSUMAB (den oh michelle mab) slows bone breakdown. Prolia is used to treat osteoporosis in women after menopause and in men, and in people who are taking corticosteroids for 6 months or more. Xgeva is used to treat a high calcium level due to cancer and to prevent bone fractures and other bone problems caused by multiple myeloma or cancer bone metastases. Xgeva is also used to treat giant cell tumor of the bone.  This medicine may be used for other purposes; ask your health care provider or pharmacist if you have questions.  COMMON BRAND NAME(S): Prolia, XGEVA  What should I tell my health care provider before I take this medicine?  They need to know if you have any of these conditions:  -dental disease  -having surgery or tooth extraction  -infection  -kidney disease  -low levels of calcium or Vitamin D in the blood  -malnutrition  -on hemodialysis  -skin conditions or sensitivity  -thyroid or parathyroid disease  -an unusual reaction to denosumab, other medicines, foods, dyes, or preservatives  -pregnant or trying to get pregnant  -breast-feeding  How should I use this medicine?  This medicine is for injection under the skin. It is given by a health care professional in a hospital or clinic setting.  If you are getting Prolia, a special MedGuide will be given to you by the pharmacist with each prescription and refill. Be sure to read this information carefully each time.  For Prolia, talk to your pediatrician regarding the use of this medicine in children. Special care may be needed. For Xgeva, talk to your pediatrician regarding the use of this medicine in children. While this drug may be prescribed for children as young as 13 years for selected conditions, precautions do apply.  Overdosage: If you think you have taken too much of this medicine contact a poison control center or emergency room at once.  NOTE: This medicine is only for you. Do not share this medicine with  others.  What if I miss a dose?  It is important not to miss your dose. Call your doctor or health care professional if you are unable to keep an appointment.  What may interact with this medicine?  Do not take this medicine with any of the following medications:  -other medicines containing denosumab  This medicine may also interact with the following medications:  -medicines that lower your chance of fighting infection  -steroid medicines like prednisone or cortisone  This list may not describe all possible interactions. Give your health care provider a list of all the medicines, herbs, non-prescription drugs, or dietary supplements you use. Also tell them if you smoke, drink alcohol, or use illegal drugs. Some items may interact with your medicine.  What should I watch for while using this medicine?  Visit your doctor or health care professional for regular checks on your progress. Your doctor or health care professional may order blood tests and other tests to see how you are doing.  Call your doctor or health care professional for advice if you get a fever, chills or sore throat, or other symptoms of a cold or flu. Do not treat yourself. This drug may decrease your body's ability to fight infection. Try to avoid being around people who are sick.  You should make sure you get enough calcium and vitamin D while you are taking this medicine, unless your doctor tells you not to. Discuss the foods you eat and the vitamins you take with your health care professional.  See your dentist regularly. Brush and floss your teeth as directed. Before you have any dental work done, tell your dentist you are receiving this medicine.  Do not become pregnant while taking this medicine or for 5 months after stopping it. Talk with your doctor or health care professional about your birth control options while taking this medicine. Women should inform their doctor if they wish to become pregnant or think they might be pregnant. There  is a potential for serious side effects to an unborn child. Talk to your health care professional or pharmacist for more information.  What side effects may I notice from receiving this medicine?  Side effects that you should report to your doctor or health care professional as soon as possible:  -allergic reactions like skin rash, itching or hives, swelling of the face, lips, or tongue  -bone pain  -breathing problems  -dizziness  -jaw pain, especially after dental work  -redness, blistering, peeling of the skin  -signs and symptoms of infection like fever or chills; cough; sore throat; pain or trouble passing urine  -signs of low calcium like fast heartbeat, muscle cramps or muscle pain; pain, tingling, numbness in the hands or feet; seizures  -unusual bleeding or bruising  -unusually weak or tired  Side effects that usually do not require medical attention (report to your doctor or health care professional if they continue or are bothersome):  -constipation  -diarrhea  -headache  -joint pain  -loss of appetite  -muscle pain  -runny nose  -tiredness  -upset stomach  This list may not describe all possible side effects. Call your doctor for medical advice about side effects. You may report side effects to FDA at 9-735-FDA-4899.  Where should I keep my medicine?  This medicine is only given in a clinic, doctor's office, or other health care setting and will not be stored at home.  NOTE: This sheet is a summary. It may not cover all possible information. If you have questions about this medicine, talk to your doctor, pharmacist, or health care provider.  © 2019 [employer]/Gold [employer] (2018-05-23 14:50:05)

## 2019-06-17 ENCOUNTER — HOSPITAL ENCOUNTER (OUTPATIENT)
Dept: PHYSICAL THERAPY | Facility: HOSPITAL | Age: 64
Setting detail: THERAPIES SERIES
Discharge: HOME | End: 2019-06-17

## 2019-06-17 DIAGNOSIS — M25.511 RIGHT SHOULDER PAIN, UNSPECIFIED CHRONICITY: Primary | ICD-10-CM

## 2019-06-17 PROCEDURE — 97110 THERAPEUTIC EXERCISES: CPT

## 2019-06-17 NOTE — THERAPY TREATMENT NOTE
Outpatient Physical Therapy Ortho Treatment Note  [facility]     Patient Name: Daysi Pa  : 1955  MRN: [id number]  Today's Date: 2019      Visit Date: 2019     Subjective Improvement: 65%  Attendance:   (medicare)  Next MD Visit : 19  Recert Date:  19      Therapy Diagnosis:  R Shoulder Pain         Visit Dx:    ICD-10-CM ICD-9-CM   1. Right shoulder pain, unspecified chronicity M25.511 719.41       Patient Active Problem List   Diagnosis   • Hyperlipidemia   • Mitral regurgitation   • Rheumatoid arthritis (CMS/HCC)   • Primary fibromyalgia syndrome   • Osteoporosis   • Malaise and fatigue   • Irritable bowel syndrome with constipation   • Intraductal carcinoma in situ of breast   • Gastroesophageal reflux disease   • Dyslipidemia   • Depressive disorder   • Chronic pain   • Attention deficit hyperactivity disorder   • Anxiety state   • Allergic rhinitis   • Acquired hypothyroidism   • Chronic low back pain   • Lumbosacral spondylosis without myelopathy   • Myofacial muscle pain   • Long term (current) use of opiate analgesic   • Snoring        Past Medical History:   Diagnosis Date   • Acquired hypothyroidism    • Allergic rhinitis    • Allergy 2016    Consult, Allergy (1) - Ordered By: BENIGNO LIM ([facility])    • Anxiety state    • Attention deficit hyperactivity disorder    • Benign essential hypertension    • Chronic pain    • Chronic pain disorder    • Congestive heart failure (CMS/HCC)     primarily systrolic dysfunction EF 17-20% repeat echo 55%   • Depressive disorder    • Dyslipidemia    • Dysphagia    • Gastroesophageal reflux disease    • Generalized abdominal pain    • History of echocardiogram 2016    Echocardiogram W/ color flow 03831 (3) - Normal LV function wiht Ef of 60%.Normal RV size and function.Normal diastolic function.No significant valvular regurgitation or stenosis.   • History of echocardiogram 2012    Echocardiogram W/O  color flow 83943 (1) - Normal left ventricular systolic function. EF 55%. No evidence of regional wall motion abnormalities. Abnormal relaxation of the left ventricular myocardium.   • History of EKG 03/07/2016    EKG Tracing and Interpretation 13206 (3) - Ordered By: LILLIE BRADY ([facility])    • History of mammogram 06/05/2013    MAMMOGRAM SCREENING 59689 - [facility] (1) - birads 0    • Hyperlipidemia    • Intraductal carcinoma in situ of breast     hx in past and s/p bilateral simple mastectomies      • Irritable bowel syndrome    • Low back pain    • Malaise and fatigue    • Microalbuminuria 07/16/2015    POS MICROALBUMINURIA RESULT DOC AND REVIEWED 3060F (1) - Ordered By: BENIGNO LIM ([facility])   • Mitral valve regurgitation     Mild-Moderate      • Myopia    • Non-organic sleep disorder    • Osteoporosis    • Pain management 04/17/2016    Consult, Pain Management (1) - Ordered By: BENIGNO LIM ([facility])    • Pneumococcal vaccination indicated 07/08/2016    PNEUMOC VAC/ADMIN/RCVD 4040F (11) - Ordered By: BENIGNO LIM ([facility])   • Primary fibromyalgia syndrome    • Rheumatoid arthritis (CMS/HCC)         Past Surgical History:   Procedure Laterality Date   • APPENDECTOMY  2008    Appendectomy (1)   • BREAST BIOPSY  06/12/2013    Stereotactic breast biopsy (1) - stereotactic left mammotome biopsy with 11 gauge needle.   • BREAST IMPLANT REMOVAL  2003    Remove breast implant (1)   • BREAST IMPLANT SURGERY  2000    Breast implantation (1)   • COLONOSCOPY  01/20/2014    Colonoscopy, diagnostic (screening) 56794 (1)   • COLONOSCOPY W/ POLYPECTOMY  2008    Colonoscopy remove polyps 79896 (1)    • EXCISION LESION  05/01/2014    Remove chest wall lesion (1) - Excision of subcutaneous mass, left chest wall, Subcutaneous mass left chest wall, status post mastectomy.   • INJECTION OF MEDICATION  07/05/2016    B12 (44) - Ordered By: BENIGNO LIM ([facility])    • INJECTION OF MEDICATION  11/12/2015    Celestone (betamethasone) (1) - Ordered By: BENIGNO LIM  ([facility])    • INJECTION OF MEDICATION  02/11/2016    Kenalog (2) - Ordered By: BENIGNO LIM ([facility])    • INJECTION OF MEDICATION  11/17/2017    Prolia   • MASTECTOMY  09/05/2013    Mastectomy (2) - Right simple prophylactic mastectomy.   • OTHER SURGICAL HISTORY  10/29/2014    SONOGRAM THYROID, NECK 04414 (1) - Ordered By: BENIGNO LIM ([facility])   • PAP SMEAR  06/03/2013     PAP SMEAR (1)   • PARTIAL HYSTERECTOMY  1998     Partial hyst (1)     • TONSILLECTOMY  1969    Tonsillectomy (1)   • VASCULAR SURGERY  12/24/2014    Consult, Vascular Surgery (1) - Ordered By: BENIGNO LIM ([facility])        PT Ortho     Row Name 06/17/19 0732       Precautions and Contraindications    Precautions  hx of breast cx, CHF  -       Posture/Observations    Posture/Observations Comments  Forward head, rounded shoulders posture with slight increase in thoracic kyphosis. Significant pec minor shortening B.   -      User Key  (r) = Recorded By, (t) = Taken By, (c) = Cosigned By    Initials Name Provider Type    Yue Moore PTA Physical Therapy Assistant                      PT Assessment/Plan     Row Name 06/17/19 4496          PT Assessment    Assessment Comments  increaased weight with PRE's this date without difficulty; still conitnues guarding with R UE but decreased subsitiution with upper trap and decreased tightness  -        PT Plan    PT Frequency  2x/week  -     Predicted Duration of Therapy Intervention (Therapy Eval)  2-3 additional weeks w/ further TBD  -     PT Plan Comments  Continue with current POC; Increase strength and recheck next week. CC rows next  -       User Key  (r) = Recorded By, (t) = Taken By, (c) = Cosigned By    Initials Name Provider Type    Yue Moore PTA Physical Therapy Assistant          Modalities     Row Name 06/17/19 0733             Moist Heat    MH Applied  Yes  -      Location  R shoulder  -      Rx Minutes  15 mins  -      MH S/P Rx  Yes  -        User Key  (r) = Recorded By, (t) = Taken By, (c)  = Cosigned By    Initials Name Provider Type    Yue Moore PTA Physical Therapy Assistant        Exercises     Row Name 06/17/19 0725             Subjective Comments    Subjective Comments  Pt reports that she was a little sore after the last treatment but felt better the next day;   -KH         Subjective Pain    Able to rate subjective pain?  yes  -KH      Pre-Treatment Pain Level  3  -KH      Post-Treatment Pain Level  2  -KH         Exercise 1    Exercise Name 1  pro ll UE strength/ROM  -KH      Cueing 1  Verbal  -KH      Time 1  10'  -KH      Additional Comments  level 2.5; seat 5  -KH         Exercise 2    Exercise Name 2  pulley's flexion/abd  -KH      Cueing 2  Verbal  -KH      Sets 2  2  -KH      Reps 2  10  -KH         Exercise 3    Exercise Name 3  Manual: STM to upper trap R and PROM with gentle jt mobes to R shoulder  -KH      Cueing 3  Verbal  -KH      Time 3  5'  -KH         Exercise 4    Exercise Name 4  supine AROM shoulder flexion   -KH      Cueing 4  Verbal  -KH      Sets 4  2  -KH      Reps 4  15  -KH      Additional Comments  1# db  -KH         Exercise 5    Exercise Name 5  supine AROM PNF D1/D2  -KH      Cueing 5  Verbal  -KH      Sets 5  2  -KH      Reps 5  10  -KH      Additional Comments  1# db  -KH         Exercise 6    Exercise Name 6  supine AROM serratus circles  -KH      Cueing 6  Verbal  -KH      Sets 6  3  -KH      Reps 6  10  -KH      Additional Comments  cw/ccw; 1# db  -KH         Exercise 7    Exercise Name 7  prone rows  -KH      Cueing 7  Verbal  -KH      Sets 7  2  -KH      Reps 7  10  -KH      Additional Comments  1# db  -KH         Exercise 8    Exercise Name 8  prone shoulder ext  -KH      Cueing 8  Verbal  -KH      Sets 8  2  -KH      Reps 8  10  -KH      Additional Comments  1# db  -KH         Exercise 9    Exercise Name 9  prone shoulder rows  -KH      Cueing 9  Verbal  -KH      Sets 9  2  -KH      Reps 9  10  -KH      Additional Comments  1# db  -KH        User Key   (r) = Recorded By, (t) = Taken By, (c) = Cosigned By    Initials Name Provider Type    Yue Moore PTA Physical Therapy Assistant                       PT OP Goals     Row Name 06/17/19 0725          Long Term Goals    LTG Date to Achieve  06/25/19  -     LTG 1  pt will improve QuickDASH score by 10 pts  -     LTG 1 Progress  Met  -     LTG 2  pt will improve AROM of the R shoulder to WFL for all planes  -     LTG 2 Progress  Progressing  -     LTG 3  pr will report <4/10 R shoulder pain w/ ADLs  -     LTG 3 Progress  Progressing  -        Time Calculation    PT Goal Re-Cert Due Date  06/25/19  -       User Key  (r) = Recorded By, (t) = Taken By, (c) = Cosigned By    Initials Name Provider Type    Yue Moore PTA Physical Therapy Assistant                         Time Calculation:   Start Time: 0725  Stop Time: 0820  Time Calculation (min): 55 min  Total Timed Code Minutes- PT: 40 minute(s)  Therapy Charges for Today     Code Description Service Date Service Provider Modifiers Qty    80043983491 HC PT THER SUPP EA 15 MIN 6/17/2019 Yue Yip PTA GP 1    62214795020 HC PT THER PROC EA 15 MIN 6/17/2019 Yue Yip PTA GP 3                    Yue Yip PTA  6/17/2019

## 2019-06-19 ENCOUNTER — HOSPITAL ENCOUNTER (OUTPATIENT)
Dept: PHYSICAL THERAPY | Facility: HOSPITAL | Age: 64
Setting detail: THERAPIES SERIES
Discharge: HOME | End: 2019-06-19

## 2019-06-19 DIAGNOSIS — M25.511 RIGHT SHOULDER PAIN, UNSPECIFIED CHRONICITY: Primary | ICD-10-CM

## 2019-06-19 PROCEDURE — 97110 THERAPEUTIC EXERCISES: CPT

## 2019-06-19 PROCEDURE — 97140 MANUAL THERAPY 1/> REGIONS: CPT

## 2019-06-19 NOTE — THERAPY TREATMENT NOTE
Outpatient Physical Therapy Ortho Treatment Note  [facility]     Patient Name: Daysi Pa  : 1955  MRN: [id number]  Today's Date: 2019      Visit Date: 2019     Subjective Improvement: 75%  Attendance:   (medicare)  Next MD Visit : 19  Recert Date:  19      Therapy Diagnosis:  R Shoulder Pain      Visit Dx:    ICD-10-CM ICD-9-CM   1. Right shoulder pain, unspecified chronicity M25.511 719.41       Patient Active Problem List   Diagnosis   • Hyperlipidemia   • Mitral regurgitation   • Rheumatoid arthritis (CMS/HCC)   • Primary fibromyalgia syndrome   • Osteoporosis   • Malaise and fatigue   • Irritable bowel syndrome with constipation   • Intraductal carcinoma in situ of breast   • Gastroesophageal reflux disease   • Dyslipidemia   • Depressive disorder   • Chronic pain   • Attention deficit hyperactivity disorder   • Anxiety state   • Allergic rhinitis   • Acquired hypothyroidism   • Chronic low back pain   • Lumbosacral spondylosis without myelopathy   • Myofacial muscle pain   • Long term (current) use of opiate analgesic   • Snoring        Past Medical History:   Diagnosis Date   • Acquired hypothyroidism    • Allergic rhinitis    • Allergy 2016    Consult, Allergy (1) - Ordered By: BENIGNO LIM ([facility])    • Anxiety state    • Attention deficit hyperactivity disorder    • Benign essential hypertension    • Chronic pain    • Chronic pain disorder    • Congestive heart failure (CMS/HCC)     primarily systrolic dysfunction EF 17-20% repeat echo 55%   • Depressive disorder    • Dyslipidemia    • Dysphagia    • Gastroesophageal reflux disease    • Generalized abdominal pain    • History of echocardiogram 2016    Echocardiogram W/ color flow 91108 (3) - Normal LV function wiht Ef of 60%.Normal RV size and function.Normal diastolic function.No significant valvular regurgitation or stenosis.   • History of echocardiogram 2012    Echocardiogram W/O color  flow 47975 (1) - Normal left ventricular systolic function. EF 55%. No evidence of regional wall motion abnormalities. Abnormal relaxation of the left ventricular myocardium.   • History of EKG 03/07/2016    EKG Tracing and Interpretation 38837 (3) - Ordered By: LILLIE BRADY ([facility])    • History of mammogram 06/05/2013    MAMMOGRAM SCREENING 39738 - [facility] (1) - birads 0    • Hyperlipidemia    • Intraductal carcinoma in situ of breast     hx in past and s/p bilateral simple mastectomies      • Irritable bowel syndrome    • Low back pain    • Malaise and fatigue    • Microalbuminuria 07/16/2015    POS MICROALBUMINURIA RESULT DOC AND REVIEWED 3060F (1) - Ordered By: BENIGNO LIM ([facility])   • Mitral valve regurgitation     Mild-Moderate      • Myopia    • Non-organic sleep disorder    • Osteoporosis    • Pain management 04/17/2016    Consult, Pain Management (1) - Ordered By: BENIGNO ILM ([facility])    • Pneumococcal vaccination indicated 07/08/2016    PNEUMOC VAC/ADMIN/RCVD 4040F (11) - Ordered By: BENIGNO LIM ([facility])   • Primary fibromyalgia syndrome    • Rheumatoid arthritis (CMS/HCC)         Past Surgical History:   Procedure Laterality Date   • APPENDECTOMY  2008    Appendectomy (1)   • BREAST BIOPSY  06/12/2013    Stereotactic breast biopsy (1) - stereotactic left mammotome biopsy with 11 gauge needle.   • BREAST IMPLANT REMOVAL  2003    Remove breast implant (1)   • BREAST IMPLANT SURGERY  2000    Breast implantation (1)   • COLONOSCOPY  01/20/2014    Colonoscopy, diagnostic (screening) 11584 (1)   • COLONOSCOPY W/ POLYPECTOMY  2008    Colonoscopy remove polyps 49368 (1)    • EXCISION LESION  05/01/2014    Remove chest wall lesion (1) - Excision of subcutaneous mass, left chest wall, Subcutaneous mass left chest wall, status post mastectomy.   • INJECTION OF MEDICATION  07/05/2016    B12 (44) - Ordered By: BENIGNO LIM ([facility])    • INJECTION OF MEDICATION  11/12/2015    Celestone (betamethasone) (1) - Ordered By: BENIGNO LIM ([facility])     • INJECTION OF MEDICATION  02/11/2016    Kenalog (2) - Ordered By: BENIGNO LIM ([facility])    • INJECTION OF MEDICATION  11/17/2017    Prolia   • MASTECTOMY  09/05/2013    Mastectomy (2) - Right simple prophylactic mastectomy.   • OTHER SURGICAL HISTORY  10/29/2014    SONOGRAM THYROID, NECK 07805 (1) - Ordered By: BENIGNO LIM ([facility])   • PAP SMEAR  06/03/2013     PAP SMEAR (1)   • PARTIAL HYSTERECTOMY  1998     Partial hyst (1)     • TONSILLECTOMY  1969    Tonsillectomy (1)   • VASCULAR SURGERY  12/24/2014    Consult, Vascular Surgery (1) - Ordered By: BENIGNO LIM ([facility])        PT Ortho     Row Name 06/19/19 0739       Subjective Comments    Subjective Comments  Pt reports that her shoulder is a lot better but states that it loosens up then it tightnes back up  -       Precautions and Contraindications    Precautions  hx of breast cx, CHF  -       Subjective Pain    Pre-Treatment Pain Level  3  -       Posture/Observations    Posture/Observations Comments  Pt had trigger points in biceps. Forward head, rounded shoulders posture with slight increase in thoracic kyphosis. Significant pec minor shortening B.   -    Row Name 06/17/19 0715       Precautions and Contraindications    Precautions  hx of breast cx, CHF  -       Posture/Observations    Posture/Observations Comments  Forward head, rounded shoulders posture with slight increase in thoracic kyphosis. Significant pec minor shortening B.   -      User Key  (r) = Recorded By, (t) = Taken By, (c) = Cosigned By    Initials Name Provider Type    Yue Moore PTA Physical Therapy Assistant                      PT Assessment/Plan     Row Name 06/19/19 5519          PT Assessment    Assessment Comments  Pt did well with exercises and is continuing to progress. Pt still shows muscle fatigue, and muscle guarding. Pt responded well to STM and thought it felt a lot better after. Pt also has great PROM and is continuing to improve.   -        PT Plan    PT Frequency  2x/week   "-KH     Predicted Duration of Therapy Intervention (Therapy Eval)  2-3 additional weeks w/ further TBD  -KH     PT Plan Comments  Pt should continue current POC. Continue progressing strength exercises, and contine manual therapy as needed in the biceps/anterior deltoid area   -KH       User Key  (r) = Recorded By, (t) = Taken By, (c) = Cosigned By    Initials Name Provider Type    Yue Moore PTA Physical Therapy Assistant          Modalities     Row Name 06/19/19 0720             Moist Heat    MH Applied  Yes  -KH      Location  R shoulder   -KH      Rx Minutes  15 mins  -KH      MH S/P Rx  Yes  -KH        User Key  (r) = Recorded By, (t) = Taken By, (c) = Cosigned By    Initials Name Provider Type    Yue Moore PTA Physical Therapy Assistant        Exercises     Row Name 06/19/19 0720             Subjective Comments    Subjective Comments  Pt reports that her shoulder is a lot better but states that it loosens up then it tightnes back up  -KH         Subjective Pain    Able to rate subjective pain?  yes  -KH      Pre-Treatment Pain Level  3  -KH      Post-Treatment Pain Level  1  -KH         Exercise 1    Exercise Name 1  pro II UE strength   -KH      Time 1  10'  -KH      Additional Comments  level 3 seat 5   -KH         Exercise 2    Exercise Name 2  pulley's flexion/abduction  -KH      Sets 2  2  -KH      Reps 2  20  -KH         Exercise 3    Exercise Name 3  Standing High Rows   -KH      Cueing 3  Verbal  -KH      Sets 3  2  -KH      Reps 3  10  -KH      Additional Comments  3 plates   -KH         Exercise 4    Exercise Name 4  Standing Mid Rows   -KH      Cueing 4  Verbal  -KH      Sets 4  2  -KH      Reps 4  10  -KH      Additional Comments  3 plates   -KH         Exercise 5    Exercise Name 5  Corner Stretch   -KH      Cueing 5  Verbal;Demo  -KH      Sets 5  3  -KH      Time 5  30\"  -KH         Exercise 6    Exercise Name 6  Manual: STM/PROM  -KH      Time 6  10'  -KH         Exercise 7    " Exercise Name 7  CC Bilat Shoulder extension  -      Cueing 7  Verbal;Demo  -      Sets 7  2  -KH      Reps 7  10  -KH      Additional Comments  3 plates   -        User Key  (r) = Recorded By, (t) = Taken By, (c) = Cosigned By    Initials Name Provider Type    Yue Moore PTA Physical Therapy Assistant                       PT OP Goals     Row Name 06/19/19 0720          Long Term Goals    LTG Date to Achieve  06/25/19  -     LTG 1  pt will improve QuickDASH score by 10 pts  -     LTG 1 Progress  Met  -     LTG 2  pt will improve AROM of the R shoulder to WFL for all planes  -     LTG 2 Progress  Progressing  -     LTG 3  pr will report <4/10 R shoulder pain w/ ADLs  -     LTG 3 Progress  Progressing  -        Time Calculation    PT Goal Re-Cert Due Date  06/25/19  -KH       User Key  (r) = Recorded By, (t) = Taken By, (c) = Cosigned By    Initials Name Provider Type    Yue Moore PTA Physical Therapy Assistant                         Time Calculation:   Start Time: 0720  Stop Time: 0823  Time Calculation (min): 63 min  Total Timed Code Minutes- PT: 48 minute(s)  Therapy Charges for Today     Code Description Service Date Service Provider Modifiers Qty    90894495122 HC PT THER PROC EA 15 MIN 6/19/2019 Yue Yip PTA GP 2    88117579581 HC PT THER SUPP EA 15 MIN 6/19/2019 Yue Yip PTA GP 1    10004953981 HC PT MANUAL THERAPY EA 15 MIN 6/19/2019 Yue Yip PTA GP 1                    Yue Yip PTA  6/19/2019

## 2019-06-25 ENCOUNTER — TELEPHONE (OUTPATIENT)
Dept: FAMILY MEDICINE CLINIC | Facility: CLINIC | Age: 64
End: 2019-06-25

## 2019-06-25 ENCOUNTER — HOSPITAL ENCOUNTER (OUTPATIENT)
Dept: PHYSICAL THERAPY | Facility: HOSPITAL | Age: 64
Setting detail: THERAPIES SERIES
Discharge: HOME | End: 2019-06-25

## 2019-06-25 DIAGNOSIS — M25.511 RIGHT SHOULDER PAIN, UNSPECIFIED CHRONICITY: Primary | ICD-10-CM

## 2019-06-25 PROCEDURE — 97110 THERAPEUTIC EXERCISES: CPT | Performed by: PHYSICAL THERAPIST

## 2019-06-25 NOTE — TELEPHONE ENCOUNTER
Pt called and is needing a refill on her methylphenidate (RITALIN) 10 MG tablet and would like for it to be called into [facility] [place].      Thank you,      Rosy

## 2019-06-25 NOTE — THERAPY DISCHARGE NOTE
Outpatient Physical Therapy Ortho Progress Note/Discharge Summary  [facility]     Patient Name: Daysi Pa  : 1955  MRN: [id number]  Today's Date: 2019      Visit Date: 2019  Attendance:  (Medicare)  Subjective Improvement: 80%  Next MD Appt: 19     Therapy Diagnosis: R shoulder pain     Changes in Medications: none noted  Changes in MD Orders: none noted  Number of Work Days Lost: n/a      Visit Dx:    ICD-10-CM ICD-9-CM   1. Right shoulder pain, unspecified chronicity M25.511 719.41            Past Medical History:   Diagnosis Date   • Acquired hypothyroidism    • Allergic rhinitis    • Allergy 2016    Consult, Allergy (1) - Ordered By: BENIGNO LIM ([facility])    • Anxiety state    • Attention deficit hyperactivity disorder    • Benign essential hypertension    • Chronic pain    • Chronic pain disorder    • Congestive heart failure (CMS/HCC)     primarily systrolic dysfunction EF 17-20% repeat echo 55%   • Depressive disorder    • Dyslipidemia    • Dysphagia    • Gastroesophageal reflux disease    • Generalized abdominal pain    • History of echocardiogram 2016    Echocardiogram W/ color flow 30897 (3) - Normal LV function wiht Ef of 60%.Normal RV size and function.Normal diastolic function.No significant valvular regurgitation or stenosis.   • History of echocardiogram 2012    Echocardiogram W/O color flow 03979 (1) - Normal left ventricular systolic function. EF 55%. No evidence of regional wall motion abnormalities. Abnormal relaxation of the left ventricular myocardium.   • History of EKG 2016    EKG Tracing and Interpretation 18406 (3) - Ordered By: LILLIE BRADY ([facility])    • History of mammogram 2013    MAMMOGRAM SCREENING 84258 - [facility] (1) - birads 0    • Hyperlipidemia    • Intraductal carcinoma in situ of breast     hx in past and s/p bilateral simple mastectomies      • Irritable bowel syndrome    • Low back pain    •  Malaise and fatigue    • Microalbuminuria 07/16/2015    POS MICROALBUMINURIA RESULT DOC AND REVIEWED 3060F (1) - Ordered By: BENIGNO LIM ([facility])   • Mitral valve regurgitation     Mild-Moderate      • Myopia    • Non-organic sleep disorder    • Osteoporosis    • Pain management 04/17/2016    Consult, Pain Management (1) - Ordered By: BENIGNO LIM ([facility])    • Pneumococcal vaccination indicated 07/08/2016    PNEUMOC VAC/ADMIN/RCVD 4040F (11) - Ordered By: BENIGNO LIM ([facility])   • Primary fibromyalgia syndrome    • Rheumatoid arthritis (CMS/HCC)         Past Surgical History:   Procedure Laterality Date   • APPENDECTOMY  2008    Appendectomy (1)   • BREAST BIOPSY  06/12/2013    Stereotactic breast biopsy (1) - stereotactic left mammotome biopsy with 11 gauge needle.   • BREAST IMPLANT REMOVAL  2003    Remove breast implant (1)   • BREAST IMPLANT SURGERY  2000    Breast implantation (1)   • COLONOSCOPY  01/20/2014    Colonoscopy, diagnostic (screening) 93467 (1)   • COLONOSCOPY W/ POLYPECTOMY  2008    Colonoscopy remove polyps 37489 (1)    • EXCISION LESION  05/01/2014    Remove chest wall lesion (1) - Excision of subcutaneous mass, left chest wall, Subcutaneous mass left chest wall, status post mastectomy.   • INJECTION OF MEDICATION  07/05/2016    B12 (44) - Ordered By: BENIGNO LIM ([facility])    • INJECTION OF MEDICATION  11/12/2015    Celestone (betamethasone) (1) - Ordered By: BENIGNO LIM ([facility])    • INJECTION OF MEDICATION  02/11/2016    Kenalog (2) - Ordered By: BENIGNO LIM ([facility])    • INJECTION OF MEDICATION  11/17/2017    Prolia   • MASTECTOMY  09/05/2013    Mastectomy (2) - Right simple prophylactic mastectomy.   • OTHER SURGICAL HISTORY  10/29/2014    SONOGRAM THYROID, NECK 60097 (1) - Ordered By: BENIGNO LIM ([facility])   • PAP SMEAR  06/03/2013     PAP SMEAR (1)   • PARTIAL HYSTERECTOMY  1998     Partial hyst (1)     • TONSILLECTOMY  1969    Tonsillectomy (1)   • VASCULAR SURGERY  12/24/2014    Consult, Vascular Surgery (1) - Ordered By: BENIGNO LIM ([facility])         PT Ortho     Row Name 06/25/19 0800       Subjective Comments    Subjective Comments  Shoulder is less painful but still weak. Occasional catching the shoulder but no longer constant pain. Still catches when she reaches behind her back. Weak lifting anything with much weight to it. Working on HEP. Pain 3-4/10 at most with ADLs. 80% subjective improvement. No medication changes.   -       Precautions and Contraindications    Precautions  hx of breast cx, CHF  -       Subjective Pain    Able to rate subjective pain?  yes  -    Pre-Treatment Pain Level  -- 1.5/10  -    Post-Treatment Pain Level  2  -    Subjective Pain Comment  patient declines modalities  -SS       Posture/Observations    Posture/Observations Comments  Rounded shoulder posture. B pec minor tightness.   -SS       Shoulder Impingement/Rotator Cuff Special Tests    Shoulder Impingement/Rotator Cuff Special Tests Comments  TTP R biceps tendon/bicipital groove. Increased tone R SS/IS but non-tender at this time.   -SS       Right Upper Ext    Rt Shoulder Abduction AROM  96; end-range pain  -SS    Rt Shoulder Extension AROM  60; end-range pain  -SS    Rt Shoulder Flexion AROM  142; end-range pain  -SS    Rt Shoulder External Rotation AROM  Functional ER: T1/T2  -SS    Rt Shoulder Internal Rotation AROM  Functional IR: hand on lumbar spine with IF to L1/T12  -      User Key  (r) = Recorded By, (t) = Taken By, (c) = Cosigned By    Initials Name Provider Type     Alexander Riggs, PT DPT Physical Therapist              PT Assessment/Plan     Row Name 06/25/19 0900          PT Assessment    Functional Limitations  Performance in self-care ADL  -SS     Impairments  Muscle strength;Pain;Posture;Range of motion  -     Assessment Comments  Patient reports that she feels some tightness in the biceps post-treatment but declines modalities. Good effort. Improved AROM. Improving function.  -     Rehab Potential  Fair  -      Patient/caregiver participated in establishment of treatment plan and goals  Yes  -SS     Patient would benefit from skilled therapy intervention  Yes  -SS        PT Plan    PT Plan Comments  D/C P.T. to HEP and [employer].  -SS       User Key  (r) = Recorded By, (t) = Taken By, (c) = Cosigned By    Initials Name Provider Type    SS Alexander Riggs, PT DPT Physical Therapist              Exercises     Row Name 06/25/19 0800             Subjective Comments    Subjective Comments  Shoulder is less painful but still weak. Occasional catching the shoulder but no longer constant pain. Still catches when she reaches behind her back. Weak lifting anything with much weight to it. Working on HEP. Pain 3-4/10 at most with ADLs. 80% subjective improvement. No medication changes.   -SS         Subjective Pain    Able to rate subjective pain?  yes  -SS      Pre-Treatment Pain Level  -- 1.5/10  -SS      Post-Treatment Pain Level  2  -SS      Subjective Pain Comment  patient declines modalities  -SS         Exercise 1    Exercise Name 1  pro II UE strength   -SS      Cueing 1  Verbal  -SS      Time 1  10 mins  -SS      Additional Comments  Level 3  -SS         Exercise 2    Exercise Name 2  recheck  -SS         Exercise 3    Exercise Name 3  Corner stretch  -SS      Cueing 3  Verbal;Demo  -SS      Sets 3  3  -SS      Time 3  30 sec hold  -SS         Exercise 4    Exercise Name 4  CC Low Row  -SS      Cueing 4  Verbal;Demo  -SS      Sets 4  1  -SS      Reps 4  10  -SS      Additional Comments  3 plates  -SS         Exercise 5    Exercise Name 5  CC Long Pull  -SS      Cueing 5  Verbal;Demo  -SS      Sets 5  1  -SS      Reps 5  10  -SS      Additional Comments  3 plates  -SS         Exercise 6    Exercise Name 6  CC Shoulder Extension - B  -SS      Cueing 6  Verbal;Demo  -SS      Sets 6  1  -SS      Reps 6  10  -SS      Additional Comments  2 plates  -SS         Exercise 7    Exercise Name 7  T-band R shoulder ER  -SS       Cueing 7  Verbal;Demo  -SS      Sets 7  2  -SS      Reps 7  15  -SS      Additional Comments  yellow  -         Exercise 8    Exercise Name 8  T-band R shoulder IR  -SS      Cueing 8  Verbal;Demo  -SS      Sets 8  2  -SS      Reps 8  10  -SS      Additional Comments  yellow  -SS        User Key  (r) = Recorded By, (t) = Taken By, (c) = Cosigned By    Initials Name Provider Type    Alexander Mcnair, PT DPT Physical Therapist          PT OP Goals     Row Name 06/25/19 0800          Long Term Goals    LTG Date to Achieve  06/25/19  -SS     LTG 1  pt will improve QuickDASH score by 10 pts  -SS     LTG 1 Progress  Met  -SS     LTG 2  pt will improve AROM of the R shoulder to WFL for all planes  -SS     LTG 2 Progress  Partially Met met for all but abduction  -SS     LTG 3  pr will report <4/10 R shoulder pain w/ ADLs  -SS     LTG 3 Progress  Met  -       User Key  (r) = Recorded By, (t) = Taken By, (c) = Cosigned By    Initials Name Provider Type    Alexander Mcnair, PT DPT Physical Therapist               Outcome Measure Options: Quick DASH  Quick DASH  Open a tight or new jar.: No Difficulty  Do heavy household chores (e.g., wash walls, wash floors): Mild Difficulty  Carry a shopping bag or briefcase: Mild Difficulty  Wash your back: Mild Difficulty  Use a knife to cut food: No Difficulty  Recreational activities in which you take some force or impact through your arm, should or hand (e.g. golf, hammering, tennis, etc.): Moderate Difficulty  During the past week, to what extent has your arm, shoulder, or hand problem interfered with your normal social activities with family, friends, neighbors or groups?: Slightly  During the past week, were you limited in your work or other regular daily activities as a result of your arm, shoulder or hand problem?: Slightly Limited  Arm, Shoulder, or hand pain: Mild  Tingling (pins and needles) in your arm, shoulder, or hand: Mild  During the past week, how  much difficulty have you had sleeping because of the pain in your arm, shoulder or hand?: Mild Difficulty  Number of Questions Answered: 11  Quick DASH Score: 22.73         Time Calculation:   Start Time: 0845  Stop Time: 0925  Time Calculation (min): 40 min  Total Timed Code Minutes- PT: 40 minute(s)  Therapy Charges for Today     Code Description Service Date Service Provider Modifiers Qty    27113178300 HC PT THER PROC EA 15 MIN 6/25/2019 Alexander Riggs, PT DPT GP 3               OP PT Discharge Summary  Date of Discharge: 06/25/19  Reason for Discharge: Independent  Outcomes Achieved: Patient able to partially achieve established goals  Discharge Destination: Home with home program      Alexander iRggs, PT, DPT, CHT  6/25/2019

## 2019-06-28 ENCOUNTER — APPOINTMENT (OUTPATIENT)
Dept: PHYSICAL THERAPY | Facility: HOSPITAL | Age: 64
End: 2019-06-28

## 2019-06-30 DIAGNOSIS — F90.0 ATTENTION DEFICIT HYPERACTIVITY DISORDER (ADHD), PREDOMINANTLY INATTENTIVE TYPE: ICD-10-CM

## 2019-06-30 RX ORDER — METHYLPHENIDATE HYDROCHLORIDE 10 MG/1
10 TABLET ORAL EVERY 12 HOURS SCHEDULED
Qty: 60 TABLET | Refills: 0 | Status: SHIPPED | OUTPATIENT
Start: 2019-06-30 | End: 2019-07-30 | Stop reason: SDUPTHER

## 2019-06-30 NOTE — PROGRESS NOTES
Patient has ongoing ADHD.  Juancho came back as manual process. Juancho #[id number]   Will follow up again when available.      This document has been electronically signed by Olimpia Robison MD on June 30, 2019 4:27 PM

## 2019-07-02 RX ORDER — LEVOTHYROXINE SODIUM 88 MCG
88 TABLET ORAL DAILY
Qty: 90 TABLET | Refills: 3 | Status: SHIPPED | OUTPATIENT
Start: 2019-07-02 | End: 2020-06-22

## 2019-07-29 ENCOUNTER — TELEPHONE (OUTPATIENT)
Dept: FAMILY MEDICINE CLINIC | Facility: CLINIC | Age: 64
End: 2019-07-29

## 2019-07-29 DIAGNOSIS — F90.0 ATTENTION DEFICIT HYPERACTIVITY DISORDER (ADHD), PREDOMINANTLY INATTENTIVE TYPE: ICD-10-CM

## 2019-07-30 RX ORDER — METHYLPHENIDATE HYDROCHLORIDE 10 MG/1
10 TABLET ORAL EVERY 12 HOURS SCHEDULED
Qty: 60 TABLET | Refills: 0 | Status: SHIPPED | OUTPATIENT
Start: 2019-07-30 | End: 2019-08-26 | Stop reason: SDUPTHER

## 2019-07-30 NOTE — TELEPHONE ENCOUNTER
Patient has ongoing ADHD.  Juancho [id number]  Manual process at this time.       This document has been electronically signed by Olimpia Robison MD on July 30, 2019 4:31 PM

## 2019-08-05 ENCOUNTER — OFFICE VISIT (OUTPATIENT)
Dept: FAMILY MEDICINE CLINIC | Facility: CLINIC | Age: 64
End: 2019-08-05

## 2019-08-05 VITALS
SYSTOLIC BLOOD PRESSURE: 110 MMHG | DIASTOLIC BLOOD PRESSURE: 80 MMHG | OXYGEN SATURATION: 97 % | HEART RATE: 85 BPM | TEMPERATURE: 98.2 F | BODY MASS INDEX: 23.92 KG/M2 | HEIGHT: 62 IN | WEIGHT: 130 LBS

## 2019-08-05 DIAGNOSIS — M05.732 RHEUMATOID ARTHRITIS INVOLVING BOTH WRISTS WITH POSITIVE RHEUMATOID FACTOR (HCC): Primary | ICD-10-CM

## 2019-08-05 DIAGNOSIS — F90.0 ATTENTION DEFICIT HYPERACTIVITY DISORDER (ADHD), PREDOMINANTLY INATTENTIVE TYPE: ICD-10-CM

## 2019-08-05 DIAGNOSIS — M75.101 ROTATOR CUFF SYNDROME OF RIGHT SHOULDER: ICD-10-CM

## 2019-08-05 DIAGNOSIS — R53.81 MALAISE AND FATIGUE: ICD-10-CM

## 2019-08-05 DIAGNOSIS — M05.731 RHEUMATOID ARTHRITIS INVOLVING BOTH WRISTS WITH POSITIVE RHEUMATOID FACTOR (HCC): Primary | ICD-10-CM

## 2019-08-05 DIAGNOSIS — R53.83 MALAISE AND FATIGUE: ICD-10-CM

## 2019-08-05 PROCEDURE — 99214 OFFICE O/P EST MOD 30 MIN: CPT | Performed by: FAMILY MEDICINE

## 2019-08-05 PROCEDURE — 96372 THER/PROPH/DIAG INJ SC/IM: CPT | Performed by: FAMILY MEDICINE

## 2019-08-05 RX ORDER — HYDROXYCHLOROQUINE SULFATE 200 MG/1
200 TABLET, FILM COATED ORAL DAILY
Qty: 90 TABLET | Refills: 3 | Status: SHIPPED | OUTPATIENT
Start: 2019-08-05 | End: 2019-11-20 | Stop reason: SDUPTHER

## 2019-08-05 RX ADMIN — CYANOCOBALAMIN 1000 MCG: 1000 INJECTION, SOLUTION INTRAMUSCULAR; SUBCUTANEOUS at 10:39

## 2019-08-12 PROBLEM — H35.371 EPIRETINAL MEMBRANE (ERM) OF RIGHT EYE: Status: ACTIVE | Noted: 2019-08-12

## 2019-08-12 PROBLEM — H43.812 POSTERIOR VITREOUS DETACHMENT OF LEFT EYE: Status: ACTIVE | Noted: 2019-08-12

## 2019-08-12 PROBLEM — H35.343 LAMELLAR MACULAR HOLE OF BOTH EYES: Status: ACTIVE | Noted: 2019-08-12

## 2019-08-12 NOTE — PROGRESS NOTES
Subjective:     Daysi Pa is a 63 y.o. female who presents for follow up of ADHD. She is currently taking ritalin which helps her focus to complete her daily tasks.  She feels the medication is working well.   Her weight remains stable.  Above history reviewed and remains unchanged.    Shoulder Pain  Patient complains of right shoulder pain. The symptoms began several weeks ago. Aggravating factors: repetitive activity, vacuuming,house chores. Pain is located around the acromioclavicular (AC) joint and diffusely throughout the shoulder. Discomfort is described as aching. Symptoms are exacerbated by repetitive movements and overhead movements. Evaluation to date: none. Therapy to date includes: corticosteroid injection which was effective.  Since last visit patient was given a corticosteroid steroid injection by pain management and has been attending physical therapy.  Patient does endorse significant improvement in her pain and range of motion.       Patient was seen by ophthalmology and found to have a macular tear.  She was sent to a specialist in [place].  He did recommend that she decrease her dose to no more than 5 g/kg of Plaquenil.  She thought her pills were only 100 mg and she is continue to take them twice a day.  In fact her pills are 200 mg twice a day which is been her usual dose.    The following portions of the patient's history were reviewed and updated as appropriate: allergies, current medications, past family history, past medical history, past social history, past surgical history and problem list.    Past Medical Hx:  Past Medical History:   Diagnosis Date   • Acquired hypothyroidism    • Allergic rhinitis    • Allergy 04/17/2016    Consult, Allergy (1) - Ordered By: BENIGNO LIM ([facility])    • Anxiety state    • Attention deficit hyperactivity disorder    • Benign essential hypertension    • Chronic pain    • Chronic pain disorder    • Congestive heart failure (CMS/[facility])      primarily systrolic dysfunction EF 17-20% repeat echo 55%   • Depressive disorder    • Dyslipidemia    • Dysphagia    • Gastroesophageal reflux disease    • Generalized abdominal pain    • History of echocardiogram 03/23/2016    Echocardiogram W/ color flow 72258 (3) - Normal LV function wiht Ef of 60%.Normal RV size and function.Normal diastolic function.No significant valvular regurgitation or stenosis.   • History of echocardiogram 04/27/2012    Echocardiogram W/O color flow 94713 (1) - Normal left ventricular systolic function. EF 55%. No evidence of regional wall motion abnormalities. Abnormal relaxation of the left ventricular myocardium.   • History of EKG 03/07/2016    EKG Tracing and Interpretation 96544 (3) - Ordered By: LILLIE BRADY ([facility])    • History of mammogram 06/05/2013    MAMMOGRAM SCREENING 28408 - [facility] (1) - birads 0    • Hyperlipidemia    • Intraductal carcinoma in situ of breast     hx in past and s/p bilateral simple mastectomies      • Irritable bowel syndrome    • Low back pain    • Malaise and fatigue    • Microalbuminuria 07/16/2015    POS MICROALBUMINURIA RESULT DOC AND REVIEWED 3060F (1) - Ordered By: BENIGNO LIM ([facility])   • Mitral valve regurgitation     Mild-Moderate      • Myopia    • Non-organic sleep disorder    • Osteoporosis    • Pain management 04/17/2016    Consult, Pain Management (1) - Ordered By: BENIGNO LIM ([facility])    • Pneumococcal vaccination indicated 07/08/2016    PNEUMOC VAC/ADMIN/RCVD 4040F (11) - Ordered By: BENIGNO LIM ([facility])   • Primary fibromyalgia syndrome    • Rheumatoid arthritis (CMS/HCC)        Past Surgical Hx:  Past Surgical History:   Procedure Laterality Date   • APPENDECTOMY  2008    Appendectomy (1)   • BREAST BIOPSY  06/12/2013    Stereotactic breast biopsy (1) - stereotactic left mammotome biopsy with 11 gauge needle.   • BREAST IMPLANT REMOVAL  2003    Remove breast implant (1)   • BREAST IMPLANT SURGERY  2000    Breast implantation (1)   • COLONOSCOPY   01/20/2014    Colonoscopy, diagnostic (screening) 14110 (1)   • COLONOSCOPY W/ POLYPECTOMY  2008    Colonoscopy remove polyps 00689 (1)    • EXCISION LESION  05/01/2014    Remove chest wall lesion (1) - Excision of subcutaneous mass, left chest wall, Subcutaneous mass left chest wall, status post mastectomy.   • INJECTION OF MEDICATION  07/05/2016    B12 (44) - Ordered By: BENIGNO LIM ([facility])    • INJECTION OF MEDICATION  11/12/2015    Celestone (betamethasone) (1) - Ordered By: BENIGNO LIM ([facility])    • INJECTION OF MEDICATION  02/11/2016    Kenalog (2) - Ordered By: BENIGNO LIM ([facility])    • INJECTION OF MEDICATION  11/17/2017    Prolia   • MASTECTOMY  09/05/2013    Mastectomy (2) - Right simple prophylactic mastectomy.   • OTHER SURGICAL HISTORY  10/29/2014    SONOGRAM THYROID, NECK 68139 (1) - Ordered By: BENIGNO LIM ([facility])   • PAP SMEAR  06/03/2013     PAP SMEAR (1)   • PARTIAL HYSTERECTOMY  1998     Partial hyst (1)     • TONSILLECTOMY  1969    Tonsillectomy (1)   • VASCULAR SURGERY  12/24/2014    Consult, Vascular Surgery (1) - Ordered By: BENIGNO LIM ([facility])        Health Maintenance:  Health Maintenance   Topic Date Due   • INFLUENZA VACCINE  08/01/2019   • LIPID PANEL  12/17/2019   • COLONOSCOPY  01/18/2020   • MEDICARE ANNUAL WELLNESS  02/27/2020   • DXA SCAN  03/11/2021   • TDAP/TD VACCINES (2 - Td) 10/16/2024   • HEPATITIS C SCREENING  Completed   • ZOSTER VACCINE  Completed       Current Meds:    Current Outpatient Medications:   •  albuterol (PROAIR HFA) 108 (90 BASE) MCG/ACT inhaler, 2 puffs 4 (four) times a day as needed., Disp: , Rfl:   •  AMITIZA 24 MCG capsule, TAKE 1 CAPSULE BY MOUTH TWICE A DAY, Disp: 180 capsule, Rfl: 3  •  bisoprolol (ZEBeta) 5 MG tablet, Take 1 tablet by mouth Daily., Disp: 30 tablet, Rfl: 3  •  desloratadine (CLARINEX) 5 MG tablet, Take 1 tablet by mouth Daily., Disp: 90 tablet, Rfl: 3  •  docusate sodium (COLACE) 100 MG capsule, Take 200 mg by mouth every night. at bedtime, Disp: , Rfl:   •   esomeprazole (nexIUM) 40 MG capsule, TAKE ONE CAPSULE BY MOUTH EVERY DAY, Disp: 90 capsule, Rfl: 3  •  furosemide (LASIX) 20 MG tablet, TAKE 1 TABLET BY MOUTH EVERY DAY, Disp: 30 tablet, Rfl: 3  •  gabapentin (NEURONTIN) 300 MG capsule, Take 1 capsule by mouth 2 (Two) Times a Day., Disp: 180 capsule, Rfl: 0  •  methylphenidate (RITALIN) 10 MG tablet, Take 1 tablet by mouth Every 12 (Twelve) Hours., Disp: 60 tablet, Rfl: 0  •  metoclopramide (REGLAN) 10 MG tablet, Take 1 tablet by mouth 4 (Four) Times a Day., Disp: 270 tablet, Rfl: 3  •  OxyCODONE HCl 5 MG tablet , Take 1 tablet by mouth every 6 (six) hours as needed., Disp: , Rfl:   •  OxyCODONE HCl ER (OXYCONTIN) 30 MG tablet extended-release 12 hour, Take 30 mg by mouth 2 (two) times a day. OxyContin 30 mg tablet,crush resistant,extended release, Disp: , Rfl:   •  polyethylene glycol (MIRALAX) powder, TAKE 17 GRAMS TWICE A DAY, Disp: 1530 g, Rfl: 2  •  SYNTHROID 88 MCG tablet, TAKE 1 TABLET BY MOUTH DAILY. BRAND NAME MEDICALLY NECESSARY, Disp: 90 tablet, Rfl: 3  •  traZODone (DESYREL) 150 MG tablet, TAKE 1 TABLET BY MOUTH EVERY NIGHT AT BEDTIME, Disp: 90 tablet, Rfl: 3  •  venlafaxine XR (EFFEXOR-XR) 150 MG 24 hr capsule, Take 1 capsule by mouth Daily., Disp: 90 capsule, Rfl: 3  •  venlafaxine XR (EFFEXOR-XR) 75 MG 24 hr capsule, Take 1 capsule by mouth Daily., Disp: 90 capsule, Rfl: 3  •  hydroxychloroquine (PLAQUENIL) 200 MG tablet, Take 1 tablet by mouth Daily., Disp: 90 tablet, Rfl: 3    Current Facility-Administered Medications:   •  cyanocobalamin injection 1,000 mcg, 1,000 mcg, Intramuscular, Q28 Days, Nims, Olimpia Linares MD, 1,000 mcg at 08/05/19 1039  •  lidocaine-EPINEPHrine (XYLOCAINE W/EPI) 1 %-1:220233 injection 10 mL, 10 mL, Infiltration, Once, Patrick Skinner MD    Allergies:  Cephalosporins; Erythromycin; Morphine and related; Other; Penicillins; Shellfish-derived products; Zithromax [azithromycin]; Zolpidem; Hydrocodone-acetaminophen; and  "Sulfa antibiotics    Family Hx:  Family History   Problem Relation Age of Onset   • Breast cancer Sister    • Rectal cancer Other         Colorectal cancer   • Heart disease Other    • Hypertension Other    • Thyroid disease Other         Thyroid disorder   • Hypertension Mother    • Migraines Mother    • Osteoporosis Mother    • Diabetes Father    • Coronary artery disease Father    • Hyperlipidemia Father    • Cancer Maternal Aunt    • COPD Paternal Grandmother    • COPD Paternal Grandfather         Social History:  Social History     Socioeconomic History   • Marital status:      Spouse name: Not on file   • Number of children: Not on file   • Years of education: Not on file   • Highest education level: Not on file   Tobacco Use   • Smoking status: Former Smoker     Years: 30.00     Last attempt to quit: 8/17/2008     Years since quitting: 10.9   • Smokeless tobacco: Never Used   • Tobacco comment: PT SMOKED FOR 30 YEARS AND QUIT IN 2010   Substance and Sexual Activity   • Alcohol use: No   • Drug use: No   • Sexual activity: Defer       Review of Systems  Review of Systems   Constitutional: Positive for fatigue. Negative for fever.   HENT: Negative for ear pain and sore throat.    Eyes: Negative for pain and visual disturbance.   Respiratory: Negative for cough and shortness of breath.    Cardiovascular: Negative for chest pain and palpitations.   Gastrointestinal: Positive for constipation. Negative for abdominal pain and nausea.   Genitourinary: Negative for dysuria.   Musculoskeletal: Positive for arthralgias, back pain, joint swelling and myalgias.   Skin: Positive for color change. Negative for rash.        1 lesion on each forearm   Neurological: Negative for dizziness, weakness and headaches.         Objective:     /80 (BP Location: Left arm)   Pulse 85   Temp 98.2 °F (36.8 °C)   Ht 157.5 cm (62.01\")   Wt 59 kg (130 lb)   SpO2 97%   BMI 23.77 kg/m²     Physical Exam   Constitutional: " She is oriented to person, place, and time. She appears well-developed and well-nourished.   HENT:   Head: Normocephalic and atraumatic.   Right Ear: Hearing, tympanic membrane, external ear and ear canal normal.   Left Ear: Hearing, tympanic membrane, external ear and ear canal normal.   Nose: Nose normal.   Mouth/Throat: Oropharynx is clear and moist.   Eyes: Conjunctivae and EOM are normal. Pupils are equal, round, and reactive to light.   Neck: Normal range of motion. Neck supple.   Cardiovascular: Normal rate, regular rhythm and normal heart sounds.   Pulmonary/Chest: Effort normal and breath sounds normal.   Abdominal: Soft. Bowel sounds are normal. She exhibits no distension. There is no tenderness.   Musculoskeletal: Normal range of motion.        Right shoulder: She exhibits tenderness.   Clubbing of all digits of hand   Neurological: She is alert and oriented to person, place, and time.   Skin: Skin is warm and dry.   Psychiatric: She has a normal mood and affect. Her speech is normal and behavior is normal. Judgment and thought content normal. Cognition and memory are normal.                                Assessment:     1. Rheumatoid arthritis involving both wrists with positive rheumatoid factor (CMS/HCC)    2. Malaise and fatigue    3. Attention deficit hyperactivity disorder (ADHD), predominantly inattentive type    4. Rotator cuff syndrome of right shoulder         Plan:     1.  Rx changes: Decrease Plaquenil to 200 mg daily.  Will have to monitor clinically for worsening rheumatoid arthritis symptoms.   2.  Education: Provided reassurance. Provided reassurance and Recommended medication management  3.  Compliance at present is estimated to be excellent.  4.  Follow up: 3 months and as needed  5.  Encouraged regular follow-up with specialist.        Goals        Diet    • Eat more fruits and vegetables (pt-stated)      Barriers to goals: None         Exercise    • Exercise 150 minutes per week  (moderate activity) (pt-stated)      Barriers to goal: RA and chronic pain         Lifestyle    • Other (pt-stated)      Less fatigue  Barriers to goals: Multiple medical problems                Preventative:  Patient's Body mass index is 23.77 kg/m². BMI is within normal parameters. No follow-up required.  Recommended:Influenza and In the fall.  patient is a non smoker  does not drink  plan meals, eat breakfast and have 3 meals a day    RISK SCORE: 3       This document has been electronically signed by Olimpia Robison MD on August 12, 2019 5:39 PM

## 2019-08-26 ENCOUNTER — TELEPHONE (OUTPATIENT)
Dept: FAMILY MEDICINE CLINIC | Facility: CLINIC | Age: 64
End: 2019-08-26

## 2019-08-26 DIAGNOSIS — F90.0 ATTENTION DEFICIT HYPERACTIVITY DISORDER (ADHD), PREDOMINANTLY INATTENTIVE TYPE: ICD-10-CM

## 2019-08-26 RX ORDER — METHYLPHENIDATE HYDROCHLORIDE 10 MG/1
10 TABLET ORAL EVERY 12 HOURS SCHEDULED
Qty: 60 TABLET | Refills: 0 | Status: SHIPPED | OUTPATIENT
Start: 2019-08-26 | End: 2019-09-23 | Stop reason: SDUPTHER

## 2019-08-26 NOTE — TELEPHONE ENCOUNTER
Pt called and is needing a refill on methylphenidate (RITALIN) 10 MG tablet and would like for it to be called into [facility] in [place].      Thank you,      Rosy

## 2019-08-30 RX ORDER — FUROSEMIDE 20 MG/1
TABLET ORAL
Qty: 90 TABLET | Refills: 1 | Status: ON HOLD | OUTPATIENT
Start: 2019-08-30 | End: 2020-01-21

## 2019-09-03 ENCOUNTER — PROCEDURE VISIT (OUTPATIENT)
Dept: CARDIOLOGY | Facility: CLINIC | Age: 64
End: 2019-09-03

## 2019-09-03 DIAGNOSIS — I42.8 NONISCHEMIC CARDIOMYOPATHY (HCC): ICD-10-CM

## 2019-09-03 PROCEDURE — 94618 PULMONARY STRESS TESTING: CPT | Performed by: INTERNAL MEDICINE

## 2019-09-03 NOTE — PROGRESS NOTES
Daysi Elizabeth Pa  Procedure: 6 Minute Walk Test   Indication:nicm    Pretest: BP:108/62               HR:81               Sa02:99               Dyspnea:0               Fatigue:2    Post Test: BP:130/78                Hr 94                Sa02:98               Dyspnea:1               Fatigue:1    First 6MWT:9/5/17    Supplemental oxygen  No    stopped before 6 minutes:no    Pauses:none    Results in distance walked:426.46 m    Did individual experience any pain or discomfort:no    Attestation:  I was immediately available for the above test and agree with the data.     NYHA Functional Class I.      Stuart Sears MD PhD    -----------------------------------------------------------------------------------------------------------------  [facility] performs 6MWT to the ATS guidelines for 6MWT (2002). Predicted distance is from:      -------------------------------------------------------------------------------------------------------------

## 2019-09-12 DIAGNOSIS — I42.8 NICM (NONISCHEMIC CARDIOMYOPATHY) (HCC): Primary | ICD-10-CM

## 2019-09-13 ENCOUNTER — OFFICE VISIT (OUTPATIENT)
Dept: CARDIOLOGY | Facility: CLINIC | Age: 64
End: 2019-09-13

## 2019-09-13 VITALS
HEIGHT: 62 IN | SYSTOLIC BLOOD PRESSURE: 114 MMHG | HEART RATE: 73 BPM | OXYGEN SATURATION: 99 % | WEIGHT: 129 LBS | DIASTOLIC BLOOD PRESSURE: 80 MMHG | BODY MASS INDEX: 23.74 KG/M2

## 2019-09-13 DIAGNOSIS — I42.8 NONISCHEMIC CARDIOMYOPATHY (HCC): Primary | ICD-10-CM

## 2019-09-13 DIAGNOSIS — I35.1 NONRHEUMATIC AORTIC VALVE INSUFFICIENCY: ICD-10-CM

## 2019-09-13 DIAGNOSIS — R07.2 PRECORDIAL PAIN: ICD-10-CM

## 2019-09-13 DIAGNOSIS — I34.0 NON-RHEUMATIC MITRAL REGURGITATION: ICD-10-CM

## 2019-09-13 PROCEDURE — 93005 ELECTROCARDIOGRAM TRACING: CPT | Performed by: INTERNAL MEDICINE

## 2019-09-13 PROCEDURE — 99214 OFFICE O/P EST MOD 30 MIN: CPT | Performed by: INTERNAL MEDICINE

## 2019-09-13 RX ORDER — NALOXONE HYDROCHLORIDE 4 MG/.1ML
1 SPRAY NASAL AS NEEDED
Refills: 0 | COMMUNITY
Start: 2019-08-22 | End: 2021-10-01

## 2019-09-13 NOTE — PROGRESS NOTES
Cardiovascular Medicine   Stuart Sears M.D., Ph.D., [facility]    The patient returns to cardiology clinic for follow-up of the following cardiac problems:    PROBLEM LIST:    1. 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 EF initially 15%  b. Recently 55%  2. MR  3. JD Pa is a 63 y.o. female who returns to clinic today.  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  She has been able to tolerate bisoprolol but no addition of ace inhibition because of orthostasis.  She does continue to use furosemide because of lower extremity edema.  Patient has stable dyspnea.  Stable lower extremity edema.  She's medication compliant.  Denies side effects.  Most recent TTE showed normalization of left ventricular ejection fraction.  Concerning her mitral regurgitation and aortic insufficiency her last TTE was in 2017.  Her next surveillance TTE is due in 2019. Today, she does complain of CP. This is a squeezing sensation. It is a pressure. It is non-exertional.     Review of Systems   Cardiovascular: Positive for chest pain and dyspnea on exertion. Negative for claudication, cyanosis, irregular heartbeat, leg swelling, near-syncope, orthopnea, palpitations, paroxysmal nocturnal dyspnea and syncope.   Respiratory: Positive for shortness of breath. Negative for cough, hemoptysis, sleep disturbances due to breathing, snoring and wheezing.          Current Outpatient Medications on File Prior to Visit   Medication Sig Dispense Refill   • albuterol (PROAIR HFA) 108 (90 BASE) MCG/ACT inhaler 2 puffs 4 (four) times a day as needed.     • AMITIZA 24 MCG capsule TAKE 1 CAPSULE BY MOUTH TWICE A  capsule 3   • bisoprolol (ZEBeta) 5 MG tablet Take 1 tablet by mouth Daily. 30 tablet 3   • desloratadine (CLARINEX) 5 MG tablet Take 1 tablet by mouth Daily. 90 tablet 3   • docusate sodium (COLACE) 100 MG capsule Take 200 mg by mouth every night. at bedtime     • esomeprazole (nexIUM) 40 MG capsule TAKE ONE CAPSULE BY  MOUTH EVERY DAY 90 capsule 3   • furosemide (LASIX) 20 MG tablet TAKE 1 TABLET BY MOUTH EVERY DAY 90 tablet 1   • gabapentin (NEURONTIN) 300 MG capsule Take 1 capsule by mouth 2 (Two) Times a Day. 180 capsule 0   • hydroxychloroquine (PLAQUENIL) 200 MG tablet Take 1 tablet by mouth Daily. 90 tablet 3   • methylphenidate (RITALIN) 10 MG tablet Take 1 tablet by mouth Every 12 (Twelve) Hours. 60 tablet 0   • metoclopramide (REGLAN) 10 MG tablet Take 1 tablet by mouth 4 (Four) Times a Day. 270 tablet 3   • OxyCODONE HCl 5 MG tablet  Take 1 tablet by mouth every 6 (six) hours as needed.     • OxyCODONE HCl ER (OXYCONTIN) 30 MG tablet extended-release 12 hour Take 30 mg by mouth 2 (two) times a day. OxyContin 30 mg tablet,crush resistant,extended release     • polyethylene glycol (MIRALAX) powder TAKE 17 GRAMS TWICE A DAY 1530 g 2   • SYNTHROID 88 MCG tablet TAKE 1 TABLET BY MOUTH DAILY. BRAND NAME MEDICALLY NECESSARY 90 tablet 3   • traZODone (DESYREL) 150 MG tablet TAKE 1 TABLET BY MOUTH EVERY NIGHT AT BEDTIME 90 tablet 3   • venlafaxine XR (EFFEXOR-XR) 150 MG 24 hr capsule Take 1 capsule by mouth Daily. 90 capsule 3   • venlafaxine XR (EFFEXOR-XR) 75 MG 24 hr capsule Take 1 capsule by mouth Daily. 90 capsule 3     Current Facility-Administered Medications on File Prior to Visit   Medication Dose Route Frequency Provider Last Rate Last Dose   • cyanocobalamin injection 1,000 mcg  1,000 mcg Intramuscular Q28 Days Olimpia Robison MD   1,000 mcg at 08/05/19 1039   • lidocaine-EPINEPHrine (XYLOCAINE W/EPI) 1 %-1:198945 injection 10 mL  10 mL Infiltration Once Patrick Skinner MD         Allergies   Allergen Reactions   • Cephalosporins Nausea And Vomiting   • Erythromycin Nausea Only   • Morphine And Related Itching   • Other Nausea And Vomiting     Cipro   • Penicillins Hives   • Shellfish-Derived Products Hives and Other (See Comments)     Other reaction(s): SOA   • Zithromax [Azithromycin] Nausea And Vomiting and  Hives   • Zolpidem Unknown (See Comments)   • Hydrocodone-Acetaminophen Anxiety   • Sulfa Antibiotics Hives and Rash     Sulfa (Sulfonamide Antibiotics)     Social History     Socioeconomic History   • Marital status:      Spouse name: Not on file   • Number of children: Not on file   • Years of education: Not on file   • Highest education level: Not on file   Tobacco Use   • Smoking status: Former Smoker     Years: 30.00     Last attempt to quit: 2008     Years since quittin.0   • Smokeless tobacco: Never Used   • Tobacco comment: PT SMOKED FOR 30 YEARS AND QUIT IN    Substance and Sexual Activity   • Alcohol use: No   • Drug use: No   • Sexual activity: Defer         Physical Exam:  Vitals:    19 0833   BP: 114/80   Pulse: 73   SpO2: 99%     Body mass index is 23.59 kg/m².    GEN: alert, appears stated age and cooperative  Body Habitus: well-nourished  HEENT: Head: Normocephalic, no lesions, without obvious abnormality.  JVP: 6 cm of water at 45 degrees HJR: absent      [place]:  normal size and placement Palpable S4: Not present.   Heart rate: normal  Heart Rhythm: regular     Heart Sounds: S1: normal intensity  S2: normal intensity  S3: absent   S4: absent  Opening Snap: absent  A2-OS:  N/A  Pericardial rub: absent    Ejection click: None      Murmurs: Systolic: none  Diastolic: none  Extremity: Moves spontaneously      DATA:        Results for orders placed in visit on 07/10/17   Dobutamine Stress Echocardiogram    Narrative · Dobutamine stress echocardiogram  · Left Ventricle: Left ventricular systolic function is normal. Estimated   EF appears to be in the range of 56 - 60%.  · All left ventricular wall segments contract normally.  · Stress Description: A pharmacological stress test was performed using   dobutamine without low-level exercise.  · Stress Echo - Peak Stress Findings: Post stress images with adequate   visualization of myocardium to assess for ischemia  · Normal stress  echo with no significant echocardiographic evidence for   myocardial ischemia              RECOMMENDATIONS:       Daysi Pa has a chest pain syndrome with pain complaints that are best characterized as atypical.  These pain complaints are likely non-cardiac chest pain. The patient is Moderate Risk.  An ischemia evaluation is indicated. The patient is not able to exercise. Reason for inability to exercise: orthopedic condition(s): RA. EKG: NSR with inferior ischemic changes. .  -Risks/Benefits discussed.   -A hand out was provided on the type of stress test and how to handle additional chest pain complaints. Questions answered.   -I have asked the patient to call 911 or be seen in the ED for further CP complaints.   -Will plan on further evaluation with:  -Stress echocardiogram      1. Nonischemic cardiomyopathy (CMS/HCC).  NYHA stage C; functional class I.  Today, euvolemic and perfused.  She had normalization of her LVEF.  -Continue bisoprolol, furosemide  -Call for concerning symptoms    2. Nonrheumatic aortic valve insufficiency/mitral regurgitation. ACC stage B.  There are no surgical indications at this time. The patient has not had valve surgery..   · The patient has been advised to remain in clinical surveillance every 12 months.  · Signs and symptoms of worsening valve disease discussed.  I've asked the patient contact me for an earlier appointment if these develop.  · I've recommended a repeat 2D TTE every 2 years.   · TTE due: 2019.  No indication based on 2017 ACC/AHA guidelines for IE prophylaxis for dental procedures: Optimal oral health is recommended through regular professional dental care and the use of appropriate dental products, such as manual, powered, and ultrasonic toothbrushes; dental floss; and other plaque-removal devices    3. Cardiac Risk Assessments:    -Continue follow-up visits with PCP for monitoring of labs; Dietary changes: Increase soluble fiber: A printed copy of  "a low fat, low cholesterol diet was given; Reduce saturated fat, \"trans\" monounsaturated fatty acids, and cholesterol; Exercise changes:  Encouraged daily aerobic activity.    -Essential HTN is a significant risk factor for stroke, heart disease and vascular disease. I've recommended the patient continue current medications, if any, as prescribed by the primary care provider. I recommended they have close follow-up for ongoing mgmt of this and the medical comorbidities associated with HTN with their PCP.    The patient's BMI is Body mass index is 23.59 kg/m²..  This places the patient in weight class:  Normal: 18.5-24.9kg/m2.     Nicotine status: is a former smoker  -Congratulated on cessation  -Avoid tobacco    Return in about 2 months (around 11/13/2019).    "

## 2019-09-13 NOTE — PATIENT INSTRUCTIONS
Dr. Sears has recommended a pharmacologic (dobutamine) stress test with echocardiography.    What is a Dobutamine Stress Echo Test?     Why do I need a stress test?     This test helps your physician determine how your heart functions when it is made to work harder by injecting dobutamine. Dobutamine is a drug that has an effect on the heart similar to exercise. This test is done on patients that are unable to exercise adequately or have severe lung disease. An echocardiogram, the ultrasound study of the heart, evaluates the heart’s size, how strongly it pumps blood, and how well the valves are working.     The dobutamine stress echo test is useful to determine:   • If there is a decreased supply of blood and oxygen to the heart at rest as well as with exercise   • If there is reduced movement of the heart muscle.   • Overall level of cardiovascular conditioning   • How quickly the heart recovers after exercise   • How hard the heart can work before symptoms develop   • Estimate the risk of surgery that can cause cardiac complications     What happens during the test?   • An echo technician will do a resting scan of your heart while you are lying down on an exam table. You will lie on your back and on your left side  . • A stress  will prep ten small areas on your chest and place electrodes (small, flat, sticky patches) on these areas. The electrodes are attached to an EKG monitor that charts your heart’s electrical activity during the test.   • You will be lying down on an exam table while the technician performs a resting EKG and blood pressure.   • A nurse will place an IV into a vein in your arm or hand. Next, your heart will be “stressed” by injecting a medication called dobutamine into the IV. This medication will increase your heart rate.   • Please tell the technician immediately if you have chest pain, shortness of breath, or any other unusual symptoms at any time.   • The stress lab staff  will watch for any changes on the EKG monitor that suggest the test should be stopped. The testing area is supervised by a physician.   • The echo technician will take images of your heart every three minutes during the test. • At the peak effects of dobutamine, a second echocardiogram will be taken to visualize the heart’s motion with exercise.   • Your heart rate, blood pressure, and EKG will continue to be monitored until the levels are returning to normal. One more echo scan will be done as your heart rate returns to normal    The risks     This test is very safe and there are usually no problems. There is a small risk of an abnormal heartbeat, chest pain or nausea and, if this happens, the appropriate action will be taken. On very rare occasions (approximately 1 in 2000) the test is associated with life-threatening events. If you have any questions about the risks associated with the procedure, your medical team will be able to discuss them with you at the appointment.      Instruction checklist for the dobutamine stress echo test:     . • Bring a list of medications you take with you to the test. Include the name and dosage amounts of each medicine. This information can be found on the prescription or bottle label.     You may take any of your regular morning medications unless otherwise instructed.       § If you have asthma, please bring your inhaler medication with you.     § If you have diabetes, ask your physician how to adjust your medications the day of your test.     • Please refrain from any strenuous exercise or activities the day before your test, or the day of the test     • Do Not eat or drink anything except for small amounts of water for 4 hours prior to your testing time.      • Do Not use lotion or powders on your chest the day of the test.     • Wear loose fitting, comfortable clothing. Pants or shorts and short sleeve shirts are preferred. (No long sleeves.) Do not wear one-piece  undergarments or body suits.      • The Dobutamine Stress Echo takes about one hour to complete including check-in time and actual test time.      • If you need to CANCEL OR CHANGE your appointment, please call [phone].     • If you have any QUESTIONS about the test, Please call [phone] and ask to speak to Dr. Orion Kirkland's Medical Assistant. Please leave a message if prompted to do so. We will return your call as soon as possible.     • We request a 24 hour notice for changes or cancellations.    You MUST complete the DSE within 30 calendar days of being ordered by Dr. Sears.     You MUST arrive for your DSE appointment 10 minutes BEFORE the scheduled time, or your test will be rescheduled.    Results:    Dr. Sears will be physically present during your dobutamine stress echo.  You will be given the results of your test in real time.

## 2019-09-23 ENCOUNTER — TELEPHONE (OUTPATIENT)
Dept: FAMILY MEDICINE CLINIC | Facility: CLINIC | Age: 64
End: 2019-09-23

## 2019-09-23 DIAGNOSIS — F90.0 ATTENTION DEFICIT HYPERACTIVITY DISORDER (ADHD), PREDOMINANTLY INATTENTIVE TYPE: ICD-10-CM

## 2019-09-23 RX ORDER — METHYLPHENIDATE HYDROCHLORIDE 10 MG/1
10 TABLET ORAL EVERY 12 HOURS SCHEDULED
Qty: 60 TABLET | Refills: 0 | Status: SHIPPED | OUTPATIENT
Start: 2019-09-23 | End: 2019-09-26 | Stop reason: SDUPTHER

## 2019-09-23 NOTE — TELEPHONE ENCOUNTER
Patient called asking for a refill on her methylphenidate (RITALIN) 10 MG tablet. Patient uses [facility].    Thank You,  Olimpia

## 2019-09-26 DIAGNOSIS — F90.0 ATTENTION DEFICIT HYPERACTIVITY DISORDER (ADHD), PREDOMINANTLY INATTENTIVE TYPE: ICD-10-CM

## 2019-09-26 RX ORDER — METHYLPHENIDATE HYDROCHLORIDE 10 MG/1
10 TABLET ORAL EVERY 12 HOURS SCHEDULED
Qty: 60 TABLET | Refills: 0 | Status: SHIPPED | OUTPATIENT
Start: 2019-09-26 | End: 2019-10-22 | Stop reason: SDUPTHER

## 2019-09-26 NOTE — PROGRESS NOTES
Patient has ongoing ADHD. [facility] #[id number] Manual process.      This document has been electronically signed by Olimpia Robison MD on September 26, 2019 3:56 PM

## 2019-09-27 ENCOUNTER — DOCUMENTATION (OUTPATIENT)
Dept: FAMILY MEDICINE CLINIC | Facility: CLINIC | Age: 64
End: 2019-09-27

## 2019-09-27 NOTE — PROGRESS NOTES
Mrs Hopson called on 9/23 to christie a refill on her Ritalin, Dr Robison was out so Dr De Souza filled the RX for her, [facility] to [facility].   2 days later the pt called back saying [facility] did not have her RX, we thought it had failed electronically, so Dr Robison sent the RX again, but the [facility] called back saying the pts  had filled the RX the day Dr De Souza sent it, which the pt was not aware of at that point. So we have asked them to void the RX Dr Robison sent in for the Ritalin.

## 2019-09-30 RX ORDER — ESOMEPRAZOLE MAGNESIUM 40 MG/1
CAPSULE, DELAYED RELEASE ORAL
Qty: 90 CAPSULE | Refills: 3 | Status: ON HOLD | OUTPATIENT
Start: 2019-09-30 | End: 2020-01-21

## 2019-09-30 RX ORDER — LUBIPROSTONE 24 UG/1
CAPSULE, GELATIN COATED ORAL
Qty: 180 CAPSULE | Refills: 3 | Status: ON HOLD | OUTPATIENT
Start: 2019-09-30 | End: 2020-01-21

## 2019-10-07 RX ORDER — TRAZODONE HYDROCHLORIDE 150 MG/1
TABLET ORAL
Qty: 90 TABLET | Refills: 3 | Status: ON HOLD | OUTPATIENT
Start: 2019-10-07 | End: 2020-01-21

## 2019-10-08 ENCOUNTER — HOSPITAL ENCOUNTER (OUTPATIENT)
Dept: CARDIOLOGY | Facility: HOSPITAL | Age: 64
Discharge: HOME | End: 2019-10-08

## 2019-10-08 VITALS — BODY MASS INDEX: 23.78 KG/M2 | WEIGHT: 130 LBS

## 2019-10-08 LAB
BH CV ECHO MEAS - BSA(HAYCOCK): 1.6 M^2
BH CV ECHO MEAS - BSA: 1.6 M^2
BH CV ECHO MEAS - BZI_BMI: 23.6 KILOGRAMS/M^2
BH CV ECHO MEAS - BZI_METRIC_HEIGHT: 157.5 CM
BH CV ECHO MEAS - BZI_METRIC_WEIGHT: 58.5 KG
BH CV STRESS DOSE DOBUTAMINE STAGE 1: 10
BH CV STRESS DURATION MIN STAGE 1: 3
BH CV STRESS DURATION SEC STAGE 1: 0
BH CV STRESS ECHO POST STRESS EJECTION FRACTION EF: 70 %
BH CV STRESS PROTOCOL 1: NORMAL
BH CV STRESS STAGE 1: 1

## 2019-10-08 PROCEDURE — 93351 STRESS TTE COMPLETE: CPT | Performed by: INTERNAL MEDICINE

## 2019-10-08 PROCEDURE — 25010000002 ATROPINE PER 0.01 MG: Performed by: INTERNAL MEDICINE

## 2019-10-08 PROCEDURE — 93320 DOPPLER ECHO COMPLETE: CPT

## 2019-10-08 PROCEDURE — 93325 DOPPLER ECHO COLOR FLOW MAPG: CPT

## 2019-10-08 PROCEDURE — 25010000002 DOBUTAMINE: Performed by: INTERNAL MEDICINE

## 2019-10-08 PROCEDURE — 93017 CV STRESS TEST TRACING ONLY: CPT

## 2019-10-08 PROCEDURE — 93350 STRESS TTE ONLY: CPT

## 2019-10-08 RX ORDER — ATROPINE SULFATE 1 MG/ML
0.2 INJECTION, SOLUTION INTRAMUSCULAR; INTRAVENOUS; SUBCUTANEOUS ONCE
Status: COMPLETED | OUTPATIENT
Start: 2019-10-08 | End: 2019-10-08

## 2019-10-08 RX ADMIN — DOBUTAMINE 10 MCG/KG/MIN: 12.5 INJECTION, SOLUTION, CONCENTRATE INTRAVENOUS at 14:15

## 2019-10-08 RX ADMIN — ATROPINE SULFATE 0.2 MG: 1 INJECTION, SOLUTION INTRAMUSCULAR; INTRAVENOUS; SUBCUTANEOUS at 14:13

## 2019-10-09 RX ORDER — BISOPROLOL FUMARATE 5 MG/1
5 TABLET, FILM COATED ORAL DAILY
Qty: 30 TABLET | Refills: 6 | Status: SHIPPED | OUTPATIENT
Start: 2019-10-09 | End: 2020-04-28

## 2019-10-15 LAB
BH CV ECHO MEAS - ACS: 1.9 CM
BH CV ECHO MEAS - AO ISTHMUS: 2.3 CM
BH CV ECHO MEAS - AO MAX PG (FULL): 8.9 MMHG
BH CV ECHO MEAS - AO MAX PG: 12.5 MMHG
BH CV ECHO MEAS - AO MEAN PG (FULL): 5 MMHG
BH CV ECHO MEAS - AO MEAN PG: 7 MMHG
BH CV ECHO MEAS - AO ROOT AREA (BSA CORRECTED): 1.8
BH CV ECHO MEAS - AO ROOT AREA: 6.6 CM^2
BH CV ECHO MEAS - AO ROOT DIAM: 2.9 CM
BH CV ECHO MEAS - AO V2 MAX: 177 CM/SEC
BH CV ECHO MEAS - AO V2 MEAN: 118 CM/SEC
BH CV ECHO MEAS - AO V2 VTI: 35.6 CM
BH CV ECHO MEAS - ASC AORTA: 3 CM
BH CV ECHO MEAS - AVA(I,A): 2 CM^2
BH CV ECHO MEAS - AVA(I,D): 2 CM^2
BH CV ECHO MEAS - AVA(V,A): 1.7 CM^2
BH CV ECHO MEAS - AVA(V,D): 1.7 CM^2
BH CV ECHO MEAS - BSA(HAYCOCK): 1.6 M^2
BH CV ECHO MEAS - BSA: 1.6 M^2
BH CV ECHO MEAS - BZI_BMI: 23.8 KILOGRAMS/M^2
BH CV ECHO MEAS - BZI_METRIC_HEIGHT: 157.5 CM
BH CV ECHO MEAS - BZI_METRIC_WEIGHT: 59 KG
BH CV ECHO MEAS - EDV(CUBED): 58.9 ML
BH CV ECHO MEAS - EDV(TEICH): 65.5 ML
BH CV ECHO MEAS - EF(CUBED): 69.8 %
BH CV ECHO MEAS - EF(MOD-BP): 54 %
BH CV ECHO MEAS - EF(TEICH): 62.1 %
BH CV ECHO MEAS - EPSS: 0.4 CM
BH CV ECHO MEAS - ESV(CUBED): 17.8 ML
BH CV ECHO MEAS - ESV(TEICH): 24.8 ML
BH CV ECHO MEAS - FS: 32.9 %
BH CV ECHO MEAS - IVS/LVPW: 0.76
BH CV ECHO MEAS - IVSD: 0.8 CM
BH CV ECHO MEAS - LA DIMENSION: 2.7 CM
BH CV ECHO MEAS - LA/AO: 0.93
BH CV ECHO MEAS - LV MASS(C)D: 108.6 GRAMS
BH CV ECHO MEAS - LV MASS(C)DI: 68.3 GRAMS/M^2
BH CV ECHO MEAS - LV MAX PG: 3.6 MMHG
BH CV ECHO MEAS - LV MEAN PG: 2 MMHG
BH CV ECHO MEAS - LV V1 MAX: 95.3 CM/SEC
BH CV ECHO MEAS - LV V1 MEAN: 65.8 CM/SEC
BH CV ECHO MEAS - LV V1 VTI: 22.6 CM
BH CV ECHO MEAS - LVIDD: 3.9 CM
BH CV ECHO MEAS - LVIDS: 2.6 CM
BH CV ECHO MEAS - LVOT AREA (M): 3.1 CM^2
BH CV ECHO MEAS - LVOT AREA: 3.1 CM^2
BH CV ECHO MEAS - LVOT DIAM: 2 CM
BH CV ECHO MEAS - LVPWD: 1.1 CM
BH CV ECHO MEAS - MR MAX PG: 88.7 MMHG
BH CV ECHO MEAS - MR MAX VEL: 471 CM/SEC
BH CV ECHO MEAS - MV A MAX VEL: 93.8 CM/SEC
BH CV ECHO MEAS - MV DEC SLOPE: 487 CM/SEC^2
BH CV ECHO MEAS - MV E MAX VEL: 89.5 CM/SEC
BH CV ECHO MEAS - MV E/A: 0.95
BH CV ECHO MEAS - MV MAX PG: 3.3 MMHG
BH CV ECHO MEAS - MV MEAN PG: 1 MMHG
BH CV ECHO MEAS - MV P1/2T MAX VEL: 98.3 CM/SEC
BH CV ECHO MEAS - MV P1/2T: 59.1 MSEC
BH CV ECHO MEAS - MV V2 MAX: 90.5 CM/SEC
BH CV ECHO MEAS - MV V2 MEAN: 54.5 CM/SEC
BH CV ECHO MEAS - MV V2 VTI: 25.1 CM
BH CV ECHO MEAS - MVA P1/2T LCG: 2.2 CM^2
BH CV ECHO MEAS - MVA(P1/2T): 3.7 CM^2
BH CV ECHO MEAS - MVA(VTI): 2.8 CM^2
BH CV ECHO MEAS - PA MAX PG: 2.6 MMHG
BH CV ECHO MEAS - PA MEAN PG: 2 MMHG
BH CV ECHO MEAS - PA V2 MAX: 80.3 CM/SEC
BH CV ECHO MEAS - PA V2 MEAN: 58.2 CM/SEC
BH CV ECHO MEAS - PA V2 VTI: 18.7 CM
BH CV ECHO MEAS - RAP SYSTOLE: 3 MMHG
BH CV ECHO MEAS - RVSP: 24.5 MMHG
BH CV ECHO MEAS - SI(AO): 147.7 ML/M^2
BH CV ECHO MEAS - SI(CUBED): 25.8 ML/M^2
BH CV ECHO MEAS - SI(LVOT): 44.6 ML/M^2
BH CV ECHO MEAS - SI(TEICH): 25.5 ML/M^2
BH CV ECHO MEAS - SV(AO): 235.1 ML
BH CV ECHO MEAS - SV(CUBED): 41.1 ML
BH CV ECHO MEAS - SV(LVOT): 71 ML
BH CV ECHO MEAS - SV(TEICH): 40.7 ML
BH CV ECHO MEAS - TR MAX VEL: 232 CM/SEC
MAXIMAL PREDICTED HEART RATE: 155 BPM
STRESS TARGET HR: 132 BPM

## 2019-10-21 ENCOUNTER — TELEPHONE (OUTPATIENT)
Dept: FAMILY MEDICINE CLINIC | Facility: CLINIC | Age: 64
End: 2019-10-21

## 2019-10-21 DIAGNOSIS — F90.0 ATTENTION DEFICIT HYPERACTIVITY DISORDER (ADHD), PREDOMINANTLY INATTENTIVE TYPE: ICD-10-CM

## 2019-10-21 NOTE — TELEPHONE ENCOUNTER
Patient needs refill for methylphenidate (RITALIN) 10 MG tablet       Please send to [facility]          Thank you     [phone]

## 2019-10-22 RX ORDER — METHYLPHENIDATE HYDROCHLORIDE 10 MG/1
10 TABLET ORAL EVERY 12 HOURS SCHEDULED
Qty: 60 TABLET | Refills: 0 | Status: SHIPPED | OUTPATIENT
Start: 2019-10-22 | End: 2019-11-20

## 2019-10-22 NOTE — TELEPHONE ENCOUNTER
Patient has ongoing ADHD and continues to benefit from medication. [facility] #[id number] consistent with current medications.      This document has been electronically signed by Olimpia Robison MD on October 22, 2019 11:22 AM

## 2019-11-20 ENCOUNTER — OFFICE VISIT (OUTPATIENT)
Dept: FAMILY MEDICINE CLINIC | Facility: CLINIC | Age: 64
End: 2019-11-20

## 2019-11-20 VITALS
DIASTOLIC BLOOD PRESSURE: 70 MMHG | HEIGHT: 63 IN | HEART RATE: 78 BPM | WEIGHT: 130 LBS | SYSTOLIC BLOOD PRESSURE: 138 MMHG | BODY MASS INDEX: 23.04 KG/M2 | OXYGEN SATURATION: 98 %

## 2019-11-20 DIAGNOSIS — F90.0 ATTENTION DEFICIT HYPERACTIVITY DISORDER (ADHD), PREDOMINANTLY INATTENTIVE TYPE: Primary | ICD-10-CM

## 2019-11-20 DIAGNOSIS — Z23 NEED FOR IMMUNIZATION AGAINST INFLUENZA: ICD-10-CM

## 2019-11-20 DIAGNOSIS — E53.8 B12 DEFICIENCY: ICD-10-CM

## 2019-11-20 DIAGNOSIS — Z23 NEED FOR VACCINATION AGAINST STREPTOCOCCUS PNEUMONIAE USING PNEUMOCOCCAL CONJUGATE VACCINE 13: ICD-10-CM

## 2019-11-20 PROCEDURE — 99214 OFFICE O/P EST MOD 30 MIN: CPT | Performed by: FAMILY MEDICINE

## 2019-11-20 PROCEDURE — G0009 ADMIN PNEUMOCOCCAL VACCINE: HCPCS | Performed by: FAMILY MEDICINE

## 2019-11-20 PROCEDURE — 96372 THER/PROPH/DIAG INJ SC/IM: CPT | Performed by: FAMILY MEDICINE

## 2019-11-20 PROCEDURE — 90670 PCV13 VACCINE IM: CPT | Performed by: FAMILY MEDICINE

## 2019-11-20 PROCEDURE — G0008 ADMIN INFLUENZA VIRUS VAC: HCPCS | Performed by: FAMILY MEDICINE

## 2019-11-20 PROCEDURE — 90662 IIV NO PRSV INCREASED AG IM: CPT | Performed by: FAMILY MEDICINE

## 2019-11-20 RX ORDER — VENLAFAXINE HYDROCHLORIDE 75 MG/1
75 CAPSULE, EXTENDED RELEASE ORAL DAILY
Qty: 90 CAPSULE | Refills: 3 | Status: SHIPPED | OUTPATIENT
Start: 2019-11-20 | End: 2020-12-23 | Stop reason: SDUPTHER

## 2019-11-20 RX ORDER — CYANOCOBALAMIN 1000 UG/ML
1000 INJECTION, SOLUTION INTRAMUSCULAR; SUBCUTANEOUS ONCE
Status: COMPLETED | OUTPATIENT
Start: 2019-11-20 | End: 2019-11-20

## 2019-11-20 RX ORDER — HYDROXYCHLOROQUINE SULFATE 200 MG/1
100 TABLET, FILM COATED ORAL DAILY
Qty: 45 TABLET | Refills: 3 | Status: ON HOLD | OUTPATIENT
Start: 2019-11-20 | End: 2020-01-21

## 2019-11-20 RX ORDER — ATOMOXETINE 18 MG/1
18 CAPSULE ORAL DAILY
Qty: 30 CAPSULE | Refills: 4 | Status: SHIPPED | OUTPATIENT
Start: 2019-11-20 | End: 2019-12-09 | Stop reason: DRUGHIGH

## 2019-11-20 RX ORDER — VENLAFAXINE HYDROCHLORIDE 150 MG/1
150 CAPSULE, EXTENDED RELEASE ORAL DAILY
Qty: 90 CAPSULE | Refills: 3 | Status: SHIPPED | OUTPATIENT
Start: 2019-11-20 | End: 2020-11-09

## 2019-11-20 RX ADMIN — CYANOCOBALAMIN 1000 MCG: 1000 INJECTION, SOLUTION INTRAMUSCULAR; SUBCUTANEOUS at 11:22

## 2019-11-20 NOTE — PROGRESS NOTES
"Subjective   Daysi Pa is a 64 y.o. female.     Daysi Hopson is a 64 y.o. F who presents to clinic today for follow-up of her ADHD. She is currently prescribed Ritalin 10mg/day, but has felt minimal improvement of her symptoms. Pt endorses difficulty concentrating accompanied by an overwhelming sense of lethargy. PMH is significant for HTN, HLD, breast cancer, and mitral valve regurgitation. She states that she has decreased appetite on her current therapy but claims to have gained \"nearly 20 lbs in the last year\".            Review of Systems   Constitutional: Negative for activity change, appetite change, fatigue and fever.   HENT: Negative.  Negative for ear pain and sore throat.    Eyes: Negative.  Negative for pain and visual disturbance.   Respiratory: Negative.  Negative for cough and shortness of breath.    Cardiovascular: Negative for chest pain and palpitations.   Gastrointestinal: Negative.  Negative for abdominal pain and nausea.   Endocrine: Negative for cold intolerance and heat intolerance.   Genitourinary: Negative for difficulty urinating and dysuria.   Musculoskeletal: Positive for arthralgias and joint swelling. Negative for gait problem.   Skin: Negative for color change and rash.   Neurological: Negative for dizziness, weakness and headaches.   Hematological: Negative for adenopathy. Does not bruise/bleed easily.   Psychiatric/Behavioral: Negative for agitation, confusion and sleep disturbance.       Objective   Physical Exam   Constitutional: She appears well-developed and well-nourished.   HENT:   Head: Normocephalic.   Eyes: Conjunctivae are normal. Pupils are equal, round, and reactive to light.   Cardiovascular: Normal rate, regular rhythm, normal heart sounds and intact distal pulses.   Pulmonary/Chest: Effort normal and breath sounds normal.   Neurological: She is alert.   Psychiatric: She has a normal mood and affect. Her behavior is normal. Judgment and thought " content normal.       Assessment/Plan       Daysi Hopson is a 64 y.o. F presenting for follow-up of her ADHD. She appears comfortable but frustrated with the lack of improvement in her symptoms.    1.) ADHD: given the pt's history of CVD and due to the lack of improvement experienced on Ritalin, pt will be switched to a non-stimulant therapy for ADHD (atomoxetine 18mg); pt instructed to follow-up in a few weeks for reassessment of symptoms      I was present during the office visit with the medical student. Agree with the above documentation.      This document has been electronically signed by Olimpia Robison MD on November 24, 2019 5:19 PM      Subjective:     Daysi Pa is a 64 y.o. female who presents for follow up of ADHD. She is currently taking ritalin which helps her focus to complete her daily tasks.  She feels the medication is working well.   Her weight remains stable.  Above history reviewed and remains unchanged.    Shoulder Pain  Patient complains of right shoulder pain. The symptoms began several weeks ago. Aggravating factors: repetitive activity, vacuuming,house chores. Pain is located around the acromioclavicular (AC) joint and diffusely throughout the shoulder. Discomfort is described as aching. Symptoms are exacerbated by repetitive movements and overhead movements. Evaluation to date: none. Therapy to date includes: corticosteroid injection which was effective.  Since last visit patient was given a corticosteroid steroid injection by pain management and has been attending physical therapy.  Patient does endorse significant improvement in her pain and range of motion.       Patient was seen by ophthalmology and found to have a macular tear.  She was sent to a specialist in [place].  He did recommend that she decrease her dose to no more than 5 g/kg of Plaquenil.  She thought her pills were only 100 mg and she is continue to take them twice a day.  In fact her pills are  200 mg twice a day which is been her usual dose.    The following portions of the patient's history were reviewed and updated as appropriate: allergies, current medications, past family history, past medical history, past social history, past surgical history and problem list.    Past Medical Hx:  Past Medical History:   Diagnosis Date   • Acquired hypothyroidism    • Allergic rhinitis    • Allergy 04/17/2016    Consult, Allergy (1) - Ordered By: BENIGNO LIM ([facility])    • Anxiety state    • Attention deficit hyperactivity disorder    • Benign essential hypertension    • Chronic pain    • Chronic pain disorder    • Congestive heart failure (CMS/HCC)     primarily systrolic dysfunction EF 17-20% repeat echo 55%   • Depressive disorder    • Dyslipidemia    • Dysphagia    • Gastroesophageal reflux disease    • Generalized abdominal pain    • History of echocardiogram 03/23/2016    Echocardiogram W/ color flow 20013 (3) - Normal LV function wiht Ef of 60%.Normal RV size and function.Normal diastolic function.No significant valvular regurgitation or stenosis.   • History of echocardiogram 04/27/2012    Echocardiogram W/O color flow 15340 (1) - Normal left ventricular systolic function. EF 55%. No evidence of regional wall motion abnormalities. Abnormal relaxation of the left ventricular myocardium.   • History of EKG 03/07/2016    EKG Tracing and Interpretation 67186 (3) - Ordered By: LILLIE BRADY ([facility])    • History of mammogram 06/05/2013    MAMMOGRAM SCREENING 27513 - [facility] (1) - birads 0    • Hyperlipidemia    • Intraductal carcinoma in situ of breast     hx in past and s/p bilateral simple mastectomies      • Irritable bowel syndrome    • Low back pain    • Malaise and fatigue    • Microalbuminuria 07/16/2015    POS MICROALBUMINURIA RESULT DOC AND REVIEWED 3060F (1) - Ordered By: BENIGNO LMI ([facility])   • Mitral valve regurgitation     Mild-Moderate      • Myopia    • Non-organic sleep disorder    • Osteoporosis    •  Pain management 04/17/2016    Consult, Pain Management (1) - Ordered By: BENIGNO LIM ([facility])    • Pneumococcal vaccination indicated 07/08/2016    PNEUMOC VAC/ADMIN/RCVD 4040F (11) - Ordered By: BENIGNO LIM ([facility])   • Primary fibromyalgia syndrome    • Rheumatoid arthritis (CMS/HCC)        Past Surgical Hx:  Past Surgical History:   Procedure Laterality Date   • APPENDECTOMY  2008    Appendectomy (1)   • BREAST BIOPSY  06/12/2013    Stereotactic breast biopsy (1) - stereotactic left mammotome biopsy with 11 gauge needle.   • BREAST IMPLANT REMOVAL  2003    Remove breast implant (1)   • BREAST IMPLANT SURGERY  2000    Breast implantation (1)   • COLONOSCOPY  01/20/2014    Colonoscopy, diagnostic (screening) 49899 (1)   • COLONOSCOPY W/ POLYPECTOMY  2008    Colonoscopy remove polyps 66279 (1)    • EXCISION LESION  05/01/2014    Remove chest wall lesion (1) - Excision of subcutaneous mass, left chest wall, Subcutaneous mass left chest wall, status post mastectomy.   • INJECTION OF MEDICATION  07/05/2016    B12 (44) - Ordered By: BENIGNO LIM ([facility])    • INJECTION OF MEDICATION  11/12/2015    Celestone (betamethasone) (1) - Ordered By: BENIGNO LIM ([facility])    • INJECTION OF MEDICATION  02/11/2016    Kenalog (2) - Ordered By: BENIGNO LIM ([facility])    • INJECTION OF MEDICATION  11/17/2017    Prolia   • MASTECTOMY  09/05/2013    Mastectomy (2) - Right simple prophylactic mastectomy.   • OTHER SURGICAL HISTORY  10/29/2014    SONOGRAM THYROID, NECK 09427 (1) - Ordered By: BENIGNO LIM ([facility])   • PAP SMEAR  06/03/2013     PAP SMEAR (1)   • PARTIAL HYSTERECTOMY  1998     Partial hyst (1)     • TONSILLECTOMY  1969    Tonsillectomy (1)   • VASCULAR SURGERY  12/24/2014    Consult, Vascular Surgery (1) - Ordered By: BENIGNO LIM ([facility])        Health Maintenance:  Health Maintenance   Topic Date Due   • LIPID PANEL  12/17/2019   • COLONOSCOPY  01/18/2020   • MEDICARE ANNUAL WELLNESS  02/27/2020   • PNEUMOCOCCAL VACCINES (65+ LOW/MEDIUM RISK) (2 of 2 - PPSV23) 11/20/2020   •  DXA SCAN  03/11/2021   • TDAP/TD VACCINES (2 - Td) 10/16/2024   • HEPATITIS C SCREENING  Completed   • INFLUENZA VACCINE  Completed   • ZOSTER VACCINE  Completed       Current Meds:    Current Outpatient Medications:   •  albuterol (PROAIR HFA) 108 (90 BASE) MCG/ACT inhaler, 2 puffs 4 (four) times a day as needed., Disp: , Rfl:   •  AMITIZA 24 MCG capsule, TAKE 1 CAPSULE BY MOUTH TWICE A DAY, Disp: 180 capsule, Rfl: 3  •  bisoprolol (ZEBeta) 5 MG tablet, Take 1 tablet by mouth Daily., Disp: 30 tablet, Rfl: 6  •  desloratadine (CLARINEX) 5 MG tablet, Take 1 tablet by mouth Daily., Disp: 90 tablet, Rfl: 3  •  docusate sodium (COLACE) 100 MG capsule, Take 200 mg by mouth every night. at bedtime, Disp: , Rfl:   •  esomeprazole (nexIUM) 40 MG capsule, TAKE 1 CAPSULE BY MOUTH EVERY DAY, Disp: 90 capsule, Rfl: 3  •  furosemide (LASIX) 20 MG tablet, TAKE 1 TABLET BY MOUTH EVERY DAY, Disp: 90 tablet, Rfl: 1  •  gabapentin (NEURONTIN) 300 MG capsule, Take 1 capsule by mouth 2 (Two) Times a Day., Disp: 180 capsule, Rfl: 0  •  hydroxychloroquine (PLAQUENIL) 200 MG tablet, Take 0.5 tablets by mouth Daily., Disp: 45 tablet, Rfl: 3  •  metoclopramide (REGLAN) 10 MG tablet, Take 1 tablet by mouth 4 (Four) Times a Day., Disp: 270 tablet, Rfl: 3  •  NARCAN 4 MG/0.1ML nasal spray, GIVE 1 SPRAY INTRANASAL FOR EMERGENCY TREATMENT OF OPIOID OVERDOSE, IF NO RESPONSE REPEAT IN 2 MINUTES, Disp: , Rfl: 0  •  OxyCODONE HCl 5 MG tablet , Take 1 tablet by mouth every 6 (six) hours as needed., Disp: , Rfl:   •  OxyCODONE HCl ER (OXYCONTIN) 30 MG tablet extended-release 12 hour, Take 30 mg by mouth 2 (two) times a day. OxyContin 30 mg tablet,crush resistant,extended release, Disp: , Rfl:   •  polyethylene glycol (MIRALAX) powder, TAKE 17 GRAMS TWICE A DAY, Disp: 1530 g, Rfl: 2  •  SYNTHROID 88 MCG tablet, TAKE 1 TABLET BY MOUTH DAILY. BRAND NAME MEDICALLY NECESSARY, Disp: 90 tablet, Rfl: 3  •  traZODone (DESYREL) 150 MG tablet, TAKE 1 TABLET BY  MOUTH EVERYDAY AT BEDTIME, Disp: 90 tablet, Rfl: 3  •  venlafaxine XR (EFFEXOR-XR) 150 MG 24 hr capsule, Take 1 capsule by mouth Daily., Disp: 90 capsule, Rfl: 3  •  venlafaxine XR (EFFEXOR-XR) 75 MG 24 hr capsule, Take 1 capsule by mouth Daily., Disp: 90 capsule, Rfl: 3  •  atomoxetine (STRATTERA) 18 MG capsule, Take 1 capsule by mouth Daily., Disp: 30 capsule, Rfl: 4    Current Facility-Administered Medications:   •  cyanocobalamin injection 1,000 mcg, 1,000 mcg, Intramuscular, Q28 Days, Olimpia Robison MD, 1,000 mcg at 19 1039  •  lidocaine-EPINEPHrine (XYLOCAINE W/EPI) 1 %-1:390551 injection 10 mL, 10 mL, Infiltration, Once, Patrick Skinner MD    Allergies:  Cephalosporins; Erythromycin; Morphine and related; Other; Penicillins; Shellfish-derived products; Zithromax [azithromycin]; Zolpidem; Hydrocodone-acetaminophen; and Sulfa antibiotics    Family Hx:  Family History   Problem Relation Age of Onset   • Breast cancer Sister    • Rectal cancer Other         Colorectal cancer   • Heart disease Other    • Hypertension Other    • Thyroid disease Other         Thyroid disorder   • Hypertension Mother    • Migraines Mother    • Osteoporosis Mother    • Diabetes Father    • Coronary artery disease Father    • Hyperlipidemia Father    • Cancer Maternal Aunt    • COPD Paternal Grandmother    • COPD Paternal Grandfather         Social History:  Social History     Socioeconomic History   • Marital status:      Spouse name: Not on file   • Number of children: Not on file   • Years of education: Not on file   • Highest education level: Not on file   Tobacco Use   • Smoking status: Former Smoker     Years: 30.00     Last attempt to quit: 2008     Years since quittin.2   • Smokeless tobacco: Never Used   • Tobacco comment: PT SMOKED FOR 30 YEARS AND QUIT IN    Substance and Sexual Activity   • Alcohol use: No   • Drug use: No   • Sexual activity: Defer       Review of Systems  Review of  "Systems   Constitutional: Positive for fatigue. Negative for fever.   HENT: Negative for ear pain and sore throat.    Eyes: Negative for pain and visual disturbance.   Respiratory: Negative for cough and shortness of breath.    Cardiovascular: Negative for chest pain and palpitations.   Gastrointestinal: Positive for constipation. Negative for abdominal pain and nausea.   Genitourinary: Negative for dysuria.   Musculoskeletal: Positive for arthralgias, back pain, joint swelling and myalgias.   Skin: Positive for color change. Negative for rash.       Neurological: Negative for dizziness, weakness and headaches.         Objective:     /70 (BP Location: Right arm, Patient Position: Sitting, Cuff Size: Adult)   Pulse 78   Ht 160 cm (63\")   Wt 59 kg (130 lb)   SpO2 98%   BMI 23.03 kg/m²     Physical Exam   Constitutional: She is oriented to person, place, and time. She appears well-developed and well-nourished.   HENT:   Head: Normocephalic and atraumatic.   Right Ear: Hearing, tympanic membrane, external ear and ear canal normal.   Left Ear: Hearing, tympanic membrane, external ear and ear canal normal.   Nose: Nose normal.   Mouth/Throat: Oropharynx is clear and moist.   Eyes: Conjunctivae and EOM are normal. Pupils are equal, round, and reactive to light.   Neck: Normal range of motion. Neck supple.   Cardiovascular: Normal rate, regular rhythm and normal heart sounds.   Pulmonary/Chest: Effort normal and breath sounds normal.   Abdominal: Soft. Bowel sounds are normal. She exhibits no distension. There is no tenderness.   Musculoskeletal: Normal range of motion.        Right shoulder: She exhibits tenderness.   Clubbing of all digits of hand   Neurological: She is alert and oriented to person, place, and time.   Skin: Skin is warm and dry.   Psychiatric: She has a normal mood and affect. Her speech is normal and behavior is normal. Judgment and thought content normal. Cognition and memory are normal.      "                           Assessment:     1. Attention deficit hyperactivity disorder (ADHD), predominantly inattentive type    2. Need for immunization against influenza    3. Need for vaccination against Streptococcus pneumoniae using pneumococcal conjugate vaccine 13    4. B12 deficiency         Plan:     1.  Rx changes: Stop ritalin. Start strattera.    2.  Education: Provided reassurance. Provided reassurance and Recommended medication management  3.  Compliance at present is estimated to be excellent.  4.  Follow up: 3 months and as needed. Have asked patient to return sooner if strattera is not working at all. Discussed dose adjustments could be made sooner.   5.  Encouraged regular follow-up with specialist.        Goals        Diet    • Eat more fruits and vegetables (pt-stated)      Barriers to goals: None         Exercise    • Exercise 150 minutes per week (moderate activity) (pt-stated)      Barriers to goal: RA and chronic pain         Lifestyle    • Other (pt-stated)      Less fatigue  Barriers to goals: Multiple medical problems                Preventative:  Patient's Body mass index is 23.03 kg/m². BMI is within normal parameters. No follow-up required.  Recommended:Influenza  patient is a non smoker  does not drink  plan meals, eat breakfast and have 3 meals a day    RISK SCORE: 3       This document has been electronically signed by Olimpia Robison MD on November 24, 2019 5:19 PM

## 2019-11-25 ENCOUNTER — OFFICE VISIT (OUTPATIENT)
Dept: CARDIOLOGY | Facility: CLINIC | Age: 64
End: 2019-11-25

## 2019-11-25 VITALS
WEIGHT: 131.9 LBS | HEART RATE: 80 BPM | DIASTOLIC BLOOD PRESSURE: 80 MMHG | OXYGEN SATURATION: 99 % | HEIGHT: 63 IN | BODY MASS INDEX: 23.37 KG/M2 | SYSTOLIC BLOOD PRESSURE: 136 MMHG

## 2019-11-25 DIAGNOSIS — I42.8 NONISCHEMIC CARDIOMYOPATHY (HCC): Primary | ICD-10-CM

## 2019-11-25 DIAGNOSIS — I34.0 NONRHEUMATIC MITRAL VALVE REGURGITATION: ICD-10-CM

## 2019-11-25 DIAGNOSIS — R94.39 ABNORMAL STRESS ECHOCARDIOGRAM: ICD-10-CM

## 2019-11-25 DIAGNOSIS — I31.39 PERICARDIAL EFFUSION: ICD-10-CM

## 2019-11-25 DIAGNOSIS — I35.1 NONRHEUMATIC AORTIC VALVE INSUFFICIENCY: ICD-10-CM

## 2019-11-25 PROCEDURE — 99214 OFFICE O/P EST MOD 30 MIN: CPT | Performed by: INTERNAL MEDICINE

## 2019-11-25 RX ORDER — METHYLNALTREXONE BROMIDE 150 MG/1
15 TABLET ORAL DAILY
Refills: 0 | Status: ON HOLD | COMMUNITY
Start: 2019-11-18 | End: 2020-06-30

## 2019-11-25 NOTE — PROGRESS NOTES
Cardiovascular Medicine   Stuart Sears M.D., Ph.D., [facility]    The patient returns to cardiology clinic for follow-up of the following cardiac problems:    PROBLEM LIST:    1. 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 EF initially 15%--now improved  2. MR  3. AI  4. Pericardial effusion  5. Abnormal DSE in inferior wall, 2019    Daysi Pa is a 64 y.o. female who returns to clinic today.  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  She is on Bisoprolol only as she had OH associated with medication. Fortunately, she has had normalization of her LV EF. She remains with stable dyspnea and LE edema. She uses Furosemide daily.  She's medication compliant.  Denies side effects. She has had no ED visits or admissions for ADHF.   Most recent TTE showed normalization of left ventricular ejection fraction.  She does have known AI and MR. Her most recent TTE continues to show mild valve disease.  Her next surveillance TTE is due in 2019. Today, she does complain of CP. This is a squeezing sensation. It is a pressure. It is non-exertional. She also now has a small pericardial effusion. She was having CP at her last appt so she was sent for a DSE. This showed inferior ischemia, with normal LV EF and no TID. She is on Bisoprolol. Today, she continues to report some CP is random. She thinks it is associated with movement. There are no particular activity associated with it.   Review of Systems   Cardiovascular: Positive for chest pain and dyspnea on exertion. Negative for claudication, cyanosis, irregular heartbeat, leg swelling, near-syncope, orthopnea, palpitations, paroxysmal nocturnal dyspnea and syncope.   Respiratory: Positive for shortness of breath. Negative for cough, hemoptysis, sleep disturbances due to breathing, snoring and wheezing.          Current Outpatient Medications on File Prior to Visit   Medication Sig Dispense Refill   • albuterol (PROAIR HFA) 108 (90 BASE) MCG/ACT inhaler 2 puffs 4 (four)  times a day as needed.     • AMITIZA 24 MCG capsule TAKE 1 CAPSULE BY MOUTH TWICE A  capsule 3   • atomoxetine (STRATTERA) 18 MG capsule Take 1 capsule by mouth Daily. 30 capsule 4   • bisoprolol (ZEBeta) 5 MG tablet Take 1 tablet by mouth Daily. 30 tablet 6   • desloratadine (CLARINEX) 5 MG tablet Take 1 tablet by mouth Daily. 90 tablet 3   • docusate sodium (COLACE) 100 MG capsule Take 200 mg by mouth every night. at bedtime     • esomeprazole (nexIUM) 40 MG capsule TAKE 1 CAPSULE BY MOUTH EVERY DAY 90 capsule 3   • furosemide (LASIX) 20 MG tablet TAKE 1 TABLET BY MOUTH EVERY DAY 90 tablet 1   • gabapentin (NEURONTIN) 300 MG capsule Take 1 capsule by mouth 2 (Two) Times a Day. 180 capsule 0   • hydroxychloroquine (PLAQUENIL) 200 MG tablet Take 0.5 tablets by mouth Daily. 45 tablet 3   • metoclopramide (REGLAN) 10 MG tablet Take 1 tablet by mouth 4 (Four) Times a Day. 270 tablet 3   • NARCAN 4 MG/0.1ML nasal spray GIVE 1 SPRAY INTRANASAL FOR EMERGENCY TREATMENT OF OPIOID OVERDOSE, IF NO RESPONSE REPEAT IN 2 MINUTES  0   • OxyCODONE HCl 5 MG tablet  Take 1 tablet by mouth every 6 (six) hours as needed.     • OxyCODONE HCl ER (OXYCONTIN) 30 MG tablet extended-release 12 hour Take 30 mg by mouth 2 (two) times a day. OxyContin 30 mg tablet,crush resistant,extended release     • polyethylene glycol (MIRALAX) powder TAKE 17 GRAMS TWICE A DAY 1530 g 2   • SYNTHROID 88 MCG tablet TAKE 1 TABLET BY MOUTH DAILY. BRAND NAME MEDICALLY NECESSARY 90 tablet 3   • traZODone (DESYREL) 150 MG tablet TAKE 1 TABLET BY MOUTH EVERYDAY AT BEDTIME 90 tablet 3   • venlafaxine XR (EFFEXOR-XR) 150 MG 24 hr capsule Take 1 capsule by mouth Daily. 90 capsule 3   • venlafaxine XR (EFFEXOR-XR) 75 MG 24 hr capsule Take 1 capsule by mouth Daily. 90 capsule 3     Current Facility-Administered Medications on File Prior to Visit   Medication Dose Route Frequency Provider Last Rate Last Dose   • cyanocobalamin injection 1,000 mcg  1,000 mcg  Intramuscular Q28 Days Olimpia Robison MD   1,000 mcg at 19 1039   • lidocaine-EPINEPHrine (XYLOCAINE W/EPI) 1 %-1:546402 injection 10 mL  10 mL Infiltration Once Patrick Skinner MD         Allergies   Allergen Reactions   • Cephalosporins Nausea And Vomiting   • Erythromycin Nausea Only   • Morphine And Related Itching   • Other Nausea And Vomiting     Cipro   • Penicillins Hives   • Shellfish-Derived Products Hives and Other (See Comments)     Other reaction(s): SOA   • Zithromax [Azithromycin] Hives and Nausea And Vomiting   • Zolpidem Mental Status Change   • Hydrocodone-Acetaminophen Anxiety   • Sulfa Antibiotics Hives and Rash     Sulfa (Sulfonamide Antibiotics)     Social History     Socioeconomic History   • Marital status:      Spouse name: Not on file   • Number of children: Not on file   • Years of education: Not on file   • Highest education level: Not on file   Tobacco Use   • Smoking status: Former Smoker     Years: 30.00     Last attempt to quit: 2008     Years since quittin.2   • Smokeless tobacco: Never Used   • Tobacco comment: PT SMOKED FOR 30 YEARS AND QUIT IN    Substance and Sexual Activity   • Alcohol use: No   • Drug use: No   • Sexual activity: Defer         Physical Exam:  Vitals:    19 1018   BP: 136/80   Pulse: 80   SpO2: 99%     Body mass index is 23.37 kg/m².    GEN: alert, appears stated age and cooperative  Body Habitus: well-nourished  HEENT: Head: Normocephalic, no lesions, without obvious abnormality.  JVP: 6 cm of water at 45 degrees HJR: absent      [place]:  normal size and placement Palpable S4: Not present.   Heart rate: normal  Heart Rhythm: regular     Heart Sounds: S1: normal intensity  S2: normal intensity  S3: absent   S4: absent  Opening Snap: absent  A2-OS:  N/A  Pericardial rub: absent    Ejection click: None      Murmurs: Systolic: none  Diastolic: none  Extremity: Moves spontaneously      DATA:        Results for orders placed  in visit on 09/13/19   Adult Stress Echo W/ Cont or Stress Agent if Necessary Per Protocol    Narrative · Resting left ventricle systolic function is normal. Estimated LVEF is   56-60%.  · Target heart rate achieved. Stress ECG with abnormality consistent with   ischemia.  · Stress LVEF hyperdynamic at greater than 70%.  · The following left ventricular wall segments are hypokinetic: basal   inferolateral, basal inferoseptal, basal inferior and basal inferoseptal.  · Abnormal stress echo with echocardiographic evidence for myocardial   ischemia located in the inferior wall.              RECOMMENDATIONS:       1. Nonischemic cardiomyopathy (CMS/HCC).  NYHA stage C; functional class II.  Today, euvolemic and perfused.  Today, we discussed continuing bisoprolol as her only agent as she is had problems with orthostasis in the past with ACE inhibitors.  Additionally, she is had normalization of her LVEF that has been sustained.  I have encouraged medication compliance and a low-sodium diet.  I have asked her to contact me with any issues.    2. Nonrheumatic aortic valve insufficiency/Nonrheumatic mitral valve regurgitation. ACC stage B.  There are no surgical indications at this time.  · The patient has been advised to remain in clinical surveillance every 12 months.  · Signs and symptoms of worsening valve disease discussed.  I've asked the patient contact me for an earlier appointment if these develop.  · I've recommended a repeat 2D TTE every 3 years.  · TTE due: 2022.  No indication based on 2017 ACC/AHA guidelines for IE prophylaxis for dental procedures: Optimal oral health is recommended through regular professional dental care and the use of appropriate dental products, such as manual, powered, and ultrasonic toothbrushes; dental floss; and other plaque-removal devices    3. Pericardial effusion. The prevalence of pericardial effusion is about 3%.  The patient is currently Asymptomatic.  The size of the effusion  is small.  I briefly discussed the etiology of pericardial effusions.  A handout was also offered to the patient which explained the disease process, including signs of worsening pericardial effusion.  At this point I have counseled conservative management.   -Will repeat a 2D TTE in 3 months  -Please call for worsening signs or symptoms.      4. Abnormal stress echocardiogram.  There is the suggestion of inferior wall ischemia.  However, her LVEF is normal and there was no evidence of TID.  I did discuss with her today options including invasive coronary angiography.  She tells me at this point that she does not desire an invasive approach.  Risks and benefits of this were discussed.  She is also hesitant to add medications because she already takes several.  She is already on bisoprolol and I do not think she will tolerate any up-titration.  She is not on aspirin 81 mg.  The risks and benefits of this were discussed and she is agreeable to proceeding with a daily aspirin.  I have asked her to contact me if she has any other chest pain episodes so that we can discuss coronary angiography.  I did recommend close follow-up in 1 month so that I can see how she is responded to her daily aspirin therapy.  911 precautions were provided.    5. Cardiac Risk Assessments based on 2019 ACCF guidelines:  A team-based care approach is recommended for the control of risk factors associated with ASCAD.  As such, Daysi Pa was requested to have ongoing follow-up with their PCP. Continue follow-up visits with PCP for monitoring of labs; diet emphasizing intake of vegetables, fruits, nuts, whole grains and fish is recommended. Physical activity recommendations were provided. Essential HTN is a significant risk factor for stroke, heart disease and vascular disease. I've recommended the patient continue current medications, if any, as prescribed by the primary care provider. I recommended they have close follow-up  for ongoing mgmt of this and the medical comorbidities associated with HTN with their PCP.  They were also provided with information regarding maintaining a healthy weight, heart-healthy dietary pattern DASH information.  Goal blood pressure less than 130/80.  The patient's BMI is recommended to be calculated at least annually.  The patient's BMI is Body mass index is 23.37 kg/m²..  This places the patient in weight class:  Normal: 18.5-24.9kg/m2. They have been asked to have close PCP follow-up.  Tobacco status assessed at every visits.  The patient's nicotine status: is a former smoker      Return in about 1 month (around 12/25/2019).

## 2019-11-25 NOTE — PATIENT INSTRUCTIONS
Aspirin and Your Heart    Aspirin is a medicine that prevents the cells in the blood that are used for clotting, called platelets, from sticking together. Aspirin can be used to help reduce the risk of blood clots, heart attacks, and other heart-related problems.  Can I take aspirin?  Your health care provider will help you determine whether it is safe and beneficial for you to take aspirin daily. Taking aspirin daily may be helpful if you:  · Have had a heart attack or chest pain.  · Are at risk for a heart attack.  · Have undergone open-heart surgery, such as coronary artery bypass surgery (CABG).  · Have had coronary angioplasty or a stent.  · Have had certain types of stroke or transient ischemic attack (TIA).  · Have peripheral artery disease (PAD).  · Have chronic heart rhythm problems such as atrial fibrillation and cannot take an anticoagulant.  · Have valve disease or have had surgery on a valve.  What are the risks?  Daily use of aspirin can cause side effects. Some of these include:  · Bleeding. Bleeding problems can be minor or serious. An example of a minor problem is a cut that does not stop bleeding. An example of a more serious problem is stomach bleeding or, rarely, bleeding into the brain. Your risk of bleeding is increased if you are also taking non-steroidal anti-inflammatory drugs (NSAIDs).  · Increased bruising.  · Upset stomach.  · An allergic reaction. People who have nasal polyps have an increased risk of developing an aspirin allergy.  General guidelines  · Take aspirin only as told by your health care provider. Make sure that you understand how much you should take and what form you should take. The two forms of aspirin are:  ? Non-enteric-coated.This type of aspirin does not have a coating and is absorbed quickly. This type of aspirin also comes in a chewable form.  ? Enteric-coated. This type of aspirin has a coating that releases the medicine very slowly. Enteric-coated aspirin might  cause less stomach upset than non-enteric-coated aspirin. This type of aspirin should not be chewed or crushed.  · Limit alcohol intake to no more than 1 drink a day for nonpregnant women and 2 drinks a day for men. Drinking alcohol increases your risk of bleeding. One drink equals 12 oz of beer, 5 oz of wine, or 1½ oz of hard liquor.  Contact a health care provider if you:  · Have unusual bleeding or bruising.  · Have stomach pain or nausea.  · Have ringing in your ears.  · Have an allergic reaction that causes:  ? Hives.  ? Itchy skin.  ? Swelling of the lips, tongue, or face.  Get help right away if you:  · Notice that your bowel movements are bloody, dark red, or black in color.  · Vomit or cough up blood.  · Have blood in your urine.  · Cough, have noisy breathing (wheeze), or feel short of breath.  · Have chest pain, especially if the pain spreads to the arms, back, neck, or jaw.  · Have a severe headache, or a headache with confusion, or dizziness.  These symptoms may represent a serious problem that is an emergency. Do not wait to see if the symptoms will go away. Get medical help right away. Call your local emergency services (911 in the [place] Do not drive yourself to the hospital.  Summary  · Aspirin can be used to help reduce the risk of blood clots, heart attacks, and other heart-related problems.  · Daily use of aspirin can increase your risk of side effects. Your health care provider will help you determine whether it is safe and beneficial for you to take aspirin daily.  · Take aspirin only as told by your health care provider. Make sure that you understand how much you can take and what form you can take.  This information is not intended to replace advice given to you by your health care provider. Make sure you discuss any questions you have with your health care provider.  Document Released: 11/30/2009 Document Revised: 10/18/2018 Document Reviewed: 10/18/2018  [employer] Patient  Education © 2019 [employer]

## 2019-12-09 RX ORDER — ATOMOXETINE 40 MG/1
40 CAPSULE ORAL DAILY
Qty: 30 CAPSULE | Refills: 1 | Status: ON HOLD | OUTPATIENT
Start: 2019-12-09 | End: 2020-01-21

## 2019-12-09 NOTE — PROGRESS NOTES
Patient does endorse improvement in her concentration. Feels like it is helping some but needs to be increased. Will send Strattera 40mg to [facility].

## 2019-12-13 ENCOUNTER — HOSPITAL ENCOUNTER (OUTPATIENT)
Dept: INFUSION THERAPY | Facility: HOSPITAL | Age: 64
Discharge: HOME | End: 2019-12-13
Admitting: FAMILY MEDICINE

## 2019-12-13 ENCOUNTER — APPOINTMENT (OUTPATIENT)
Dept: ONCOLOGY | Facility: HOSPITAL | Age: 64
End: 2019-12-13

## 2019-12-13 ENCOUNTER — TELEPHONE (OUTPATIENT)
Dept: FAMILY MEDICINE CLINIC | Facility: CLINIC | Age: 64
End: 2019-12-13

## 2019-12-13 VITALS
SYSTOLIC BLOOD PRESSURE: 140 MMHG | TEMPERATURE: 97.9 F | DIASTOLIC BLOOD PRESSURE: 67 MMHG | HEART RATE: 75 BPM | RESPIRATION RATE: 18 BRPM

## 2019-12-13 DIAGNOSIS — M81.0 OSTEOPOROSIS WITHOUT CURRENT PATHOLOGICAL FRACTURE, UNSPECIFIED OSTEOPOROSIS TYPE: Primary | ICD-10-CM

## 2019-12-13 LAB
CALCIUM SPEC-SCNC: 10.2 MG/DL (ref 8.6–10.5)
MAGNESIUM SERPL-MCNC: 1.9 MG/DL (ref 1.6–2.4)
PHOSPHATE SERPL-MCNC: 4.4 MG/DL (ref 2.5–4.5)

## 2019-12-13 PROCEDURE — 84100 ASSAY OF PHOSPHORUS: CPT | Performed by: FAMILY MEDICINE

## 2019-12-13 PROCEDURE — 96372 THER/PROPH/DIAG INJ SC/IM: CPT | Performed by: NURSE PRACTITIONER

## 2019-12-13 PROCEDURE — 25010000002 DENOSUMAB 60 MG/ML SOLUTION PREFILLED SYRINGE: Performed by: FAMILY MEDICINE

## 2019-12-13 PROCEDURE — 82310 ASSAY OF CALCIUM: CPT | Performed by: FAMILY MEDICINE

## 2019-12-13 PROCEDURE — 83735 ASSAY OF MAGNESIUM: CPT | Performed by: FAMILY MEDICINE

## 2019-12-13 PROCEDURE — 36415 COLL VENOUS BLD VENIPUNCTURE: CPT | Performed by: NURSE PRACTITIONER

## 2019-12-13 RX ADMIN — DENOSUMAB 60 MG: 60 INJECTION SUBCUTANEOUS at 10:58

## 2019-12-13 NOTE — TELEPHONE ENCOUNTER
That is okay. She needs to keep checking it daily for me and let me know.      This document has been electronically signed by Olimpia Robison MD on December 13, 2019 11:06 AM

## 2019-12-13 NOTE — TELEPHONE ENCOUNTER
Pt called and Dr. Robison increased her atomoxetine (STRATTERA) 40 MG capsule when she was last in and she is having some bp issues to where her bp is reading 140/80 and she said for her that is high since she has heart issues. Her number to call back is [phone]. She doesn't know if she needs to stop the medication or not.        Thank you,      Rosy

## 2019-12-23 ENCOUNTER — OFFICE VISIT (OUTPATIENT)
Dept: CARDIOLOGY | Facility: CLINIC | Age: 64
End: 2019-12-23

## 2019-12-23 VITALS
WEIGHT: 132.8 LBS | OXYGEN SATURATION: 98 % | DIASTOLIC BLOOD PRESSURE: 82 MMHG | SYSTOLIC BLOOD PRESSURE: 116 MMHG | BODY MASS INDEX: 23.53 KG/M2 | HEART RATE: 98 BPM | HEIGHT: 63 IN

## 2019-12-23 DIAGNOSIS — E78.2 MIXED HYPERLIPIDEMIA: ICD-10-CM

## 2019-12-23 DIAGNOSIS — I35.1 NONRHEUMATIC AORTIC VALVE INSUFFICIENCY: ICD-10-CM

## 2019-12-23 DIAGNOSIS — R06.09 DYSPNEA ON EXERTION: Primary | ICD-10-CM

## 2019-12-23 DIAGNOSIS — I34.0 NONRHEUMATIC MITRAL VALVE REGURGITATION: Chronic | ICD-10-CM

## 2019-12-23 DIAGNOSIS — R07.89 CHEST PAIN, ATYPICAL: ICD-10-CM

## 2019-12-23 DIAGNOSIS — R94.39 ABNORMAL STRESS ECHOCARDIOGRAM: ICD-10-CM

## 2019-12-23 DIAGNOSIS — I42.8 NONISCHEMIC CARDIOMYOPATHY (HCC): ICD-10-CM

## 2019-12-23 DIAGNOSIS — I31.39 PERICARDIAL EFFUSION: ICD-10-CM

## 2019-12-23 PROCEDURE — 99214 OFFICE O/P EST MOD 30 MIN: CPT | Performed by: INTERNAL MEDICINE

## 2019-12-23 RX ORDER — ISOSORBIDE MONONITRATE 30 MG/1
30 TABLET, EXTENDED RELEASE ORAL DAILY
Qty: 30 TABLET | Refills: 11 | Status: SHIPPED | OUTPATIENT
Start: 2019-12-23 | End: 2020-12-21

## 2019-12-23 NOTE — PROGRESS NOTES
Cardiovascular Medicine   Stuart Sears M.D., Ph.D., [facility]    The patient returns to cardiology clinic for follow-up of the following cardiac problems:    PROBLEM LIST:    1. 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 EF initially 15%--now improved  2. MR  3. AI  4. Pericardial effusion  5. Abnormal DSE in inferior wall, 2019    Daysi Pa is a 64 y.o. female who returns to clinic today.  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  She is on Bisoprolol only as she had OH associated with medication. Fortunately, she has had normalization of her LV EF. She continues with exercise intolerance and MAURICIO.  She uses Furosemide daily.  She's medication compliant.  Denies side effects. She has had no ED visits or admissions for ADHF.   Most recent TTE showed normalization of left ventricular ejection fraction.  She does have known AI and MR. Her most recent TTE continues to show mild valve disease.  Her next surveillance TTE is due in 2019. Today, she does complain of CP. This is a squeezing sensation. It is a pressure. It is non-exertional. She also now has a small pericardial effusion. She was having CP at her last appt so she was sent for a DSE. This showed inferior ischemia, with normal LV EF and no TID. She is on Bisoprolol. She declined C, but presents today to discuss it. She continues with non-exertional CP.   Review of Systems   Cardiovascular: Positive for chest pain and dyspnea on exertion. Negative for claudication, cyanosis, irregular heartbeat, leg swelling, near-syncope, orthopnea, palpitations, paroxysmal nocturnal dyspnea and syncope.   Respiratory: Positive for shortness of breath. Negative for cough, hemoptysis, sleep disturbances due to breathing, snoring and wheezing.          Current Outpatient Medications on File Prior to Visit   Medication Sig Dispense Refill   • albuterol (PROAIR HFA) 108 (90 BASE) MCG/ACT inhaler 2 puffs 4 (four) times a day as needed.     • AMITIZA 24 MCG capsule  TAKE 1 CAPSULE BY MOUTH TWICE A  capsule 3   • aspirin 81 MG tablet Take 1 tablet by mouth Daily. 30 tablet 11   • atomoxetine (STRATTERA) 40 MG capsule Take 1 capsule by mouth Daily. 30 capsule 1   • bisoprolol (ZEBeta) 5 MG tablet Take 1 tablet by mouth Daily. 30 tablet 6   • desloratadine (CLARINEX) 5 MG tablet Take 1 tablet by mouth Daily. 90 tablet 3   • docusate sodium (COLACE) 100 MG capsule Take 200 mg by mouth every night. at bedtime     • esomeprazole (nexIUM) 40 MG capsule TAKE 1 CAPSULE BY MOUTH EVERY DAY 90 capsule 3   • furosemide (LASIX) 20 MG tablet TAKE 1 TABLET BY MOUTH EVERY DAY 90 tablet 1   • gabapentin (NEURONTIN) 300 MG capsule Take 1 capsule by mouth 2 (Two) Times a Day. 180 capsule 0   • hydroxychloroquine (PLAQUENIL) 200 MG tablet Take 0.5 tablets by mouth Daily. (Patient taking differently: Take 200 mg by mouth Daily.) 45 tablet 3   • metoclopramide (REGLAN) 10 MG tablet Take 1 tablet by mouth 4 (Four) Times a Day. 270 tablet 3   • NARCAN 4 MG/0.1ML nasal spray GIVE 1 SPRAY INTRANASAL FOR EMERGENCY TREATMENT OF OPIOID OVERDOSE, IF NO RESPONSE REPEAT IN 2 MINUTES  0   • OxyCODONE HCl 5 MG tablet  Take 1 tablet by mouth every 6 (six) hours as needed.     • OxyCODONE HCl ER (OXYCONTIN) 30 MG tablet extended-release 12 hour Take 30 mg by mouth 2 (two) times a day. OxyContin 30 mg tablet,crush resistant,extended release     • polyethylene glycol (MIRALAX) powder TAKE 17 GRAMS TWICE A DAY 1530 g 2   • RELISTOR 150 MG tablet   0   • SYNTHROID 88 MCG tablet TAKE 1 TABLET BY MOUTH DAILY. BRAND NAME MEDICALLY NECESSARY 90 tablet 3   • traZODone (DESYREL) 150 MG tablet TAKE 1 TABLET BY MOUTH EVERYDAY AT BEDTIME 90 tablet 3   • venlafaxine XR (EFFEXOR-XR) 150 MG 24 hr capsule Take 1 capsule by mouth Daily. 90 capsule 3   • venlafaxine XR (EFFEXOR-XR) 75 MG 24 hr capsule Take 1 capsule by mouth Daily. 90 capsule 3     Current Facility-Administered Medications on File Prior to Visit   Medication  Dose Route Frequency Provider Last Rate Last Dose   • cyanocobalamin injection 1,000 mcg  1,000 mcg Intramuscular Q28 Days Olimpia Robison MD   1,000 mcg at 19 1039   • lidocaine-EPINEPHrine (XYLOCAINE W/EPI) 1 %-1:[id number] injection 10 mL  10 mL Infiltration Once Patrick Skinner MD         Allergies   Allergen Reactions   • Cephalosporins Nausea And Vomiting   • Erythromycin Nausea Only   • Morphine And Related Itching   • Other Nausea And Vomiting     Cipro   • Penicillins Hives   • Shellfish-Derived Products Hives and Other (See Comments)     Other reaction(s): SOA   • Zithromax [Azithromycin] Hives and Nausea And Vomiting   • Zolpidem Mental Status Change   • Hydrocodone-Acetaminophen Anxiety   • Sulfa Antibiotics Hives and Rash     Sulfa (Sulfonamide Antibiotics)     Social History     Socioeconomic History   • Marital status:      Spouse name: Not on file   • Number of children: Not on file   • Years of education: Not on file   • Highest education level: Not on file   Tobacco Use   • Smoking status: Former Smoker     Years: 30.00     Last attempt to quit: 2008     Years since quittin.3   • Smokeless tobacco: Never Used   • Tobacco comment: PT SMOKED FOR 30 YEARS AND QUIT IN    Substance and Sexual Activity   • Alcohol use: No   • Drug use: No   • Sexual activity: Defer         Physical Exam:  Vitals:    19 1452   BP: 116/82   Pulse: 98   SpO2: 98%     Body mass index is 23.52 kg/m².    GEN: alert, appears stated age and cooperative  Body Habitus: well-nourished  HEENT: Head: Normocephalic, no lesions, without obvious abnormality.  JVP: 6 cm of water at 45 degrees HJR: absent      [place]:  normal size and placement Palpable S4: Not present.   Heart rate: normal  Heart Rhythm: regular     Heart Sounds: S1: normal intensity  S2: normal intensity  S3: absent   S4: absent  Opening Snap: absent  A2-OS:  N/A  Pericardial rub: absent    Ejection click: None      Murmurs: Systolic:  none  Diastolic: none  Extremity: Moves spontaneously      DATA:        Results for orders placed in visit on 09/13/19   Adult Stress Echo W/ Cont or Stress Agent if Necessary Per Protocol    Narrative · Resting left ventricle systolic function is normal. Estimated LVEF is   56-60%.  · Target heart rate achieved. Stress ECG with abnormality consistent with   ischemia.  · Stress LVEF hyperdynamic at greater than 70%.  · The following left ventricular wall segments are hypokinetic: basal   inferolateral, basal inferoseptal, basal inferior and basal inferoseptal.  · Abnormal stress echo with echocardiographic evidence for myocardial   ischemia located in the inferior wall.              RECOMMENDATIONS:       1. Nonischemic cardiomyopathy (CMS/HCC).  NYHA stage C; functional class II.  Today, euvolemic and perfused.  Today, we discussed continuing bisoprolol as her only agent as she is had problems with orthostasis in the past with ACE inhibitors.  Additionally, she has had normalization of her LVEF that has been sustained.  I have encouraged medication compliance and a low-sodium diet.  I have asked her to contact me with any issues. Unfortunately, she continues with exertional dyspnea.     2. Nonrheumatic aortic valve insufficiency/Nonrheumatic mitral valve regurgitation. ACC stage B.  There are no surgical indications at this time.  · The patient has been advised to remain in clinical surveillance every 12 months.  · Signs and symptoms of worsening valve disease discussed.  I've asked the patient contact me for an earlier appointment if these develop.  · I've recommended a repeat 2D TTE every 3 years.  · TTE due: 2022.  No indication based on 2017 ACC/AHA guidelines for IE prophylaxis for dental procedures: Optimal oral health is recommended through regular professional dental care and the use of appropriate dental products, such as manual, powered, and ultrasonic toothbrushes; dental floss; and other plaque-removal  devices    3. Pericardial effusion. The prevalence of pericardial effusion is about 3%.  The patient is currently Asymptomatic.  The size of the effusion is small.  I briefly discussed the etiology of pericardial effusions.  A handout was also offered to the patient which explained the disease process, including signs of worsening pericardial effusion.  At this point I have counseled conservative management.   -Will repeat a 2D TTE in 3 months  -Please call for worsening signs or symptoms.    4. Abnormal stress echocardiogram and CPS.  There is the suggestion of inferior wall ischemia on her SE.  However, her LVEF is normal and there was no evidence of TID. She did not initially want to proceed with LHC. I left her on Bisoprolol and started an ASA. Unfortunately, she has continued with CPS. I would like to add Imdur and schedule her LHC. She is agreeable.     5. Cardiac Risk Assessments based on 2019 ACCF guidelines:  A team-based care approach is recommended for the control of risk factors associated with ASCAD.  As such, Daysi Johnson Thierno was requested to have ongoing follow-up with their PCP. Continue follow-up visits with PCP for monitoring of labs; diet emphasizing intake of vegetables, fruits, nuts, whole grains and fish is recommended. Physical activity recommendations were provided. Essential HTN is a significant risk factor for stroke, heart disease and vascular disease. I've recommended the patient continue current medications, if any, as prescribed by the primary care provider. I recommended they have close follow-up for ongoing mgmt of this and the medical comorbidities associated with HTN with their PCP.  They were also provided with information regarding maintaining a healthy weight, heart-healthy dietary pattern DASH information.  Goal blood pressure less than 130/80.  The patient's BMI is recommended to be calculated at least annually.  The patient's BMI is Body mass index is 23.52 kg/m²..   This places the patient in weight class:  Normal: 18.5-24.9kg/m2. They have been asked to have close PCP follow-up.  Tobacco status assessed at every visits.  The patient's nicotine status: is a former smoker    6. Dyspnea. She has abnormal SE. No evidence of PAH. PFTs were in 2017 and abnormal. I would like to exclude HFpEF and PAH.    The indications, risks and benefits of diagnostic right  heart cardiac catheterization, angiography, conscious sedation, and possible blood transfusion were discussed in detail with the patient. The increased risks that are present with pulmonary arterial hypertension with right heart catheterization were emphasized. I have cited a complication rate of 1.1% with total adverse events. This includes a 0.3% risk of inpatient admission due to a complication. I have cited a 0.5% risk of fatality. This includes the risk of PA perforation. I have cited a risk of hematoma, vagal reactions and pneumothorax as <1%. Pulmonary vasoreactivity testing, if indicated, can cause hypotension, bronchospasm, chest pain and SOB. Pulmonary angiography, if indicated, can rarely cause bronchospasm and one reported death has been cited due to intrapulmonary hemorrhage. I have also discussed the possible risks, though low, of heart block and the need for emergency venous pacing. Additionally, I went over that if a rare complication requires a thoracotomy or median sternotomy that this would be performed emergently by CTS. The patient is willing to proceed. ASA class is ASA 3 - Patient with moderate systemic disease with functional limitations; Mallampati is I (soft palate, uvula, fauces, and tonsillar pillars visible).  -RHC, RUE access, possible VD challenge      Return in about 2 months (around 2/23/2020).

## 2019-12-23 NOTE — PATIENT INSTRUCTIONS
Coronary Angiogram  A coronary angiogram is an X-ray procedure that is used to examine the arteries in the heart. In this procedure, a dye (contrast dye) is injected through a long, thin tube (catheter). The catheter is inserted through the groin, wrist, or arm. The dye is injected into each artery, then X-rays are taken to show if there is a blockage in the arteries of the heart. This procedure can also show if you have valve disease or a disease of the aorta, and it can be used to check the overall function of your heart muscle. You may have a coronary angiogram if:  · You are having chest pain, or other symptoms of angina, and you are at risk for heart disease.  · You have an abnormal electrocardiogram (ECG) or stress test.  · You have chest pain and heart failure.  · You are having irregular heart rhythms.  · You and your health care provider determine that the benefits of the test information outweigh the risks of the procedure.  Let your health care provider know about:  · Any allergies you have, including allergies to contrast dye.  · All medicines you are taking, including vitamins, herbs, eye drops, creams, and over-the-counter medicines.  · Any problems you or family members have had with anesthetic medicines.  · Any blood disorders you have.  · Any surgeries you have had.  · History of kidney problems or kidney failure.  · Any medical conditions you have.  · Whether you are pregnant or may be pregnant.  What are the risks?  Generally, this is a safe procedure. However, problems may occur, including:  · Infection.  · Allergic reaction to medicines or dyes that are used.  · Bleeding from the access site or other locations.  · Kidney injury, especially in people with impaired kidney function.  · Stroke (rare).  · Heart attack (rare).  · Damage to other structures or organs.  What happens before the procedure?  Staying hydrated  Follow instructions from your health care provider about hydration, which may  include:  · Up to 2 hours before the procedure - you may continue to drink clear liquids, such as water, clear fruit juice, black coffee, and plain tea.  Eating and drinking restrictions  Follow instructions from your health care provider about eating and drinking, which may include:  · 8 hours before the procedure - stop eating heavy meals or foods such as meat, fried foods, or fatty foods.  · 6 hours before the procedure - stop eating light meals or foods, such as toast or cereal.  · 2 hours before the procedure - stop drinking clear liquids.  General instructions  · Ask your health care provider about:  ? Changing or stopping your regular medicines. This is especially important if you are taking diabetes medicines or blood thinners.  ? Taking medicines such as ibuprofen. These medicines can thin your blood. Do not take these medicines before your procedure if your health care provider instructs you not to, though aspirin may be recommended prior to coronary angiograms.  · Plan to have someone take you home from the hospital or clinic.  · You may need to have blood tests or X-rays done.  What happens during the procedure?  · An IV tube will be inserted into one of your veins.  · You will be given one or more of the following:  ? A medicine to help you relax (sedative).  ? A medicine to numb the area where the catheter will be inserted into an artery (local anesthetic).  · To reduce your risk of infection:  ? Your health care team will wash or sanitize their hands.  ? Your skin will be washed with soap.  ? Hair may be removed from the area where the catheter will be inserted.  · You will be connected to a continuous ECG monitor.  · The catheter will be inserted into an artery. The location may be in your groin, in your wrist, or in the fold of your arm (near your elbow).  · A type of X-ray (fluoroscopy) will be used to help guide the catheter to the opening of the blood vessel that is being examined.  · A dye will  be injected into the catheter, and X-rays will be taken. The dye will help to show where any narrowing or blockages are located in the heart arteries.  · Tell your health care provider if you have any chest pain or trouble breathing during the procedure.  · If blockages are found, your health care provider may perform another procedure, such as inserting a coronary stent.  The procedure may vary among health care providers and hospitals.  What happens after the procedure?  · After the procedure, you will need to keep the area still for a few hours, or for as long as told by your health care provider. If the procedure is done through the groin, you will be instructed to not bend and not cross your legs.  · The insertion site will be checked frequently.  · The pulse in your foot or wrist will be checked frequently.  · You may have additional blood tests, X-rays, and a test that records the electrical activity of your heart (ECG).  · Do not drive for 24 hours if you were given a sedative.  Summary  · A coronary angiogram is an X-ray procedure that is used to look into the arteries in the heart.  · During the procedure, a dye (contrast dye) is injected through a long, thin tube (catheter). The catheter is inserted through the groin, wrist, or arm.  · Tell your health care provider about any allergies you have, including allergies to contrast dye.  · After the procedure, you will need to keep the area still for a few hours, or for as long as told by your health care provider.  This information is not intended to replace advice given to you by your health care provider. Make sure you discuss any questions you have with your health care provider.  Document Released: 06/23/2004 Document Revised: 09/29/2017 Document Reviewed: 09/29/2017  [employer] Interactive Patient Education © 2019 [employer]  Pulmonary Artery Catheterization    Pulmonary artery catheterization is a procedure used to test blood movement through the heart  and to monitor the heart's function. In this procedure, a thin, flexible tube (catheter) is passed into the right side of the heart and into the main artery that carries blood from your heart to your lungs (pulmonary artery). The procedure may be used during heart or blood vessel surgery, during cardiac catheterization procedures, or to monitor serious conditions in the intensive care unit.  Tell a health care provider about:  · Any allergies you have.  · All medicines you are taking, including vitamins, herbs, eye drops, creams, and over-the-counter medicines.  · Any problems you or family members have had with anesthetic medicines.  · Any blood disorders you have.  · Any surgeries you have had.  · Any medical conditions you have.  · Whether you are pregnant or may be pregnant.  What are the risks?  Generally, this is a safe procedure. However, problems may occur, including:  · Bruising or bleeding at the catheter insertion site.  · Injury to the vein where the catheter was inserted.  · Lung puncture. This is a risk if neck or chest veins are used.  · Infection.  The following problems may also occur, but they are rare:  · Abnormal heart rhythms.  · Low blood pressure.  · Fluid buildup around the heart.  · Blocked blood vessel caused by a blood clot (embolism).  What happens before the procedure?  Medicines  Ask your health care provider about:  · Changing or stopping your regular medicines. This is especially important if you are taking diabetes medicines or blood thinners.  · Taking medicines such as aspirin and ibuprofen. These medicines can thin your blood. Do not take these medicines unless your health care provider tells you to take them.  · Taking over-the-counter medicines, vitamins, herbs, and supplements.  General instructions  · Follow instructions from your health care provider about eating or drinking restrictions.  · Plan to have someone take you home from the hospital or clinic.  · Ask your health  care provider what steps will be taken to help prevent infection. These may include:  ? Removing hair at the surgery site.  ? Washing skin with a germ-killing soap.  ? Antibiotic medicine.  What happens during the procedure?    · An IV will be inserted into one of your veins.  · You will be given one or both of the following:  ? A medicine to help you relax (sedative).  ? A medicine to numb the area (local anesthetic).  · A small incision will be made in a vein in the insertion area.  · A catheter will be inserted through the incision and into the vein. The health care provider will carefully move the catheter into the upper chamber of the heart (right atrium). X-rays may be used to help guide the catheter to the right place.  · The catheter will be threaded through two heart valves (tricuspid valve and pulmonary valve) and placed into the pulmonary artery.  · Blood pressure in the pulmonary artery will be measured as soon as the catheter is in place.  · During the procedure, your heart's rhythm will be watched constantly using an electrocardiogram (ECG).  · The catheter will be removed after tests and monitoring have been completed.  The procedure may vary among health care providers and hospitals.  What happens after the procedure?  · Your blood pressure, heart rate, breathing rate, and blood oxygen level will be monitored until you leave the hospital or clinic.  Summary  · Pulmonary artery catheterization is a procedure that is used to test blood movement through the heart and to monitor the heart's function.  · The procedure may be used during heart or blood vessel surgery, during cardiac catheterization procedures, or to monitor serious conditions in the intensive care unit.  · A thin, flexible tube (catheter) is passed into the right side of the heart and into the main artery that carries blood from your heart to your lungs (pulmonary artery).  This information is not intended to replace advice given to you by  your health care provider. Make sure you discuss any questions you have with your health care provider.  Document Released: 04/22/2008 Document Revised: 01/23/2019 Document Reviewed: 01/23/2019  [employer] Interactive Patient Education © 2019 [employer]    Pericardial Effusion    Pericardial effusion is a buildup of fluid around the heart. The heart is surrounded by a thin, double-layered sac (pericardium). When fluid builds up in this sac, it can put pressure on the heart and cause problems.  When fluid builds up in the pericardial sac and pressure on the heart increases, it becomes harder for the heart to pump blood. The fluid can prevent the heart from pumping enough blood (cardiac tamponade). This can be life-threatening.  What are the causes?  Often, the cause of pericardial effusion is not known (idiopathic effusion). In some cases, the condition may be caused by:  · Infections from a virus, fungus, parasite, or bacteria.  · Damage to the pericardium from heart surgery or a heart attack.  · Inflammatory diseases, such as rheumatoid arthritis or lupus.  · Kidney disease.  · Thyroid disease.  · Cancer or treatment for cancer, including radiation or chemotherapy.  · Certain medicines, including medicines for tuberculosis or seizures.  · Chest injury.  What are the signs or symptoms?  Pericardial effusion may not cause symptoms at first, especially if the fluid builds up slowly. In time, pressure on the heart may cause:  · Chest pain.  · Trouble with breathing.  · Pain and shortness of breath that get worse when lying down.  · Dizziness.  · Fainting.  · Cough.  · Hiccups.  · Skipped heartbeats (palpitations).  · Anxiety and confusion.  · A bluish skin color (cyanosis).  · Swollen legs and ankles.  · A feeling of fullness in the chest.  How is this diagnosed?  This condition is diagnosed based on your symptoms and testing, which may include:  · A test that creates ultrasound images of your heart  (echocardiogram).  · A test to examine the electrical functions of your heart (electrocardiogram, ECG).  · Chest X-ray.  · CT scan.  · MRI.  · Blood tests.  How is this treated?  Treatment for this condition depends on the cause of your condition and how severe your symptoms are. Treatment may include:  · Medicines, such as:  ? NSAIDs or other anti-inflammatory medicines such as steroids.  ? Antibiotic medicine.  ? Antifungal medicine.  · Hospital treatment. This may be necessary for cardiac tamponade. Treatment in the hospital may include:  ? IV fluids.  ? Breathing support.  · Surgery. This may be needed in severe cases. Surgery may include:  ? A procedure to remove fluid from the pericardium by placing a needle into it (pericardiocentesis).  ? A procedure to make a permanent opening in the pericardium (pericardial window).  ? Open heart surgery.  Follow these instructions at home:  · Take over-the-counter and prescription medicines only as told by your health care provider.  · If you were prescribed antibiotic medicine, take it as told by your health care provider. Do not stop taking the antibiotic even if you start to feel better.  · Rest as told by your health care provider. Ask your health care provider what activities are safe for you.  · Keep all follow-up visits as told by your health care provider. This is important.  Contact a health care provider if:  · You have a cough or hiccups that do not go away.  · You have severe swelling in your legs or ankles.  Get help right away if:  · You have fast or irregular heartbeats (palpitations).  · You feel dizzy or light-headed.  · You faint.  · You have chest pain.  · You have trouble breathing.  These symptoms may represent a serious problem that is an emergency. Do not wait to see if the symptoms will go away. Get medical help right away. Call your local emergency services (911 in the [place] Do not drive yourself to the hospital.  Summary  · Pericardial effusion  is a buildup of fluid around the heart. The fluid can eventually prevent the heart from pumping enough blood (cardiac tamponade), which can be life-threatening.  · Pericardial effusion may not cause symptoms at first.  · Treatment for pericardial effusion depends on the cause of your condition and how severe your symptoms are. In severe cases, hospital treatment or surgery may be required.  · Rest as told by your health care provider. Ask your health care provider what activities are safe for you.  This information is not intended to replace advice given to you by your health care provider. Make sure you discuss any questions you have with your health care provider.  Document Released: 08/15/2006 Document Revised: 01/25/2018 Document Reviewed: 01/25/2018  [employer] Interactive Patient Education © 2019 [employer]    Isosorbide Mononitrate extended-release tablets  What is this medicine?  ISOSORBIDE MONONITRATE (eye quinton SOR bide mon oh SHIVANI trate) is a vasodilator. It relaxes blood vessels, increasing the blood and oxygen supply to your heart. This medicine is used to prevent chest pain caused by angina. It will not help to stop an episode of chest pain.  This medicine may be used for other purposes; ask your health care provider or pharmacist if you have questions.  COMMON BRAND NAME(S): Imdur, Isotrate ER  What should I tell my health care provider before I take this medicine?  They need to know if you have any of these conditions:  -previous heart attack or heart failure  -an unusual or allergic reaction to isosorbide mononitrate, nitrates, other medicines, foods, dyes, or preservatives  -pregnant or trying to get pregnant  -breast-feeding  How should I use this medicine?  Take this medicine by mouth with a glass of water. Follow the directions on the prescription label. Do not crush or chew. Take your medicine at regular intervals. Do not take your medicine more often than directed. Do not stop taking this  medicine except on the advice of your doctor or health care professional.  Talk to your pediatrician regarding the use of this medicine in children. Special care may be needed.  Overdosage: If you think you have taken too much of this medicine contact a poison control center or emergency room at once.  NOTE: This medicine is only for you. Do not share this medicine with others.  What if I miss a dose?  If you miss a dose, take it as soon as you can. If it is almost time for your next dose, take only that dose. Do not take double or extra doses.  What may interact with this medicine?  Do not take this medicine with any of the following medications:  -medicines used to treat erectile dysfunction (ED) like avanafil, sildenafil, tadalafil, and vardenafil  -riociguat  This medicine may also interact with the following medications:  -medicines for high blood pressure  -other medicines for angina or heart failure  This list may not describe all possible interactions. Give your health care provider a list of all the medicines, herbs, non-prescription drugs, or dietary supplements you use. Also tell them if you smoke, drink alcohol, or use illegal drugs. Some items may interact with your medicine.  What should I watch for while using this medicine?  Check your heart rate and blood pressure regularly while you are taking this medicine. Ask your doctor or health care professional what your heart rate and blood pressure should be and when you should contact him or her. Tell your doctor or health care professional if you feel your medicine is no longer working.  You may get dizzy. Do not drive, use machinery, or do anything that needs mental alertness until you know how this medicine affects you. To reduce the risk of dizzy or fainting spells, do not sit or stand up quickly, especially if you are an older patient. Alcohol can make you more dizzy, and increase flushing and rapid heartbeats. Avoid alcoholic drinks.  Do not treat  yourself for coughs, colds, or pain while you are taking this medicine without asking your doctor or health care professional for advice. Some ingredients may increase your blood pressure.  What side effects may I notice from receiving this medicine?  Side effects that you should report to your doctor or health care professional as soon as possible:  -bluish discoloration of lips, fingernails, or palms of hands  -irregular heartbeat, palpitations  -low blood pressure  -nausea, vomiting  -persistent headache  -unusually weak or tired  Side effects that usually do not require medical attention (report to your doctor or health care professional if they continue or are bothersome):  -flushing of the face or neck  -rash  This list may not describe all possible side effects. Call your doctor for medical advice about side effects. You may report side effects to FDA at 4-270-FDA-3165.  Where should I keep my medicine?  Keep out of the reach of children.  Store between 15 and 30 degrees C (59 and 86 degrees F). Keep container tightly closed. Throw away any unused medicine after the expiration date.  NOTE: This sheet is a summary. It may not cover all possible information. If you have questions about this medicine, talk to your doctor, pharmacist, or health care provider.  © 2019 [employer] (2014-10-17 14:48:19)

## 2019-12-31 ENCOUNTER — TELEPHONE (OUTPATIENT)
Dept: CARDIOLOGY | Facility: CLINIC | Age: 64
End: 2019-12-31

## 2019-12-31 NOTE — TELEPHONE ENCOUNTER
Stuart Sears MD PhD  Gardenia Jamison RN              She needs a LHC/RHC. The LHC can be [place]      Right and Left heart scheduled. Procedure explained and discussed with patient.  Date and instructions given. Patient verbalized understanding.

## 2019-12-31 NOTE — TELEPHONE ENCOUNTER
Call patient   Message Contents   Stuart Sears MD PhD  Gardenia Jamison RN             She needs a LHC/RHC. The LHC can be [place]      Tried to call patient to schedule. Left message to call me at office.

## 2020-01-02 RX ORDER — DESLORATADINE 5 MG/1
TABLET ORAL
Qty: 90 TABLET | Refills: 3 | Status: ON HOLD | OUTPATIENT
Start: 2020-01-02 | End: 2020-01-21

## 2020-01-14 DIAGNOSIS — R94.39 ABNORMAL STRESS ECHOCARDIOGRAM: Primary | ICD-10-CM

## 2020-01-14 DIAGNOSIS — E78.2 MIXED HYPERLIPIDEMIA: ICD-10-CM

## 2020-01-14 DIAGNOSIS — R06.09 DYSPNEA ON EXERTION: ICD-10-CM

## 2020-01-14 DIAGNOSIS — I42.8 NICM (NONISCHEMIC CARDIOMYOPATHY) (HCC): ICD-10-CM

## 2020-01-14 RX ORDER — ASPIRIN 325 MG
325 TABLET ORAL ONCE
Status: CANCELLED | OUTPATIENT
Start: 2020-01-21 | End: 2020-01-14

## 2020-01-14 RX ORDER — ASPIRIN 81 MG/1
325 TABLET ORAL DAILY
Status: CANCELLED | OUTPATIENT
Start: 2020-01-21

## 2020-01-14 RX ORDER — SODIUM CHLORIDE 9 MG/ML
100 INJECTION, SOLUTION INTRAVENOUS CONTINUOUS
Status: CANCELLED | OUTPATIENT
Start: 2020-01-21

## 2020-01-21 ENCOUNTER — HOSPITAL ENCOUNTER (OUTPATIENT)
Facility: HOSPITAL | Age: 65
Setting detail: HOSPITAL OUTPATIENT SURGERY
Discharge: HOME | End: 2020-01-21
Attending: INTERNAL MEDICINE | Admitting: INTERNAL MEDICINE

## 2020-01-21 VITALS
HEIGHT: 63 IN | HEART RATE: 94 BPM | BODY MASS INDEX: 22.81 KG/M2 | TEMPERATURE: 97.4 F | RESPIRATION RATE: 18 BRPM | WEIGHT: 128.75 LBS | OXYGEN SATURATION: 98 % | SYSTOLIC BLOOD PRESSURE: 150 MMHG | DIASTOLIC BLOOD PRESSURE: 94 MMHG

## 2020-01-21 DIAGNOSIS — R06.09 DYSPNEA ON EXERTION: ICD-10-CM

## 2020-01-21 DIAGNOSIS — I42.8 NICM (NONISCHEMIC CARDIOMYOPATHY) (HCC): ICD-10-CM

## 2020-01-21 DIAGNOSIS — R94.39 ABNORMAL STRESS ECHOCARDIOGRAM: ICD-10-CM

## 2020-01-21 DIAGNOSIS — E78.2 MIXED HYPERLIPIDEMIA: ICD-10-CM

## 2020-01-21 LAB
ANION GAP SERPL CALCULATED.3IONS-SCNC: 14 MMOL/L (ref 5–15)
BUN BLD-MCNC: 8 MG/DL (ref 8–23)
BUN/CREAT SERPL: 8.9 (ref 7–25)
CALCIUM SPEC-SCNC: 9.6 MG/DL (ref 8.6–10.5)
CHLORIDE SERPL-SCNC: 94 MMOL/L (ref 98–107)
CO2 SERPL-SCNC: 28 MMOL/L (ref 22–29)
CREAT BLD-MCNC: 0.9 MG/DL (ref 0.57–1)
DEPRECATED RDW RBC AUTO: 38.5 FL (ref 37–54)
ERYTHROCYTE [DISTWIDTH] IN BLOOD BY AUTOMATED COUNT: 12.1 % (ref 12.3–15.4)
GFR SERPL CREATININE-BSD FRML MDRD: 63 ML/MIN/1.73
GLUCOSE BLD-MCNC: 81 MG/DL (ref 65–99)
HCT VFR BLD AUTO: 35 % (ref 34–46.6)
HGB BLD-MCNC: 11.9 G/DL (ref 12–15.9)
INR PPP: 1.05 (ref 0.8–1.2)
MCH RBC QN AUTO: 29.5 PG (ref 26.6–33)
MCHC RBC AUTO-ENTMCNC: 34 G/DL (ref 31.5–35.7)
MCV RBC AUTO: 86.8 FL (ref 79–97)
OXYGEN SATURATION, PULMONARY ARTERY: 68.3 % (ref 94–100)
OXYGEN SATURATION, RIGHT ATRIUM: 66.2 % (ref 94–100)
OXYGEN SATURATION, RIGHT VENTRICLE: 68.4 % (ref 94–100)
OXYGEN SATURATION, SUPERIOR VENA CAVA: 64.6 % (ref 94–100)
PLATELET # BLD AUTO: 298 10*3/MM3 (ref 140–450)
PMV BLD AUTO: 11.1 FL (ref 6–12)
POTASSIUM BLD-SCNC: 4.1 MMOL/L (ref 3.5–5.2)
PROTHROMBIN TIME: 13.5 SECONDS (ref 11.1–15.3)
RBC # BLD AUTO: 4.03 10*6/MM3 (ref 3.77–5.28)
SODIUM BLD-SCNC: 136 MMOL/L (ref 136–145)
WBC NRBC COR # BLD: 6.25 10*3/MM3 (ref 3.4–10.8)

## 2020-01-21 PROCEDURE — C1894 INTRO/SHEATH, NON-LASER: HCPCS | Performed by: INTERNAL MEDICINE

## 2020-01-21 PROCEDURE — 85027 COMPLETE CBC AUTOMATED: CPT | Performed by: INTERNAL MEDICINE

## 2020-01-21 PROCEDURE — 82810 BLOOD GASES O2 SAT ONLY: CPT | Performed by: INTERNAL MEDICINE

## 2020-01-21 PROCEDURE — 85610 PROTHROMBIN TIME: CPT | Performed by: INTERNAL MEDICINE

## 2020-01-21 PROCEDURE — C1751 CATH, INF, PER/CENT/MIDLINE: HCPCS | Performed by: INTERNAL MEDICINE

## 2020-01-21 PROCEDURE — 25010000002 MIDAZOLAM PER 1 MG: Performed by: INTERNAL MEDICINE

## 2020-01-21 PROCEDURE — 0 IOPAMIDOL PER 1 ML: Performed by: INTERNAL MEDICINE

## 2020-01-21 PROCEDURE — 93460 R&L HRT ART/VENTRICLE ANGIO: CPT | Performed by: INTERNAL MEDICINE

## 2020-01-21 PROCEDURE — 93458 L HRT ARTERY/VENTRICLE ANGIO: CPT | Performed by: INTERNAL MEDICINE

## 2020-01-21 PROCEDURE — C1769 GUIDE WIRE: HCPCS | Performed by: INTERNAL MEDICINE

## 2020-01-21 PROCEDURE — 80048 BASIC METABOLIC PNL TOTAL CA: CPT | Performed by: INTERNAL MEDICINE

## 2020-01-21 PROCEDURE — 25010000002 HEPARIN (PORCINE) PER 1000 UNITS: Performed by: INTERNAL MEDICINE

## 2020-01-21 PROCEDURE — 25010000002 FENTANYL CITRATE (PF) 100 MCG/2ML SOLUTION: Performed by: INTERNAL MEDICINE

## 2020-01-21 PROCEDURE — 93451 RIGHT HEART CATH: CPT | Performed by: INTERNAL MEDICINE

## 2020-01-21 RX ORDER — POLYETHYLENE GLYCOL 3350 17 G/17G
17 POWDER, FOR SOLUTION ORAL DAILY
COMMUNITY

## 2020-01-21 RX ORDER — DESLORATADINE 5 MG
5 TABLET ORAL DAILY
COMMUNITY
End: 2021-02-08 | Stop reason: SDUPTHER

## 2020-01-21 RX ORDER — HEPARIN SODIUM 1000 [USP'U]/ML
INJECTION, SOLUTION INTRAVENOUS; SUBCUTANEOUS AS NEEDED
Status: DISCONTINUED | OUTPATIENT
Start: 2020-01-21 | End: 2020-01-21 | Stop reason: HOSPADM

## 2020-01-21 RX ORDER — ATOMOXETINE 40 MG/1
80 CAPSULE ORAL DAILY
COMMUNITY
End: 2020-02-03

## 2020-01-21 RX ORDER — ASPIRIN 81 MG/1
325 TABLET ORAL DAILY
Status: DISCONTINUED | OUTPATIENT
Start: 2020-01-22 | End: 2020-01-21 | Stop reason: HOSPADM

## 2020-01-21 RX ORDER — MIDAZOLAM HYDROCHLORIDE 1 MG/ML
INJECTION INTRAMUSCULAR; INTRAVENOUS AS NEEDED
Status: DISCONTINUED | OUTPATIENT
Start: 2020-01-21 | End: 2020-01-21 | Stop reason: HOSPADM

## 2020-01-21 RX ORDER — TRAZODONE HYDROCHLORIDE 150 MG/1
150 TABLET ORAL NIGHTLY
COMMUNITY
End: 2020-09-21

## 2020-01-21 RX ORDER — LUBIPROSTONE 24 UG/1
24 CAPSULE ORAL
COMMUNITY
End: 2020-03-02

## 2020-01-21 RX ORDER — SODIUM CHLORIDE 9 MG/ML
100 INJECTION, SOLUTION INTRAVENOUS CONTINUOUS
Status: DISCONTINUED | OUTPATIENT
Start: 2020-01-21 | End: 2020-01-21 | Stop reason: HOSPADM

## 2020-01-21 RX ORDER — POLYETHYLENE GLYCOL 3350 17 G/17G
17 POWDER, FOR SOLUTION ORAL DAILY
Status: ON HOLD | COMMUNITY
End: 2020-01-21

## 2020-01-21 RX ORDER — HYDROXYCHLOROQUINE SULFATE 200 MG/1
200 TABLET, FILM COATED ORAL DAILY
COMMUNITY
End: 2020-03-02 | Stop reason: SDUPTHER

## 2020-01-21 RX ORDER — SODIUM CHLORIDE 9 MG/ML
3 INJECTION, SOLUTION INTRAVENOUS CONTINUOUS
Status: ACTIVE | OUTPATIENT
Start: 2020-01-21 | End: 2020-01-21

## 2020-01-21 RX ORDER — FENTANYL CITRATE 50 UG/ML
INJECTION, SOLUTION INTRAMUSCULAR; INTRAVENOUS AS NEEDED
Status: DISCONTINUED | OUTPATIENT
Start: 2020-01-21 | End: 2020-01-21 | Stop reason: HOSPADM

## 2020-01-21 RX ORDER — ESOMEPRAZOLE MAGNESIUM 40 MG/1
40 CAPSULE, DELAYED RELEASE ORAL
COMMUNITY
End: 2020-09-08

## 2020-01-21 RX ORDER — ASPIRIN 325 MG
325 TABLET ORAL ONCE
Status: COMPLETED | OUTPATIENT
Start: 2020-01-21 | End: 2020-01-21

## 2020-01-21 RX ORDER — FUROSEMIDE 20 MG/1
20 TABLET ORAL DAILY
Status: ON HOLD | COMMUNITY
End: 2020-06-30

## 2020-01-21 RX ORDER — NITROGLYCERIN 5 MG/ML
INJECTION, SOLUTION INTRAVENOUS AS NEEDED
Status: DISCONTINUED | OUTPATIENT
Start: 2020-01-21 | End: 2020-01-21 | Stop reason: HOSPADM

## 2020-01-21 RX ORDER — LIDOCAINE HYDROCHLORIDE 20 MG/ML
INJECTION, SOLUTION INFILTRATION; PERINEURAL AS NEEDED
Status: DISCONTINUED | OUTPATIENT
Start: 2020-01-21 | End: 2020-01-21 | Stop reason: HOSPADM

## 2020-01-21 RX ADMIN — ASPIRIN 325 MG: 325 TABLET ORAL at 09:51

## 2020-01-21 RX ADMIN — SODIUM CHLORIDE 100 ML/HR: 9 INJECTION, SOLUTION INTRAVENOUS at 09:50

## 2020-01-21 NOTE — H&P
[facility] Cardiology  HISTORY AND PHYSICAL  Daysi Pa  64 y.o. female    Chief complaint -  Chest pain    History of Present Illness:  This is a 64-year-old lady with history of hypertension, prior history of nonischemic cardiomyopathy with ejection fraction of 15% with subsequent improvement to normal, small pericardial effusion, mild aortic regurgitation and mild mitral regurgitation was recently seen by Dr. Sears in office with chest pain.  Subsequently patient underwent a dobutamine stress echo which was concern for ischemia in inferior wall so is here for cardiac catheterization for further evaluation.  She has been started on medical therapy with aspirin and bisoprolol, continues to have intermittent chest pain and is here for cardiac catheterization for further evaluation.  She did have a cardiac cath at the time of diagnosis for cardiomyopathy and was reported to be normal that was almost 20 years ago.  She is denying any allergies to contrast dye, no bleeding problems, no recent surgeries.          Allergies   Allergen Reactions   • Cephalosporins Nausea And Vomiting   • Erythromycin Nausea Only   • Morphine And Related Itching   • Other Nausea And Vomiting     Cipro   • Penicillins Hives   • Shellfish-Derived Products Hives and Other (See Comments)     Other reaction(s): SOA   • Zithromax [Azithromycin] Hives and Nausea And Vomiting   • Zolpidem Mental Status Change   • Hydrocodone-Acetaminophen Anxiety   • Sulfa Antibiotics Hives and Rash     Sulfa (Sulfonamide Antibiotics)         Past Medical History:   Diagnosis Date   • Acquired hypothyroidism    • Allergic rhinitis    • Allergy 04/17/2016    Consult, Allergy (1) - Ordered By: BENIGNO LIM ([facility])    • Anxiety state    • Attention deficit hyperactivity disorder    • Benign essential hypertension    • Chronic pain    • Chronic pain disorder    • Congestive heart failure (CMS/HCC)     primarily systrolic dysfunction EF  17-20% repeat echo 55%   • Depressive disorder    • Dyslipidemia    • Dysphagia    • Gastroesophageal reflux disease    • Generalized abdominal pain    • History of echocardiogram 03/23/2016    Echocardiogram W/ color flow 01128 (3) - Normal LV function wiht Ef of 60%.Normal RV size and function.Normal diastolic function.No significant valvular regurgitation or stenosis.   • History of echocardiogram 04/27/2012    Echocardiogram W/O color flow 99498 (1) - Normal left ventricular systolic function. EF 55%. No evidence of regional wall motion abnormalities. Abnormal relaxation of the left ventricular myocardium.   • History of EKG 03/07/2016    EKG Tracing and Interpretation 78203 (3) - Ordered By: LILLIE BRADY ([facility])    • History of mammogram 06/05/2013    MAMMOGRAM SCREENING 97258 - [facility] (1) - birads 0    • Hyperlipidemia    • Intraductal carcinoma in situ of breast     hx in past and s/p bilateral simple mastectomies      • Irritable bowel syndrome    • Low back pain    • Malaise and fatigue    • Microalbuminuria 07/16/2015    POS MICROALBUMINURIA RESULT DOC AND REVIEWED 3060F (1) - Ordered By: BENIGNO LIM ([facility])   • Mitral valve regurgitation     Mild-Moderate      • Myopia    • Non-organic sleep disorder    • Osteoporosis    • Pain management 04/17/2016    Consult, Pain Management (1) - Ordered By: BENIGNO LIM ([facility])    • Pneumococcal vaccination indicated 07/08/2016    PNEUMOC VAC/ADMIN/RCVD 4040F (11) - Ordered By: BENIGNO LIM ([facility])   • Primary fibromyalgia syndrome    • Rheumatoid arthritis (CMS/HCC)          Past Surgical History:   Procedure Laterality Date   • APPENDECTOMY  2008    Appendectomy (1)   • BREAST BIOPSY  06/12/2013    Stereotactic breast biopsy (1) - stereotactic left mammotome biopsy with 11 gauge needle.   • BREAST IMPLANT REMOVAL  2003    Remove breast implant (1)   • BREAST IMPLANT SURGERY  2000    Breast implantation (1)   • COLONOSCOPY  01/20/2014    Colonoscopy, diagnostic (screening)  66228 (1)   • COLONOSCOPY W/ POLYPECTOMY      Colonoscopy remove polyps 81419 (1)    • EXCISION LESION  2014    Remove chest wall lesion (1) - Excision of subcutaneous mass, left chest wall, Subcutaneous mass left chest wall, status post mastectomy.   • INJECTION OF MEDICATION  2016    B12 (44) - Ordered By: BENIGNO LIM ([facility])    • INJECTION OF MEDICATION  2015    Celestone (betamethasone) (1) - Ordered By: BENIGNO LIM ([facility])    • INJECTION OF MEDICATION  2016    Kenalog (2) - Ordered By: BENIGNO LIM ([facility])    • INJECTION OF MEDICATION  2017    Prolia   • MASTECTOMY  2013    Mastectomy (2) - Right simple prophylactic mastectomy.   • OTHER SURGICAL HISTORY  10/29/2014    SONOGRAM THYROID, NECK 02113 (1) - Ordered By: BENIGNO LIM ([facility])   • PAP SMEAR  2013     PAP SMEAR (1)   • SUBTOTAL HYSTERECTOMY  1998     Partial hyst (1)     • TONSILLECTOMY  1969    Tonsillectomy (1)   • VASCULAR SURGERY  2014    Consult, Vascular Surgery (1) - Ordered By: BENIGNO LIM ([facility])          Family History   Problem Relation Age of Onset   • Breast cancer Sister    • Rectal cancer Other         Colorectal cancer   • Heart disease Other    • Hypertension Other    • Thyroid disease Other         Thyroid disorder   • Hypertension Mother    • Migraines Mother    • Osteoporosis Mother    • Diabetes Father    • Coronary artery disease Father    • Hyperlipidemia Father    • Cancer Maternal Aunt    • COPD Paternal Grandmother    • COPD Paternal Grandfather          Social History     Socioeconomic History   • Marital status:      Spouse name: Not on file   • Number of children: Not on file   • Years of education: Not on file   • Highest education level: Not on file   Tobacco Use   • Smoking status: Former Smoker     Years: 30.00     Last attempt to quit: 2008     Years since quittin.4   • Smokeless tobacco: Never Used   • Tobacco comment: PT SMOKED FOR 30 YEARS AND QUIT IN    Substance and Sexual  Activity   • Alcohol use: No   • Drug use: No   • Sexual activity: Defer         Prior to Admission medications    Medication Sig Start Date End Date Taking? Authorizing Provider   aspirin 81 MG tablet Take 1 tablet by mouth Daily.   Yes Stuart Sears MD PhD   atomoxetine (STRATTERA) 40 MG capsule Take 80 mg by mouth Daily.   Yes Keisha Patel MD   bisoprolol (ZEBeta) 5 MG tablet Take 1 tablet by mouth Daily. 10/9/19  Yes Stuart Sears MD PhD   desloratadine (CLARINEX) 5 MG tablet Take 5 mg by mouth Daily.   Yes Keisha Patel MD   docusate sodium (COLACE) 100 MG capsule Take 200 mg by mouth every night. at bedtime   Yes Keisha Patel MD   esomeprazole (nexIUM) 40 MG capsule Take 40 mg by mouth Every Morning Before Breakfast.   Yes Keisha Patel MD   furosemide (LASIX) 20 MG tablet Take 20 mg by mouth Daily.   Yes Keisha Patel MD   gabapentin (NEURONTIN) 300 MG capsule Take 1 capsule by mouth 2 (Two) Times a Day. 10/17/18  Yes Olimpia Robison MD   hydroxychloroquine (PLAQUENIL) 200 MG tablet Take 200 mg by mouth Daily.   Yes Keisha Patel MD   isosorbide mononitrate (IMDUR) 30 MG 24 hr tablet Take 1 tablet by mouth Daily. 12/23/19  Yes Stuart Seras MD PhD   lubiprostone (AMITIZA) 24 MCG capsule Take 24 mcg by mouth Daily With Breakfast.   Yes Keisha Patel MD   OxyCODONE HCl 5 MG tablet  Take 1 tablet by mouth every 6 (six) hours as needed.   Yes Keisha Patel MD   OxyCODONE HCl ER (OXYCONTIN) 30 MG tablet extended-release 12 hour Take 30 mg by mouth 2 (two) times a day. OxyContin 30 mg tablet,crush resistant,extended release   Yes Keisha Patel MD   polyethylene glycol (MIRALAX) packet Take 17 g by mouth Daily.   Yes Keisha Patel MD   RELISTOR 150 MG tablet Take 15 mg by mouth Daily. 11/18/19  Yes Keisha Patel MD   SYNTHROID 88 MCG tablet TAKE 1 TABLET BY MOUTH DAILY. BRAND NAME MEDICALLY  NECESSARY 7/2/19  Yes Olimpia Robison MD   traZODone (DESYREL) 150 MG tablet Take 150 mg by mouth Every Night.   Yes Keisha Patel MD   venlafaxine XR (EFFEXOR-XR) 150 MG 24 hr capsule Take 1 capsule by mouth Daily. 11/20/19  Yes Olimpia Robison MD   venlafaxine XR (EFFEXOR-XR) 75 MG 24 hr capsule Take 1 capsule by mouth Daily. 11/20/19  Yes Olimpia Robison MD   AMITIZA 24 MCG capsule TAKE 1 CAPSULE BY MOUTH TWICE A DAY  Patient taking differently: Take  by mouth Daily With Breakfast. 9/30/19 1/21/20 Yes Olimpia Robison MD   atomoxetine (STRATTERA) 40 MG capsule Take 1 capsule by mouth Daily.  Patient taking differently: Take 80 mg by mouth Daily. 12/9/19 1/21/20 Yes Olimpia Robison MD   desloratadine (CLARINEX) 5 MG tablet TAKE 1 TABLET BY MOUTH EVERY DAY  Patient taking differently: Take 5 mg by mouth Daily. 1/2/20 1/21/20 Yes Olimpia Robison MD   esomeprazole (nexIUM) 40 MG capsule TAKE 1 CAPSULE BY MOUTH EVERY DAY  Patient taking differently: Take 40 mg by mouth Every Morning Before Breakfast. 9/30/19 1/21/20 Yes Olimpia Robison MD   furosemide (LASIX) 20 MG tablet TAKE 1 TABLET BY MOUTH EVERY DAY  Patient taking differently: Take 20 mg by mouth Daily. 8/30/19 1/21/20 Yes Stuart Sears MD PhD   hydroxychloroquine (PLAQUENIL) 200 MG tablet Take 0.5 tablets by mouth Daily.  Patient taking differently: Take 200 mg by mouth Daily. 11/20/19 1/21/20 Yes Olimpia Robison MD   polyethylene glycol (MIRALAX) packet Take 17 g by mouth Daily.  1/21/20 Yes Keisha Patel MD   polyethylene glycol (MIRALAX) powder TAKE 17 GRAMS TWICE A DAY  Patient taking differently: Take 17 g by mouth Daily. 12/19/17 1/21/20 Yes Olimpia Robison MD   traZODone (DESYREL) 150 MG tablet TAKE 1 TABLET BY MOUTH EVERYDAY AT BEDTIME 10/7/19 1/21/20 Yes Olimpia Robison MD   albuterol (PROAIR HFA) 108 (90 BASE) MCG/ACT inhaler Inhale 2 puffs 4 (Four) Times a Day As Needed.    Provider, Historical,  "MD   NARCAN 4 MG/0.1ML nasal spray 1 spray into the nostril(s) as directed by provider As Needed. 8/22/19   Provider, MD Keisha   metoclopramide (REGLAN) 10 MG tablet Take 1 tablet by mouth 4 (Four) Times a Day. 2/14/19 1/21/20  [facility], Olimpia Linares MD         Review of Systems:     Constitution: Denies any fatigue, fever or chills.  HENT: Denies any headache, hearing impairment.  Eyes: Denies any blurring of vision, or photophobia.  Cardiovascular:  As per history of present illness.   Respiratory system: Denies any COPD, shortness of breath.  Endocrine:  No history of hyperlipidemia, diabetes.  Musculoskeletal:  No history of arthritis with musculoskeletal problems.  Gastrointestinal: No nausea, vomiting, or melena.  Genitourinary: No dysuria or hematuria.  Neurological: No history of seizure disorder, stroke, or memory problems.    Psychiatric/Behavioral: No history of depression, bipolar disorder or schizophrenia .    Hematological: No history of easy bruising.    ROS          OBJECTIVE:    /64 (BP Location: Right arm, Patient Position: Lying)   Pulse 76   Temp 97 °F (36.1 °C) (Temporal)   Resp 21   Ht 160 cm (63\")   Wt 58.4 kg (128 lb 12 oz)   SpO2 98%   BMI 22.81 kg/m²       Physical Exam:   Constitutional: Patient is oriented to person, place, and time.   Skin: Warm and dry.  Well developed and nourished in no acute distress .  Head: Normocephalic and atraumatic.   Eyes: Pupils are equal, round, and reactive to light.   Neck: Neck supple. No bruit in the carotids, no elevation of JVD.  Cardiovascular: Sterling in the fifth intercostal space, regular rate, and rhythm,  S1 greater than S2, no S3 or S4, no gallop.  Pulmonary/Chest: Air entry is equal on both sides.  No wheezing or crackles.  Abdominal: Soft.  No hepatosplenomegaly, bowel sounds are present.  Musculoskeletal: No kyphoscoliosis.  Neurological: Alert and oriented to person, place, and time.  Cranial nerves are intact. No motor or " sensory deficit.  Extremities: No edema, no radial femoral delay.  Psychiatric: Normal mood and affect. Behavior is normal.      Lab Results (last 24 hours)     Procedure Component Value Units Date/Time    CBC (No Diff) [[id number]] Collected:  01/21/20 0936    Specimen:  Blood Updated:  01/21/20 0952    Protime-INR [[id number]] Collected:  01/21/20 0936    Specimen:  Blood Updated:  01/21/20 0951          Results for orders placed in visit on 09/13/19   Adult Stress Echo W/ Cont or Stress Agent if Necessary Per Protocol    Narrative · Resting left ventricle systolic function is normal. Estimated LVEF is   56-60%.  · Target heart rate achieved. Stress ECG with abnormality consistent with   ischemia.  · Stress LVEF hyperdynamic at greater than 70%.  · The following left ventricular wall segments are hypokinetic: basal   inferolateral, basal inferoseptal, basal inferior and basal inferoseptal.  · Abnormal stress echo with echocardiographic evidence for myocardial   ischemia located in the inferior wall.          The ASCVD Risk score (Letty EFRAIN Jr., et al., 2013) failed to calculate for the following reasons:    The patient has a prior MI or stroke diagnosis          A/P:    [facility] Pre-Op:    Invasive coronary angiography was recommended to the patient.  The patientdenies  bleeding issues. The patient reports use of antiplatelet agents.  The patient denies  CKD. The patient denies  contrast allergy. The patient denies  use of diabetes medications.    Pre-Cath Surgical History: NA    The indications, risks/benefits and alternatives of diagnostic left heart cardiac catheterization, angiography, conscious sedation, and possible blood transfusion were discussed in detail with the patient. The potential complications of 1/2000 chance of death, 1/1000 chance of heart attack or stroke, 1/500 chance of bleeding or clotting of the femoral artery, and 1/500 chance of allergic reaction to contrast were discussed. We also reviewed  possible complications of infection and kidney dysfunction. If PCI were performed and intra-coronary stents indicated, we discussed the details about JORGE L. This included a review of the risks of the infrequent, but relatively higher incidence of late thrombosis with JORGE L. The importance of maintaining a consistent daily regimen of aspirin and an additional anti-platelet agent for as long as directed after implantation was emphasized. No contraindications were found. The patient  appeared to understand and agree to the above.  -Left heart catheterization, Coronary angiography, Graft angiography, In-situ LIMA angiography, Left ventriculography, Intravascular ultrasound, Optical coherence tomography, Flow wire, Balloon Angioplasty, Coronary stent, Graft stent, Aortography, Iliofemoral angiography, Device closure, femoral artery, Intra-aortic balloon pump, Impella LV assist device implantation, Transvenous pacemaker and Pericardiocentesis    ASA Class: II  Mallampati Score: II    Recommended contrast: BMP Pending  Maximum contrast: BMP Pending    Contraindications to DAPT: None        Patient's Body mass index is 22.81 kg/m². BMI is within normal parameters. No follow-up required..      Daysi Johnson Thierno  reports that she quit smoking about 11 years ago. She quit after 30.00 years of use. She has never used smokeless tobacco..     Carmen Thompson MD  1/21/2020  9:52 AM      Part of this note may be an electronic transcription/translation of spoken language to printed text using the Dragon Dictation System.

## 2020-01-21 NOTE — BRIEF OP NOTE
CV Medicine    Status post uncomplicated RHC  Normal filling pressures        This document has been electronically signed by Stuart Sears MD PhD on January 21, 2020 11:29 AM

## 2020-02-03 ENCOUNTER — TELEPHONE (OUTPATIENT)
Dept: FAMILY MEDICINE CLINIC | Facility: CLINIC | Age: 65
End: 2020-02-03

## 2020-02-03 RX ORDER — ATOMOXETINE 40 MG/1
CAPSULE ORAL
Qty: 30 CAPSULE | Refills: 1 | Status: SHIPPED | OUTPATIENT
Start: 2020-02-03 | End: 2020-04-01

## 2020-02-26 ENCOUNTER — OFFICE VISIT (OUTPATIENT)
Dept: PULMONOLOGY | Facility: CLINIC | Age: 65
End: 2020-02-26

## 2020-02-26 DIAGNOSIS — R06.02 SOB (SHORTNESS OF BREATH): Primary | ICD-10-CM

## 2020-02-26 DIAGNOSIS — I42.8 NICM (NONISCHEMIC CARDIOMYOPATHY) (HCC): Primary | ICD-10-CM

## 2020-02-26 LAB
BH CV ECHO MEAS - BSA(HAYCOCK): 1.6 M^2
BH CV ECHO MEAS - BSA: 1.6 M^2
BH CV ECHO MEAS - BZI_BMI: 22.7 KILOGRAMS/M^2
BH CV ECHO MEAS - BZI_METRIC_HEIGHT: 160 CM
BH CV ECHO MEAS - BZI_METRIC_WEIGHT: 58.1 KG
BH CV ECHO MEAS - EDV(CUBED): 67.9 ML
BH CV ECHO MEAS - EDV(MOD-SP2): 53.1 ML
BH CV ECHO MEAS - EDV(MOD-SP4): 60.3 ML
BH CV ECHO MEAS - EDV(TEICH): 73.4 ML
BH CV ECHO MEAS - EF(CUBED): 55.3 %
BH CV ECHO MEAS - EF(MOD-BP): 52 %
BH CV ECHO MEAS - EF(MOD-SP2): 53.7 %
BH CV ECHO MEAS - EF(MOD-SP4): 52.2 %
BH CV ECHO MEAS - EF(TEICH): 47.5 %
BH CV ECHO MEAS - ESV(CUBED): 30.4 ML
BH CV ECHO MEAS - ESV(MOD-SP2): 24.6 ML
BH CV ECHO MEAS - ESV(MOD-SP4): 28.8 ML
BH CV ECHO MEAS - ESV(TEICH): 38.5 ML
BH CV ECHO MEAS - FS: 23.5 %
BH CV ECHO MEAS - IVS/LVPW: 1.2
BH CV ECHO MEAS - IVSD: 1 CM
BH CV ECHO MEAS - LV DIASTOLIC VOL/BSA (35-75): 37.7 ML/M^2
BH CV ECHO MEAS - LV MASS(C)D: 124.6 GRAMS
BH CV ECHO MEAS - LV MASS(C)DI: 77.9 GRAMS/M^2
BH CV ECHO MEAS - LV SYSTOLIC VOL/BSA (12-30): 18 ML/M^2
BH CV ECHO MEAS - LVIDD: 4.1 CM
BH CV ECHO MEAS - LVIDS: 3.1 CM
BH CV ECHO MEAS - LVLD AP2: 6.9 CM
BH CV ECHO MEAS - LVLD AP4: 6.9 CM
BH CV ECHO MEAS - LVLS AP2: 5.9 CM
BH CV ECHO MEAS - LVLS AP4: 6.3 CM
BH CV ECHO MEAS - LVPWD: 0.89 CM
BH CV ECHO MEAS - RAP SYSTOLE: 3 MMHG
BH CV ECHO MEAS - RVSP: 21.3 MMHG
BH CV ECHO MEAS - SI(CUBED): 23.5 ML/M^2
BH CV ECHO MEAS - SI(MOD-SP2): 17.8 ML/M^2
BH CV ECHO MEAS - SI(MOD-SP4): 19.7 ML/M^2
BH CV ECHO MEAS - SI(TEICH): 21.8 ML/M^2
BH CV ECHO MEAS - SV(CUBED): 37.5 ML
BH CV ECHO MEAS - SV(MOD-SP2): 28.5 ML
BH CV ECHO MEAS - SV(MOD-SP4): 31.5 ML
BH CV ECHO MEAS - SV(TEICH): 34.9 ML
BH CV ECHO MEAS - TR MAX VEL: 214 CM/SEC

## 2020-02-26 PROCEDURE — 94727 GAS DIL/WSHOT DETER LNG VOL: CPT | Performed by: INTERNAL MEDICINE

## 2020-02-26 PROCEDURE — 94729 DIFFUSING CAPACITY: CPT | Performed by: INTERNAL MEDICINE

## 2020-02-26 PROCEDURE — 94060 EVALUATION OF WHEEZING: CPT | Performed by: INTERNAL MEDICINE

## 2020-02-26 NOTE — PROCEDURES
Pulmonary Function Test  Performed by: Gaby Hines MD  Authorized by: Stuart Sears MD PhD      Pre Drug    FVC: 103%   FEV1: 59%   FEF 25-75%: 57%   FEV1/FVC: 44%   T%   DLCO: 75%     Post Drug    FVC: 100%   FEV1: 60%   FEF 25-75%: 59%   FEV1/FVC: 46%      Change in % (calculated):    FVC: -3   FEV1: 0   FEF 25-75%: 4      Interpretation   Spirometry    Spirometry shows moderate obstruction. The patient's response to bronchodilators is insignificant.  Post-bronchodilator the ratio shows moderate obstruction.  Review of FVL curve    Effort is normal.   Lung Volume Measurements  Measurements show: normal results.  Diffusion Capacity  The patient's diffusion capacity is reduced.

## 2020-02-27 ENCOUNTER — APPOINTMENT (OUTPATIENT)
Dept: LAB | Facility: HOSPITAL | Age: 65
End: 2020-02-27

## 2020-02-27 ENCOUNTER — OFFICE VISIT (OUTPATIENT)
Dept: CARDIOLOGY | Facility: CLINIC | Age: 65
End: 2020-02-27

## 2020-02-27 VITALS
WEIGHT: 128.8 LBS | HEART RATE: 76 BPM | SYSTOLIC BLOOD PRESSURE: 122 MMHG | DIASTOLIC BLOOD PRESSURE: 70 MMHG | BODY MASS INDEX: 22.82 KG/M2 | HEIGHT: 63 IN | OXYGEN SATURATION: 98 %

## 2020-02-27 DIAGNOSIS — R94.2 ABNORMAL PFT: ICD-10-CM

## 2020-02-27 DIAGNOSIS — I34.0 NONRHEUMATIC MITRAL VALVE REGURGITATION: Chronic | ICD-10-CM

## 2020-02-27 DIAGNOSIS — I35.1 NONRHEUMATIC AORTIC VALVE INSUFFICIENCY: ICD-10-CM

## 2020-02-27 DIAGNOSIS — I42.8 NONISCHEMIC CARDIOMYOPATHY (HCC): Primary | ICD-10-CM

## 2020-02-27 DIAGNOSIS — I25.118 CORONARY ARTERY DISEASE OF NATIVE ARTERY OF NATIVE HEART WITH STABLE ANGINA PECTORIS (HCC): ICD-10-CM

## 2020-02-27 DIAGNOSIS — I31.39 PERICARDIAL EFFUSION: ICD-10-CM

## 2020-02-27 LAB
CHOLEST SERPL-MCNC: 232 MG/DL (ref 0–200)
HBA1C MFR BLD: 5.6 % (ref 4.8–5.6)
HDLC SERPL-MCNC: 56 MG/DL (ref 40–60)
LDLC SERPL CALC-MCNC: 132 MG/DL (ref 0–100)
LDLC/HDLC SERPL: 2.36 {RATIO}
TRIGL SERPL-MCNC: 220 MG/DL (ref 0–150)
VLDLC SERPL-MCNC: 44 MG/DL

## 2020-02-27 PROCEDURE — 36415 COLL VENOUS BLD VENIPUNCTURE: CPT | Performed by: INTERNAL MEDICINE

## 2020-02-27 PROCEDURE — 83036 HEMOGLOBIN GLYCOSYLATED A1C: CPT | Performed by: INTERNAL MEDICINE

## 2020-02-27 PROCEDURE — 93000 ELECTROCARDIOGRAM COMPLETE: CPT | Performed by: INTERNAL MEDICINE

## 2020-02-27 PROCEDURE — 80061 LIPID PANEL: CPT | Performed by: INTERNAL MEDICINE

## 2020-02-27 PROCEDURE — 99214 OFFICE O/P EST MOD 30 MIN: CPT | Performed by: INTERNAL MEDICINE

## 2020-02-27 NOTE — PROGRESS NOTES
"Cardiovascular Medicine   Stuart Sears M.D., Ph.D., Located within [facility]    The patient returns to cardiology clinic for follow-up of the following cardiac problems:    PROBLEM LIST:    1. 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 EF initially 15%--now improved  2. MR  3. AI  4. Pericardial effusion  5. TERENCE Pa is a 64 y.o. female who returns to clinic today.  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  She is on Bisoprolol only as she had OH associated with medication. Fortunately, she has had normalization of her LV EF. She continues with exercise intolerance and MAURICIO.  She uses Furosemide daily.  She's medication compliant.  Denies side effects. She has had no ED visits or admissions for ADHF.   Most recent TTE showed normalization of left ventricular ejection fraction.  She does have known AI and MR. Her most recent TTE continues to show mild valve disease.  Her next surveillance TTE is due in 2019. Today, she does complain of CP. This is a squeezing sensation. It is a pressure. It is non-exertional. She also now has a small pericardial effusion. She was having CP at her last appt so she was sent for a DSE. This showed inferior ischemia, with normal LV EF and no TID. She is on Bisoprolol.  At her last visit with me she continued with nonexertional chest pain.  She had refused invasive evaluation in the past given her abnormal stress echo.  However, at her last appointment she was agreeable.  She went for invasive coronary angiography which showed a  and otherwise nonobstructive disease.  She was recommended to go medication management.  She is currently prescribed aspirin, bisoprolol and isosorbide. She is not on a statin. She had \"anger\" issues. She had atorvastatin, pravastatin. She has tried Crestor. RHC showed normal filling pressures.   Review of Systems   Cardiovascular: Positive for dyspnea on exertion. Negative for chest pain, claudication, cyanosis, irregular heartbeat, leg swelling, " near-syncope, orthopnea, palpitations, paroxysmal nocturnal dyspnea and syncope.   Respiratory: Positive for shortness of breath. Negative for cough, hemoptysis, sleep disturbances due to breathing, snoring and wheezing.          Current Outpatient Medications on File Prior to Visit   Medication Sig Dispense Refill   • albuterol (PROAIR HFA) 108 (90 BASE) MCG/ACT inhaler Inhale 2 puffs 4 (Four) Times a Day As Needed.     • aspirin 81 MG tablet Take 1 tablet by mouth Daily. 30 tablet 11   • atomoxetine (STRATTERA) 40 MG capsule TAKE 1 CAPSULE BY MOUTH DAILY. 30 capsule 1   • bisoprolol (ZEBeta) 5 MG tablet Take 1 tablet by mouth Daily. 30 tablet 6   • desloratadine (CLARINEX) 5 MG tablet Take 5 mg by mouth Daily.     • docusate sodium (COLACE) 100 MG capsule Take 200 mg by mouth every night. at bedtime     • esomeprazole (nexIUM) 40 MG capsule Take 40 mg by mouth Every Morning Before Breakfast.     • furosemide (LASIX) 20 MG tablet Take 20 mg by mouth Daily.     • gabapentin (NEURONTIN) 300 MG capsule Take 1 capsule by mouth 2 (Two) Times a Day. 180 capsule 0   • hydroxychloroquine (PLAQUENIL) 200 MG tablet Take 200 mg by mouth Daily.     • isosorbide mononitrate (IMDUR) 30 MG 24 hr tablet Take 1 tablet by mouth Daily. 30 tablet 11   • lubiprostone (AMITIZA) 24 MCG capsule Take 24 mcg by mouth Daily With Breakfast.     • NARCAN 4 MG/0.1ML nasal spray 1 spray into the nostril(s) as directed by provider As Needed.  0   • OxyCODONE HCl 5 MG tablet  Take 1 tablet by mouth every 6 (six) hours as needed.     • OxyCODONE HCl ER (OXYCONTIN) 30 MG tablet extended-release 12 hour Take 30 mg by mouth 2 (two) times a day. OxyContin 30 mg tablet,crush resistant,extended release     • polyethylene glycol (MIRALAX) packet Take 17 g by mouth Daily.     • RELISTOR 150 MG tablet Take 15 mg by mouth Daily.  0   • SYNTHROID 88 MCG tablet TAKE 1 TABLET BY MOUTH DAILY. BRAND NAME MEDICALLY NECESSARY 90 tablet 3   • traZODone (DESYREL) 150  MG tablet Take 150 mg by mouth Every Night.     • venlafaxine XR (EFFEXOR-XR) 150 MG 24 hr capsule Take 1 capsule by mouth Daily. 90 capsule 3   • venlafaxine XR (EFFEXOR-XR) 75 MG 24 hr capsule Take 1 capsule by mouth Daily. 90 capsule 3     Current Facility-Administered Medications on File Prior to Visit   Medication Dose Route Frequency Provider Last Rate Last Dose   • cyanocobalamin injection 1,000 mcg  1,000 mcg Intramuscular Q28 Days Olimpia Robison MD   1,000 mcg at 19 1039   • lidocaine-EPINEPHrine (XYLOCAINE W/EPI) 1 %-1:[id number] injection 10 mL  10 mL Infiltration Once Patrick Skinner MD         Allergies   Allergen Reactions   • Cephalosporins Nausea And Vomiting   • Erythromycin Nausea Only   • Morphine And Related Itching   • Other Nausea And Vomiting     Cipro   • Penicillins Hives   • Shellfish-Derived Products Hives and Other (See Comments)     Other reaction(s): SOA   • Zithromax [Azithromycin] Hives and Nausea And Vomiting   • Zolpidem Mental Status Change   • Honey Bee Treatment [Bee Venom] Hives   • Hydrocodone-Acetaminophen Anxiety   • Sulfa Antibiotics Hives and Rash     Sulfa (Sulfonamide Antibiotics)     Social History     Socioeconomic History   • Marital status:      Spouse name: Not on file   • Number of children: Not on file   • Years of education: Not on file   • Highest education level: Not on file   Tobacco Use   • Smoking status: Former Smoker     Years: 30.00     Last attempt to quit: 2008     Years since quittin.5   • Smokeless tobacco: Never Used   • Tobacco comment: PT SMOKED FOR 30 YEARS AND QUIT IN    Substance and Sexual Activity   • Alcohol use: No   • Drug use: No   • Sexual activity: Defer         Physical Exam:  Vitals:    20 0906   BP: 122/70   Pulse: 76   SpO2: 98%     Body mass index is 22.82 kg/m².    GEN: alert, appears stated age and cooperative  Body Habitus: well-nourished  HEENT: Head: Normocephalic, no lesions, without  obvious abnormality.  JVP: 6 cm of water at 45 degrees HJR: absent      [place]:  normal size and placement Palpable S4: Not present.   Heart rate: normal  Heart Rhythm: regular     Heart Sounds: S1: normal intensity  S2: normal intensity  S3: absent   S4: absent  Opening Snap: absent  A2-OS:  N/A  Pericardial rub: absent    Ejection click: None      Murmurs: Systolic: none  Diastolic: none  Extremity: Moves spontaneously      DATA:              Results for orders placed in visit on 12/23/19   Adult Transthoracic Echo Limited W/ Cont if Necessary Per Protocol    Narrative · Limited echocardiogram for pericardial effusion  · Left ventricle systolic function is mildly decreased at 52%. Mild global   hypokinesis. Borderline concentric hypertrophy.  · Right ventricle systolic function is normal.  · Trivial pericardial effusion adjacent to the left ventricle.              RHC    Resistance:  SVR:      1504 dynes · sec/cm5       PVR:      112 dynes · sec/cm5     Saturations:  SVC:                    64.6%  High RA:             69%  Low RA:               66.2%  PA:                      68.4%  RV:                      68.3     Cardiac Outputs:  Thermal CO:       5  Thermal CI:         3.1  Regan CO:              5  Regan CI:                3.1        RV Status:  RVSWI:  5.74 gm-m/m2/beat       RECOMMENDATIONS:       1. Nonischemic cardiomyopathy (CMS/HCC).  NYHA stage C; functional class II.  Today, euvolemic and perfused.  Today, we discussed continuing bisoprolol as her only agent as she is had problems with orthostasis in the past with ACE inhibitors.  Additionally, she has had normalization of her LVEF that has been sustained.  I have encouraged medication compliance and a low-sodium diet.  I have asked her to contact me with any issues. Unfortunately, she continues with exertional dyspnea.     2. Nonrheumatic aortic valve insufficiency/Nonrheumatic mitral valve regurgitation. ACC stage B.  There are no surgical indications  at this time.  · The patient has been advised to remain in clinical surveillance every 12 months.  · Signs and symptoms of worsening valve disease discussed.  I've asked the patient contact me for an earlier appointment if these develop.  · I've recommended a repeat 2D TTE every 3 years.  · TTE due: 2022.  No indication based on 2017 ACC/AHA guidelines for IE prophylaxis for dental procedures: Optimal oral health is recommended through regular professional dental care and the use of appropriate dental products, such as manual, powered, and ultrasonic toothbrushes; dental floss; and other plaque-removal devices    3. Pericardial effusion. The prevalence of pericardial effusion is about 3%.  The patient is currently Asymptomatic.  The size of the effusion is small.  I briefly discussed the etiology of pericardial effusions.  A handout was also offered to the patient which explained the disease process, including signs of worsening pericardial effusion.  At this point I have counseled conservative management.   -Will repeat a 2D TTE in 1 year  -Please call for worsening signs or symptoms.    4. ASCAD. No anginal symptoms. The patient has not been revascularized.   -Bisoprolol  -ASA  -Statin intolerance: Will check Lipid profile for possible injectable  -Imdur  -Call 911 immediately for concerning symptoms.    5. Cardiac Risk Assessments based on 2019 ACCF guidelines:  A team-based care approach is recommended for the control of risk factors associated with ASCAD.  As such, Daysi Pa was requested to have ongoing follow-up with their PCP. Continue follow-up visits with PCP for monitoring of labs; diet emphasizing intake of vegetables, fruits, nuts, whole grains and fish is recommended. Physical activity recommendations were provided. Essential HTN is a significant risk factor for stroke, heart disease and vascular disease. I've recommended the patient continue current medications, if any, as prescribed  by the primary care provider. I recommended they have close follow-up for ongoing mgmt of this and the medical comorbidities associated with HTN with their PCP.  They were also provided with information regarding maintaining a healthy weight, heart-healthy dietary pattern DASH information.  Goal blood pressure less than 130/80.  The patient's BMI is recommended to be calculated at least annually.  The patient's BMI is Body mass index is 22.82 kg/m²..  This places the patient in weight class:  Normal: 18.5-24.9kg/m2. They have been asked to have close PCP follow-up.  Tobacco status assessed at every visits.  The patient's nicotine status: is a former smoker      Return in about 3 months (around 5/27/2020).

## 2020-02-27 NOTE — PATIENT INSTRUCTIONS
Hemoglobin A1c Test  Why am I having this test?  You may have the hemoglobin A1c test (HbA1c test) done to:  · Evaluate your risk for developing diabetes (diabetes mellitus).  · Diagnose diabetes.  · Monitor long-term control of blood sugar (glucose) in people who have diabetes and help make treatment decisions.  This test may be done with other blood glucose tests, such as fasting blood glucose and oral glucose tolerance tests.  What is being tested?  Hemoglobin is a type of protein in the blood that carries oxygen. Glucose attaches to hemoglobin to form glycated hemoglobin. This test checks the amount of glycated hemoglobin in your blood, which is a good indicator of the average amount of glucose in your blood during the past 2-3 months.  What kind of sample is taken?    A blood sample is required for this test. It is usually collected by inserting a needle into a blood vessel.  Tell a health care provider about:  · All medicines you are taking, including vitamins, herbs, eye drops, creams, and over-the-counter medicines.  · Any blood disorders you have.  · Any surgeries you have had.  · Any medical conditions you have.  · Whether you are pregnant or may be pregnant.  How are the results reported?  Your results will be reported as a percentage that indicates how much of your hemoglobin has glucose attached to it (is glycated). Your health care provider will compare your results to normal ranges that were established after testing a large group of people (reference ranges). Reference ranges may vary among labs and hospitals. For this test, common reference ranges are:  · Adult or child without diabetes: 4-5.6%.  · Adult or child with diabetes and good blood glucose control: less than 7%.  What do the results mean?  If you have diabetes:  · A result of less than 7% is considered normal, meaning that your blood glucose is well controlled.  · A result higher than 7% means that your blood glucose is not well  controlled, and your treatment plan may need to be adjusted.  If you do not have diabetes:  · A result within the reference range is considered normal, meaning that you are not at high risk for diabetes.  · A result of 5.7-6.4% means that you have a high risk of developing diabetes, and you may have prediabetes. Prediabetes is the condition of having a blood glucose level that is higher than it should be, but not high enough for you to be diagnosed with diabetes. Having prediabetes puts you at risk for developing type 2 diabetes (type 2 diabetes mellitus). You may have more tests, including a repeat HbA1c test.  · Results of 6.5% or higher on two separate HbA1c tests mean that you have diabetes. You may have more tests to confirm the diagnosis.  Abnormally low HbA1c values may be caused by:  · Pregnancy.  · Severe blood loss.  · Receiving donated blood (transfusions).  · Low red blood cell count (anemia).  · Long-term kidney failure.  · Some unusual forms (variants) of hemoglobin.  Talk with your health care provider about what your results mean.  Questions to ask your health care provider  Ask your health care provider, or the department that is doing the test:  · When will my results be ready?  · How will I get my results?  · What are my treatment options?  · What other tests do I need?  · What are my next steps?  Summary  · The hemoglobin A1c test (HbA1c test) may be done to evaluate your risk for developing diabetes, to diagnose diabetes, and to monitor long-term control of blood sugar (glucose) in people who have diabetes and help make treatment decisions.  · Hemoglobin is a type of protein in the blood that carries oxygen. Glucose attaches to hemoglobin to form glycated hemoglobin. This test checks the amount of glycated hemoglobin in your blood, which is a good indicator of the average amount of glucose in your blood during the past 2-3 months.  · Talk with your health care provider about what your results  mean.  This information is not intended to replace advice given to you by your health care provider. Make sure you discuss any questions you have with your health care provider.  Document Released: 01/09/2006 Document Revised: 07/31/2018 Document Reviewed: 07/31/2018  [employer] Interactive Patient Education © 2020 [employer]  Lipid Profile Test  Why am I having this test?  The lipid profile test can be used to help evaluate your risk for developing heart disease. The test is also used to monitor your levels during treatment for high cholesterol to see if you are reaching your goals.  What is being tested?  A lipid profile measures the following:  · Total cholesterol. Cholesterol is a waxy, fat-like substance in your blood. If your total cholesterol level is high, this can increase your risk for heart disease.  · High-density lipoprotein (HDL). This is known as the good cholesterol. Having high levels of HDL decreases your risk for heart disease. Your HDL level may be low if you smoke or do not get enough exercise.  · Low-density lipoprotein (LDL). This is known as the bad cholesterol. This type causes plaque to build up in your arteries. Having a low level of LDL is best. Having high levels of LDL increases your risk for heart disease.  · Cholesterol to HDL ratio. This is calculated by dividing your total cholesterol by your HDL cholesterol. The ratio is used by health care providers to determine your risk for heart disease. A low ratio is best.  · Triglycerides. These are fats that your body can store or burn for energy. Low levels are best. Having high levels of triglycerides increases your risk for heart disease.  What kind of sample is taken?    A blood sample is required for this test. It is usually collected by inserting a needle into a blood vessel.  How do I prepare for this test?  Do not drink alcohol starting at least 24 hours before your test.  Follow any instructions from your health care provider  about dietary restrictions before your test.  Do not eat or drink anything other than water after midnight on the night before the test, or as told by your health care provider.  Tell a health care provider about:  · All medicines you are taking, including vitamins, herbs, eye drops, creams, and over-the-counter medicines.  · Any medical conditions you have.  · Whether you are pregnant or may be pregnant.  How are the results reported?  Your test results will be reported as values that indicate your cholesterol and triglyceride levels. Your health care provider will compare your results to normal ranges that were established after testing a large group of people (reference ranges). Reference ranges may vary among labs and hospitals. For this test, common reference ranges are:  Total cholesterol  · Adult or elderly: less than 200 mg/dL.  · Child: 120-200 mg/dL.  · Infant:  mg/dL.  · :  mg/dL.  HDL  · Male: greater than 45 mg/dL.  · Female: greater than 55 mg/dL.  HDL reference values based on your risk for heart disease:  · Low risk for heart disease:  ? Male: 60 mg/dL.  ? Female: 70 mg/dL.  · Moderate risk for heart disease:  ? Male: 45 mg/dL.  ? Female: 55 mg/dL.  · High risk for heart disease:  ? Male: 25 mg/dL.  ? Female: 35 mg/dL.  LDL  · Adults: Your health care provider will determine a target level for LDL based on your risk for heart disease.  ? If you are at low risk, your LDL should be 130 mg/dL or less.  ? If you are at moderate risk, your LDL should be 100 mg/dL or less.  ? If you are at high risk, your LDL should be 70 mg/dL or less.  · Children: less than 110 mg/dL.  Cholesterol to HDL ratio  Reference values based on your risk for heart disease:  · Risk that is half the average risk:  ? Male: 3.4.  ? Female: 3.3.  · Average risk:  ? Male: 5.0.  ? Female: 4.4.  · Risk that is two times average (moderate risk):  ? Male: 10.0.  ? Female: 7.0.  · Risk that is three times average (high  risk):  ? Male: 24.0.  ? Female: 11.0.  Triglycerides  · Adult or elderly:  ? Male:  mg/dL.  ? Female:  mg/dL.  · Children 16-19 years old:  ? Male:  mg/dL.  ? Female:  mg/dL.  · Children 12-15 years old:  ? Male:  mg/dL.  ? Female:  mg/dL.  · Children 6-11 years old:  ? Male:  mg/dL.  ? Female:  mg/dL.  · Children 0-5 years old:  ? Male: 30-86 mg/dL.  ? Female: 32-99 mg/dL.  Triglycerides should be less than 400 mg/dL even when you are not fasting.  What do the results mean?  Results that are within the reference ranges are considered normal. Total cholesterol, LDL, and triglyceride levels that are higher than the reference ranges can mean that you have an increased risk for heart disease. An HDL level that is lower than the reference range can also indicate an increased risk.  Talk with your health care provider about what your results mean.  Questions to ask your health care provider  Ask your health care provider, or the department that is doing the test:  · When will my results be ready?  · How will I get my results?  · What are my treatment options?  · What other tests do I need?  · What are my next steps?  Summary  · The lipid profile test can be used to help predict the likelihood that you will develop heart disease. It can also help monitor your cholesterol levels during treatment.  · A lipid profile measures your total cholesterol, high-density lipoprotein (HDL), low-density lipoprotein (LDL), cholesterol to HDL ratio, and triglycerides.  · Total cholesterol, LDL, and triglyceride levels that are higher than the reference ranges can indicate an increased risk for heart disease.  · An HDL level that is lower than the reference range can indicate an increased risk for heart disease.  · Talk with your health care provider about what your results mean.  This information is not intended to replace advice given to you by your health care provider. Make sure you  discuss any questions you have with your health care provider.  Document Released: 01/11/2006 Document Revised: 09/24/2018 Document Reviewed: 09/24/2018  [employer] Interactive Patient Education © 2020 [employer]

## 2020-03-02 ENCOUNTER — OFFICE VISIT (OUTPATIENT)
Dept: FAMILY MEDICINE CLINIC | Facility: CLINIC | Age: 65
End: 2020-03-02

## 2020-03-02 VITALS
BODY MASS INDEX: 23.21 KG/M2 | SYSTOLIC BLOOD PRESSURE: 138 MMHG | HEART RATE: 80 BPM | DIASTOLIC BLOOD PRESSURE: 70 MMHG | OXYGEN SATURATION: 98 % | HEIGHT: 63 IN | WEIGHT: 131 LBS

## 2020-03-02 DIAGNOSIS — Z00.00 MEDICARE ANNUAL WELLNESS VISIT, SUBSEQUENT: Primary | ICD-10-CM

## 2020-03-02 PROCEDURE — G0439 PPPS, SUBSEQ VISIT: HCPCS | Performed by: FAMILY MEDICINE

## 2020-03-02 RX ORDER — LUBIPROSTONE 24 UG/1
24 CAPSULE ORAL 2 TIMES DAILY WITH MEALS
Status: SHIPPED | COMMUNITY
Start: 2020-03-02 | End: 2020-09-08

## 2020-03-02 RX ORDER — HYDROXYCHLOROQUINE SULFATE 200 MG/1
200 TABLET, FILM COATED ORAL DAILY
Qty: 90 TABLET | Refills: 3 | Status: SHIPPED | OUTPATIENT
Start: 2020-03-02 | End: 2021-01-27 | Stop reason: SDUPTHER

## 2020-03-02 NOTE — PROGRESS NOTES
Pharmacy Medication Reconciliation  Patient: Daysi Pa   Sex: female  : 1955  Primary Care Physician: Olimpia Robison MD (seeing Dr. Robison today)  Outpatient Pharmacy: [facility]      Reconciled Medication List  Prior to Admission medications    Medication Sig Start Date End Date Taking? Authorizing Provider   aspirin 81 MG tablet Take 1 tablet by mouth Daily.   Yes Stuart Sears MD PhD   atomoxetine (STRATTERA) 40 MG capsule TAKE 1 CAPSULE BY MOUTH DAILY. 2/3/20  Yes Olimpia Robison MD   bisoprolol (ZEBeta) 5 MG tablet Take 1 tablet by mouth Daily. 10/9/19  Yes Stuart Sears MD PhD   denosumab (PROLIA) 60 MG/ML solution prefilled syringe syringe Inject 60 mg under the skin into the appropriate area as directed Every 6 (Six) Months.   Yes Keisha Patel MD   desloratadine (CLARINEX) 5 MG tablet Take 5 mg by mouth Daily.   Yes Keisha Patel MD   docusate sodium (COLACE) 100 MG capsule Take 200 mg by mouth every night. at bedtime   Yes Keisha Patel MD   esomeprazole (nexIUM) 40 MG capsule Take 40 mg by mouth Every Morning Before Breakfast.   Yes Keisha Patel MD   furosemide (LASIX) 20 MG tablet Take 20 mg by mouth Daily.   Yes Keisha Patel MD   gabapentin (NEURONTIN) 300 MG capsule Take 1 capsule by mouth 2 (Two) Times a Day. 10/17/18  Yes Olimpia Robison MD   hydroxychloroquine (PLAQUENIL) 200 MG tablet Take 1 tablet by mouth Daily. 3/2/20  Yes Olimpia Robison MD   isosorbide mononitrate (IMDUR) 30 MG 24 hr tablet Take 1 tablet by mouth Daily. 19  Yes Stuart Seasr MD PhD   lubiprostone (AMITIZA) 24 MCG capsule Take 1 capsule by mouth 2 (Two) Times a Day With Meals. 3/2/20  Yes Olimpia Robison MD   OxyCODONE HCl 5 MG tablet  Take 1 tablet by mouth every 6 (six) hours as needed.   Yes Keisha Patel MD   OxyCODONE HCl ER (OXYCONTIN) 30 MG tablet extended-release 12 hour Take 30 mg by mouth 2 (two)  times a day. OxyContin 30 mg tablet,crush resistant,extended release   Yes Keisha Patel MD   polyethylene glycol (MIRALAX) packet Take 17 g by mouth Daily.   Yes Keisha Patel MD   SYNTHROID 88 MCG tablet TAKE 1 TABLET BY MOUTH DAILY. BRAND NAME MEDICALLY NECESSARY 7/2/19  Yes Olimpia Robison MD   traZODone (DESYREL) 150 MG tablet Take 150 mg by mouth Every Night.   Yes Keisha Patel MD   venlafaxine XR (EFFEXOR-XR) 150 MG 24 hr capsule Take 1 capsule by mouth Daily. 11/20/19  Yes Olimpia Robison MD   venlafaxine XR (EFFEXOR-XR) 75 MG 24 hr capsule Take 1 capsule by mouth Daily. 11/20/19  Yes Olimpia Robison MD   albuterol (PROAIR HFA) 108 (90 BASE) MCG/ACT inhaler Inhale 2 puffs 4 (Four) Times a Day As Needed.    Keisha Patel MD   Evolocumab 140 MG/ML solution auto-injector Inject 1 mL under the skin into the appropriate area as directed Every 14 (Fourteen) Days. 2/27/20   Stuart Sears MD PhD   NARCAN 4 MG/0.1ML nasal spray 1 spray into the nostril(s) as directed by provider As Needed. 8/22/19   Keisha Patel MD   RELISTOR 150 MG tablet Take 15 mg by mouth Daily. 11/18/19   Keisha Patel MD   hydroxychloroquine (PLAQUENIL) 200 MG tablet Take 200 mg by mouth Daily.  3/2/20  Keisha Patel MD   lubiprostone (AMITIZA) 24 MCG capsule Take 24 mcg by mouth Daily With Breakfast.  3/2/20  Keisha Patel MD           Number of medications added - 1  Number of medications removed - 1  Number of clarifications - 4  Patient reported compliance - Yes  Allergy changes - None      Summary/Recommendations  Patient wanted to have her plaquenil increased back to 200 mg after meeting with her eye doctor  Not taking ProAir or Relistor  Still pending insurance approval for Evolocumab  Added Prolia  Patient seems very aware and knowledgeable regarding her medications    Arnold Lira [facility]  03/02/20 11:02 AM

## 2020-03-02 NOTE — PROGRESS NOTES
Subsequent Medicare Wellness Visit   The ABC's of the Annual Wellness Visit    Chief Complaint   Patient presents with   • Medicare Wellness-subsequent       HPI:  Daysi Pa, -1955, is a 64 y.o. female who presents for a Subsequent Medicare Wellness Visit.    Recent Hospitalizations:  No hospitalization(s) within the last year..    Current Medical Providers:  Patient Care Team:  Olimpia Robison MD as PCP - Stuart Hinton MD PhD as Consulting Physician (Cardiology)  Gordon Tapia MD as Anesthesiologist (Pain Medicine)  Manny Santos MD as Surgeon (General Surgery)  Jay Aragon MD as Consulting Physician (Gastroenterology)    Health Habits and Functional and Cognitive Screening and Depression Screening:  Functional & Cognitive Status 3/2/2020   Do you have difficulty preparing food and eating? -   Do you have difficulty bathing yourself, getting dressed or grooming yourself? -   Do you have difficulty using the toilet? -   Do you have difficulty moving around from place to place? -   Do you have trouble with steps or getting out of a bed or a chair? -   Current Diet Limited Junk Food   Dental Exam Unknown   Eye Exam Up to date   Exercise (times per week) 3 times per week   Current Exercise Activities Include Walking   Do you need help using the phone?  No   Are you deaf or do you have serious difficulty hearing?  No   Do you need help with transportation? No   Do you need help shopping? No   Do you need help preparing meals?  No   Do you need help with housework?  No   Do you need help with laundry? No   Do you need help taking your medications? No   Do you need help managing money? No   Do you ever drive or ride in a car without wearing a seat belt? No   Do you have difficulty concentrating, remembering or making decisions? -       Compared to one year ago, the patient feels her physical health is worse and her mental health is the same.    Depression  Screen:  PHQ-2/PHQ-9 Depression Screening 3/2/2020   Little interest or pleasure in doing things 1   Feeling down, depressed, or hopeless 1   Trouble falling or staying asleep, or sleeping too much 1   Feeling tired or having little energy 1   Poor appetite or overeating 0   Feeling bad about yourself - or that you are a failure or have let yourself or your family down 0   Trouble concentrating on things, such as reading the newspaper or watching television 1   Moving or speaking so slowly that other people could have noticed. Or the opposite - being so fidgety or restless that you have been moving around a lot more than usual 0   Thoughts that you would be better off dead, or of hurting yourself in some way 0   Total Score 5   If you checked off any problems, how difficult have these problems made it for you to do your work, take care of things at home, or get along with other people? Not difficult at all         Past Medical/Family/Social History:  The following portions of the patient's history were reviewed and updated as appropriate: allergies, current medications, past family history, past medical history, past social history, past surgical history and problem list.    Allergies   Allergen Reactions   • Cephalosporins Nausea And Vomiting   • Erythromycin Nausea Only   • Morphine And Related Itching   • Other Nausea And Vomiting     Cipro   • Penicillins Hives   • Shellfish-Derived Products Hives and Other (See Comments)     Other reaction(s): SOA   • Zithromax [Azithromycin] Hives and Nausea And Vomiting   • Zolpidem Mental Status Change   • Honey Bee Treatment [Bee Venom] Hives   • Hydrocodone-Acetaminophen Anxiety   • Sulfa Antibiotics Hives and Rash     Sulfa (Sulfonamide Antibiotics)         Current Outpatient Medications:   •  aspirin 81 MG tablet, Take 1 tablet by mouth Daily., Disp: 30 tablet, Rfl: 11  •  atomoxetine (STRATTERA) 40 MG capsule, TAKE 1 CAPSULE BY MOUTH DAILY., Disp: 30 capsule, Rfl: 1  •   bisoprolol (ZEBeta) 5 MG tablet, Take 1 tablet by mouth Daily., Disp: 30 tablet, Rfl: 6  •  denosumab (PROLIA) 60 MG/ML solution prefilled syringe syringe, Inject 60 mg under the skin into the appropriate area as directed Every 6 (Six) Months., Disp: , Rfl:   •  desloratadine (CLARINEX) 5 MG tablet, Take 5 mg by mouth Daily., Disp: , Rfl:   •  docusate sodium (COLACE) 100 MG capsule, Take 200 mg by mouth every night. at bedtime, Disp: , Rfl:   •  esomeprazole (nexIUM) 40 MG capsule, Take 40 mg by mouth Every Morning Before Breakfast., Disp: , Rfl:   •  furosemide (LASIX) 20 MG tablet, Take 20 mg by mouth Daily., Disp: , Rfl:   •  gabapentin (NEURONTIN) 300 MG capsule, Take 1 capsule by mouth 2 (Two) Times a Day., Disp: 180 capsule, Rfl: 0  •  hydroxychloroquine (PLAQUENIL) 200 MG tablet, Take 1 tablet by mouth Daily., Disp: 90 tablet, Rfl: 3  •  isosorbide mononitrate (IMDUR) 30 MG 24 hr tablet, Take 1 tablet by mouth Daily., Disp: 30 tablet, Rfl: 11  •  lubiprostone (AMITIZA) 24 MCG capsule, Take 1 capsule by mouth 2 (Two) Times a Day With Meals., Disp: , Rfl:   •  OxyCODONE HCl 5 MG tablet , Take 1 tablet by mouth every 6 (six) hours as needed., Disp: , Rfl:   •  OxyCODONE HCl ER (OXYCONTIN) 30 MG tablet extended-release 12 hour, Take 30 mg by mouth 2 (two) times a day. OxyContin 30 mg tablet,crush resistant,extended release, Disp: , Rfl:   •  polyethylene glycol (MIRALAX) packet, Take 17 g by mouth Daily., Disp: , Rfl:   •  SYNTHROID 88 MCG tablet, TAKE 1 TABLET BY MOUTH DAILY. BRAND NAME MEDICALLY NECESSARY, Disp: 90 tablet, Rfl: 3  •  traZODone (DESYREL) 150 MG tablet, Take 150 mg by mouth Every Night., Disp: , Rfl:   •  venlafaxine XR (EFFEXOR-XR) 150 MG 24 hr capsule, Take 1 capsule by mouth Daily., Disp: 90 capsule, Rfl: 3  •  venlafaxine XR (EFFEXOR-XR) 75 MG 24 hr capsule, Take 1 capsule by mouth Daily., Disp: 90 capsule, Rfl: 3  •  albuterol (PROAIR HFA) 108 (90 BASE) MCG/ACT inhaler, Inhale 2 puffs 4  (Four) Times a Day As Needed., Disp: , Rfl:   •  Evolocumab 140 MG/ML solution auto-injector, Inject 1 mL under the skin into the appropriate area as directed Every 14 (Fourteen) Days., Disp: 2.1 mL, Rfl: 6  •  metoclopramide (REGLAN) 10 MG tablet, TAKE 1 TABLET BY MOUTH 4 (FOUR) TIMES A DAY., Disp: 360 tablet, Rfl: 2  •  NARCAN 4 MG/0.1ML nasal spray, 1 spray into the nostril(s) as directed by provider As Needed., Disp: , Rfl: 0  •  RELISTOR 150 MG tablet, Take 15 mg by mouth Daily., Disp: , Rfl: 0    Current Facility-Administered Medications:   •  cyanocobalamin injection 1,000 mcg, 1,000 mcg, Intramuscular, Q28 Days, Olimpia Robison MD, 1,000 mcg at 19 1039  •  lidocaine-EPINEPHrine (XYLOCAINE W/EPI) 1 %-1:057885 injection 10 mL, 10 mL, Infiltration, Once, Patrick Skinner MD    Aspirin use counseling: Taking ASA appropriately as indicated    Pharmacist has reviewed medications with patient and myself.     Family History   Problem Relation Age of Onset   • Breast cancer Sister    • Rectal cancer Other         Colorectal cancer   • Heart disease Other    • Hypertension Other    • Thyroid disease Other         Thyroid disorder   • Hypertension Mother    • Migraines Mother    • Osteoporosis Mother    • Diabetes Father    • Coronary artery disease Father    • Hyperlipidemia Father    • Cancer Maternal Aunt    • COPD Paternal Grandmother    • COPD Paternal Grandfather        Social History     Tobacco Use   • Smoking status: Former Smoker     Years: 30.00     Last attempt to quit: 2008     Years since quittin.5   • Smokeless tobacco: Never Used   • Tobacco comment: PT SMOKED FOR 30 YEARS AND QUIT IN    Substance Use Topics   • Alcohol use: No       Past Surgical History:   Procedure Laterality Date   • APPENDECTOMY      Appendectomy (1)   • BREAST BIOPSY  2013    Stereotactic breast biopsy (1) - stereotactic left mammotome biopsy with 11 gauge needle.   • BREAST IMPLANT REMOVAL   2003    Remove breast implant (1)   • BREAST IMPLANT SURGERY  2000    Breast implantation (1)   • CARDIAC CATHETERIZATION N/A 1/21/2020    Procedure: Right Heart Cath;  Surgeon: Stuart Sears MD PhD;  Location: [facility] INVASIVE LOCATION;  Service: Cardiology   • CARDIAC CATHETERIZATION N/A 1/21/2020    Procedure: LEFT HEART CATH;  Surgeon: Carmen Thompson MD;  Location: [facility] INVASIVE LOCATION;  Service: Cardiology   • COLONOSCOPY  01/20/2014    Colonoscopy, diagnostic (screening) 30547 (1)   • COLONOSCOPY W/ POLYPECTOMY  2008    Colonoscopy remove polyps 04432 (1)    • EXCISION LESION  05/01/2014    Remove chest wall lesion (1) - Excision of subcutaneous mass, left chest wall, Subcutaneous mass left chest wall, status post mastectomy.   • INJECTION OF MEDICATION  07/05/2016    B12 (44) - Ordered By: BENIGNO LIM ([facility])    • INJECTION OF MEDICATION  11/12/2015    Celestone (betamethasone) (1) - Ordered By: BENIGNO LIM ([facility])    • INJECTION OF MEDICATION  02/11/2016    Kenalog (2) - Ordered By: BENIGNO LIM ([facility])    • INJECTION OF MEDICATION  11/17/2017    Prolia   • MASTECTOMY  09/05/2013    Mastectomy (2) - Right simple prophylactic mastectomy.   • OTHER SURGICAL HISTORY  10/29/2014    SONOGRAM THYROID, NECK 61742 (1) - Ordered By: BENIGNO LIM ([facility])   • PAP SMEAR  06/03/2013     PAP SMEAR (1)   • SUBTOTAL HYSTERECTOMY  1998     Partial hyst (1)     • TONSILLECTOMY  1969    Tonsillectomy (1)   • VASCULAR SURGERY  12/24/2014    Consult, Vascular Surgery (1) - Ordered By: BENIGNO LIM ([facility])        Patient Active Problem List   Diagnosis   • Hyperlipidemia   • Mitral regurgitation   • Rheumatoid arthritis (CMS/HCC)   • Primary fibromyalgia syndrome   • Osteoporosis   • Malaise and fatigue   • Irritable bowel syndrome with constipation   • Intraductal carcinoma in situ of breast   • Gastroesophageal reflux disease   • Dyslipidemia   • Depressive disorder   • Chronic pain   • Attention deficit hyperactivity disorder   •  "Anxiety state   • Allergic rhinitis   • Acquired hypothyroidism   • Chronic low back pain   • Lumbosacral spondylosis without myelopathy   • Myofacial muscle pain   • Long term (current) use of opiate analgesic   • Snoring   • Nonischemic cardiomyopathy (CMS/HCC)   • Posterior vitreous detachment of left eye   • Epiretinal membrane (ERM) of right eye   • Lamellar macular hole of both eyes   • Nonrheumatic aortic valve insufficiency   • Precordial pain   • Pericardial effusion   • Abnormal stress echocardiogram   • Dyspnea on exertion   • Chest pain, atypical   • Coronary artery disease of native artery of native heart with stable angina pectoris (CMS/HCC)       Review of Systems    Objective     Vitals:    03/02/20 1041   BP: 138/70   BP Location: Left arm   Patient Position: Sitting   Cuff Size: Adult   Pulse: 80   SpO2: 98%   Weight: 59.4 kg (131 lb)   Height: 160 cm (63\")   PainSc:   6   PainLoc: Back       Patient's Body mass index is 23.21 kg/m². BMI is within normal parameters. No follow-up required..      No exam data present    The patient has no evidence of cognitve impairment.     Physical Exam    Recent Lab Results:     Lab Results   Component Value Date    CHOL 232 (H) 02/27/2020    TRIG 220 (H) 02/27/2020    HDL 56 02/27/2020    VLDL 44 02/27/2020    LDLHDL 2.36 02/27/2020       Assessment/Plan   Age-appropriate Screening Schedule:  Refer to the list below for future screening recommendations based on patient's age, sex and/or medical conditions.      Health Maintenance   Topic Date Due   • COLONOSCOPY  01/18/2020   • LIPID PANEL  02/27/2021   • DXA SCAN  03/11/2021   • TDAP/TD VACCINES (2 - Td) 10/16/2024   • INFLUENZA VACCINE  Completed   • ZOSTER VACCINE  Completed       Medicare Risks and Personalized Health Plan:  Advance Directive Discussion  Cardiovascular risk  Chronic Pain   Depression/Dysphoria  Fall Risk  Glaucoma Risk  Osteoprorosis Risk  Polypharmacy      CMS-Preventive Services Quick " Reference  Medicare Preventive Services Addressed:  Annual Wellness Visit (AWV)  Colorectal Cancer Screening, Colonoscopy    Advance Care Planning:  ACP discussion was held with the patient during this visit.    Diagnoses and all orders for this visit:    1. Medicare annual wellness visit, subsequent (Primary)    Other orders  -     hydroxychloroquine (PLAQUENIL) 200 MG tablet; Take 1 tablet by mouth Daily.  Dispense: 90 tablet; Refill: 3        An After Visit Summary and PPPS with all of these plans were given to the patient.      Follow Up:  Return in about 1 year (around 3/2/2021), or if symptoms worsen or fail to improve, for Medicare Wellness.            This document has been electronically signed by Olimpia Robison MD on March 4, 2020 8:41 AM

## 2020-03-02 NOTE — PATIENT INSTRUCTIONS
Always call the office and ask Nikia or Alicia to help finish Advanced care planning paperwork. [phone]

## 2020-03-03 RX ORDER — METOCLOPRAMIDE 10 MG/1
10 TABLET ORAL 4 TIMES DAILY
Qty: 360 TABLET | Refills: 2 | Status: SHIPPED | OUTPATIENT
Start: 2020-03-03 | End: 2020-12-29 | Stop reason: SDUPTHER

## 2020-03-04 NOTE — ACP (ADVANCE CARE PLANNING)
Discussed with patient ACP. Patient has paperwork and wants to come back to meet with our team to complete the papers.      This document has been electronically signed by Olimpia Robison MD on March 4, 2020 8:42 AM

## 2020-04-01 RX ORDER — ATOMOXETINE 40 MG/1
CAPSULE ORAL
Qty: 30 CAPSULE | Refills: 1 | Status: ON HOLD | OUTPATIENT
Start: 2020-04-01 | End: 2020-06-30

## 2020-04-28 RX ORDER — BISOPROLOL FUMARATE 5 MG/1
5 TABLET, FILM COATED ORAL DAILY
Qty: 30 TABLET | Refills: 6 | Status: SHIPPED | OUTPATIENT
Start: 2020-04-28 | End: 2020-11-30

## 2020-05-27 ENCOUNTER — OFFICE VISIT (OUTPATIENT)
Dept: CARDIOLOGY | Facility: CLINIC | Age: 65
End: 2020-05-27

## 2020-05-27 VITALS
SYSTOLIC BLOOD PRESSURE: 108 MMHG | HEART RATE: 96 BPM | DIASTOLIC BLOOD PRESSURE: 70 MMHG | HEIGHT: 63 IN | OXYGEN SATURATION: 99 % | WEIGHT: 130.6 LBS | BODY MASS INDEX: 23.14 KG/M2

## 2020-05-27 DIAGNOSIS — I35.1 NONRHEUMATIC AORTIC VALVE INSUFFICIENCY: ICD-10-CM

## 2020-05-27 DIAGNOSIS — I25.118 CORONARY ARTERY DISEASE OF NATIVE ARTERY OF NATIVE HEART WITH STABLE ANGINA PECTORIS (HCC): ICD-10-CM

## 2020-05-27 DIAGNOSIS — I34.0 NONRHEUMATIC MITRAL VALVE REGURGITATION: Chronic | ICD-10-CM

## 2020-05-27 DIAGNOSIS — I42.8 NONISCHEMIC CARDIOMYOPATHY (HCC): Primary | ICD-10-CM

## 2020-05-27 DIAGNOSIS — I31.39 PERICARDIAL EFFUSION: ICD-10-CM

## 2020-05-27 PROCEDURE — 99214 OFFICE O/P EST MOD 30 MIN: CPT | Performed by: INTERNAL MEDICINE

## 2020-05-27 NOTE — PROGRESS NOTES
Cardiovascular Medicine   Stuart Sears M.D., Ph.D., [facility]    The patient returns to cardiology clinic for follow-up of the following cardiac problems:    PROBLEM LIST:    1. 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 EF initially 15%--now improved  2. MR  3. AI  4. Pericardial effusion  5. TERENCE Pa is a 64 y.o. female who returns to clinic today.  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  She is on Bisoprolol only as she had OH associated with medication. Fortunately, she has had normalization of her LV EF. She continues with exercise intolerance and MAURICIO.  She uses Furosemide daily.  She's medication compliant.  Denies side effects. She has had no ED visits or admissions for ADHF.   Most recent TTE showed normalization of left ventricular ejection fraction.  She does have known AI and MR. Her most recent TTE continues to show mild valve disease.  Her next surveillance TTE is due in 2020 Today, she does complain of CP. This is a squeezing sensation. It is a pressure. It is non-exertional. She also now has a small pericardial effusion. She was having CP at her last appt so she was sent for a DSE. This showed inferior ischemia, with normal LV EF and no TID. She is on Bisoprolol.  At her last visit with me she continued with nonexertional chest pain.  She had refused invasive evaluation in the past given her abnormal stress echo.  However, at her last appointment she was agreeable.  She went for invasive coronary angiography which showed a  and otherwise nonobstructive disease. However, she had a RPDA that possibly could have explained her CPS and her abnormal stress echo.   She was recommended to go to  medication management.  She is currently prescribed aspirin, bisoprolol and isosorbide.She had atorvastatin, pravastatin. She has tried Crestor. RHC showed normal filling pressures.     Today, she has continued CPS. She has had near daily episodes. She is on Imdur and low-dose bisoprolol.  This remains a squeezing sensations.     Review of Systems   Cardiovascular: Positive for dyspnea on exertion. Negative for chest pain, claudication, cyanosis, irregular heartbeat, leg swelling, near-syncope, orthopnea, palpitations, paroxysmal nocturnal dyspnea and syncope.   Respiratory: Positive for shortness of breath. Negative for cough, hemoptysis, sleep disturbances due to breathing, snoring and wheezing.          Current Outpatient Medications on File Prior to Visit   Medication Sig Dispense Refill   • albuterol (PROAIR HFA) 108 (90 BASE) MCG/ACT inhaler Inhale 2 puffs 4 (Four) Times a Day As Needed.     • aspirin 81 MG tablet Take 1 tablet by mouth Daily. 30 tablet 11   • atomoxetine (STRATTERA) 40 MG capsule TAKE 1 CAPSULE BY MOUTH DAILY. 30 capsule 1   • bisoprolol (ZEBeta) 5 MG tablet TAKE 1 TABLET BY MOUTH DAILY. 30 tablet 6   • denosumab (PROLIA) 60 MG/ML solution prefilled syringe syringe Inject 60 mg under the skin into the appropriate area as directed Every 6 (Six) Months.     • desloratadine (CLARINEX) 5 MG tablet Take 5 mg by mouth Daily.     • docusate sodium (COLACE) 100 MG capsule Take 200 mg by mouth every night. at bedtime     • esomeprazole (nexIUM) 40 MG capsule Take 40 mg by mouth Every Morning Before Breakfast.     • Evolocumab 140 MG/ML solution auto-injector Inject 1 mL under the skin into the appropriate area as directed Every 14 (Fourteen) Days. 2.1 mL 6   • furosemide (LASIX) 20 MG tablet Take 20 mg by mouth Daily.     • gabapentin (NEURONTIN) 300 MG capsule Take 1 capsule by mouth 2 (Two) Times a Day. 180 capsule 0   • hydroxychloroquine (PLAQUENIL) 200 MG tablet Take 1 tablet by mouth Daily. 90 tablet 3   • isosorbide mononitrate (IMDUR) 30 MG 24 hr tablet Take 1 tablet by mouth Daily. 30 tablet 11   • lubiprostone (AMITIZA) 24 MCG capsule Take 1 capsule by mouth 2 (Two) Times a Day With Meals.     • metoclopramide (REGLAN) 10 MG tablet TAKE 1 TABLET BY MOUTH 4 (FOUR) TIMES A DAY.  360 tablet 2   • NARCAN 4 MG/0.1ML nasal spray 1 spray into the nostril(s) as directed by provider As Needed.  0   • OxyCODONE HCl 5 MG tablet  Take 1 tablet by mouth every 6 (six) hours as needed.     • OxyCODONE HCl ER (OXYCONTIN) 30 MG tablet extended-release 12 hour Take 30 mg by mouth 2 (two) times a day. OxyContin 30 mg tablet,crush resistant,extended release     • polyethylene glycol (MIRALAX) packet Take 17 g by mouth Daily.     • RELISTOR 150 MG tablet Take 15 mg by mouth Daily.  0   • SYNTHROID 88 MCG tablet TAKE 1 TABLET BY MOUTH DAILY. BRAND NAME MEDICALLY NECESSARY 90 tablet 3   • traZODone (DESYREL) 150 MG tablet Take 150 mg by mouth Every Night.     • venlafaxine XR (EFFEXOR-XR) 150 MG 24 hr capsule Take 1 capsule by mouth Daily. 90 capsule 3   • venlafaxine XR (EFFEXOR-XR) 75 MG 24 hr capsule Take 1 capsule by mouth Daily. 90 capsule 3     Current Facility-Administered Medications on File Prior to Visit   Medication Dose Route Frequency Provider Last Rate Last Dose   • cyanocobalamin injection 1,000 mcg  1,000 mcg [facility]amuscular Q28 Days [facility]   1,000 mcg at 08/05/19 1039   • lidocaine-EPINEPHrine (XYLOCAINE W/EPI) 1 %-1:805812 injection 10 mL  10 mL Infiltration Once Patrick Skinner MD         Allergies   Allergen Reactions   • Cephalosporins Nausea And Vomiting   • Erythromycin Nausea Only   • Morphine And Related Itching   • Other Nausea And Vomiting     Cipro   • Penicillins Hives   • Shellfish-Derived Products Hives and Other (See Comments)     Other reaction(s): SOA   • Zithromax [Azithromycin] Hives and Nausea And Vomiting   • Zolpidem Mental Status Change   • Honey Bee Treatment [Bee Venom] Hives   • Hydrocodone-Acetaminophen Anxiety   • Sulfa Antibiotics Hives and Rash     Sulfa (Sulfonamide Antibiotics)     Social History     Socioeconomic History   • Marital status:      Spouse name: Not on file   • Number of children: Not on file   • Years of education: Not on  file   • Highest education level: Not on file   Tobacco Use   • Smoking status: Former Smoker     Years: 30.00     Last attempt to quit: 2008     Years since quittin.7   • Smokeless tobacco: Never Used   • Tobacco comment: PT SMOKED FOR 30 YEARS AND QUIT IN 2010   Substance and Sexual Activity   • Alcohol use: No   • Drug use: No   • Sexual activity: Defer         Physical Exam:  Vitals:    20 1448   BP: 108/70   Pulse: 96   SpO2: 99%     Body mass index is 23.13 kg/m².    GEN: alert, appears stated age and cooperative  Body Habitus: well-nourished  HEENT: Head: Normocephalic, no lesions, without obvious abnormality.  JVP: 6 cm of water at 45 degrees HJR: absent      Avenel:  normal size and placement Palpable S4: Not present.   Heart rate: normal  Heart Rhythm: regular     Heart Sounds: S1: normal intensity  S2: normal intensity  S3: absent   S4: absent  Opening Snap: absent  A2-OS:  N/A  Pericardial rub: absent    Ejection click: None      Murmurs: Systolic: none  Diastolic: none  Extremity: Moves spontaneously      DATA:              Results for orders placed in visit on 19   Adult Transthoracic Echo Limited W/ Cont if Necessary Per Protocol    Narrative · Limited echocardiogram for pericardial effusion  · Left ventricle systolic function is mildly decreased at 52%. Mild global   hypokinesis. Borderline concentric hypertrophy.  · Right ventricle systolic function is normal.  · Trivial pericardial effusion adjacent to the left ventricle.              RHC    Resistance:  SVR:      1504 dynes · sec/cm5       PVR:      112 dynes · sec/cm5     Saturations:  SVC:                    64.6%  High RA:             69%  Low RA:               66.2%  PA:                      68.4%  RV:                      68.3     Cardiac Outputs:  Thermal CO:       5  Thermal CI:         3.1  Regan CO:              5  Regan CI:                3.1        RV Status:  RVSWI:  5.74 gm-m/m2/beat       RECOMMENDATIONS:       1.  Nonischemic cardiomyopathy (CMS/HCC).  NYHA stage C; functional class II.  Today, euvolemic and perfused.  Today, we discussed continuing bisoprolol as her only agent as she is had problems with orthostasis in the past with ACE inhibitors.  Additionally, she has had normalization of her LVEF that has been sustained.  I have encouraged medication compliance and a low-sodium diet.  I have asked her to contact me with any issues.     2. Nonrheumatic aortic valve insufficiency/Nonrheumatic mitral valve regurgitation. ACC stage B.  There are no surgical indications at this time.  · The patient has been advised to remain in clinical surveillance every 12 months.  · Signs and symptoms of worsening valve disease discussed.  I've asked the patient contact me for an earlier appointment if these develop.  · I've recommended a repeat 2D TTE every 3 years.  · TTE due: 2022.  No indication based on 2017 ACC/AHA guidelines for IE prophylaxis for dental procedures: Optimal oral health is recommended through regular professional dental care and the use of appropriate dental products, such as manual, powered, and ultrasonic toothbrushes; dental floss; and other plaque-removal devices    3. Pericardial effusion. The prevalence of pericardial effusion is about 3%.  The patient is currently Asymptomatic.  The size of the effusion is small.  I briefly discussed the etiology of pericardial effusions.  A handout was also offered to the patient which explained the disease process, including signs of worsening pericardial effusion.  At this point I have counseled conservative management.   -Will repeat a 2D TTE in 10/2020  -Please call for worsening signs or symptoms.    4. ASCAD. CCS class III. The patient has not been revascularized. She has continued to have CPS. She has OH, so increasing BB or Imdur is not an attractive option. Will arrange follow-up appt with  to discuss PTCI.   -Bisoprolol  -ASA  -Statin intolerance: Repatha.      -Imdur  -Call 911 immediately for concerning symptoms.    5. Cardiac Risk Assessments based on 2019 ACCF guidelines:  A team-based care approach is recommended for the control of risk factors associated with ASCAD.  As such, Daysi Pa was requested to have ongoing follow-up with their PCP. Continue follow-up visits with PCP for monitoring of labs; diet emphasizing intake of vegetables, fruits, nuts, whole grains and fish is recommended. Physical activity recommendations were provided. Essential HTN is a significant risk factor for stroke, heart disease and vascular disease. I've recommended the patient continue current medications, if any, as prescribed by the primary care provider. I recommended they have close follow-up for ongoing mgmt of this and the medical comorbidities associated with HTN with their PCP.  They were also provided with information regarding maintaining a healthy weight, heart-healthy dietary pattern DASH information.  Goal blood pressure less than 130/80.  The patient's BMI is recommended to be calculated at least annually.  The patient's BMI is Body mass index is 23.13 kg/m²..  This places the patient in weight class:  Normal: 18.5-24.9kg/m2. They have been asked to have close PCP follow-up.  Tobacco status assessed at every visits.  The patient's nicotine status: is a former smoker      Return in about 3 months (around 8/27/2020).

## 2020-06-01 DIAGNOSIS — R94.2 ABNORMAL PFT: Primary | ICD-10-CM

## 2020-06-02 PROBLEM — Z87.891 PERSONAL HISTORY OF TOBACCO USE, PRESENTING HAZARDS TO HEALTH: Status: ACTIVE | Noted: 2020-06-02

## 2020-06-02 NOTE — PROGRESS NOTES
Pulmonary Consultation    Stuart Sears,*,    Thank you for asking me to see Daysi Pa for   Chief Complaint   Patient presents with   • abnormal PFT   .      History of Present Illness  Daysi Pa is a 64 y.o. female with a PMH significant for past tobacco use, nonischemic cardiomyopathy, CAD, GERD, breast cancer, and fibromyalgia who presents for evaluation of abnormal PFTs.    6/3/2020: She reports that she had breathing tests by Dr. Sears and she was told that her lungs were damaged. Pt complains that she has had sudden onset of dyspnea on minimal exertion last year. It does not happen daily and is intermittent. Pt has been having issues with her heart the past few months as she has a h/o cardiomyopathy. She had a heart cath which showed some clogged arteries but there was no PCI. Pt continues to have chest pain so she was referred to Dr. Thompson for PCI. She describes tightness and twisting in her chest which occurs intermittently and is not aggravated by any factors. She states that she has noticed weight gain in the past year but no sudden changes recently. Pt denies edema. She denies prior diagnosis of lung disease but someone has commented on digital clubbing. Pt does not use O2. She has occasional cough which is nonproductive but she does have some wheeze at night. Pt does have occasional nighttime awakenings with dyspnea. She admits to snoring and witnessed apneas. Pt had a sleep study about 10yrs ago and was told she did not have SAMY. Pt previously smoked. She has worked at [employer] and [employer]; she states she was exposed to a lot of solvent fumes and dusts at [employer]. Her grandmother  of emphysema (smoker), and a paternal uncle had lung cancer (nonsmoker). She had a maternal aunt with lung cancer. 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     Tobacco use history:  Type: cigarettes  Amount: 1 ppd  Duration: 30 years  Cessation:     Willing to quit: N/A      Review of Systems: History obtained from chart review and the patient.  Review of Systems   Constitutional: Positive for fatigue and unexpected weight change. Negative for fever.        Snoring, witnessed apneas   HENT: Positive for congestion.    Respiratory: Positive for cough, chest tightness, shortness of breath and wheezing.    Cardiovascular: Positive for chest pain. Negative for leg swelling.   Gastrointestinal: Positive for abdominal pain and nausea.   Genitourinary: Positive for frequency.   Musculoskeletal: Positive for arthralgias and back pain.   Neurological: Positive for dizziness and headaches.   Psychiatric/Behavioral: Positive for dysphoric mood. The patient is nervous/anxious.      As described in the HPI. Otherwise, remainder of ROS (14 systems) were negative.    Patient Active Problem List   Diagnosis   • Hyperlipidemia   • Mitral regurgitation   • Rheumatoid arthritis (CMS/HCC)   • Primary fibromyalgia syndrome   • Osteoporosis   • Malaise and fatigue   • Irritable bowel syndrome with constipation   • Intraductal carcinoma in situ of breast   • Gastroesophageal reflux disease   • Dyslipidemia   • Depressive disorder   • Chronic pain   • Attention deficit hyperactivity disorder   • Anxiety state   • Allergic rhinitis   • Acquired hypothyroidism   • Chronic low back pain   • Lumbosacral spondylosis without myelopathy   • Myofacial muscle pain   • Long term (current) use of opiate analgesic   • Snoring   • Nonischemic cardiomyopathy (CMS/HCC)   • Posterior vitreous detachment of left eye   • Epiretinal membrane (ERM) of right eye   • Lamellar macular hole of both eyes   • Nonrheumatic aortic valve insufficiency   • Precordial pain   • Pericardial effusion   • Abnormal stress echocardiogram   • Dyspnea on exertion   • Chest pain, atypical   • Coronary artery disease of native artery of native heart with stable angina pectoris (CMS/HCC)   • Personal history of tobacco use,  presenting hazards to health   • Stage 2 moderate COPD by GOLD classification (CMS/[facility])   • Clubbing of fingers   • Exposure to second hand smoke         Current Outpatient Medications:   •  albuterol (PROAIR HFA) 108 (90 BASE) MCG/ACT inhaler, Inhale 2 puffs 4 (Four) Times a Day As Needed., Disp: , Rfl:   •  aspirin 81 MG tablet, Take 1 tablet by mouth Daily., Disp: 30 tablet, Rfl: 11  •  atomoxetine (STRATTERA) 40 MG capsule, TAKE 1 CAPSULE BY MOUTH DAILY., Disp: 30 capsule, Rfl: 1  •  bisoprolol (ZEBeta) 5 MG tablet, TAKE 1 TABLET BY MOUTH DAILY., Disp: 30 tablet, Rfl: 6  •  denosumab (PROLIA) 60 MG/ML solution prefilled syringe syringe, Inject 60 mg under the skin into the appropriate area as directed Every 6 (Six) Months., Disp: , Rfl:   •  desloratadine (CLARINEX) 5 MG tablet, Take 5 mg by mouth Daily., Disp: , Rfl:   •  docusate sodium (COLACE) 100 MG capsule, Take 200 mg by mouth every night. at bedtime, Disp: , Rfl:   •  esomeprazole (nexIUM) 40 MG capsule, Take 40 mg by mouth Every Morning Before Breakfast., Disp: , Rfl:   •  Evolocumab 140 MG/ML solution auto-injector, Inject 1 mL under the skin into the appropriate area as directed Every 14 (Fourteen) Days., Disp: 2.1 mL, Rfl: 6  •  furosemide (LASIX) 20 MG tablet, Take 20 mg by mouth Daily., Disp: , Rfl:   •  gabapentin (NEURONTIN) 300 MG capsule, Take 1 capsule by mouth 2 (Two) Times a Day., Disp: 180 capsule, Rfl: 0  •  hydroxychloroquine (PLAQUENIL) 200 MG tablet, Take 1 tablet by mouth Daily., Disp: 90 tablet, Rfl: 3  •  isosorbide mononitrate (IMDUR) 30 MG 24 hr tablet, Take 1 tablet by mouth Daily., Disp: 30 tablet, Rfl: 11  •  lubiprostone (AMITIZA) 24 MCG capsule, Take 1 capsule by mouth 2 (Two) Times a Day With Meals., Disp: , Rfl:   •  metoclopramide (REGLAN) 10 MG tablet, TAKE 1 TABLET BY MOUTH 4 (FOUR) TIMES A DAY., Disp: 360 tablet, Rfl: 2  •  NARCAN 4 MG/0.1ML nasal spray, 1 spray into the nostril(s) as directed by provider As Needed.,  Disp: , Rfl: 0  •  OxyCODONE HCl 5 MG tablet , Take 1 tablet by mouth every 6 (six) hours as needed., Disp: , Rfl:   •  OxyCODONE HCl ER (OXYCONTIN) 30 MG tablet extended-release 12 hour, Take 30 mg by mouth 2 (two) times a day. OxyContin 30 mg tablet,crush resistant,extended release, Disp: , Rfl:   •  polyethylene glycol (MIRALAX) packet, Take 17 g by mouth Daily., Disp: , Rfl:   •  RELISTOR 150 MG tablet, Take 15 mg by mouth Daily., Disp: , Rfl: 0  •  SYNTHROID 88 MCG tablet, TAKE 1 TABLET BY MOUTH DAILY. BRAND NAME MEDICALLY NECESSARY, Disp: 90 tablet, Rfl: 3  •  traZODone (DESYREL) 150 MG tablet, Take 150 mg by mouth Every Night., Disp: , Rfl:   •  venlafaxine XR (EFFEXOR-XR) 150 MG 24 hr capsule, Take 1 capsule by mouth Daily., Disp: 90 capsule, Rfl: 3  •  venlafaxine XR (EFFEXOR-XR) 75 MG 24 hr capsule, Take 1 capsule by mouth Daily., Disp: 90 capsule, Rfl: 3  •  umeclidinium-vilanterol (ANORO ELLIPTA) 62.5-25 MCG/INH aerosol powder  inhaler, Inhale 1 puff Daily., Disp: 1 each, Rfl: 11    Current Facility-Administered Medications:   •  cyanocobalamin injection 1,000 mcg, 1,000 mcg, Intramuscular, Q28 Days, Olimpia Robison MD, 1,000 mcg at 08/05/19 1039  •  lidocaine-EPINEPHrine (XYLOCAINE W/EPI) 1 %-1:002342 injection 10 mL, 10 mL, Infiltration, Once, Patrick Skinner MD    Allergies   Allergen Reactions   • Cephalosporins Nausea And Vomiting   • Erythromycin Nausea Only   • Morphine And Related Itching   • Other Nausea And Vomiting     Cipro   • Penicillins Hives   • Shellfish-Derived Products Hives and Other (See Comments)     Other reaction(s): SOA   • Zithromax [Azithromycin] Hives and Nausea And Vomiting   • Zolpidem Mental Status Change   • Honey Bee Treatment [Bee Venom] Hives   • Hydrocodone-Acetaminophen Anxiety   • Sulfa Antibiotics Hives and Rash     Sulfa (Sulfonamide Antibiotics)       Past Medical History:   Diagnosis Date   • Acquired hypothyroidism    • Allergic rhinitis    • Allergy  04/17/2016    Consult, Allergy (1) - Ordered By: BENIGNO LIM ([facility])    • Anxiety state    • Attention deficit hyperactivity disorder    • Benign essential hypertension    • Chronic pain    • Chronic pain disorder    • Congestive heart failure (CMS/HCC)     primarily systrolic dysfunction EF 17-20% repeat echo 55%   • Depressive disorder    • Dyslipidemia    • Dysphagia    • Gastroesophageal reflux disease    • Generalized abdominal pain    • History of echocardiogram 03/23/2016    Echocardiogram W/ color flow 87293 (3) - Normal LV function wiht Ef of 60%.Normal RV size and function.Normal diastolic function.No significant valvular regurgitation or stenosis.   • History of echocardiogram 04/27/2012    Echocardiogram W/O color flow 29542 (1) - Normal left ventricular systolic function. EF 55%. No evidence of regional wall motion abnormalities. Abnormal relaxation of the left ventricular myocardium.   • History of EKG 03/07/2016    EKG Tracing and Interpretation 77863 (3) - Ordered By: LILLIE BRADY ([facility])    • History of mammogram 06/05/2013    MAMMOGRAM SCREENING 20527 - [facility] (1) - birads 0    • Hyperlipidemia    • Intraductal carcinoma in situ of breast     hx in past and s/p bilateral simple mastectomies      • Irritable bowel syndrome    • Low back pain    • Malaise and fatigue    • Microalbuminuria 07/16/2015    POS MICROALBUMINURIA RESULT DOC AND REVIEWED 3060F (1) - Ordered By: BENIGNO LIM ([facility])   • Mitral valve regurgitation     Mild-Moderate      • Myopia    • Non-organic sleep disorder    • Osteoporosis    • Pain management 04/17/2016    Consult, Pain Management (1) - Ordered By: BENIGNO LIM ([facility])    • Pneumococcal vaccination indicated 07/08/2016    PNEUMOC VAC/ADMIN/RCVD 4040F (11) - Ordered By: BENIGNO LIM ([facility])   • Primary fibromyalgia syndrome    • Rheumatoid arthritis (CMS/HCC)      Past Surgical History:   Procedure Laterality Date   • APPENDECTOMY  2008    Appendectomy (1)   • BREAST BIOPSY  06/12/2013     Stereotactic breast biopsy (1) - stereotactic left mammotome biopsy with 11 gauge needle.   • BREAST IMPLANT REMOVAL  2003    Remove breast implant (1)   • BREAST IMPLANT SURGERY  2000    Breast implantation (1)   • CARDIAC CATHETERIZATION N/A 1/21/2020    Procedure: Right Heart Cath;  Surgeon: Stuart Sears MD PhD;  Location: [facility] INVASIVE LOCATION;  Service: Cardiology   • CARDIAC CATHETERIZATION N/A 1/21/2020    Procedure: LEFT HEART CATH;  Surgeon: Carmen Thompson MD;  Location: [facility] INVASIVE LOCATION;  Service: Cardiology   • COLONOSCOPY  01/20/2014    Colonoscopy, diagnostic (screening) 40703 (1)   • COLONOSCOPY W/ POLYPECTOMY  2008    Colonoscopy remove polyps 34575 (1)    • EXCISION LESION  05/01/2014    Remove chest wall lesion (1) - Excision of subcutaneous mass, left chest wall, Subcutaneous mass left chest wall, status post mastectomy.   • INJECTION OF MEDICATION  07/05/2016    B12 (44) - Ordered By: BENIGNO LIM ([facility])    • INJECTION OF MEDICATION  11/12/2015    Celestone (betamethasone) (1) - Ordered By: BENIGNO LIM ([facility])    • INJECTION OF MEDICATION  02/11/2016    Kenalog (2) - Ordered By: BENIGNO LIM ([facility])    • INJECTION OF MEDICATION  11/17/2017    Prolia   • MASTECTOMY  09/05/2013    Mastectomy (2) - Right simple prophylactic mastectomy.   • OTHER SURGICAL HISTORY  10/29/2014    SONOGRAM THYROID, NECK 14486 (1) - Ordered By: BENIGNO LIM ([facility])   • PAP SMEAR  06/03/2013     PAP SMEAR (1)   • SUBTOTAL HYSTERECTOMY  1998     Partial hyst (1)     • TONSILLECTOMY  1969    Tonsillectomy (1)   • VASCULAR SURGERY  12/24/2014    Consult, Vascular Surgery (1) - Ordered By: BENIGNO LIM ([facility])      Social History     Socioeconomic History   • Marital status:      Spouse name: Not on file   • Number of children: Not on file   • Years of education: Not on file   • Highest education level: Not on file   Tobacco Use   • Smoking status: Former Smoker     Years: 30.00     Last attempt to quit: 8/17/2008      "Years since quittin.8   • Smokeless tobacco: Never Used   • Tobacco comment: PT SMOKED FOR 30 YEARS AND QUIT IN 2010   Substance and Sexual Activity   • Alcohol use: No   • Drug use: No   • Sexual activity: Defer     Family History   Problem Relation Age of Onset   • Breast cancer Sister    • Rectal cancer Other         Colorectal cancer   • Heart disease Other    • Hypertension Other    • Thyroid disease Other         Thyroid disorder   • Hypertension Mother    • Migraines Mother    • Osteoporosis Mother    • Diabetes Father    • Coronary artery disease Father    • Hyperlipidemia Father    • Cancer Maternal Aunt    • COPD Paternal Grandmother    • COPD Paternal Grandfather           Objective     Blood pressure 138/70, pulse 82, height 160 cm (63\"), weight 58.1 kg (128 lb), SpO2 98 %, not currently breastfeeding.  Physical Exam   Constitutional: She is oriented to person, place, and time. Vital signs are normal. She appears well-developed and well-nourished.   HENT:   Head: Normocephalic and atraumatic.   Nose: Nose normal.   Mouth/Throat: Uvula is midline, oropharynx is clear and moist and mucous membranes are normal.   Mallampati 2, edentulous   Eyes: Pupils are equal, round, and reactive to light. Conjunctivae, EOM and lids are normal.   Neck: Trachea normal and normal range of motion. No tracheal tenderness present. No thyroid mass present.   Cardiovascular: Normal rate, regular rhythm and normal heart sounds. PMI is not displaced. Exam reveals no gallop.   No murmur heard.  Pulmonary/Chest: Effort normal and breath sounds normal. No respiratory distress. She has no decreased breath sounds. She has no wheezes. She has no rhonchi. Chest wall is not dull to percussion. She exhibits deformity (barrel chest). She exhibits no tenderness.   Abdominal: Soft. Normal appearance and bowel sounds are normal. There is no hepatomegaly. There is no tenderness.   Musculoskeletal:   Normal gait, no extremity edema "     Vascular Status -  Her right foot exhibits no edema. Her left foot exhibits no edema.  Lymphadenopathy:        Head (right side): No submandibular adenopathy present.        Head (left side): No submandibular adenopathy present.     She has no cervical adenopathy.        Right: No supraclavicular adenopathy present.        Left: No supraclavicular adenopathy present.   Neurological: She is alert and oriented to person, place, and time.   Skin: Skin is warm and dry. No rash noted. No cyanosis. Nails show clubbing.   Psychiatric: She has a normal mood and affect. Her speech is normal and behavior is normal. Judgment normal.   Nursing note and vitals reviewed.      PFTs: 2/26/20 (independently reviewed and interpreted by me)  Ratio 46  FVC 3.03/ 100%  FEV1 1.4/ 60%  TLC 4.1/ 83%  DLCO 17.25/ 75%  Moderate obstruction with no significant bronchodilator response.  Normal lung volumes.  Mildly reduced diffusing capacity.  No comparative data available.    Radiology (independently reviewed and interpreted by me): CXR 6/3/20 showed old granulomatous disease, probable linear scar medial right middle lobe    LHC 1/12/20:  1.  Moderate nonobstructive disease in the left system  2.  There is chronic total occlusion of the RPL which is the most likely culprit for her abnormal stress test.  However this is small in caliber and not amenable to PCI.  Ostial RPDA also has moderate disease.  If patient continues to have symptoms despite optimal medical therapy consider PCI of the ostial RPDA.  3.  Normal left-sided filling pressures.  No gradient noted across aortic valve on pullback.    RHC 1/21/20:  · 1. Normal pulmonary pressures  · 2. Normal left-sided filling pressures.  · 3. Normal right-sided filling pressures.  · Co-oximetry run is without evidence of right-to-left shunt    TTE 2/26/20:  · Left ventricle systolic function is mildly decreased at 52%. Mild global hypokinesis. Borderline concentric hypertrophy.  · Right  ventricle systolic function is normal.  · Trivial pericardial effusion adjacent to the left ventricle.     Assessment/Plan     Daysi was seen today for abnormal pft.    Diagnoses and all orders for this visit:    Abnormal PFTs    Stage 2 moderate COPD by GOLD classification (CMS/[facility])  -     umeclidinium-vilanterol (ANORO ELLIPTA) 62.5-25 MCG/INH aerosol powder  inhaler; Inhale 1 puff Daily.    Clubbing of fingers    Personal history of tobacco use, presenting hazards to health  -     CT Chest Low Dose Wo; Future    Exposure to second hand smoke    Nonischemic cardiomyopathy (CMS/[facility])         Discussion/ Recommendations:   PFTs consistent with moderate COPD.  Chest x-ray showed old granulomatous disease.  I think her symptoms may be secondary to her underlying COPD, but they are less predictable than expected.  Nevertheless, I think she would benefit from initiation of a dual bronchodilator.  I also recommended that she undergo lung cancer screening given her history of tobacco use.  She was noted to have digital clubbing on exam which is likely due to underlying lung disease, so I evaluated her for exertional oxygen need as below.    -Start Anoro daily.  Sample provided and instructed on use.  -Use albuterol as needed for dyspnea or wheeze.  -Encouraged regular, progressive exercise  -Consultation with Dr. Thompson later this week as scheduled.  -Lung cancer screening shared decision making counseling: The patient meets criteria for lung cancer screening CT (LDCT); 1ppd x 30yrs ,quit 2010 with no symptoms of lung cancer.  Reviewed benefits of such screening including, early detection of potentially curable lung cancer.  Also, discussed risks of screening including, radiation exposure, test anxiety, false positives, risk associated with future procedures, and possibility of clinically significant incidental findings.  The patient does agree to undergo lung cancer screening.  -Up-to-date with pneumococcal and influenza  vaccinations.    6 Minute Walk for Oxygen   Room air SpO2 at rest: 98%  Room air SpO2 with exertion: 96%    Does not qualify for supplemental oxygen      Patient's Body mass index is 22.67 kg/m². BMI is within normal parameters. No follow-up required..           Return in about 4 weeks (around 7/1/2020) for Recheck COPD.      Thank you for allowing me to participate in the care of Daysi Pa. Please do not hesitate to contact me with any questions.         This document has been electronically signed by Gaby Hines MD on Shalonda 3, 2020 09:33      Dictated using Dragon

## 2020-06-03 ENCOUNTER — HOSPITAL ENCOUNTER (OUTPATIENT)
Dept: GENERAL RADIOLOGY | Facility: HOSPITAL | Age: 65
Discharge: HOME | End: 2020-06-03
Admitting: INTERNAL MEDICINE

## 2020-06-03 ENCOUNTER — OFFICE VISIT (OUTPATIENT)
Dept: PULMONOLOGY | Facility: CLINIC | Age: 65
End: 2020-06-03

## 2020-06-03 VITALS
SYSTOLIC BLOOD PRESSURE: 138 MMHG | WEIGHT: 128 LBS | HEART RATE: 82 BPM | DIASTOLIC BLOOD PRESSURE: 70 MMHG | OXYGEN SATURATION: 98 % | HEIGHT: 63 IN | BODY MASS INDEX: 22.68 KG/M2

## 2020-06-03 DIAGNOSIS — J44.9 STAGE 2 MODERATE COPD BY GOLD CLASSIFICATION (HCC): ICD-10-CM

## 2020-06-03 DIAGNOSIS — R94.2 ABNORMAL PFT: ICD-10-CM

## 2020-06-03 DIAGNOSIS — Z77.22 EXPOSURE TO SECOND HAND SMOKE: ICD-10-CM

## 2020-06-03 DIAGNOSIS — R68.3 CLUBBING OF FINGERS: ICD-10-CM

## 2020-06-03 DIAGNOSIS — R94.2 ABNORMAL PFTS: Primary | ICD-10-CM

## 2020-06-03 DIAGNOSIS — Z87.891 PERSONAL HISTORY OF TOBACCO USE, PRESENTING HAZARDS TO HEALTH: ICD-10-CM

## 2020-06-03 DIAGNOSIS — I42.8 NONISCHEMIC CARDIOMYOPATHY (HCC): ICD-10-CM

## 2020-06-03 PROCEDURE — 99204 OFFICE O/P NEW MOD 45 MIN: CPT | Performed by: INTERNAL MEDICINE

## 2020-06-03 PROCEDURE — 71046 X-RAY EXAM CHEST 2 VIEWS: CPT

## 2020-06-03 PROCEDURE — G0296 VISIT TO DETERM LDCT ELIG: HCPCS | Performed by: INTERNAL MEDICINE

## 2020-06-05 ENCOUNTER — OFFICE VISIT (OUTPATIENT)
Dept: CARDIOLOGY | Facility: CLINIC | Age: 65
End: 2020-06-05

## 2020-06-05 ENCOUNTER — TELEPHONE (OUTPATIENT)
Dept: CARDIOLOGY | Facility: CLINIC | Age: 65
End: 2020-06-05

## 2020-06-05 VITALS — BODY MASS INDEX: 23 KG/M2 | HEIGHT: 62 IN | WEIGHT: 125 LBS

## 2020-06-05 DIAGNOSIS — I25.83 CORONARY ARTERY DISEASE DUE TO LIPID RICH PLAQUE: ICD-10-CM

## 2020-06-05 DIAGNOSIS — I10 HYPERTENSION, ESSENTIAL: Primary | ICD-10-CM

## 2020-06-05 DIAGNOSIS — I25.10 CORONARY ARTERY DISEASE DUE TO LIPID RICH PLAQUE: ICD-10-CM

## 2020-06-05 PROCEDURE — 99441 PR PHYS/QHP TELEPHONE EVALUATION 5-10 MIN: CPT | Performed by: INTERNAL MEDICINE

## 2020-06-05 NOTE — PROGRESS NOTES
[facility]  OFFICE NOTE    Cardiovascular Medicine  Carmen Thompson M.D., RPVI         No referring provider defined for this encounter.   You have chosen to receive care through a telephone visit. Do you consent to use a telephone visit for your medical care today? Yes      Thank you for asking me to see Daysi Pa for CAD.    History of Present Illness  This is a 64 y.o. female with:    1.  Hypertension  2.  Hyperlipidemia  3.  Coronary artery disease    Daysi Pa is a 64 y.o. female who presents for consultation today.  Patient was previously seen by Dr. Sears for chest pain.  She had an echocardiogram which was concerning for ischemia inferior wall.  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  She also had an ostial PDA lesion which was treated medically.  Now she is having worsening chest pain more so with exertion and occasionally at rest.  Here for discussion for plans for potential PCI.  Reported she had abnormal colonoscopy so she is being scheduled for repeat colonoscopy towards the mid of this month.      Review of Systems - ROS  Constitution: Negative for weakness, weight gain and weight loss.   HENT: Negative for congestion.    Eyes: Negative for blurred vision.   Cardiovascular: As mentioned above  Respiratory: Negative for cough and hemoptysis.    Endocrine: Negative for polydipsia and polyuria.   Hematologic/Lymphatic: Negative for bleeding problem. Does not bruise/bleed easily.   Skin: Negative for flushing.   Musculoskeletal: Negative for neck pain and stiffness.   Gastrointestinal: Negative for abdominal pain, diarrhea, jaundice, melena, nausea and vomiting.   Genitourinary: Negative for dysuria and hematuria.   Neurological: Negative for dizziness, focal weakness and numbness.   Psychiatric/Behavioral: Negative for altered mental status and depression.          All  other systems were reviewed and were negative.    family history includes Breast cancer in her sister; COPD in her paternal grandfather and paternal grandmother; Cancer in her maternal aunt; Coronary artery disease in her father; Diabetes in her father; Heart disease in an other family member; Hyperlipidemia in her father; Hypertension in her mother and another family member; Migraines in her mother; Osteoporosis in her mother; Rectal cancer in an other family member; Thyroid disease in an other family member.     reports that she quit smoking about 11 years ago. She quit after 30.00 years of use. She has never used smokeless tobacco. She reports that she does not drink alcohol or use drugs.    Allergies   Allergen Reactions   • Cephalosporins Nausea And Vomiting   • Erythromycin Nausea Only   • Morphine And Related Itching   • Other Nausea And Vomiting     Cipro   • Penicillins Hives   • Shellfish-Derived Products Hives and Other (See Comments)     Other reaction(s): SOA   • Zithromax [Azithromycin] Hives and Nausea And Vomiting   • Zolpidem Mental Status Change   • Honey Bee Treatment [Bee Venom] Hives   • Hydrocodone-Acetaminophen Anxiety   • Sulfa Antibiotics Hives and Rash     Sulfa (Sulfonamide Antibiotics)         Current Outpatient Medications:   •  albuterol (PROAIR HFA) 108 (90 BASE) MCG/ACT inhaler, Inhale 2 puffs 4 (Four) Times a Day As Needed., Disp: , Rfl:   •  aspirin 81 MG tablet, Take 1 tablet by mouth Daily., Disp: 30 tablet, Rfl: 11  •  atomoxetine (STRATTERA) 40 MG capsule, TAKE 1 CAPSULE BY MOUTH DAILY., Disp: 30 capsule, Rfl: 1  •  bisoprolol (ZEBeta) 5 MG tablet, TAKE 1 TABLET BY MOUTH DAILY., Disp: 30 tablet, Rfl: 6  •  denosumab (PROLIA) 60 MG/ML solution prefilled syringe syringe, Inject 60 mg under the skin into the appropriate area as directed Every 6 (Six) Months., Disp: , Rfl:   •  desloratadine (CLARINEX) 5 MG tablet, Take 5 mg by mouth Daily., Disp: , Rfl:   •  docusate sodium  (COLACE) 100 MG capsule, Take 200 mg by mouth every night. at bedtime, Disp: , Rfl:   •  esomeprazole (nexIUM) 40 MG capsule, Take 40 mg by mouth Every Morning Before Breakfast., Disp: , Rfl:   •  Evolocumab 140 MG/ML solution auto-injector, Inject 1 mL under the skin into the appropriate area as directed Every 14 (Fourteen) Days., Disp: 2.1 mL, Rfl: 6  •  furosemide (LASIX) 20 MG tablet, Take 20 mg by mouth Daily., Disp: , Rfl:   •  gabapentin (NEURONTIN) 300 MG capsule, Take 1 capsule by mouth 2 (Two) Times a Day., Disp: 180 capsule, Rfl: 0  •  hydroxychloroquine (PLAQUENIL) 200 MG tablet, Take 1 tablet by mouth Daily., Disp: 90 tablet, Rfl: 3  •  isosorbide mononitrate (IMDUR) 30 MG 24 hr tablet, Take 1 tablet by mouth Daily., Disp: 30 tablet, Rfl: 11  •  lubiprostone (AMITIZA) 24 MCG capsule, Take 1 capsule by mouth 2 (Two) Times a Day With Meals., Disp: , Rfl:   •  metoclopramide (REGLAN) 10 MG tablet, TAKE 1 TABLET BY MOUTH 4 (FOUR) TIMES A DAY., Disp: 360 tablet, Rfl: 2  •  NARCAN 4 MG/0.1ML nasal spray, 1 spray into the nostril(s) as directed by provider As Needed., Disp: , Rfl: 0  •  OxyCODONE HCl 5 MG tablet , Take 1 tablet by mouth every 6 (six) hours as needed., Disp: , Rfl:   •  OxyCODONE HCl ER (OXYCONTIN) 30 MG tablet extended-release 12 hour, Take 30 mg by mouth 2 (two) times a day. OxyContin 30 mg tablet,crush resistant,extended release, Disp: , Rfl:   •  polyethylene glycol (MIRALAX) packet, Take 17 g by mouth Daily., Disp: , Rfl:   •  RELISTOR 150 MG tablet, Take 15 mg by mouth Daily., Disp: , Rfl: 0  •  SYNTHROID 88 MCG tablet, TAKE 1 TABLET BY MOUTH DAILY. BRAND NAME MEDICALLY NECESSARY, Disp: 90 tablet, Rfl: 3  •  traZODone (DESYREL) 150 MG tablet, Take 150 mg by mouth Every Night., Disp: , Rfl:   •  umeclidinium-vilanterol (ANORO ELLIPTA) 62.5-25 MCG/INH aerosol powder  inhaler, Inhale 1 puff Daily., Disp: 1 each, Rfl: 11  •  venlafaxine XR (EFFEXOR-XR) 150 MG 24 hr capsule, Take 1 capsule by  "mouth Daily., Disp: 90 capsule, Rfl: 3  •  venlafaxine XR (EFFEXOR-XR) 75 MG 24 hr capsule, Take 1 capsule by mouth Daily., Disp: 90 capsule, Rfl: 3    Current Facility-Administered Medications:   •  cyanocobalamin injection 1,000 mcg, 1,000 mcg, Intramuscular, Q28 Days, Olimpia Robison MD, 1,000 mcg at 08/05/19 1039  •  lidocaine-EPINEPHrine (XYLOCAINE W/EPI) 1 %-1:513270 injection 10 mL, 10 mL, Infiltration, Once, Patrick Skinner MD    Physical Exam:  Vitals:    06/05/20 1011   Weight: 56.7 kg (125 lb)   Height: 157.5 cm (62\")   PainSc: 0-No pain       Unable to perform since tele visit      DATA REVIEWED:       ECG/EMG Results (all)     None        ---------------------------------------------------  TTE/JORDAN:  Results for orders placed in visit on 12/23/19   Adult Transthoracic Echo Limited W/ Cont if Necessary Per Protocol    Narrative · Limited echocardiogram for pericardial effusion  · Left ventricle systolic function is mildly decreased at 52%. Mild global   hypokinesis. Borderline concentric hypertrophy.  · Right ventricle systolic function is normal.  · Trivial pericardial effusion adjacent to the left ventricle.          CT:   Xr Chest 2 View    Result Date: 6/3/2020  Numerous benign calcified granulomata in both lung fields. Otherwise unremarkable chest. No evidence of active disease. Electronically signed by:  Timo Garcia MD  6/3/2020 8:52 AM CDT Workstation: [id number]          --------------------------------------------------------------------------------------------------  LABS:     The CVD Risk score (Diogenes et al., 2008) failed to calculate for the following reasons:    The patient has a prior MI, stroke, CHF, or peripheral vascular disease diagnosis         Lab Results   Component Value Date    GLUCOSE 81 01/21/2020    BUN 8 01/21/2020    CREATININE 0.90 01/21/2020    EGFRIFNONA 63 01/21/2020    BCR 8.9 01/21/2020    K 4.1 01/21/2020    CO2 28.0 01/21/2020    CALCIUM 9.6 01/21/2020    " ALBUMIN 4.70 12/12/2018    AST 21 12/12/2018    ALT 17 12/12/2018     Lab Results   Component Value Date    WBC 6.25 01/21/2020    HGB 11.9 (L) 01/21/2020    HCT 35.0 01/21/2020    MCV 86.8 01/21/2020     01/21/2020     Lab Results   Component Value Date    CHOL 232 (H) 02/27/2020    CHLPL 201 (H) 09/22/2016    TRIG 220 (H) 02/27/2020    HDL 56 02/27/2020     (H) 02/27/2020     Lab Results   Component Value Date    TSH 1.610 12/17/2018     No results found for: CKTOTAL, CKMB, CKMBINDEX, TROPONINI, TROPONINT  Lab Results   Component Value Date    HGBA1C 5.60 02/27/2020     No results found for: DDIMER  Lab Results   Component Value Date    ALT 17 12/12/2018     Lab Results   Component Value Date    HGBA1C 5.60 02/27/2020    HGBA1C 5.6 12/14/2017     Lab Results   Component Value Date    MICROALBUR <0.6 11/13/2017    CREATININE 0.90 01/21/2020     No results found for: IRON, TIBC, FERRITIN  Lab Results   Component Value Date    INR 1.05 01/21/2020    PROTIME 13.5 01/21/2020       Assessment/Plan     1. CAD:  Cardiac cath showed  of small caliber RPL and ostial RPDA lesion.  Since patient continues to have chest pain despite being in optimal medical therapy we will plan on performing staged PCI of her RPDA.  I explained the risk, benefits, alternatives limitation of PCI to the patient patient is agreeable.    2. HTN:  Better controlled on current regimen    3. HLD:    4. Cardiomyopathy: Previously had severe cardiomyopathy but EF is improved 52%.  Follows with Dr. Sears.    Prevention:  Patient's Body mass index is 22.86 kg/m². BMI is within normal parameters. No follow-up required..      Daysi Johnson Thierno  reports that she quit smoking about 11 years ago. She quit after 30.00 years of use. She has never used smokeless tobacco.    AAA Screening:             This document has been electronically signed by Carmen Thompson MD on June 5, 2020 10:57

## 2020-06-09 DIAGNOSIS — M81.0 OSTEOPOROSIS WITHOUT CURRENT PATHOLOGICAL FRACTURE, UNSPECIFIED OSTEOPOROSIS TYPE: Primary | ICD-10-CM

## 2020-06-10 ENCOUNTER — LAB (OUTPATIENT)
Dept: LAB | Facility: HOSPITAL | Age: 65
End: 2020-06-10

## 2020-06-10 DIAGNOSIS — M81.0 OSTEOPOROSIS WITHOUT CURRENT PATHOLOGICAL FRACTURE, UNSPECIFIED OSTEOPOROSIS TYPE: ICD-10-CM

## 2020-06-10 LAB
CALCIUM SPEC-SCNC: 9.7 MG/DL (ref 8.6–10.5)
MAGNESIUM SERPL-MCNC: 2 MG/DL (ref 1.6–2.4)
PHOSPHATE SERPL-MCNC: 4.3 MG/DL (ref 2.5–4.5)

## 2020-06-10 PROCEDURE — 84100 ASSAY OF PHOSPHORUS: CPT

## 2020-06-10 PROCEDURE — 83735 ASSAY OF MAGNESIUM: CPT

## 2020-06-10 PROCEDURE — 82310 ASSAY OF CALCIUM: CPT

## 2020-06-12 DIAGNOSIS — I25.10 CORONARY ARTERY DISEASE DUE TO LIPID RICH PLAQUE: Primary | ICD-10-CM

## 2020-06-12 DIAGNOSIS — I25.83 CORONARY ARTERY DISEASE DUE TO LIPID RICH PLAQUE: Primary | ICD-10-CM

## 2020-06-12 DIAGNOSIS — R07.89 CHEST PAIN, ATYPICAL: ICD-10-CM

## 2020-06-12 RX ORDER — ASPIRIN 325 MG
325 TABLET ORAL ONCE
Status: CANCELLED | OUTPATIENT
Start: 2020-06-12 | End: 2020-06-12

## 2020-06-12 RX ORDER — SODIUM CHLORIDE 9 MG/ML
100 INJECTION, SOLUTION INTRAVENOUS CONTINUOUS
Status: CANCELLED | OUTPATIENT
Start: 2020-06-30

## 2020-06-12 RX ORDER — ASPIRIN 81 MG/1
325 TABLET ORAL DAILY
Status: CANCELLED | OUTPATIENT
Start: 2020-06-13

## 2020-06-12 NOTE — PROGRESS NOTES
Left heart cath scheduled for June 30. Patient given date and instructions. Date time and instructions for preop covid test given to patient as well. Patient verbalized understanding of all instructions and asked to call me if questions arise. All info sent via mail service.

## 2020-06-15 ENCOUNTER — APPOINTMENT (OUTPATIENT)
Dept: ONCOLOGY | Facility: HOSPITAL | Age: 65
End: 2020-06-15

## 2020-06-15 ENCOUNTER — INFUSION (OUTPATIENT)
Dept: ONCOLOGY | Facility: HOSPITAL | Age: 65
End: 2020-06-15

## 2020-06-15 VITALS
HEART RATE: 80 BPM | TEMPERATURE: 97.2 F | DIASTOLIC BLOOD PRESSURE: 67 MMHG | RESPIRATION RATE: 16 BRPM | SYSTOLIC BLOOD PRESSURE: 106 MMHG

## 2020-06-15 DIAGNOSIS — M81.0 OSTEOPOROSIS WITHOUT CURRENT PATHOLOGICAL FRACTURE, UNSPECIFIED OSTEOPOROSIS TYPE: Primary | ICD-10-CM

## 2020-06-15 PROCEDURE — 96372 THER/PROPH/DIAG INJ SC/IM: CPT | Performed by: INTERNAL MEDICINE

## 2020-06-15 PROCEDURE — 25010000002 DENOSUMAB 60 MG/ML SOLUTION PREFILLED SYRINGE: Performed by: FAMILY MEDICINE

## 2020-06-15 RX ADMIN — DENOSUMAB 60 MG: 60 INJECTION SUBCUTANEOUS at 09:41

## 2020-06-18 ENCOUNTER — APPOINTMENT (OUTPATIENT)
Dept: CT IMAGING | Facility: HOSPITAL | Age: 65
End: 2020-06-18

## 2020-06-22 RX ORDER — LEVOTHYROXINE SODIUM 88 MCG
TABLET ORAL
Qty: 90 TABLET | Refills: 3 | Status: ON HOLD | OUTPATIENT
Start: 2020-06-22 | End: 2020-06-30

## 2020-06-27 PROCEDURE — U0003 INFECTIOUS AGENT DETECTION BY NUCLEIC ACID (DNA OR RNA); SEVERE ACUTE RESPIRATORY SYNDROME CORONAVIRUS 2 (SARS-COV-2) (CORONAVIRUS DISEASE [COVID-19]), AMPLIFIED PROBE TECHNIQUE, MAKING USE OF HIGH THROUGHPUT TECHNOLOGIES AS DESCRIBED BY CMS-2020-01-R: HCPCS | Performed by: INTERNAL MEDICINE

## 2020-06-27 PROCEDURE — C9803 HOPD COVID-19 SPEC COLLECT: HCPCS | Performed by: INTERNAL MEDICINE

## 2020-06-28 LAB
COVID LABCORP PRIORITY: NORMAL
SARS-COV-2 RNA RESP QL NAA+PROBE: NOT DETECTED

## 2020-06-30 ENCOUNTER — HOSPITAL ENCOUNTER (OUTPATIENT)
Facility: HOSPITAL | Age: 65
Setting detail: HOSPITAL OUTPATIENT SURGERY
Discharge: HOME | End: 2020-06-30
Attending: INTERNAL MEDICINE | Admitting: INTERNAL MEDICINE

## 2020-06-30 VITALS
BODY MASS INDEX: 22.58 KG/M2 | DIASTOLIC BLOOD PRESSURE: 69 MMHG | HEART RATE: 87 BPM | TEMPERATURE: 99.4 F | SYSTOLIC BLOOD PRESSURE: 151 MMHG | OXYGEN SATURATION: 98 % | RESPIRATION RATE: 18 BRPM | WEIGHT: 127.43 LBS | HEIGHT: 63 IN

## 2020-06-30 DIAGNOSIS — I25.10 CORONARY ARTERY DISEASE DUE TO LIPID RICH PLAQUE: ICD-10-CM

## 2020-06-30 DIAGNOSIS — R07.89 CHEST PAIN, ATYPICAL: ICD-10-CM

## 2020-06-30 DIAGNOSIS — I25.83 CORONARY ARTERY DISEASE DUE TO LIPID RICH PLAQUE: ICD-10-CM

## 2020-06-30 LAB
ANION GAP SERPL CALCULATED.3IONS-SCNC: 9 MMOL/L (ref 5–15)
BUN SERPL-MCNC: 7 MG/DL (ref 8–23)
BUN/CREAT SERPL: 9.6 (ref 7–25)
CALCIUM SPEC-SCNC: 8.5 MG/DL (ref 8.6–10.5)
CHLORIDE SERPL-SCNC: 103 MMOL/L (ref 98–107)
CO2 SERPL-SCNC: 26 MMOL/L (ref 22–29)
CREAT SERPL-MCNC: 0.73 MG/DL (ref 0.57–1)
DEPRECATED RDW RBC AUTO: 38.5 FL (ref 37–54)
ERYTHROCYTE [DISTWIDTH] IN BLOOD BY AUTOMATED COUNT: 12.4 % (ref 12.3–15.4)
GFR SERPL CREATININE-BSD FRML MDRD: 80 ML/MIN/1.73
GLUCOSE SERPL-MCNC: 73 MG/DL (ref 65–99)
HCT VFR BLD AUTO: 29.9 % (ref 34–46.6)
HGB BLD-MCNC: 10.2 G/DL (ref 12–15.9)
INR PPP: 1.01 (ref 0.8–1.2)
MCH RBC QN AUTO: 29.2 PG (ref 26.6–33)
MCHC RBC AUTO-ENTMCNC: 34.1 G/DL (ref 31.5–35.7)
MCV RBC AUTO: 85.7 FL (ref 79–97)
PLATELET # BLD AUTO: 332 10*3/MM3 (ref 140–450)
PMV BLD AUTO: 10.7 FL (ref 6–12)
POTASSIUM SERPL-SCNC: 3.9 MMOL/L (ref 3.5–5.2)
PROTHROMBIN TIME: 13.1 SECONDS (ref 11.1–15.3)
RBC # BLD AUTO: 3.49 10*6/MM3 (ref 3.77–5.28)
SODIUM SERPL-SCNC: 138 MMOL/L (ref 136–145)
WBC # BLD AUTO: 8.37 10*3/MM3 (ref 3.4–10.8)

## 2020-06-30 PROCEDURE — C1769 GUIDE WIRE: HCPCS | Performed by: INTERNAL MEDICINE

## 2020-06-30 PROCEDURE — 93458 L HRT ARTERY/VENTRICLE ANGIO: CPT | Performed by: INTERNAL MEDICINE

## 2020-06-30 PROCEDURE — 80048 BASIC METABOLIC PNL TOTAL CA: CPT | Performed by: INTERNAL MEDICINE

## 2020-06-30 PROCEDURE — C1894 INTRO/SHEATH, NON-LASER: HCPCS | Performed by: INTERNAL MEDICINE

## 2020-06-30 PROCEDURE — 25010000002 FENTANYL CITRATE (PF) 100 MCG/2ML SOLUTION: Performed by: INTERNAL MEDICINE

## 2020-06-30 PROCEDURE — 85610 PROTHROMBIN TIME: CPT | Performed by: INTERNAL MEDICINE

## 2020-06-30 PROCEDURE — 25010000002 PROTAMINE SULFATE PER 10 MG: Performed by: INTERNAL MEDICINE

## 2020-06-30 PROCEDURE — 0 IOPAMIDOL PER 1 ML: Performed by: INTERNAL MEDICINE

## 2020-06-30 PROCEDURE — 85027 COMPLETE CBC AUTOMATED: CPT | Performed by: INTERNAL MEDICINE

## 2020-06-30 PROCEDURE — 93571 IV DOP VEL&/PRESS C FLO 1ST: CPT | Performed by: INTERNAL MEDICINE

## 2020-06-30 PROCEDURE — C1887 CATHETER, GUIDING: HCPCS | Performed by: INTERNAL MEDICINE

## 2020-06-30 PROCEDURE — 25010000002 MIDAZOLAM PER 1 MG: Performed by: INTERNAL MEDICINE

## 2020-06-30 PROCEDURE — C1760 CLOSURE DEV, VASC: HCPCS | Performed by: INTERNAL MEDICINE

## 2020-06-30 PROCEDURE — 25010000002 HEPARIN (PORCINE) PER 1000 UNITS: Performed by: INTERNAL MEDICINE

## 2020-06-30 RX ORDER — ATOMOXETINE 40 MG/1
40 CAPSULE ORAL DAILY
COMMUNITY
End: 2020-07-02

## 2020-06-30 RX ORDER — FENTANYL CITRATE 50 UG/ML
INJECTION, SOLUTION INTRAMUSCULAR; INTRAVENOUS AS NEEDED
Status: DISCONTINUED | OUTPATIENT
Start: 2020-06-30 | End: 2020-06-30 | Stop reason: HOSPADM

## 2020-06-30 RX ORDER — HEPARIN SODIUM 1000 [USP'U]/ML
INJECTION, SOLUTION INTRAVENOUS; SUBCUTANEOUS AS NEEDED
Status: DISCONTINUED | OUTPATIENT
Start: 2020-06-30 | End: 2020-06-30 | Stop reason: HOSPADM

## 2020-06-30 RX ORDER — ASPIRIN 325 MG
325 TABLET ORAL ONCE
Status: DISCONTINUED | OUTPATIENT
Start: 2020-06-30 | End: 2020-06-30

## 2020-06-30 RX ORDER — LEVOTHYROXINE SODIUM 88 UG/1
88 TABLET ORAL DAILY
COMMUNITY
End: 2021-03-03 | Stop reason: SDUPTHER

## 2020-06-30 RX ORDER — OXYCODONE HYDROCHLORIDE 5 MG/1
5 TABLET ORAL ONCE
Status: COMPLETED | OUTPATIENT
Start: 2020-06-30 | End: 2020-06-30

## 2020-06-30 RX ORDER — PROTAMINE SULFATE 10 MG/ML
INJECTION, SOLUTION INTRAVENOUS AS NEEDED
Status: DISCONTINUED | OUTPATIENT
Start: 2020-06-30 | End: 2020-06-30 | Stop reason: HOSPADM

## 2020-06-30 RX ORDER — ASPIRIN 81 MG/1
243 TABLET, CHEWABLE ORAL ONCE
Status: COMPLETED | OUTPATIENT
Start: 2020-06-30 | End: 2020-06-30

## 2020-06-30 RX ORDER — MIDAZOLAM HYDROCHLORIDE 1 MG/ML
INJECTION INTRAMUSCULAR; INTRAVENOUS AS NEEDED
Status: DISCONTINUED | OUTPATIENT
Start: 2020-06-30 | End: 2020-06-30 | Stop reason: HOSPADM

## 2020-06-30 RX ORDER — NITROGLYCERIN 5 MG/ML
INJECTION, SOLUTION INTRAVENOUS AS NEEDED
Status: DISCONTINUED | OUTPATIENT
Start: 2020-06-30 | End: 2020-06-30 | Stop reason: HOSPADM

## 2020-06-30 RX ORDER — SODIUM CHLORIDE 9 MG/ML
100 INJECTION, SOLUTION INTRAVENOUS CONTINUOUS
Status: DISCONTINUED | OUTPATIENT
Start: 2020-06-30 | End: 2020-06-30

## 2020-06-30 RX ORDER — ACETAMINOPHEN 325 MG/1
650 TABLET ORAL EVERY 4 HOURS PRN
Status: DISCONTINUED | OUTPATIENT
Start: 2020-06-30 | End: 2020-06-30 | Stop reason: HOSPADM

## 2020-06-30 RX ORDER — LIDOCAINE HYDROCHLORIDE 20 MG/ML
INJECTION, SOLUTION INFILTRATION; PERINEURAL AS NEEDED
Status: DISCONTINUED | OUTPATIENT
Start: 2020-06-30 | End: 2020-06-30 | Stop reason: HOSPADM

## 2020-06-30 RX ORDER — SODIUM CHLORIDE 9 MG/ML
1 INJECTION, SOLUTION INTRAVENOUS CONTINUOUS
Status: ACTIVE | OUTPATIENT
Start: 2020-06-30 | End: 2020-06-30

## 2020-06-30 RX ADMIN — ASPIRIN 81 MG 243 MG: 81 TABLET ORAL at 11:02

## 2020-06-30 RX ADMIN — OXYCODONE HYDROCHLORIDE 5 MG: 5 TABLET ORAL at 13:29

## 2020-06-30 RX ADMIN — SODIUM CHLORIDE 100 ML/HR: 9 INJECTION, SOLUTION INTRAVENOUS at 10:03

## 2020-07-02 RX ORDER — ATOMOXETINE 40 MG/1
CAPSULE ORAL
Qty: 30 CAPSULE | Refills: 1 | Status: SHIPPED | OUTPATIENT
Start: 2020-07-02 | End: 2020-09-01

## 2020-07-07 ENCOUNTER — DOCUMENTATION (OUTPATIENT)
Dept: CARDIAC REHAB | Facility: HOSPITAL | Age: 65
End: 2020-07-07

## 2020-08-27 ENCOUNTER — OFFICE VISIT (OUTPATIENT)
Dept: CARDIOLOGY | Facility: CLINIC | Age: 65
End: 2020-08-27

## 2020-08-27 VITALS
DIASTOLIC BLOOD PRESSURE: 64 MMHG | BODY MASS INDEX: 23.6 KG/M2 | HEART RATE: 100 BPM | SYSTOLIC BLOOD PRESSURE: 118 MMHG | WEIGHT: 133.2 LBS | OXYGEN SATURATION: 98 % | HEIGHT: 63 IN

## 2020-08-27 DIAGNOSIS — I34.0 NONRHEUMATIC MITRAL VALVE REGURGITATION: Chronic | ICD-10-CM

## 2020-08-27 DIAGNOSIS — I25.118 CORONARY ARTERY DISEASE OF NATIVE ARTERY OF NATIVE HEART WITH STABLE ANGINA PECTORIS (HCC): ICD-10-CM

## 2020-08-27 DIAGNOSIS — I31.39 PERICARDIAL EFFUSION: ICD-10-CM

## 2020-08-27 DIAGNOSIS — I35.1 NONRHEUMATIC AORTIC VALVE INSUFFICIENCY: ICD-10-CM

## 2020-08-27 DIAGNOSIS — E78.2 MIXED HYPERLIPIDEMIA: ICD-10-CM

## 2020-08-27 DIAGNOSIS — I42.8 NONISCHEMIC CARDIOMYOPATHY (HCC): Primary | ICD-10-CM

## 2020-08-27 PROBLEM — I25.83 CORONARY ARTERY DISEASE DUE TO LIPID RICH PLAQUE: Status: RESOLVED | Noted: 2020-06-12 | Resolved: 2020-08-27

## 2020-08-27 PROBLEM — I25.10 CORONARY ARTERY DISEASE DUE TO LIPID RICH PLAQUE: Status: RESOLVED | Noted: 2020-06-12 | Resolved: 2020-08-27

## 2020-08-27 PROCEDURE — 99214 OFFICE O/P EST MOD 30 MIN: CPT | Performed by: INTERNAL MEDICINE

## 2020-08-27 NOTE — PATIENT INSTRUCTIONS
"Conn's Current Therapy 2017 (pp. ). ARMIDA Phelan: [employer]"> Stoelting's Anesthesia and Co-Existing Disease (7th ed., pp. 225-236). ARMIDA Phelan: [employer]"> Braunwald's Heart Disease (11th ed., pp. 174-243). ARMIDA Phelan: Elsevier.\">   Pericardial Effusion    Pericardial effusion is a buildup of fluid around the heart. The heart is surrounded by a thin, double-layered sac (pericardium). When fluid builds up in this sac, it can put too much pressure on the heart and make it harder for the heart to pump blood (cardiac tamponade). This can be life-threatening.  What are the causes?  Often, the cause of pericardial effusion is not known (idiopathic effusion). In some cases, the condition may be caused by:  · Infections from a virus, fungus, parasite, or bacteria.  · Damage to the pericardium from heart surgery or a heart attack.  · Inflammatory diseases, such as rheumatoid arthritis or lupus.  · Kidney or thyroid disease.  · Cancer or treatment for cancer, including radiation or chemotherapy.  · Certain medicines, including medicines for tuberculosis or seizures.  · Chest injury.  What are the signs or symptoms?  Pericardial effusion may not cause symptoms at first, especially if the fluid builds up slowly. In time, pressure on the heart may cause:  · Chest pain and trouble breathing. This may include pain and shortness of breath that get worse when lying down.  · Dizziness and fainting.  · Coughing and hiccups.  · Skipped heartbeats.  · Anxiety and confusion.  · A bluish skin color (cyanosis).  · Swollen legs and ankles.  · A feeling of fullness in the chest.  How is this diagnosed?  This condition is diagnosed based on your symptoms and testing, which may include:  · A test that creates ultrasound images of your heart (echocardiogram).  · A test to examine the electrical functions of your heart (electrocardiogram).  · Chest X-ray.  · CT scan.  · MRI.  · Blood tests.  How is this " treated?  Treatment for this condition depends on the cause of your condition and how severe your symptoms are. Treatment may include:  · Medicines, such as:  ? NSAIDs, such as ibuprofen.  ? Anti-inflammatory medicines, such as steroids.  ? Medicines to fight infection, such as antibiotics.  · Hospital treatment. This may be necessary if the fluid around the heart prevents it from pumping enough blood. Treatment in the hospital may include:  ? IV fluids.  ? Breathing support.  · Surgery. This may be needed in severe cases. Surgery may include:  ? A procedure to remove fluid from the pericardium by placing a needle into it (pericardiocentesis).  ? A procedure to make a permanent opening in the pericardium (pericardial window).  ? Open heart surgery.  Follow these instructions at home:  · Take over-the-counter and prescription medicines only as told by your health care provider.  · If you were prescribed a medicine to fight infection, such as antibiotic medicine, take it as told by your health care provider. Do not stop taking the medicine even if you start to feel better.  · Rest as told by your health care provider. Ask your health care provider what activities are safe for you.  · Keep all follow-up visits as told by your health care provider. This is important.  Contact a health care provider if:  · You have a cough or hiccups that do not go away.  · You have severe swelling in your legs or ankles.  Get help right away if you:  · Have fast or irregular heartbeats (palpitations).  · Feel dizzy or light-headed.  · Faint.  · Have chest pain.  · Have trouble breathing.  These symptoms may represent a serious problem that is an emergency. Do not wait to see if the symptoms will go away. Get medical help right away. Call your local emergency services (911 in the [place] Do not drive yourself to the hospital.  Summary  · Pericardial effusion is a buildup of fluid around the heart. The fluid can eventually prevent the  heart from pumping enough blood (cardiac tamponade), which can be life-threatening.  · Pericardial effusion may not cause symptoms at first.  · Treatment for pericardial effusion depends on the cause of your condition and how severe your symptoms are. In severe cases, hospital treatment or surgery may be required.  · Rest as told by your health care provider. Ask your health care provider what activities are safe for you.  This information is not intended to replace advice given to you by your health care provider. Make sure you discuss any questions you have with your health care provider.  Document Released: 08/15/2006 Document Revised: 05/05/2020 Document Reviewed: 05/05/2020  Elsevier Patient Education © 2020 [employer]

## 2020-08-27 NOTE — PROGRESS NOTES
Cardiovascular Medicine   Stuart Sears M.D., Ph.D., [facility]    The patient returns to cardiology clinic for follow-up of the following cardiac problems:    PROBLEM LIST:    1. 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 EF initially 15%--now improved  2. MR  3. AI  4. Pericardial effusion  5. TERENCE Pa is a 64 y.o. female who returns to clinic today.  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  She is on Bisoprolol only as she had OH associated with medication. Fortunately, she has had normalization of her LV EF. She continues with exercise intolerance and MAURICIO.  She uses Furosemide daily.  She's medication compliant.  Denies side effects. She has had no ED visits or admissions for ADHF.   Most recent TTE showed normalization of left ventricular ejection fraction.  She does have known AI and MR. Her most recent TTE continues to show mild valve disease.  Her next surveillance TTE is due in 2020 Today, she does complain of CP. This is a squeezing sensation. It is a pressure. It is non-exertional. She also now has a small pericardial effusion. She was having CP at her last appt so she was sent for a DSE. This showed inferior ischemia, with normal LV EF and no TID. She is on Bisoprolol.  At her last visit with me she continued with nonexertional chest pain.  She had refused invasive evaluation in the past given her abnormal stress echo.  However, at her last appointment she was agreeable.  She went for invasive coronary angiography which showed a  and otherwise nonobstructive disease. However, she had a RPDA that possibly could have explained her CPS and her abnormal stress echo.   She was recommended to go to  medication management.  She is currently prescribed aspirin, bisoprolol and isosorbide.She had atorvastatin, pravastatin. She has tried Crestor. RHC showed normal filling pressures.     She was sent for LHC because of ongoing chest pain.  She had nonobstructive disease at that time.   Interventional cardiology recommended ongoing medication management.    She returns today in follow-up after invasive coronary angiography.     Review of Systems   Cardiovascular: Positive for dyspnea on exertion. Negative for chest pain, claudication, cyanosis, irregular heartbeat, leg swelling, near-syncope, orthopnea, palpitations, paroxysmal nocturnal dyspnea and syncope.   Respiratory: Positive for shortness of breath. Negative for cough, hemoptysis, sleep disturbances due to breathing, snoring and wheezing.          Current Outpatient Medications on File Prior to Visit   Medication Sig Dispense Refill   • albuterol sulfate HFA (ProAir HFA) 108 (90 Base) MCG/ACT inhaler Inhale 2 puffs 4 (Four) Times a Day As Needed.     • aspirin 81 MG tablet Take 1 tablet by mouth Daily. 30 tablet 11   • atomoxetine (STRATTERA) 40 MG capsule TAKE 1 CAPSULE BY MOUTH DAILY. 30 capsule 1   • bisoprolol (ZEBeta) 5 MG tablet TAKE 1 TABLET BY MOUTH DAILY. 30 tablet 6   • denosumab (PROLIA) 60 MG/ML solution prefilled syringe syringe Inject 60 mg under the skin into the appropriate area as directed Every 6 (Six) Months.     • desloratadine (Clarinex) 5 MG tablet Take 5 mg by mouth Daily.     • docusate sodium (COLACE) 100 MG capsule Take 200 mg by mouth every night. at bedtime     • esomeprazole (nexIUM) 40 MG capsule Take 40 mg by mouth Every Morning Before Breakfast.     • Evolocumab 140 MG/ML solution auto-injector Inject 1 mL under the skin into the appropriate area as directed Every 14 (Fourteen) Days. 2.1 mL 6   • gabapentin (NEURONTIN) 300 MG capsule Take 1 capsule by mouth 2 (Two) Times a Day. 180 capsule 0   • hydroxychloroquine (PLAQUENIL) 200 MG tablet Take 1 tablet by mouth Daily. 90 tablet 3   • isosorbide mononitrate (IMDUR) 30 MG 24 hr tablet Take 1 tablet by mouth Daily. 30 tablet 11   • levothyroxine (SYNTHROID, LEVOTHROID) 88 MCG tablet Take 88 mcg by mouth Daily.     • lubiprostone (AMITIZA) 24 MCG capsule Take 1  capsule by mouth 2 (Two) Times a Day With Meals.     • metoclopramide (REGLAN) 10 MG tablet TAKE 1 TABLET BY MOUTH 4 (FOUR) TIMES A DAY. 360 tablet 2   • NARCAN 4 MG/0.1ML nasal spray 1 spray into the nostril(s) as directed by provider As Needed.  0   • OxyCODONE HCl 5 MG tablet  Take 1 tablet by mouth every 6 (six) hours as needed.     • OxyCODONE HCl ER (OXYCONTIN) 30 MG tablet extended-release 12 hour Take 30 mg by mouth 2 (two) times a day. OxyContin 30 mg tablet,crush resistant,extended release     • polyethylene glycol (MIRALAX) packet Take 17 g by mouth Daily.     • traZODone (DESYREL) 150 MG tablet Take 150 mg by mouth Every Night.     • umeclidinium-vilanterol (ANORO ELLIPTA) 62.5-25 MCG/INH aerosol powder  inhaler Inhale 1 puff Daily. 1 each 11   • venlafaxine XR (EFFEXOR-XR) 150 MG 24 hr capsule Take 1 capsule by mouth Daily. 90 capsule 3   • venlafaxine XR (EFFEXOR-XR) 75 MG 24 hr capsule Take 1 capsule by mouth Daily. 90 capsule 3     No current facility-administered medications on file prior to visit.      Allergies   Allergen Reactions   • Cephalosporins Nausea And Vomiting   • Erythromycin Nausea Only   • Morphine And Related Itching   • Other Nausea And Vomiting     Cipro   • Penicillins Hives   • Shellfish-Derived Products Hives and Other (See Comments)     Other reaction(s): SOA   • Zithromax [Azithromycin] Hives and Nausea And Vomiting   • Zolpidem Mental Status Change   • Honey Bee Treatment [Bee Venom] Hives   • Hydrocodone-Acetaminophen Anxiety   • Sulfa Antibiotics Hives and Rash     Sulfa (Sulfonamide Antibiotics)     Social History     Socioeconomic History   • Marital status:      Spouse name: Not on file   • Number of children: Not on file   • Years of education: Not on file   • Highest education level: Not on file   Tobacco Use   • Smoking status: Former Smoker     Years: 30.00     Last attempt to quit: 2008     Years since quittin.0   • Smokeless tobacco: Never Used   •  Tobacco comment: PT SMOKED FOR 30 YEARS AND QUIT IN 2010   Substance and Sexual Activity   • Alcohol use: No   • Drug use: No   • Sexual activity: Defer         Physical Exam:  Vitals:    08/27/20 1430   BP: 118/64   Pulse: 100   SpO2: 98%     Body mass index is 23.6 kg/m².    GEN: alert, appears stated age and cooperative  Body Habitus: well-nourished  HEENT: Head: Normocephalic, no lesions, without obvious abnormality.  JVP: 6 cm of water at 45 degrees HJR: absent      [place]:  normal size and placement Palpable S4: Not present.   Heart rate: normal  Heart Rhythm: regular     Heart Sounds: S1: normal intensity  S2: normal intensity  S3: absent   S4: absent  Opening Snap: absent  A2-OS:  N/A  Pericardial rub: absent    Ejection click: None      Murmurs: Systolic: none  Diastolic: none  Extremity: Moves spontaneously      DATA:              Results for orders placed in visit on 12/23/19   Adult Transthoracic Echo Limited W/ Cont if Necessary Per Protocol    Narrative · Limited echocardiogram for pericardial effusion  · Left ventricle systolic function is mildly decreased at 52%. Mild global   hypokinesis. Borderline concentric hypertrophy.  · Right ventricle systolic function is normal.  · Trivial pericardial effusion adjacent to the left ventricle.              RHC    Resistance:  SVR:      1504 dynes · sec/cm5       PVR:      112 dynes · sec/cm5     Saturations:  SVC:                    64.6%  High RA:             69%  Low RA:               66.2%  PA:                      68.4%  RV:                      68.3     Cardiac Outputs:  Thermal CO:       5  Thermal CI:         3.1  Regan CO:              5  Regan CI:                3.1        RV Status:  RVSWI:  5.74 gm-m/m2/beat       RECOMMENDATIONS:       1. Nonischemic cardiomyopathy (CMS/HCC).  NYHA stage C; functional class II.  Today, euvolemic and perfused.  Today, we discussed continuing bisoprolol as her only agent as she is had problems with orthostasis in the  past with ACE inhibitors.  Additionally, she has had normalization of her LVEF that has been sustained.  I have encouraged medication compliance and a low-sodium diet.  I have asked her to contact me with any issues.     2. Nonrheumatic aortic valve insufficiency/Nonrheumatic mitral valve regurgitation. ACC stage B.  There are no surgical indications at this time.  · The patient has been advised to remain in clinical surveillance every 12 months.  · Signs and symptoms of worsening valve disease discussed.  I've asked the patient contact me for an earlier appointment if these develop.  · I've recommended a repeat 2D TTE every 3 years.  · TTE due: 2022.  No indication based on 2017 ACC/AHA guidelines for IE prophylaxis for dental procedures: Optimal oral health is recommended through regular professional dental care and the use of appropriate dental products, such as manual, powered, and ultrasonic toothbrushes; dental floss; and other plaque-removal devices    3. Pericardial effusion. The prevalence of pericardial effusion is about 3%.  The patient is currently Asymptomatic.  The size of the effusion is small.  I briefly discussed the etiology of pericardial effusions.  A handout was also offered to the patient which explained the disease process, including signs of worsening pericardial effusion.  At this point I have counseled conservative management.   -Will repeat a 2D TTE in 2/2021  -Please call for worsening signs or symptoms.    4. ASCAD. CCS class III. The patient has not been revascularized. Recent angiography with no need for intervention.   -Bisoprolol  -ASA  -Statin intolerance: Repatha.   -Imdur  -Call 911 immediately for concerning symptoms.    5. Cardiac Risk Assessments based on 2019 ACCF guidelines:  A team-based care approach is recommended for the control of risk factors associated with ASCAD.  As such, Daysi Pa was requested to have ongoing follow-up with their PCP. Continue  follow-up visits with PCP for monitoring of labs; diet emphasizing intake of vegetables, fruits, nuts, whole grains and fish is recommended. Physical activity recommendations were provided. Essential HTN is a significant risk factor for stroke, heart disease and vascular disease. I've recommended the patient continue current medications, if any, as prescribed by the primary care provider. I recommended they have close follow-up for ongoing mgmt of this and the medical comorbidities associated with HTN with their PCP.  They were also provided with information regarding maintaining a healthy weight, heart-healthy dietary pattern DASH information.  Goal blood pressure less than 130/80.  The patient's BMI is recommended to be calculated at least annually.  The patient's BMI is Body mass index is 23.6 kg/m²..  This places the patient in weight class:  Normal: 18.5-24.9kg/m2. They have been asked to have close PCP follow-up.  Tobacco status assessed at every visits.  The patient's nicotine status: is a former smoker      Return in about 6 months (around 2/27/2021).

## 2020-09-01 RX ORDER — ATOMOXETINE 40 MG/1
CAPSULE ORAL
Qty: 30 CAPSULE | Refills: 1 | Status: SHIPPED | OUTPATIENT
Start: 2020-09-01 | End: 2020-10-22

## 2020-09-08 RX ORDER — LUBIPROSTONE 24 UG/1
CAPSULE, GELATIN COATED ORAL
Qty: 180 CAPSULE | Refills: 3 | Status: SHIPPED | OUTPATIENT
Start: 2020-09-08 | End: 2021-03-03 | Stop reason: SDUPTHER

## 2020-09-08 RX ORDER — ESOMEPRAZOLE MAGNESIUM 40 MG/1
CAPSULE, DELAYED RELEASE ORAL
Qty: 90 CAPSULE | Refills: 3 | Status: SHIPPED | OUTPATIENT
Start: 2020-09-08 | End: 2021-08-23

## 2020-09-21 RX ORDER — TRAZODONE HYDROCHLORIDE 150 MG/1
TABLET ORAL
Qty: 90 TABLET | Refills: 3 | Status: SHIPPED | OUTPATIENT
Start: 2020-09-21 | End: 2021-09-16

## 2020-10-22 RX ORDER — ATOMOXETINE 40 MG/1
CAPSULE ORAL
Qty: 30 CAPSULE | Refills: 1 | Status: SHIPPED | OUTPATIENT
Start: 2020-10-22 | End: 2020-12-21

## 2020-11-09 RX ORDER — VENLAFAXINE HYDROCHLORIDE 150 MG/1
CAPSULE, EXTENDED RELEASE ORAL
Qty: 90 CAPSULE | Refills: 3 | Status: SHIPPED | OUTPATIENT
Start: 2020-11-09 | End: 2021-11-01

## 2020-11-30 RX ORDER — BISOPROLOL FUMARATE 5 MG/1
5 TABLET, FILM COATED ORAL DAILY
Qty: 30 TABLET | Refills: 6 | Status: SHIPPED | OUTPATIENT
Start: 2020-11-30 | End: 2021-01-29 | Stop reason: SDUPTHER

## 2020-12-07 ENCOUNTER — TELEPHONE (OUTPATIENT)
Dept: FAMILY MEDICINE CLINIC | Facility: CLINIC | Age: 65
End: 2020-12-07

## 2020-12-07 ENCOUNTER — TELEPHONE (OUTPATIENT)
Dept: ONCOLOGY | Facility: CLINIC | Age: 65
End: 2020-12-07

## 2020-12-07 DIAGNOSIS — M81.0 OSTEOPOROSIS, UNSPECIFIED OSTEOPOROSIS TYPE, UNSPECIFIED PATHOLOGICAL FRACTURE PRESENCE: Primary | ICD-10-CM

## 2020-12-07 NOTE — TELEPHONE ENCOUNTER
PATIENT CALLED STATING THERE HER PROLIA SHOT IS DUE AND HER LABS NEEDS TO BE REORDERED FOR THE INJECTION.    CALL BACK NUMBER FOR HER [phone].    THANKS,  PORTER

## 2020-12-10 ENCOUNTER — LAB (OUTPATIENT)
Dept: LAB | Facility: HOSPITAL | Age: 65
End: 2020-12-10

## 2020-12-10 DIAGNOSIS — M81.0 OSTEOPOROSIS, UNSPECIFIED OSTEOPOROSIS TYPE, UNSPECIFIED PATHOLOGICAL FRACTURE PRESENCE: ICD-10-CM

## 2020-12-10 LAB
CALCIUM SPEC-SCNC: 9.9 MG/DL (ref 8.6–10.5)
MAGNESIUM SERPL-MCNC: 2.1 MG/DL (ref 1.6–2.4)
PHOSPHATE SERPL-MCNC: 4.4 MG/DL (ref 2.5–4.5)

## 2020-12-10 PROCEDURE — 82310 ASSAY OF CALCIUM: CPT

## 2020-12-10 PROCEDURE — 84100 ASSAY OF PHOSPHORUS: CPT

## 2020-12-10 PROCEDURE — 36415 COLL VENOUS BLD VENIPUNCTURE: CPT

## 2020-12-10 PROCEDURE — 83735 ASSAY OF MAGNESIUM: CPT

## 2020-12-15 ENCOUNTER — INFUSION (OUTPATIENT)
Dept: ONCOLOGY | Facility: HOSPITAL | Age: 65
End: 2020-12-15

## 2020-12-15 VITALS — DIASTOLIC BLOOD PRESSURE: 81 MMHG | HEART RATE: 90 BPM | TEMPERATURE: 97.7 F | SYSTOLIC BLOOD PRESSURE: 140 MMHG

## 2020-12-15 DIAGNOSIS — M81.0 OSTEOPOROSIS WITHOUT CURRENT PATHOLOGICAL FRACTURE, UNSPECIFIED OSTEOPOROSIS TYPE: Primary | ICD-10-CM

## 2020-12-15 PROCEDURE — 25010000002 DENOSUMAB 60 MG/ML SOLUTION PREFILLED SYRINGE: Performed by: FAMILY MEDICINE

## 2020-12-15 PROCEDURE — 96372 THER/PROPH/DIAG INJ SC/IM: CPT | Performed by: NURSE PRACTITIONER

## 2020-12-15 RX ADMIN — DENOSUMAB 60 MG: 60 INJECTION SUBCUTANEOUS at 14:59

## 2020-12-21 RX ORDER — ISOSORBIDE MONONITRATE 30 MG/1
30 TABLET, EXTENDED RELEASE ORAL DAILY
Qty: 30 TABLET | Refills: 11 | Status: SHIPPED | OUTPATIENT
Start: 2020-12-21 | End: 2021-01-29 | Stop reason: SDUPTHER

## 2020-12-21 RX ORDER — ATOMOXETINE 40 MG/1
CAPSULE ORAL
Qty: 30 CAPSULE | Refills: 5 | Status: SHIPPED | OUTPATIENT
Start: 2020-12-21 | End: 2021-02-08 | Stop reason: SDUPTHER

## 2020-12-23 RX ORDER — VENLAFAXINE HYDROCHLORIDE 75 MG/1
75 CAPSULE, EXTENDED RELEASE ORAL DAILY
Qty: 90 CAPSULE | Refills: 3 | Status: SHIPPED | OUTPATIENT
Start: 2020-12-23 | End: 2021-11-15

## 2020-12-29 RX ORDER — METOCLOPRAMIDE 10 MG/1
10 TABLET ORAL 4 TIMES DAILY
Qty: 360 TABLET | Refills: 2 | Status: SHIPPED | OUTPATIENT
Start: 2020-12-29 | End: 2021-04-20 | Stop reason: SDUPTHER

## 2020-12-31 PROBLEM — M51.36 DDD (DEGENERATIVE DISC DISEASE), LUMBAR: Status: ACTIVE | Noted: 2020-12-31

## 2020-12-31 PROBLEM — Z78.0 MENOPAUSE: Status: ACTIVE | Noted: 2020-12-31

## 2020-12-31 PROBLEM — I42.9 CARDIOMYOPATHY (HCC): Status: ACTIVE | Noted: 2018-09-07

## 2020-12-31 PROBLEM — E16.2 HYPOGLYCEMIA: Status: ACTIVE | Noted: 2020-12-31

## 2020-12-31 PROCEDURE — U0003 INFECTIOUS AGENT DETECTION BY NUCLEIC ACID (DNA OR RNA); SEVERE ACUTE RESPIRATORY SYNDROME CORONAVIRUS 2 (SARS-COV-2) (CORONAVIRUS DISEASE [COVID-19]), AMPLIFIED PROBE TECHNIQUE, MAKING USE OF HIGH THROUGHPUT TECHNOLOGIES AS DESCRIBED BY CMS-2020-01-R: HCPCS | Performed by: NURSE PRACTITIONER

## 2021-01-18 ENCOUNTER — TELEPHONE (OUTPATIENT)
Dept: FAMILY MEDICINE CLINIC | Facility: CLINIC | Age: 66
End: 2021-01-18

## 2021-01-18 NOTE — TELEPHONE ENCOUNTER
Pt was seen at the urgent care on 12/31 for pneumonia. She took her last antibiotic a couple of days ago. She is wanting to know if another round could be called in [facility] because she is starting to wheeze with congestion and tiredness.     Her call back number is [phone].

## 2021-01-22 ENCOUNTER — OFFICE VISIT (OUTPATIENT)
Dept: FAMILY MEDICINE CLINIC | Facility: CLINIC | Age: 66
End: 2021-01-22

## 2021-01-22 DIAGNOSIS — J44.1 COPD WITH EXACERBATION (HCC): Primary | ICD-10-CM

## 2021-01-22 PROCEDURE — 99442 PR PHYS/QHP TELEPHONE EVALUATION 11-20 MIN: CPT | Performed by: STUDENT IN AN ORGANIZED HEALTH CARE EDUCATION/TRAINING PROGRAM

## 2021-01-22 RX ORDER — PREDNISONE 20 MG/1
40 TABLET ORAL DAILY
Qty: 10 TABLET | Refills: 0 | Status: SHIPPED | OUTPATIENT
Start: 2021-01-22 | End: 2021-01-27

## 2021-01-22 NOTE — PROGRESS NOTES
Family Medicine Residency  Maddi Montoya MD   Telephone visit    Subjective:   You have chosen to receive care through a telephone visit. Do you consent to use a telephone visit for your medical care today? Yes    Daysi Pa is a 65 y.o. female who was recently treated at an urgent care for presumed pneumonia with doxycycline.  Since then she has continued to have a headache, shortness of breath, sweats, dry cough, wheezing.  She is unable to taste anything.  Temperature is 97.8.  Denies any nasal drainage.  She recently tested negative for Covid.  She has underlying history of COPD for which she uses Anoro.  Tessalon Perles did not help her with her cough.  She lives with her  who did not have these symptoms. Patient states that she last took the doxycycline on January 11 and had severe side effects to antibiotic including abdominal pain and diarrhea.  She states that doxycycline did not help improve her symptoms.    The following portions of the patient's history were reviewed and updated as appropriate: allergies, current medications, past family history, past medical history, past social history, past surgical history and problem list.    Past Medical Hx:  Past Medical History:   Diagnosis Date   • Acquired hypothyroidism    • Allergic rhinitis    • Allergy 04/17/2016    Consult, Allergy (1) - Ordered By: BENIGNO LIM ([facility])    • Anxiety state    • Attention deficit hyperactivity disorder    • Benign essential hypertension    • Chronic pain    • Chronic pain disorder    • Congestive heart failure (CMS/HCC)     primarily systrolic dysfunction EF 17-20% repeat echo 55%   • Depressive disorder    • Dyslipidemia    • Dysphagia    • Gastroesophageal reflux disease    • Generalized abdominal pain    • History of echocardiogram 03/23/2016    Echocardiogram W/ color flow 15081 (3) - Normal LV function wiht Ef of 60%.Normal RV size and function.Normal diastolic function.No significant valvular  regurgitation or stenosis.   • History of echocardiogram 04/27/2012    Echocardiogram W/O color flow 34435 (1) - Normal left ventricular systolic function. EF 55%. No evidence of regional wall motion abnormalities. Abnormal relaxation of the left ventricular myocardium.   • History of EKG 03/07/2016    EKG Tracing and Interpretation 02297 (3) - Ordered By: LILLIE SEARS ([facility])    • History of mammogram 06/05/2013    MAMMOGRAM SCREENING 32869 - [facility] (1) - birads 0    • Hyperlipidemia    • Intraductal carcinoma in situ of breast     hx in past and s/p bilateral simple mastectomies      • Irritable bowel syndrome    • Low back pain    • Malaise and fatigue    • Microalbuminuria 07/16/2015    POS MICROALBUMINURIA RESULT DOC AND REVIEWED 3060F (1) - Ordered By: BENIGNO LIM ([facility])   • Mitral valve regurgitation     Mild-Moderate      • Myopia    • Non-organic sleep disorder    • Osteoporosis    • Pain management 04/17/2016    Consult, Pain Management (1) - Ordered By: BENIGNO LIM ([facility])    • Pneumococcal vaccination indicated 07/08/2016    PNEUMOC VAC/ADMIN/RCVD 4040F (11) - Ordered By: BENIGNO LIM ([facility])   • Primary fibromyalgia syndrome    • Rheumatoid arthritis (CMS/HCC)        Past Surgical Hx:  Past Surgical History:   Procedure Laterality Date   • APPENDECTOMY  2008    Appendectomy (1)   • BREAST BIOPSY  06/12/2013    Stereotactic breast biopsy (1) - stereotactic left mammotome biopsy with 11 gauge needle.   • BREAST IMPLANT REMOVAL  2003    Remove breast implant (1)   • BREAST IMPLANT SURGERY  2000    Breast implantation (1)   • CARDIAC CATHETERIZATION N/A 1/21/2020    Procedure: Right Heart Cath;  Surgeon: Stuart Sears MD PhD;  Location: [facility] CATH INVASIVE LOCATION;  Service: Cardiology   • CARDIAC CATHETERIZATION N/A 1/21/2020    Procedure: LEFT HEART CATH;  Surgeon: Carmen Thompson MD;  Location: [facility] CATH INVASIVE LOCATION;  Service: Cardiology   • CARDIAC CATHETERIZATION N/A 6/30/2020     Procedure: Left Heart Cath/PCI;  Surgeon: Carmen Thompson MD;  Location: [facility] INVASIVE LOC[facility];  Service: Cardiology;  Laterality: N/A;   • COLONOSCOPY  01/20/2014    Colonoscopy, diagnostic (screening) 31254 (1)   • COLONOSCOPY W/ POLYPECTOMY  2008    Colonoscopy remove polyps 20803 (1)    • EXCISION LESION  05/01/2014    Remove chest wall lesion (1) - Excision of subcutaneous mass, left chest wall, Subcutaneous mass left chest wall, status post mastectomy.   • INJECTION OF MEDICATION  07/05/2016    B12 (44) - Ordered By: BENIGNO LIM ([facility])    • INJECTION OF MEDICATION  11/12/2015    Celestone (betamethasone) (1) - Ordered By: BENIGNO LIM ([facility])    • INJECTION OF MEDICATION  02/11/2016    Kenalog (2) - Ordered By: BENIGNO LIM ([facility])    • INJECTION OF MEDICATION  11/17/2017    Prolia   • MASTECTOMY  09/05/2013    Mastectomy (2) - Right simple prophylactic mastectomy.   • OTHER SURGICAL HISTORY  10/29/2014    SONOGRAM THYROID, NECK 73461 (1) - Ordered By: BENIGNO LIM ([facility])   • PAP SMEAR  06/03/2013     PAP SMEAR (1)   • SUBTOTAL HYSTERECTOMY  1998     Partial hyst (1)     • TONSILLECTOMY  1969    Tonsillectomy (1)   • VASCULAR SURGERY  12/24/2014    Consult, Vascular Surgery (1) - Ordered By: BENIGNO LIM ([facility])        Current Meds:    Current Outpatient Medications:   •  albuterol sulfate HFA (ProAir HFA) 108 (90 Base) MCG/ACT inhaler, Inhale 2 puffs 4 (Four) Times a Day As Needed., Disp: , Rfl:   •  AMITIZA 24 MCG capsule, TAKE 1 CAPSULE BY MOUTH TWICE A DAY, Disp: 180 capsule, Rfl: 3  •  aspirin 81 MG tablet, Take 1 tablet by mouth Daily., Disp: 30 tablet, Rfl: 11  •  atomoxetine (STRATTERA) 40 MG capsule, TAKE 1 CAPSULE BY MOUTH DAILY., Disp: 30 capsule, Rfl: 5  •  bisoprolol (ZEBeta) 5 MG tablet, TAKE 1 TABLET BY MOUTH DAILY., Disp: 30 tablet, Rfl: 6  •  denosumab (PROLIA) 60 MG/ML solution prefilled syringe syringe, Inject 60 mg under the skin into the appropriate area as directed Every 6 (Six) Months., Disp: , Rfl:   •   desloratadine (Clarinex) 5 MG tablet, Take 5 mg by mouth Daily., Disp: , Rfl:   •  docusate sodium (COLACE) 100 MG capsule, Take 200 mg by mouth every night. at bedtime, Disp: , Rfl:   •  doxycycline (MONODOX) 100 MG capsule, Take 1 capsule by mouth 2 (Two) Times a Day., Disp: 20 capsule, Rfl: 0  •  esomeprazole (nexIUM) 40 MG capsule, TAKE 1 CAPSULE BY MOUTH EVERY DAY, Disp: 90 capsule, Rfl: 3  •  Evolocumab 140 MG/ML solution auto-injector, Inject 1 mL under the skin into the appropriate area as directed Every 14 (Fourteen) Days., Disp: 2.1 mL, Rfl: 6  •  gabapentin (NEURONTIN) 300 MG capsule, Take 1 capsule by mouth 2 (Two) Times a Day., Disp: 180 capsule, Rfl: 0  •  hydroxychloroquine (PLAQUENIL) 200 MG tablet, Take 1 tablet by mouth Daily., Disp: 90 tablet, Rfl: 3  •  isosorbide mononitrate (IMDUR) 30 MG 24 hr tablet, TAKE 1 TABLET BY MOUTH DAILY., Disp: 30 tablet, Rfl: 11  •  levothyroxine (SYNTHROID, LEVOTHROID) 88 MCG tablet, Take 88 mcg by mouth Daily., Disp: , Rfl:   •  metoclopramide (REGLAN) 10 MG tablet, Take 1 tablet by mouth 4 (Four) Times a Day., Disp: 360 tablet, Rfl: 2  •  NARCAN 4 MG/0.1ML nasal spray, 1 spray into the nostril(s) as directed by provider As Needed., Disp: , Rfl: 0  •  OxyCODONE HCl ER (OXYCONTIN) 30 MG tablet extended-release 12 hour, Take 10 mg by mouth 4 (Four) Times a Day. OxyContin 30 mg tablet,crush resistant,extended release , Disp: , Rfl:   •  polyethylene glycol (MIRALAX) packet, Take 17 g by mouth Daily., Disp: , Rfl:   •  traZODone (DESYREL) 150 MG tablet, TAKE 1 TABLET BY MOUTH EVERY DAY AT BEDTIME, Disp: 90 tablet, Rfl: 3  •  umeclidinium-vilanterol (ANORO ELLIPTA) 62.5-25 MCG/INH aerosol powder  inhaler, Inhale 1 puff Daily., Disp: 1 each, Rfl: 11  •  venlafaxine XR (EFFEXOR-XR) 150 MG 24 hr capsule, TAKE 1 CAPSULE BY MOUTH EVERY DAY, Disp: 90 capsule, Rfl: 3  •  venlafaxine XR (EFFEXOR-XR) 75 MG 24 hr capsule, Take 1 capsule by mouth Daily., Disp: 90 capsule, Rfl:  3  •  predniSONE (DELTASONE) 20 MG tablet, Take 2 tablets by mouth Daily for 5 days., Disp: 10 tablet, Rfl: 0    Allergies:  Allergies   Allergen Reactions   • Cephalosporins Nausea And Vomiting   • Erythromycin Nausea Only   • Morphine And Related Itching   • Other Nausea And Vomiting     Cipro   • Penicillins Hives   • Shellfish-Derived Products Hives and Other (See Comments)     Other reaction(s): SOA   • Zithromax [Azithromycin] Hives and Nausea And Vomiting   • Zolpidem Mental Status Change   • Honey Bee Treatment [Bee Venom] Hives   • Hydrocodone-Acetaminophen Anxiety   • Sulfa Antibiotics Hives and Rash     Sulfa (Sulfonamide Antibiotics)       Family Hx:  Family History   Problem Relation Age of Onset   • Breast cancer Sister    • Rectal cancer Other         Colorectal cancer   • Heart disease Other    • Hypertension Other    • Thyroid disease Other         Thyroid disorder   • Hypertension Mother    • Migraines Mother    • Osteoporosis Mother    • Diabetes Father    • Coronary artery disease Father    • Hyperlipidemia Father    • Cancer Maternal Aunt    • COPD Paternal Grandmother    • COPD Paternal Grandfather         Social History:  Social History     Socioeconomic History   • Marital status:      Spouse name: Not on file   • Number of children: Not on file   • Years of education: Not on file   • Highest education level: Not on file   Tobacco Use   • Smoking status: Former Smoker     Years: 30.00     Quit date: 2008     Years since quittin.4   • Smokeless tobacco: Never Used   • Tobacco comment: PT SMOKED FOR 30 YEARS AND QUIT IN    Substance and Sexual Activity   • Alcohol use: No   • Drug use: No   • Sexual activity: Defer       Review of Systems  Review of Systems   Constitutional: Negative for chills and fever.   HENT: Negative for facial swelling and trouble swallowing.    Eyes: Negative for photophobia and visual disturbance.   Respiratory: Positive for cough, shortness of  breath and wheezing. Negative for apnea and chest tightness.    Cardiovascular: Negative for chest pain.   Gastrointestinal: Positive for diarrhea. Negative for abdominal distention, nausea and vomiting.   Genitourinary: Negative for pelvic pain.   Musculoskeletal: Negative for arthralgias and myalgias.   Neurological: Negative for seizures and speech difficulty.   Psychiatric/Behavioral: Negative for confusion and hallucinations.       Objective:     There were no vitals taken for this visit.  Physical Exam  Not performed due to nature of telephone visit  Assessment/Plan:     Diagnoses and all orders for this visit:    1. COPD with exacerbation (CMS/[facility]) (Primary)  -     predniSONE (DELTASONE) 20 MG tablet; Take 2 tablets by mouth Daily for 5 days.  Dispense: 10 tablet; Refill: 0    I believe the patient has a COPD exacerbation secondary to a viral infection.  It may or may not be due to the Covid virus but seems unlikely given that her  does not have the symptoms and given that she has a negative Covid test.  I counseled patient to continue to use her albuterol and Anoro Ellipta inhaler at home.  I will start her on a 5-day course of prednisone.  I will have patient follow-up with us in 1 week.    · Rx changes: Prednisone 40 for 5 days  · Patient Education: Go to the ER if symptoms worsen     Follow-up:     Return in about 1 week (around 1/29/2021).    Preventative:  Health Maintenance   Topic Date Due   • Pneumococcal Vaccine 65+ (2 of 2 - PPSV23) 11/20/2020   • LIPID PANEL  02/27/2021   • ANNUAL WELLNESS VISIT  03/02/2021   • DXA SCAN  03/11/2021   • COLONOSCOPY  06/19/2023   • TDAP/TD VACCINES (2 - Td) 10/16/2024   • HEPATITIS C SCREENING  Completed   • INFLUENZA VACCINE  Completed   • ZOSTER VACCINE  Completed   • MENINGOCOCCAL VACCINE  Aged Out       Alcohol use:  reports no history of alcohol use.  Nicotine status  reports that she quit smoking about 12 years ago. She quit after 30.00 years of use.  She has never used smokeless tobacco.    Goals     • Eat more fruits and vegetables (pt-stated)      Barriers to goals: None      • Exercise 150 minutes per week (moderate activity) (pt-stated)      Barriers to goal: RA and chronic pain      • Exercise 3x per week (30 min per time)      Increase physical activity       • Exercise 3x per week (30 min per time)      Walking, stretching, light weight lifting       • Other (pt-stated)      Less fatigue  Barriers to goals: Multiple medical problems            RISK SCORE: 3              This document has been electronically signed by Maddi Montoya MD on January 22, 2021 17:13 CST      Total telephone time was 13 minutes

## 2021-01-27 RX ORDER — HYDROXYCHLOROQUINE SULFATE 200 MG/1
200 TABLET, FILM COATED ORAL DAILY
Qty: 90 TABLET | Refills: 3 | Status: SHIPPED | OUTPATIENT
Start: 2021-01-27 | End: 2021-04-27

## 2021-01-27 NOTE — TELEPHONE ENCOUNTER
Patient called needing a refill for     hydroxychloroquine (PLAQUENIL) 200 MG tablet     Per her insurance request a 90 day supply    Please send to       [facility] #[id number] - [place], [place] - [address] . - [phone]  - [phone] FX   Phone:  [phone]   Fax:  [phone]     Thank you     [phone]

## 2021-01-28 ENCOUNTER — OFFICE VISIT (OUTPATIENT)
Dept: FAMILY MEDICINE CLINIC | Facility: CLINIC | Age: 66
End: 2021-01-28

## 2021-01-28 VITALS — BODY MASS INDEX: 24.69 KG/M2 | HEIGHT: 62 IN

## 2021-01-28 DIAGNOSIS — J44.1 COPD WITH EXACERBATION (HCC): Primary | ICD-10-CM

## 2021-01-28 PROCEDURE — 99442 PR PHYS/QHP TELEPHONE EVALUATION 11-20 MIN: CPT | Performed by: STUDENT IN AN ORGANIZED HEALTH CARE EDUCATION/TRAINING PROGRAM

## 2021-01-28 RX ORDER — ALBUTEROL SULFATE 0.63 MG/3ML
1 SOLUTION RESPIRATORY (INHALATION) EVERY 6 HOURS PRN
Qty: 3 ML | Refills: 12 | Status: SHIPPED | OUTPATIENT
Start: 2021-01-28 | End: 2021-02-27

## 2021-01-28 NOTE — PROGRESS NOTES
I have reviewed the notes, assessments, and/or procedures performed by *Dr Montoya**during office visit. I concur with her/his documentation and assessment and plan for Daysi Pa.          This document has been electronically signed by Kash Silverio MD on January 28, 2021 14:11 CST

## 2021-02-01 ENCOUNTER — TELEPHONE (OUTPATIENT)
Dept: CARDIOLOGY | Facility: CLINIC | Age: 66
End: 2021-02-01

## 2021-02-01 DIAGNOSIS — E78.5 HYPERLIPIDEMIA, UNSPECIFIED HYPERLIPIDEMIA TYPE: Primary | ICD-10-CM

## 2021-02-01 RX ORDER — BISOPROLOL FUMARATE 5 MG/1
5 TABLET, FILM COATED ORAL DAILY
Qty: 90 TABLET | Refills: 2 | Status: SHIPPED | OUTPATIENT
Start: 2021-02-01 | End: 2021-05-12

## 2021-02-01 RX ORDER — ISOSORBIDE MONONITRATE 30 MG/1
30 TABLET, EXTENDED RELEASE ORAL DAILY
Qty: 90 TABLET | Refills: 2 | Status: SHIPPED | OUTPATIENT
Start: 2021-02-01 | End: 2021-11-15 | Stop reason: SDUPTHER

## 2021-02-01 NOTE — TELEPHONE ENCOUNTER
Patient asked to have lipids done. We are attempting a pa for repatha and they want a recent ldl.

## 2021-02-02 NOTE — PROGRESS NOTES
I was present onsite throughout the encounter and discussed the patient's presentation and plan of care with Dr. Sosa.  I spoke with the patient by telephone.  I have reviewed the notes, assessments, and/or procedures performed by Dr. Sosa, I concur with her/his documentation and assessment and plan for Daysi WoodruffEmiliana.                This document has been electronically signed by Tony Hi MD on February 2, 2021 09:15 CST

## 2021-02-03 ENCOUNTER — OFFICE VISIT (OUTPATIENT)
Dept: FAMILY MEDICINE CLINIC | Facility: CLINIC | Age: 66
End: 2021-02-03

## 2021-02-03 ENCOUNTER — LAB (OUTPATIENT)
Dept: LAB | Facility: HOSPITAL | Age: 66
End: 2021-02-03

## 2021-02-03 VITALS
WEIGHT: 145 LBS | HEIGHT: 62 IN | HEART RATE: 104 BPM | SYSTOLIC BLOOD PRESSURE: 100 MMHG | OXYGEN SATURATION: 97 % | BODY MASS INDEX: 26.68 KG/M2 | DIASTOLIC BLOOD PRESSURE: 60 MMHG

## 2021-02-03 DIAGNOSIS — E03.9 ACQUIRED HYPOTHYROIDISM: ICD-10-CM

## 2021-02-03 DIAGNOSIS — R60.0 LOCALIZED EDEMA: ICD-10-CM

## 2021-02-03 DIAGNOSIS — R06.02 SHORTNESS OF BREATH: ICD-10-CM

## 2021-02-03 DIAGNOSIS — E16.2 HYPOGLYCEMIA: ICD-10-CM

## 2021-02-03 DIAGNOSIS — I42.9 CARDIOMYOPATHY, UNSPECIFIED TYPE (HCC): Primary | ICD-10-CM

## 2021-02-03 DIAGNOSIS — E78.5 HYPERLIPIDEMIA, UNSPECIFIED HYPERLIPIDEMIA TYPE: ICD-10-CM

## 2021-02-03 LAB
ANION GAP SERPL CALCULATED.3IONS-SCNC: 11 MMOL/L (ref 5–15)
BUN SERPL-MCNC: 7 MG/DL (ref 8–23)
BUN/CREAT SERPL: 9.6 (ref 7–25)
CALCIUM SPEC-SCNC: 9.2 MG/DL (ref 8.6–10.5)
CHLORIDE SERPL-SCNC: 99 MMOL/L (ref 98–107)
CHOLEST SERPL-MCNC: 159 MG/DL (ref 0–200)
CO2 SERPL-SCNC: 26 MMOL/L (ref 22–29)
CREAT SERPL-MCNC: 0.73 MG/DL (ref 0.57–1)
GFR SERPL CREATININE-BSD FRML MDRD: 80 ML/MIN/1.73
GLUCOSE SERPL-MCNC: 109 MG/DL (ref 65–99)
HDLC SERPL-MCNC: 64 MG/DL (ref 40–60)
LDLC SERPL CALC-MCNC: 56 MG/DL (ref 0–100)
LDLC/HDLC SERPL: 0.7 {RATIO}
POTASSIUM SERPL-SCNC: 4 MMOL/L (ref 3.5–5.2)
SODIUM SERPL-SCNC: 136 MMOL/L (ref 136–145)
T4 FREE SERPL-MCNC: 1.24 NG/DL (ref 0.93–1.7)
TRIGL SERPL-MCNC: 252 MG/DL (ref 0–150)
TSH SERPL DL<=0.05 MIU/L-ACNC: 2.18 UIU/ML (ref 0.27–4.2)
VLDLC SERPL-MCNC: 39 MG/DL (ref 5–40)

## 2021-02-03 PROCEDURE — 84439 ASSAY OF FREE THYROXINE: CPT | Performed by: FAMILY MEDICINE

## 2021-02-03 PROCEDURE — 99214 OFFICE O/P EST MOD 30 MIN: CPT | Performed by: FAMILY MEDICINE

## 2021-02-03 PROCEDURE — 80061 LIPID PANEL: CPT

## 2021-02-03 PROCEDURE — 80048 BASIC METABOLIC PNL TOTAL CA: CPT | Performed by: FAMILY MEDICINE

## 2021-02-03 PROCEDURE — 36415 COLL VENOUS BLD VENIPUNCTURE: CPT

## 2021-02-03 PROCEDURE — 96372 THER/PROPH/DIAG INJ SC/IM: CPT | Performed by: FAMILY MEDICINE

## 2021-02-03 PROCEDURE — 84443 ASSAY THYROID STIM HORMONE: CPT | Performed by: FAMILY MEDICINE

## 2021-02-03 RX ORDER — FUROSEMIDE 10 MG/ML
80 INJECTION INTRAMUSCULAR; INTRAVENOUS ONCE
Status: COMPLETED | OUTPATIENT
Start: 2021-02-03 | End: 2021-02-03

## 2021-02-03 RX ADMIN — FUROSEMIDE 80 MG: 10 INJECTION INTRAMUSCULAR; INTRAVENOUS at 11:01

## 2021-02-04 RX ORDER — FUROSEMIDE 20 MG/1
TABLET ORAL
Qty: 30 TABLET | Refills: 0 | Status: SHIPPED | OUTPATIENT
Start: 2021-02-04 | End: 2021-02-17 | Stop reason: DRUGHIGH

## 2021-02-08 RX ORDER — ATOMOXETINE 40 MG/1
40 CAPSULE ORAL DAILY
Qty: 30 CAPSULE | Refills: 5 | Status: SHIPPED | OUTPATIENT
Start: 2021-02-08 | End: 2021-03-15 | Stop reason: SDUPTHER

## 2021-02-08 RX ORDER — DESLORATADINE 5 MG/1
5 TABLET ORAL DAILY
Qty: 30 TABLET | Refills: 2 | Status: SHIPPED | OUTPATIENT
Start: 2021-02-08 | End: 2021-05-03

## 2021-02-08 NOTE — TELEPHONE ENCOUNTER
PATIENT CALLED ASKING FOR MEDICATION REFILLS ON HER   atomoxetine (STRATTERA) 40 MG capsule  desloratadine (Clarinex) 5 MG tablet.  SHE WOULD LIKE THAT SENT TO [facility].    CALL BACK NUMBER FOR HER -[phone].    THANKS,  PORTER

## 2021-02-09 ENCOUNTER — IMMUNIZATION (OUTPATIENT)
Dept: VACCINE CLINIC | Facility: HOSPITAL | Age: 66
End: 2021-02-09

## 2021-02-09 PROCEDURE — 0001A: CPT | Performed by: THORACIC SURGERY (CARDIOTHORACIC VASCULAR SURGERY)

## 2021-02-09 PROCEDURE — 91300 HC SARSCOV02 VAC 30MCG/0.3ML IM: CPT | Performed by: THORACIC SURGERY (CARDIOTHORACIC VASCULAR SURGERY)

## 2021-02-14 NOTE — PROGRESS NOTES
Subjective:     Daysi Pa is a 65 y.o. femaleThe following portions of the patient's history were reviewed and updated as appropriate: allergies, current medications, past family history, past medical history, past social history, past surgical history and problem list.  presents for urgent care follow-up where she was told she had pneumonia.  She states since that time that she still has shortness of breath.  She feels like she has gained at least 10 pounds.  She feels like her abdomen swollen.  She lost her sense of taste and smell and still cannot taste, but tested negative for Covid.  She is extremely tired and sleeping many hours.  She does have a sore throat.  She states that she had been taken off of her Lasix.  She just feels miserable currently.    Past Medical Hx:  Past Medical History:   Diagnosis Date   • Acquired hypothyroidism    • Allergic rhinitis    • Allergy 04/17/2016    Consult, Allergy (1) - Ordered By: BENIGNO LIM ([facility])    • Anxiety state    • Attention deficit hyperactivity disorder    • Benign essential hypertension    • Chronic pain    • Chronic pain disorder    • Congestive heart failure (CMS/HCC)     primarily systrolic dysfunction EF 17-20% repeat echo 55%   • Depressive disorder    • Dyslipidemia    • Dysphagia    • Gastroesophageal reflux disease    • Generalized abdominal pain    • History of echocardiogram 03/23/2016    Echocardiogram W/ color flow 82594 (3) - Normal LV function wiht Ef of 60%.Normal RV size and function.Normal diastolic function.No significant valvular regurgitation or stenosis.   • History of echocardiogram 04/27/2012    Echocardiogram W/O color flow 86081 (1) - Normal left ventricular systolic function. EF 55%. No evidence of regional wall motion abnormalities. Abnormal relaxation of the left ventricular myocardium.   • History of EKG 03/07/2016    EKG Tracing and Interpretation 23799 (3) - Ordered By: LILLIE BRADY ([facility])    • History of  mammogram 06/05/2013    MAMMOGRAM SCREENING 76158 - [facility] (1) - birads 0    • Hyperlipidemia    • Intraductal carcinoma in situ of breast     hx in past and s/p bilateral simple mastectomies      • Irritable bowel syndrome    • Low back pain    • Malaise and fatigue    • Microalbuminuria 07/16/2015    POS MICROALBUMINURIA RESULT DOC AND REVIEWED 3060F (1) - Ordered By: BENIGNO LIM ([facility])   • Mitral valve regurgitation     Mild-Moderate      • Myopia    • Non-organic sleep disorder    • Osteoporosis    • Pain management 04/17/2016    Consult, Pain Management (1) - Ordered By: BENIGNO LIM ([facility])    • Pneumococcal vaccination indicated 07/08/2016    PNEUMOC VAC/ADMIN/RCVD 4040F (11) - Ordered By: BENIGNO LIM ([facility])   • Primary fibromyalgia syndrome    • Rheumatoid arthritis (CMS/HCC)        Past Surgical Hx:  Past Surgical History:   Procedure Laterality Date   • APPENDECTOMY  2008    Appendectomy (1)   • BREAST BIOPSY  06/12/2013    Stereotactic breast biopsy (1) - stereotactic left mammotome biopsy with 11 gauge needle.   • BREAST IMPLANT REMOVAL  2003    Remove breast implant (1)   • BREAST IMPLANT SURGERY  2000    Breast implantation (1)   • CARDIAC CATHETERIZATION N/A 1/21/2020    Procedure: Right Heart Cath;  Surgeon: Stuart Sears MD PhD;  Location: [facility] CATH INVASIVE LOCATION;  Service: Cardiology   • CARDIAC CATHETERIZATION N/A 1/21/2020    Procedure: LEFT HEART CATH;  Surgeon: Carmen Thompson MD;  Location: [facility] CATH INVASIVE LOCATION;  Service: Cardiology   • CARDIAC CATHETERIZATION N/A 6/30/2020    Procedure: Left Heart Cath/PCI;  Surgeon: Carmen Thompson MD;  Location: [facility] INVASIVE LOCATION;  Service: Cardiology;  Laterality: N/A;   • COLONOSCOPY  01/20/2014    Colonoscopy, diagnostic (screening) 19806 (1)   • COLONOSCOPY W/ POLYPECTOMY  2008    Colonoscopy remove polyps 83126 (1)    • EXCISION LESION  05/01/2014    Remove chest wall lesion (1) - Excision of subcutaneous mass, left chest wall,  Subcutaneous mass left chest wall, status post mastectomy.   • INJECTION OF MEDICATION  07/05/2016    B12 (44) - Ordered By: BENIGNO LIM ([facility])    • INJECTION OF MEDICATION  11/12/2015    Celestone (betamethasone) (1) - Ordered By: BENIGNO LIM ([facility])    • INJECTION OF MEDICATION  02/11/2016    Kenalog (2) - Ordered By: BENIGNO LIM ([facility])    • INJECTION OF MEDICATION  11/17/2017    Prolia   • MASTECTOMY  09/05/2013    Mastectomy (2) - Right simple prophylactic mastectomy.   • OTHER SURGICAL HISTORY  10/29/2014    SONOGRAM THYROID, NECK 49515 (1) - Ordered By: BENIGNO LIM ([facility])   • PAP SMEAR  06/03/2013     PAP SMEAR (1)   • SUBTOTAL HYSTERECTOMY  1998     Partial hyst (1)     • TONSILLECTOMY  1969    Tonsillectomy (1)   • VASCULAR SURGERY  12/24/2014    Consult, Vascular Surgery (1) - Ordered By: BENIGNO LIM ([facility])        Health Maintenance:  Health Maintenance   Topic Date Due   • Pneumococcal Vaccine 65+ (2 of 2 - PPSV23) 11/20/2020   • ANNUAL WELLNESS VISIT  03/02/2021   • DXA SCAN  03/11/2021   • LIPID PANEL  02/03/2022   • COLONOSCOPY  06/19/2023   • TDAP/TD VACCINES (2 - Td) 10/16/2024   • HEPATITIS C SCREENING  Completed   • INFLUENZA VACCINE  Completed   • ZOSTER VACCINE  Completed   • MENINGOCOCCAL VACCINE  Aged Out       Current Meds:    Current Outpatient Medications:   •  albuterol (ACCUNEB) 0.63 MG/3ML nebulizer solution, Take 3 mL by nebulization Every 6 (Six) Hours As Needed for Wheezing (Use as needed) for up to 30 days., Disp: 3 mL, Rfl: 12  •  albuterol sulfate HFA (ProAir HFA) 108 (90 Base) MCG/ACT inhaler, Inhale 2 puffs 4 (Four) Times a Day As Needed., Disp: , Rfl:   •  AMITIZA 24 MCG capsule, TAKE 1 CAPSULE BY MOUTH TWICE A DAY, Disp: 180 capsule, Rfl: 3  •  aspirin 81 MG tablet, Take 1 tablet by mouth Daily., Disp: 30 tablet, Rfl: 11  •  bisoprolol (ZEBeta) 5 MG tablet, Take 1 tablet by mouth Daily., Disp: 90 tablet, Rfl: 2  •  denosumab (PROLIA) 60 MG/ML solution prefilled syringe syringe, Inject 60 mg under the skin  into the appropriate area as directed Every 6 (Six) Months., Disp: , Rfl:   •  docusate sodium (COLACE) 100 MG capsule, Take 200 mg by mouth every night. at bedtime, Disp: , Rfl:   •  doxycycline (MONODOX) 100 MG capsule, Take 1 capsule by mouth 2 (Two) Times a Day., Disp: 20 capsule, Rfl: 0  •  esomeprazole (nexIUM) 40 MG capsule, TAKE 1 CAPSULE BY MOUTH EVERY DAY, Disp: 90 capsule, Rfl: 3  •  gabapentin (NEURONTIN) 300 MG capsule, Take 1 capsule by mouth 2 (Two) Times a Day., Disp: 180 capsule, Rfl: 0  •  hydroxychloroquine (PLAQUENIL) 200 MG tablet, Take 1 tablet by mouth Daily for 90 days. Indications: Rheumatoid Arthritis, Disp: 90 tablet, Rfl: 3  •  isosorbide mononitrate (IMDUR) 30 MG 24 hr tablet, Take 1 tablet by mouth Daily., Disp: 90 tablet, Rfl: 2  •  levothyroxine (SYNTHROID, LEVOTHROID) 88 MCG tablet, Take 88 mcg by mouth Daily., Disp: , Rfl:   •  metoclopramide (REGLAN) 10 MG tablet, Take 1 tablet by mouth 4 (Four) Times a Day., Disp: 360 tablet, Rfl: 2  •  NARCAN 4 MG/0.1ML nasal spray, 1 spray into the nostril(s) as directed by provider As Needed., Disp: , Rfl: 0  •  OxyCODONE HCl ER (OXYCONTIN) 30 MG tablet extended-release 12 hour, Take 10 mg by mouth 4 (Four) Times a Day. OxyContin 30 mg tablet,crush resistant,extended release , Disp: , Rfl:   •  polyethylene glycol (MIRALAX) packet, Take 17 g by mouth Daily., Disp: , Rfl:   •  traZODone (DESYREL) 150 MG tablet, TAKE 1 TABLET BY MOUTH EVERY DAY AT BEDTIME, Disp: 90 tablet, Rfl: 3  •  umeclidinium-vilanterol (ANORO ELLIPTA) 62.5-25 MCG/INH aerosol powder  inhaler, Inhale 1 puff Daily., Disp: 1 each, Rfl: 11  •  venlafaxine XR (EFFEXOR-XR) 150 MG 24 hr capsule, TAKE 1 CAPSULE BY MOUTH EVERY DAY, Disp: 90 capsule, Rfl: 3  •  venlafaxine XR (EFFEXOR-XR) 75 MG 24 hr capsule, Take 1 capsule by mouth Daily., Disp: 90 capsule, Rfl: 3  •  atomoxetine (STRATTERA) 40 MG capsule, Take 1 capsule by mouth Daily., Disp: 30 capsule, Rfl: 5  •  desloratadine  (Clarinex) 5 MG tablet, Take 1 tablet by mouth Daily., Disp: 30 tablet, Rfl: 2  •  Evolocumab (REPATHA) solution auto-injector SureClick injection, Inject 1 mL under the skin into the appropriate area as directed Every 14 (Fourteen) Days., Disp: 2.1 mL, Rfl: 6  •  furosemide (Lasix) 20 MG tablet, 1 daily until back to baseline weight, Disp: 30 tablet, Rfl: 0    Allergies:  Cephalosporins, Erythromycin, Morphine and related, Other, Penicillins, Shellfish-derived products, Zithromax [azithromycin], Zolpidem, Honey bee treatment [bee venom], Hydrocodone-acetaminophen, and Sulfa antibiotics    Family Hx:  Family History   Problem Relation Age of Onset   • Breast cancer Sister    • Rectal cancer Other         Colorectal cancer   • Heart disease Other    • Hypertension Other    • Thyroid disease Other         Thyroid disorder   • Hypertension Mother    • Migraines Mother    • Osteoporosis Mother    • Diabetes Father    • Coronary artery disease Father    • Hyperlipidemia Father    • Cancer Maternal Aunt    • COPD Paternal Grandmother    • COPD Paternal Grandfather         Social History:  Social History     Socioeconomic History   • Marital status:      Spouse name: Not on file   • Number of children: Not on file   • Years of education: Not on file   • Highest education level: Not on file   Tobacco Use   • Smoking status: Former Smoker     Years: 30.00     Quit date: 2008     Years since quittin.5   • Smokeless tobacco: Never Used   • Tobacco comment: PT SMOKED FOR 30 YEARS AND QUIT IN    Substance and Sexual Activity   • Alcohol use: No   • Drug use: No   • Sexual activity: Defer       Review of Systems  Review of Systems   Constitutional: Positive for fatigue and unexpected weight change. Negative for activity change, appetite change and fever.   HENT: Negative for ear pain and sore throat.         Loss of taste and smell   Eyes: Negative for pain and visual disturbance.   Respiratory: Positive for  "cough and shortness of breath.    Cardiovascular: Positive for leg swelling. Negative for chest pain and palpitations.   Gastrointestinal: Positive for abdominal distention. Negative for abdominal pain and nausea.   Endocrine: Negative for cold intolerance and heat intolerance.   Genitourinary: Negative for difficulty urinating and dysuria.   Musculoskeletal: Positive for arthralgias, back pain, joint swelling and myalgias. Negative for gait problem.   Skin: Negative for color change and rash.   Neurological: Negative for dizziness, weakness and headaches.   Hematological: Negative for adenopathy. Does not bruise/bleed easily.   Psychiatric/Behavioral: Negative for agitation, confusion and sleep disturbance.             Objective:     /60   Pulse 104   Ht 157.5 cm (62\")   Wt 65.8 kg (145 lb)   SpO2 97%   BMI 26.52 kg/m²     Physical Exam  Constitutional:       Appearance: Normal appearance. She is well-developed. She is ill-appearing.   HENT:      Head: Normocephalic and atraumatic.      Right Ear: Tympanic membrane, ear canal and external ear normal.      Left Ear: Tympanic membrane, ear canal and external ear normal.      Nose: Nose normal.      Mouth/Throat:      Mouth: Mucous membranes are moist.      Pharynx: Oropharynx is clear.   Eyes:      Extraocular Movements: Extraocular movements intact.      Conjunctiva/sclera: Conjunctivae normal.      Pupils: Pupils are equal, round, and reactive to light.   Neck:      Musculoskeletal: Normal range of motion and neck supple.   Cardiovascular:      Rate and Rhythm: Regular rhythm. Tachycardia present.      Heart sounds: Normal heart sounds.   Pulmonary:      Effort: Pulmonary effort is normal.      Breath sounds: Examination of the right-lower field reveals rales. Examination of the left-lower field reveals rales. Rales present.   Abdominal:      General: Bowel sounds are normal. There is distension.      Palpations: Abdomen is soft.      Tenderness: There " is no abdominal tenderness.   Musculoskeletal: Normal range of motion.      Left lower leg: Edema (Nonpitting) present.      Comments: Clubbing of all fingers   Skin:     General: Skin is warm and dry.   Neurological:      General: No focal deficit present.      Mental Status: She is alert and oriented to person, place, and time.   Psychiatric:         Mood and Affect: Mood normal.         Speech: Speech normal.         Behavior: Behavior normal.         Thought Content: Thought content normal.         Judgment: Judgment normal.                              Assessment:     1. Cardiomyopathy, unspecified type (CMS/HCC)    2. Shortness of breath    3. Acquired hypothyroidism    4. Hypoglycemia    5. Localized edema         Plan:     1.  Rx changes: IM Lasix given in the office.  May need to restart Lasix at home.  She is to call the office tomorrow to let me know how much weight she has lost.  2.  Education: Provided reassurance. Provided reassurance and Recommended medication management  3.  Compliance at present is estimated to be excellent.  4.  Follow up:2 weeks and as needed.    5. CXR before leaving today.   6. Encouraged to get a follow up appointment with Cardiology.     Goals        Diet    • Eat more fruits and vegetables (pt-stated)      Barriers to goals: None         Exercise    • Exercise 150 minutes per week (moderate activity) (pt-stated)      Barriers to goal: RA and chronic pain      • Exercise 3x per week (30 min per time)      Increase physical activity       • Exercise 3x per week (30 min per time)      Walking, stretching, light weight lifting          Lifestyle    • Other (pt-stated)      Less fatigue  Barriers to goals: Multiple medical problems                Preventative:  Patient's Body mass index is 26.52 kg/m². BMI is within normal parameters. No follow-up required.  Recommended:Pneumovax and Wait until acute illness is over  patient is a non smoker  does not drink  plan meals, eat  breakfast and have 3 meals a day    RISK SCORE: 3       This document has been electronically signed by Olimpia Robison MD on February 14, 2021 12:02 CST

## 2021-02-17 ENCOUNTER — OFFICE VISIT (OUTPATIENT)
Dept: FAMILY MEDICINE CLINIC | Facility: CLINIC | Age: 66
End: 2021-02-17

## 2021-02-17 VITALS — WEIGHT: 135 LBS | BODY MASS INDEX: 24.84 KG/M2 | HEIGHT: 62 IN

## 2021-02-17 DIAGNOSIS — I42.9 CARDIOMYOPATHY, UNSPECIFIED TYPE (HCC): Primary | ICD-10-CM

## 2021-02-17 PROBLEM — E16.2 HYPOGLYCEMIA: Status: RESOLVED | Noted: 2020-12-31 | Resolved: 2021-02-17

## 2021-02-17 PROCEDURE — 99441 PR PHYS/QHP TELEPHONE EVALUATION 5-10 MIN: CPT | Performed by: FAMILY MEDICINE

## 2021-02-17 RX ORDER — FUROSEMIDE 40 MG/1
40 TABLET ORAL DAILY
Qty: 30 TABLET | Refills: 1 | Status: SHIPPED | OUTPATIENT
Start: 2021-02-17 | End: 2021-03-15

## 2021-02-17 NOTE — PROGRESS NOTES
You have chosen to receive care through a telephone visit. Do you consent to use a telephone visit for your medical care today? Yes    Subjective:     Daysi Pa is a 65 y.o. female who presents for follow-up on shortness of breath.  Patient normally has a dry weight of 125 to128 pounds.  She was 145 on February 3, 2021.  She is down to 135 pounds.  She endorses that her breathing is much better.  However she still complains of swelling in her feet hands and ankles.  She is feeling extremely fatigued.  She still having headaches.    The following portions of the patient's history were reviewed and updated as appropriate: allergies, current medications, past family history, past medical history, past social history, past surgical history and problem list.    Past Medical Hx:  Past Medical History:   Diagnosis Date   • Acquired hypothyroidism    • Allergic rhinitis    • Allergy 04/17/2016    Consult, Allergy (1) - Ordered By: BENIGNO LIM ([facility])    • Anxiety state    • Attention deficit hyperactivity disorder    • Benign essential hypertension    • Chronic pain    • Chronic pain disorder    • Congestive heart failure (CMS/HCC)     primarily systrolic dysfunction EF 17-20% repeat echo 55%   • Depressive disorder    • Dyslipidemia    • Dysphagia    • Gastroesophageal reflux disease    • Generalized abdominal pain    • History of echocardiogram 03/23/2016    Echocardiogram W/ color flow 79831 (3) - Normal LV function wiht Ef of 60%.Normal RV size and function.Normal diastolic function.No significant valvular regurgitation or stenosis.   • History of echocardiogram 04/27/2012    Echocardiogram W/O color flow 66736 (1) - Normal left ventricular systolic function. EF 55%. No evidence of regional wall motion abnormalities. Abnormal relaxation of the left ventricular myocardium.   • History of EKG 03/07/2016    EKG Tracing and Interpretation 35056 (3) - Ordered By: LILLIE BRADY ([facility])    • History of  mammogram 06/05/2013    MAMMOGRAM SCREENING 34714 - [facility] (1) - birads 0    • Hyperlipidemia    • Intraductal carcinoma in situ of breast     hx in past and s/p bilateral simple mastectomies      • Irritable bowel syndrome    • Low back pain    • Malaise and fatigue    • Microalbuminuria 07/16/2015    POS MICROALBUMINURIA RESULT DOC AND REVIEWED 3060F (1) - Ordered By: BENIGNO LIM ([facility])   • Mitral valve regurgitation     Mild-Moderate      • Myopia    • Non-organic sleep disorder    • Osteoporosis    • Pain management 04/17/2016    Consult, Pain Management (1) - Ordered By: BENIGNO LIM ([facility])    • Pneumococcal vaccination indicated 07/08/2016    PNEUMOC VAC/ADMIN/RCVD 4040F (11) - Ordered By: BENIGNO LIM ([facility])   • Primary fibromyalgia syndrome    • Rheumatoid arthritis (CMS/HCC)        Past Surgical Hx:  Past Surgical History:   Procedure Laterality Date   • APPENDECTOMY  2008    Appendectomy (1)   • BREAST BIOPSY  06/12/2013    Stereotactic breast biopsy (1) - stereotactic left mammotome biopsy with 11 gauge needle.   • BREAST IMPLANT REMOVAL  2003    Remove breast implant (1)   • BREAST IMPLANT SURGERY  2000    Breast implantation (1)   • CARDIAC CATHETERIZATION N/A 1/21/2020    Procedure: Right Heart Cath;  Surgeon: Stuart Sears MD PhD;  Location: [facility] CATH INVASIVE LOCATION;  Service: Cardiology   • CARDIAC CATHETERIZATION N/A 1/21/2020    Procedure: LEFT HEART CATH;  Surgeon: Carmen Thompson MD;  Location: [facility] CATH INVASIVE LOCATION;  Service: Cardiology   • CARDIAC CATHETERIZATION N/A 6/30/2020    Procedure: Left Heart Cath/PCI;  Surgeon: Carmen Thompson MD;  Location: [facility] INVASIVE LOCATION;  Service: Cardiology;  Laterality: N/A;   • COLONOSCOPY  01/20/2014    Colonoscopy, diagnostic (screening) 57908 (1)   • COLONOSCOPY W/ POLYPECTOMY  2008    Colonoscopy remove polyps 78006 (1)    • EXCISION LESION  05/01/2014    Remove chest wall lesion (1) - Excision of subcutaneous mass, left chest wall,  Subcutaneous mass left chest wall, status post mastectomy.   • INJECTION OF MEDICATION  07/05/2016    B12 (44) - Ordered By: BENIGNO LIM ([facility])    • INJECTION OF MEDICATION  11/12/2015    Celestone (betamethasone) (1) - Ordered By: BENIGNO LIM ([facility])    • INJECTION OF MEDICATION  02/11/2016    Kenalog (2) - Ordered By: BENIGNO LIM ([facility])    • INJECTION OF MEDICATION  11/17/2017    Prolia   • MASTECTOMY  09/05/2013    Mastectomy (2) - Right simple prophylactic mastectomy.   • OTHER SURGICAL HISTORY  10/29/2014    SONOGRAM THYROID, NECK 24391 (1) - Ordered By: BENIGNO LIM ([facility])   • PAP SMEAR  06/03/2013     PAP SMEAR (1)   • SUBTOTAL HYSTERECTOMY  1998     Partial hyst (1)     • TONSILLECTOMY  1969    Tonsillectomy (1)   • VASCULAR SURGERY  12/24/2014    Consult, Vascular Surgery (1) - Ordered By: BENIGNO LIM ([facility])        Health Maintenance:  Health Maintenance   Topic Date Due   • Pneumococcal Vaccine 65+ (2 of 2 - PPSV23) 11/20/2020   • ANNUAL WELLNESS VISIT  03/02/2021   • DXA SCAN  03/11/2021   • LIPID PANEL  02/03/2022   • COLONOSCOPY  06/19/2023   • TDAP/TD VACCINES (2 - Td) 10/16/2024   • HEPATITIS C SCREENING  Completed   • INFLUENZA VACCINE  Completed   • ZOSTER VACCINE  Completed   • MENINGOCOCCAL VACCINE  Aged Out       Current Meds:    Current Outpatient Medications:   •  albuterol (ACCUNEB) 0.63 MG/3ML nebulizer solution, Take 3 mL by nebulization Every 6 (Six) Hours As Needed for Wheezing (Use as needed) for up to 30 days., Disp: 3 mL, Rfl: 12  •  albuterol sulfate HFA (ProAir HFA) 108 (90 Base) MCG/ACT inhaler, Inhale 2 puffs 4 (Four) Times a Day As Needed., Disp: , Rfl:   •  AMITIZA 24 MCG capsule, TAKE 1 CAPSULE BY MOUTH TWICE A DAY, Disp: 180 capsule, Rfl: 3  •  aspirin 81 MG tablet, Take 1 tablet by mouth Daily., Disp: 30 tablet, Rfl: 11  •  atomoxetine (STRATTERA) 40 MG capsule, Take 1 capsule by mouth Daily., Disp: 30 capsule, Rfl: 5  •  bisoprolol (ZEBeta) 5 MG tablet, Take 1 tablet by mouth Daily., Disp: 90 tablet,  Rfl: 2  •  denosumab (PROLIA) 60 MG/ML solution prefilled syringe syringe, Inject 60 mg under the skin into the appropriate area as directed Every 6 (Six) Months., Disp: , Rfl:   •  desloratadine (Clarinex) 5 MG tablet, Take 1 tablet by mouth Daily., Disp: 30 tablet, Rfl: 2  •  docusate sodium (COLACE) 100 MG capsule, Take 200 mg by mouth every night. at bedtime, Disp: , Rfl:   •  esomeprazole (nexIUM) 40 MG capsule, TAKE 1 CAPSULE BY MOUTH EVERY DAY, Disp: 90 capsule, Rfl: 3  •  Evolocumab (REPATHA) solution auto-injector SureClick injection, Inject 1 mL under the skin into the appropriate area as directed Every 14 (Fourteen) Days., Disp: 2.1 mL, Rfl: 6  •  furosemide (Lasix) 40 MG tablet, Take 1 tablet by mouth Daily., Disp: 30 tablet, Rfl: 1  •  gabapentin (NEURONTIN) 300 MG capsule, Take 1 capsule by mouth 2 (Two) Times a Day., Disp: 180 capsule, Rfl: 0  •  hydroxychloroquine (PLAQUENIL) 200 MG tablet, Take 1 tablet by mouth Daily for 90 days. Indications: Rheumatoid Arthritis, Disp: 90 tablet, Rfl: 3  •  isosorbide mononitrate (IMDUR) 30 MG 24 hr tablet, Take 1 tablet by mouth Daily., Disp: 90 tablet, Rfl: 2  •  levothyroxine (SYNTHROID, LEVOTHROID) 88 MCG tablet, Take 88 mcg by mouth Daily., Disp: , Rfl:   •  metoclopramide (REGLAN) 10 MG tablet, Take 1 tablet by mouth 4 (Four) Times a Day., Disp: 360 tablet, Rfl: 2  •  NARCAN 4 MG/0.1ML nasal spray, 1 spray into the nostril(s) as directed by provider As Needed., Disp: , Rfl: 0  •  OxyCODONE HCl ER (OXYCONTIN) 30 MG tablet extended-release 12 hour, Take 10 mg by mouth 4 (Four) Times a Day. OxyContin 30 mg tablet,crush resistant,extended release , Disp: , Rfl:   •  polyethylene glycol (MIRALAX) packet, Take 17 g by mouth Daily., Disp: , Rfl:   •  traZODone (DESYREL) 150 MG tablet, TAKE 1 TABLET BY MOUTH EVERY DAY AT BEDTIME, Disp: 90 tablet, Rfl: 3  •  umeclidinium-vilanterol (ANORO ELLIPTA) 62.5-25 MCG/INH aerosol powder  inhaler, Inhale 1 puff Daily., Disp: 1  each, Rfl: 11  •  venlafaxine XR (EFFEXOR-XR) 150 MG 24 hr capsule, TAKE 1 CAPSULE BY MOUTH EVERY DAY, Disp: 90 capsule, Rfl: 3  •  venlafaxine XR (EFFEXOR-XR) 75 MG 24 hr capsule, Take 1 capsule by mouth Daily., Disp: 90 capsule, Rfl: 3    Allergies:  Cephalosporins, Erythromycin, Morphine and related, Other, Penicillins, Shellfish-derived products, Zithromax [azithromycin], Zolpidem, Honey bee treatment [bee venom], Hydrocodone-acetaminophen, and Sulfa antibiotics    Family Hx:  Family History   Problem Relation Age of Onset   • Breast cancer Sister    • Rectal cancer Other         Colorectal cancer   • Heart disease Other    • Hypertension Other    • Thyroid disease Other         Thyroid disorder   • Hypertension Mother    • Migraines Mother    • Osteoporosis Mother    • Diabetes Father    • Coronary artery disease Father    • Hyperlipidemia Father    • Cancer Maternal Aunt    • COPD Paternal Grandmother    • COPD Paternal Grandfather         Social History:  Social History     Socioeconomic History   • Marital status:      Spouse name: Not on file   • Number of children: Not on file   • Years of education: Not on file   • Highest education level: Not on file   Tobacco Use   • Smoking status: Former Smoker     Years: 30.00     Quit date: 2008     Years since quittin.5   • Smokeless tobacco: Never Used   • Tobacco comment: PT SMOKED FOR 30 YEARS AND QUIT IN    Substance and Sexual Activity   • Alcohol use: No   • Drug use: No   • Sexual activity: Defer       Review of Systems  Review of Systems   Constitutional: Positive for fatigue and unexpected weight change. Negative for activity change, appetite change and fever.   HENT: Negative for ear pain and sore throat.         Altered taste and smell   Eyes: Negative for pain and visual disturbance.   Cardiovascular: Positive for leg swelling. Negative for chest pain and palpitations.   Gastrointestinal: Negative for abdominal pain and nausea.  "  Endocrine: Negative for cold intolerance and heat intolerance.   Genitourinary: Negative for difficulty urinating and dysuria.   Musculoskeletal: Positive for arthralgias, back pain, joint swelling and myalgias. Negative for gait problem.   Skin: Negative for color change and rash.   Neurological: Positive for headaches. Negative for dizziness and weakness.   Hematological: Negative for adenopathy. Does not bruise/bleed easily.   Psychiatric/Behavioral: Negative for agitation, confusion and sleep disturbance.             Objective:     Ht 157.5 cm (62\")   Wt 61.2 kg (135 lb)   BMI 24.69 kg/m²     Telephone only visit                       Assessment:     1. Cardiomyopathy, unspecified type (CMS/HCC)         Plan:     1.  Rx changes: Increase oral lasix to 40mg daily.    2.  Education: Provided reassurance. Provided reassurance and Recommended medication management  Continue with daily weights.   3.  Compliance at present is estimated to be excellent.  4.  Follow up: 6 weeks and as needed.    5. Patient has upcoming Echocardiogram first week of March 2021 and then follow up appointment with Cardiologist.    6. I have spent 7 and a half minutes on the phone with the patient.      Goals        Diet    • Eat more fruits and vegetables (pt-stated)      Barriers to goals: None         Exercise    • Exercise 150 minutes per week (moderate activity) (pt-stated)      Barriers to goal: RA and chronic pain      • Exercise 3x per week (30 min per time)      Increase physical activity       • Exercise 3x per week (30 min per time)      Walking, stretching, light weight lifting          Lifestyle    • Other (pt-stated)      Less fatigue  Barriers to goals: Multiple medical problems                Preventative:  Patient's Body mass index is 24.69 kg/m². BMI is within normal parameters. No follow-up required.  Recommended:Pneumovax and Wait until acute illness is over  patient is a non smoker  does not drink  plan meals, eat " breakfast and have 3 meals a day    RISK SCORE: 3       This document has been electronically signed by Olimpia Robison MD on February 17, 2021 15:11 CST

## 2021-03-01 RX ORDER — FUROSEMIDE 20 MG/1
TABLET ORAL
Qty: 30 TABLET | Refills: 0 | OUTPATIENT
Start: 2021-03-01

## 2021-03-02 ENCOUNTER — IMMUNIZATION (OUTPATIENT)
Dept: VACCINE CLINIC | Facility: HOSPITAL | Age: 66
End: 2021-03-02

## 2021-03-02 LAB
BH CV ECHO MEAS - ACS: 1.5 CM
BH CV ECHO MEAS - AO MAX PG (FULL): 10.3 MMHG
BH CV ECHO MEAS - AO MAX PG: 14.3 MMHG
BH CV ECHO MEAS - AO MEAN PG (FULL): 5 MMHG
BH CV ECHO MEAS - AO MEAN PG: 8 MMHG
BH CV ECHO MEAS - AO ROOT AREA (BSA CORRECTED): 1.9
BH CV ECHO MEAS - AO ROOT AREA: 7.1 CM^2
BH CV ECHO MEAS - AO ROOT DIAM: 3 CM
BH CV ECHO MEAS - AO V2 MAX: 189 CM/SEC
BH CV ECHO MEAS - AO V2 MEAN: 131 CM/SEC
BH CV ECHO MEAS - AO V2 VTI: 40 CM
BH CV ECHO MEAS - ASC AORTA: 2.9 CM
BH CV ECHO MEAS - AVA(I,A): 1.8 CM^2
BH CV ECHO MEAS - AVA(I,D): 1.8 CM^2
BH CV ECHO MEAS - AVA(V,A): 1.7 CM^2
BH CV ECHO MEAS - AVA(V,D): 1.7 CM^2
BH CV ECHO MEAS - BSA(HAYCOCK): 1.6 M^2
BH CV ECHO MEAS - BSA: 1.6 M^2
BH CV ECHO MEAS - BZI_BMI: 24.7 KILOGRAMS/M^2
BH CV ECHO MEAS - BZI_METRIC_HEIGHT: 157.5 CM
BH CV ECHO MEAS - BZI_METRIC_WEIGHT: 61.2 KG
BH CV ECHO MEAS - EDV(CUBED): 97.3 ML
BH CV ECHO MEAS - EDV(MOD-SP2): 98 ML
BH CV ECHO MEAS - EDV(MOD-SP4): 77 ML
BH CV ECHO MEAS - EDV(TEICH): 97.3 ML
BH CV ECHO MEAS - EF(CUBED): 56 %
BH CV ECHO MEAS - EF(MOD-BP): 47 %
BH CV ECHO MEAS - EF(MOD-SP2): 51 %
BH CV ECHO MEAS - EF(MOD-SP4): 46.8 %
BH CV ECHO MEAS - EF(TEICH): 47.7 %
BH CV ECHO MEAS - EPSS: 0.9 CM
BH CV ECHO MEAS - ESV(CUBED): 42.9 ML
BH CV ECHO MEAS - ESV(MOD-SP2): 48 ML
BH CV ECHO MEAS - ESV(MOD-SP4): 41 ML
BH CV ECHO MEAS - ESV(TEICH): 50.9 ML
BH CV ECHO MEAS - FS: 23.9 %
BH CV ECHO MEAS - IVS/LVPW: 0.9
BH CV ECHO MEAS - IVSD: 0.9 CM
BH CV ECHO MEAS - LA DIMENSION: 3.9 CM
BH CV ECHO MEAS - LA/AO: 1.3
BH CV ECHO MEAS - LV DIASTOLIC VOL/BSA (35-75): 47.6 ML/M^2
BH CV ECHO MEAS - LV MASS(C)D: 148.1 GRAMS
BH CV ECHO MEAS - LV MASS(C)DI: 91.6 GRAMS/M^2
BH CV ECHO MEAS - LV MAX PG: 4 MMHG
BH CV ECHO MEAS - LV MEAN PG: 3 MMHG
BH CV ECHO MEAS - LV SYSTOLIC VOL/BSA (12-30): 25.3 ML/M^2
BH CV ECHO MEAS - LV V1 MAX: 100 CM/SEC
BH CV ECHO MEAS - LV V1 MEAN: 77.9 CM/SEC
BH CV ECHO MEAS - LV V1 VTI: 23.4 CM
BH CV ECHO MEAS - LVIDD: 4.6 CM
BH CV ECHO MEAS - LVIDS: 3.5 CM
BH CV ECHO MEAS - LVLD AP2: 7.5 CM
BH CV ECHO MEAS - LVLD AP4: 7.7 CM
BH CV ECHO MEAS - LVLS AP2: 6.9 CM
BH CV ECHO MEAS - LVLS AP4: 6.7 CM
BH CV ECHO MEAS - LVOT AREA (M): 3.1 CM^2
BH CV ECHO MEAS - LVOT AREA: 3.1 CM^2
BH CV ECHO MEAS - LVOT DIAM: 2 CM
BH CV ECHO MEAS - LVPWD: 1 CM
BH CV ECHO MEAS - MR ALIAS VEL: 30.8 CM/SEC
BH CV ECHO MEAS - MR ERO: 0.17 CM^2
BH CV ECHO MEAS - MR FLOW RATE: 94.8 CM^3/SEC
BH CV ECHO MEAS - MR MAX PG: 123.2 MMHG
BH CV ECHO MEAS - MR MAX VEL: 555 CM/SEC
BH CV ECHO MEAS - MR MEAN PG: 60 MMHG
BH CV ECHO MEAS - MR MEAN VEL: 368 CM/SEC
BH CV ECHO MEAS - MR PISA RADIUS: 0.7 CM
BH CV ECHO MEAS - MR PISA: 3.1 CM^2
BH CV ECHO MEAS - MR VOLUME: 27.8 ML
BH CV ECHO MEAS - MR VTI: 163 CM
BH CV ECHO MEAS - MV A MAX VEL: 106 CM/SEC
BH CV ECHO MEAS - MV AREA (1 DIAM): 7.1 CM^2
BH CV ECHO MEAS - MV DEC SLOPE: 226 CM/SEC^2
BH CV ECHO MEAS - MV DIAM: 3 CM
BH CV ECHO MEAS - MV E MAX VEL: 115 CM/SEC
BH CV ECHO MEAS - MV E/A: 1.1
BH CV ECHO MEAS - MV FLOW AREA(1DIAM): 7.1 CM^2
BH CV ECHO MEAS - MV MAX PG: 3.2 MMHG
BH CV ECHO MEAS - MV MEAN PG: 1 MMHG
BH CV ECHO MEAS - MV P1/2T MAX VEL: 73.7 CM/SEC
BH CV ECHO MEAS - MV P1/2T: 95.5 MSEC
BH CV ECHO MEAS - MV V2 MAX: 88.9 CM/SEC
BH CV ECHO MEAS - MV V2 MEAN: 53.9 CM/SEC
BH CV ECHO MEAS - MV V2 VTI: 24 CM
BH CV ECHO MEAS - MVA P1/2T LCG: 3 CM^2
BH CV ECHO MEAS - MVA(P1/2T): 2.3 CM^2
BH CV ECHO MEAS - MVA(VTI): 3.1 CM^2
BH CV ECHO MEAS - PA MAX PG (FULL): 1.6 MMHG
BH CV ECHO MEAS - PA MAX PG: 3.6 MMHG
BH CV ECHO MEAS - PA MEAN PG (FULL): 1 MMHG
BH CV ECHO MEAS - PA MEAN PG: 2 MMHG
BH CV ECHO MEAS - PA V2 MAX: 95.1 CM/SEC
BH CV ECHO MEAS - PA V2 MEAN: 70.5 CM/SEC
BH CV ECHO MEAS - PA V2 VTI: 22.3 CM
BH CV ECHO MEAS - RAP SYSTOLE: 3 MMHG
BH CV ECHO MEAS - RF(MV,AO)(1 DIAM): -0.67
BH CV ECHO MEAS - RF(MV,LVOT)(1DIAM): 0.57
BH CV ECHO MEAS - RV MAX PG: 2 MMHG
BH CV ECHO MEAS - RV MEAN PG: 1 MMHG
BH CV ECHO MEAS - RV V1 MAX: 71.4 CM/SEC
BH CV ECHO MEAS - RV V1 MEAN: 51.4 CM/SEC
BH CV ECHO MEAS - RV V1 VTI: 15.9 CM
BH CV ECHO MEAS - RVDD: 2.3 CM
BH CV ECHO MEAS - RVSP: 26.2 MMHG
BH CV ECHO MEAS - SI(AO): 174.8 ML/M^2
BH CV ECHO MEAS - SI(CUBED): 33.7 ML/M^2
BH CV ECHO MEAS - SI(LVOT): 45.4 ML/M^2
BH CV ECHO MEAS - SI(MOD-SP2): 30.9 ML/M^2
BH CV ECHO MEAS - SI(MOD-SP4): 22.3 ML/M^2
BH CV ECHO MEAS - SI(MV 1 DIAM): 104.9 ML/M^2
BH CV ECHO MEAS - SI(TEICH): 28.7 ML/M^2
BH CV ECHO MEAS - SV(AO): 282.7 ML
BH CV ECHO MEAS - SV(CUBED): 54.5 ML
BH CV ECHO MEAS - SV(LVOT): 73.5 ML
BH CV ECHO MEAS - SV(MOD-SP2): 50 ML
BH CV ECHO MEAS - SV(MOD-SP4): 36 ML
BH CV ECHO MEAS - SV(MV 1 DIAM): 169.6 ML
BH CV ECHO MEAS - SV(TEICH): 46.5 ML
BH CV ECHO MEAS - TR MAX VEL: 241 CM/SEC
BH CV VAS BP LEFT ARM: NORMAL MMHG

## 2021-03-02 PROCEDURE — 0002A: CPT | Performed by: NURSE PRACTITIONER

## 2021-03-02 PROCEDURE — 91300 HC SARSCOV02 VAC 30MCG/0.3ML IM: CPT | Performed by: NURSE PRACTITIONER

## 2021-03-03 NOTE — TELEPHONE ENCOUNTER
PT CALLED STATING THAT HER INSURANCE IS NEEDING A PRIOR AUTHORIZATION ON AMITIZA 24 MCG capsule AND  levothyroxine (SYNTHROID, LEVOTHROID) 88 MCG tablet IN ORDER TO FILL NAME BRAND.    HER CALL BACK NUMBER [phone]

## 2021-03-04 RX ORDER — LEVOTHYROXINE SODIUM 88 UG/1
88 TABLET ORAL DAILY
Qty: 90 TABLET | Refills: 3 | Status: SHIPPED | OUTPATIENT
Start: 2021-03-04 | End: 2021-06-01

## 2021-03-04 RX ORDER — LUBIPROSTONE 24 UG/1
24 CAPSULE ORAL 2 TIMES DAILY
Qty: 180 CAPSULE | Refills: 3 | Status: SHIPPED | OUTPATIENT
Start: 2021-03-04 | End: 2021-06-28

## 2021-03-10 ENCOUNTER — OFFICE VISIT (OUTPATIENT)
Dept: FAMILY MEDICINE CLINIC | Facility: CLINIC | Age: 66
End: 2021-03-10

## 2021-03-10 VITALS
OXYGEN SATURATION: 96 % | SYSTOLIC BLOOD PRESSURE: 120 MMHG | DIASTOLIC BLOOD PRESSURE: 70 MMHG | BODY MASS INDEX: 26.5 KG/M2 | HEART RATE: 72 BPM | WEIGHT: 144 LBS | HEIGHT: 62 IN

## 2021-03-10 DIAGNOSIS — M81.0 OSTEOPOROSIS WITHOUT CURRENT PATHOLOGICAL FRACTURE, UNSPECIFIED OSTEOPOROSIS TYPE: ICD-10-CM

## 2021-03-10 DIAGNOSIS — Z00.00 ENCOUNTER FOR SUBSEQUENT ANNUAL WELLNESS VISIT (AWV) IN MEDICARE PATIENT: Primary | ICD-10-CM

## 2021-03-10 DIAGNOSIS — R60.9 EDEMA, UNSPECIFIED TYPE: ICD-10-CM

## 2021-03-10 PROCEDURE — 90732 PPSV23 VACC 2 YRS+ SUBQ/IM: CPT | Performed by: FAMILY MEDICINE

## 2021-03-10 PROCEDURE — 1170F FXNL STATUS ASSESSED: CPT | Performed by: FAMILY MEDICINE

## 2021-03-10 PROCEDURE — G0439 PPPS, SUBSEQ VISIT: HCPCS | Performed by: FAMILY MEDICINE

## 2021-03-10 PROCEDURE — 96372 THER/PROPH/DIAG INJ SC/IM: CPT | Performed by: FAMILY MEDICINE

## 2021-03-10 PROCEDURE — G0009 ADMIN PNEUMOCOCCAL VACCINE: HCPCS | Performed by: FAMILY MEDICINE

## 2021-03-10 PROCEDURE — 96160 PT-FOCUSED HLTH RISK ASSMT: CPT | Performed by: FAMILY MEDICINE

## 2021-03-10 RX ORDER — FUROSEMIDE 10 MG/ML
80 INJECTION INTRAMUSCULAR; INTRAVENOUS ONCE
Status: COMPLETED | OUTPATIENT
Start: 2021-03-10 | End: 2021-03-10

## 2021-03-10 RX ADMIN — FUROSEMIDE 80 MG: 10 INJECTION INTRAMUSCULAR; INTRAVENOUS at 09:44

## 2021-03-10 NOTE — PROGRESS NOTES
QUICK REFERENCE INFORMATION:  The ABCs of the Annual Wellness Visit    Subsequent Medicare Wellness Visit     HEALTH RISK ASSESSMENT    : 1955    Recent Hospitalizations:  No hospitalization(s) within the last year.        Current Medical Providers:  Patient Care Team:  Olimpia Robison MD as PCP - Stuart Hinton MD PhD as Consulting Physician (Cardiology)  Gordon Tapia MD as Anesthesiologist (Pain Medicine)  Manny Santos MD as Surgeon (General Surgery)  Jay Aragon MD as Consulting Physician (Gastroenterology)        Smoking Status:  Social History     Tobacco Use   Smoking Status Former Smoker   • Years: 30.00   • Quit date: 2008   • Years since quittin.5   Smokeless Tobacco Never Used   Tobacco Comment    PT SMOKED FOR 30 YEARS AND QUIT IN        Alcohol Consumption:  Social History     Substance and Sexual Activity   Alcohol Use No       Depression Screen:   PHQ-2/PHQ-9 Depression Screening 3/10/2021   Little interest or pleasure in doing things 1   Feeling down, depressed, or hopeless 0   Trouble falling or staying asleep, or sleeping too much 2   Feeling tired or having little energy 2   Poor appetite or overeating 0   Feeling bad about yourself - or that you are a failure or have let yourself or your family down 0   Trouble concentrating on things, such as reading the newspaper or watching television 0   Moving or speaking so slowly that other people could have noticed. Or the opposite - being so fidgety or restless that you have been moving around a lot more than usual 0   Thoughts that you would be better off dead, or of hurting yourself in some way 0   Total Score 5   If you checked off any problems, how difficult have these problems made it for you to do your work, take care of things at home, or get along with other people? Not difficult at all       Health Habits and Functional and Cognitive Screening:  Functional & Cognitive Status 3/10/2021      Do you have difficulty preparing food and eating? No   Do you have difficulty bathing yourself, getting dressed or grooming yourself? No   Do you have difficulty using the toilet? No   Do you have difficulty moving around from place to place? No   Do you have trouble with steps or getting out of a bed or a chair? No   Current Diet Well Balanced Diet   Dental Exam Unknown   Eye Exam Up to date   Exercise (times per week) 0 times per week   Current Exercise Activities Include None   Do you need help using the phone?  No   Are you deaf or do you have serious difficulty hearing?  No   Do you need help with transportation? No   Do you need help shopping? No   Do you need help preparing meals?  No   Do you need help with housework?  No   Do you need help with laundry? No   Do you need help taking your medications? No   Do you need help managing money? No   Do you ever drive or ride in a car without wearing a seat belt? No   Do you have difficulty concentrating, remembering or making decisions? -           Does the patient have evidence of cognitive impairment? No    Asiprin use counseling: Taking ASA appropriately as indicated      Recent Lab Results:       Lab Results   Component Value Date    HGBA1C 5.60 02/27/2020     Lab Results   Component Value Date    CHOL 159 02/03/2021    TRIG 252 (H) 02/03/2021    HDL 64 (H) 02/03/2021    VLDL 39 02/03/2021    LDLHDL 0.70 02/03/2021           Age-appropriate Screening Schedule:  Refer to the list below for future screening recommendations based on patient's age, sex and/or medical conditions. Orders for these recommended tests are listed in the plan section. The patient has been provided with a written plan.    Health Maintenance   Topic Date Due   • DXA SCAN  03/11/2021   • LIPID PANEL  02/03/2022   • COLONOSCOPY  06/19/2023   • TDAP/TD VACCINES (2 - Td) 10/16/2024   • INFLUENZA VACCINE  Completed   • ZOSTER VACCINE  Completed        Subjective   History of Present  Illness    Daysi Pa is a 65 y.o. female who presents for an Annual Wellness Visit.    The following portions of the patient's history were reviewed and updated as appropriate: allergies, current medications, past family history, past medical history, past social history, past surgical history and problem list.    Outpatient Medications Prior to Visit   Medication Sig Dispense Refill   • albuterol sulfate HFA (ProAir HFA) 108 (90 Base) MCG/ACT inhaler Inhale 2 puffs 4 (Four) Times a Day As Needed.     • aspirin 81 MG tablet Take 1 tablet by mouth Daily. 30 tablet 11   • atomoxetine (STRATTERA) 40 MG capsule Take 1 capsule by mouth Daily. 30 capsule 5   • bisoprolol (ZEBeta) 5 MG tablet Take 1 tablet by mouth Daily. 90 tablet 2   • denosumab (PROLIA) 60 MG/ML solution prefilled syringe syringe Inject 60 mg under the skin into the appropriate area as directed Every 6 (Six) Months.     • desloratadine (Clarinex) 5 MG tablet Take 1 tablet by mouth Daily. 30 tablet 2   • docusate sodium (COLACE) 100 MG capsule Take 200 mg by mouth every night. at bedtime     • esomeprazole (nexIUM) 40 MG capsule TAKE 1 CAPSULE BY MOUTH EVERY DAY 90 capsule 3   • Evolocumab (REPATHA) solution auto-injector SureClick injection Inject 1 mL under the skin into the appropriate area as directed Every 14 (Fourteen) Days. 2.1 mL 6   • furosemide (Lasix) 40 MG tablet Take 1 tablet by mouth Daily. 30 tablet 1   • gabapentin (NEURONTIN) 300 MG capsule Take 1 capsule by mouth 2 (Two) Times a Day. 180 capsule 0   • hydroxychloroquine (PLAQUENIL) 200 MG tablet Take 1 tablet by mouth Daily for 90 days. Indications: Rheumatoid Arthritis 90 tablet 3   • isosorbide mononitrate (IMDUR) 30 MG 24 hr tablet Take 1 tablet by mouth Daily. 90 tablet 2   • levothyroxine (SYNTHROID, LEVOTHROID) 88 MCG tablet Take 1 tablet by mouth Daily. Brand name only 90 tablet 3   • lubiprostone (Amitiza) 24 MCG capsule Take 1 capsule by mouth 2 (Two) Times a  Day. 180 capsule 3   • metoclopramide (REGLAN) 10 MG tablet Take 1 tablet by mouth 4 (Four) Times a Day. 360 tablet 2   • NARCAN 4 MG/0.1ML nasal spray 1 spray into the nostril(s) as directed by provider As Needed.  0   • OxyCODONE HCl ER (OXYCONTIN) 30 MG tablet extended-release 12 hour Take 10 mg by mouth 4 (Four) Times a Day. OxyContin 30 mg tablet,crush resistant,extended release      • polyethylene glycol (MIRALAX) packet Take 17 g by mouth Daily.     • traZODone (DESYREL) 150 MG tablet TAKE 1 TABLET BY MOUTH EVERY DAY AT BEDTIME 90 tablet 3   • umeclidinium-vilanterol (ANORO ELLIPTA) 62.5-25 MCG/INH aerosol powder  inhaler Inhale 1 puff Daily. 1 each 11   • venlafaxine XR (EFFEXOR-XR) 150 MG 24 hr capsule TAKE 1 CAPSULE BY MOUTH EVERY DAY 90 capsule 3   • venlafaxine XR (EFFEXOR-XR) 75 MG 24 hr capsule Take 1 capsule by mouth Daily. 90 capsule 3     No facility-administered medications prior to visit.       Patient Active Problem List   Diagnosis   • Hyperlipidemia   • Mitral regurgitation   • Rheumatoid arthritis (CMS/HCC)   • Primary fibromyalgia syndrome   • Osteoporosis   • Malaise and fatigue   • Irritable bowel syndrome with constipation   • Intraductal carcinoma in situ of breast   • Gastroesophageal reflux disease   • Dyslipidemia   • Depressive disorder   • Chronic pain   • Attention deficit hyperactivity disorder   • Anxiety state   • Allergic rhinitis   • Acquired hypothyroidism   • Chronic low back pain   • Lumbosacral spondylosis without myelopathy   • Myofacial muscle pain   • Long term (current) use of opiate analgesic   • Snoring   • Cardiomyopathy (CMS/HCC)   • Posterior vitreous detachment of left eye   • Epiretinal membrane (ERM) of right eye   • Lamellar macular hole of both eyes   • Nonrheumatic aortic valve insufficiency   • Precordial pain   • Pericardial effusion   • Abnormal stress echocardiogram   • Dyspnea on exertion   • Chest pain, atypical   • Coronary artery disease of native  "artery of native heart with stable angina pectoris (CMS/[facility])   • Personal history of tobacco use, presenting hazards to health   • Stage 2 moderate COPD by GOLD classification (CMS/[facility])   • Clubbing of fingers   • Exposure to second hand smoke   • Menopause   • DDD (degenerative disc disease), lumbar       Advance Care Planning:  ACP discussion was held with the patient during this visit. Patient has an advance directive (not in EMR), copy requested.    Identification of Risk Factors:  Risk factors include: Cardiovascular risk  Chronic Pain   Fall Risk  Osteoprorosis Risk  Polypharmacy.    Review of Systems    Compared to one year ago, the patient feels her physical health is worse.  Compared to one year ago, the patient feels her mental health is worse.    Objective     Physical Exam    Vitals:    03/10/21 0852   BP: 120/70   BP Location: Left arm   Patient Position: Sitting   Cuff Size: Adult   Pulse: 72   SpO2: 96%   Weight: 65.3 kg (144 lb)   Height: 157.5 cm (62\")   PainSc:   6   PainLoc: Back       Patient's Body mass index is 26.34 kg/m². BMI is above normal parameters. Recommendations include: exercise counseling and nutrition counseling.      Assessment/Plan   Patient Self-Management and Personalized Health Advice  The patient has been provided with information about: diet, exercise, weight management and fall prevention and preventive services including:   · Annual Wellness Visit (AWV).    Visit Diagnoses:    ICD-10-CM ICD-9-CM   1. Encounter for subsequent annual wellness visit (AWV) in Medicare patient  Z00.00 V70.0   2. Osteoporosis without current pathological fracture, unspecified osteoporosis type  M81.0 733.00   3. Edema, unspecified type  R60.9 782.3       Orders Placed This Encounter   Procedures   • DEXA Bone Density Axial     Order Specific Question:   Is patient taking or have taken long term Glucocorticoid (steroids)?     Answer:   No     Order Specific Question:   Does the patient have " rheumatoid arthritis?     Answer:   Yes     Order Specific Question:   Reason for Exam:     Answer:   osteoporosis     Order Specific Question:   Does this patient have a diabetic monitoring/medication delivering device on?     Answer:   No   • Pneumococcal Polysaccharide Vaccine 23-Valent Greater Than or Equal To 3yo Subcutaneous / IM       Outpatient Encounter Medications as of 3/10/2021   Medication Sig Dispense Refill   • albuterol sulfate HFA (ProAir HFA) 108 (90 Base) MCG/ACT inhaler Inhale 2 puffs 4 (Four) Times a Day As Needed.     • aspirin 81 MG tablet Take 1 tablet by mouth Daily. 30 tablet 11   • atomoxetine (STRATTERA) 40 MG capsule Take 1 capsule by mouth Daily. 30 capsule 5   • bisoprolol (ZEBeta) 5 MG tablet Take 1 tablet by mouth Daily. 90 tablet 2   • denosumab (PROLIA) 60 MG/ML solution prefilled syringe syringe Inject 60 mg under the skin into the appropriate area as directed Every 6 (Six) Months.     • desloratadine (Clarinex) 5 MG tablet Take 1 tablet by mouth Daily. 30 tablet 2   • docusate sodium (COLACE) 100 MG capsule Take 200 mg by mouth every night. at bedtime     • esomeprazole (nexIUM) 40 MG capsule TAKE 1 CAPSULE BY MOUTH EVERY DAY 90 capsule 3   • Evolocumab (REPATHA) solution auto-injector SureClick injection Inject 1 mL under the skin into the appropriate area as directed Every 14 (Fourteen) Days. 2.1 mL 6   • furosemide (Lasix) 40 MG tablet Take 1 tablet by mouth Daily. 30 tablet 1   • gabapentin (NEURONTIN) 300 MG capsule Take 1 capsule by mouth 2 (Two) Times a Day. 180 capsule 0   • hydroxychloroquine (PLAQUENIL) 200 MG tablet Take 1 tablet by mouth Daily for 90 days. Indications: Rheumatoid Arthritis 90 tablet 3   • isosorbide mononitrate (IMDUR) 30 MG 24 hr tablet Take 1 tablet by mouth Daily. 90 tablet 2   • levothyroxine (SYNTHROID, LEVOTHROID) 88 MCG tablet Take 1 tablet by mouth Daily. Brand name only 90 tablet 3   • lubiprostone (Amitiza) 24 MCG capsule Take 1 capsule by  mouth 2 (Two) Times a Day. 180 capsule 3   • metoclopramide (REGLAN) 10 MG tablet Take 1 tablet by mouth 4 (Four) Times a Day. 360 tablet 2   • NARCAN 4 MG/0.1ML nasal spray 1 spray into the nostril(s) as directed by provider As Needed.  0   • OxyCODONE HCl ER (OXYCONTIN) 30 MG tablet extended-release 12 hour Take 10 mg by mouth 4 (Four) Times a Day. OxyContin 30 mg tablet,crush resistant,extended release      • polyethylene glycol (MIRALAX) packet Take 17 g by mouth Daily.     • traZODone (DESYREL) 150 MG tablet TAKE 1 TABLET BY MOUTH EVERY DAY AT BEDTIME 90 tablet 3   • umeclidinium-vilanterol (ANORO ELLIPTA) 62.5-25 MCG/INH aerosol powder  inhaler Inhale 1 puff Daily. 1 each 11   • venlafaxine XR (EFFEXOR-XR) 150 MG 24 hr capsule TAKE 1 CAPSULE BY MOUTH EVERY DAY 90 capsule 3   • venlafaxine XR (EFFEXOR-XR) 75 MG 24 hr capsule Take 1 capsule by mouth Daily. 90 capsule 3     Facility-Administered Encounter Medications as of 3/10/2021   Medication Dose Route Frequency Provider Last Rate Last Admin   • [COMPLETED] furosemide (LASIX) injection 80 mg  [facility]   80 mg at 03/10/21 0944     Goals        Diet    •  Eat more fruits and vegetables (pt-stated)       Barriers to goals: None         Exercise    •  Exercise 150 minutes per week (moderate activity) (pt-stated)       Barriers to goal: RA and chronic pain      •  Exercise 3x per week (30 min per time)       Increase physical activity       •  Exercise 3x per week (30 min per time)       Walking, stretching, light weight lifting       •  Exercise 3x per week (30 min per time)       Exercise more, increase stamina, wants to walk 15 mins 4 times a week          Lifestyle    •  Other (pt-stated)       Less fatigue  Barriers to goals: Multiple medical problems              Reviewed use of high risk medication in the elderly: yes  Reviewed for potential of harmful drug interactions in the elderly: yes  Risk Score 4  Follow  Up:  Return in about 1 year (around 3/10/2022), or if symptoms worsen or fail to improve, for Medicare Wellness.     An After Visit Summary and PPPS with all of these plans were given to the patient.             This document has been electronically signed by Olimpia Robison MD on March 10, 2021 11:03 CST

## 2021-03-15 RX ORDER — FUROSEMIDE 40 MG/1
TABLET ORAL
Qty: 90 TABLET | Refills: 3 | Status: SHIPPED | OUTPATIENT
Start: 2021-03-15 | End: 2022-02-15

## 2021-03-15 RX ORDER — ATOMOXETINE 40 MG/1
40 CAPSULE ORAL DAILY
Qty: 90 CAPSULE | Refills: 3 | Status: SHIPPED | OUTPATIENT
Start: 2021-03-15 | End: 2021-12-22

## 2021-03-25 ENCOUNTER — OFFICE VISIT (OUTPATIENT)
Dept: CARDIOLOGY | Facility: CLINIC | Age: 66
End: 2021-03-25

## 2021-03-25 VITALS
SYSTOLIC BLOOD PRESSURE: 140 MMHG | OXYGEN SATURATION: 99 % | BODY MASS INDEX: 26.54 KG/M2 | HEART RATE: 93 BPM | DIASTOLIC BLOOD PRESSURE: 68 MMHG | WEIGHT: 144.2 LBS | HEIGHT: 62 IN

## 2021-03-25 DIAGNOSIS — I42.8 NONISCHEMIC CARDIOMYOPATHY (HCC): Primary | ICD-10-CM

## 2021-03-25 DIAGNOSIS — I34.0 NONRHEUMATIC MITRAL VALVE REGURGITATION: Chronic | ICD-10-CM

## 2021-03-25 DIAGNOSIS — I42.8 NICM (NONISCHEMIC CARDIOMYOPATHY) (HCC): Primary | ICD-10-CM

## 2021-03-25 DIAGNOSIS — I49.8 VENTRICULAR BIGEMINY: ICD-10-CM

## 2021-03-25 DIAGNOSIS — I25.118 CORONARY ARTERY DISEASE OF NATIVE ARTERY OF NATIVE HEART WITH STABLE ANGINA PECTORIS (HCC): ICD-10-CM

## 2021-03-25 PROBLEM — I35.1 NONRHEUMATIC AORTIC VALVE INSUFFICIENCY: Status: RESOLVED | Noted: 2019-09-13 | Resolved: 2021-03-25

## 2021-03-25 PROBLEM — I31.39 PERICARDIAL EFFUSION: Status: RESOLVED | Noted: 2019-11-25 | Resolved: 2021-03-25

## 2021-03-25 PROCEDURE — 93000 ELECTROCARDIOGRAM COMPLETE: CPT | Performed by: INTERNAL MEDICINE

## 2021-03-25 PROCEDURE — 99214 OFFICE O/P EST MOD 30 MIN: CPT | Performed by: INTERNAL MEDICINE

## 2021-03-25 NOTE — PROGRESS NOTES
Cardiovascular Medicine   Stuart Sears M.D., Ph.D., [facility]    The patient returns to cardiology clinic for follow-up of the following cardiac problems:    PROBLEM LIST:    1. 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 EF initially 15%--now improved  2. MR  3. AI  4. Pericardial effusion  5. TERENCE Pa is a 65 y.o. female who returns to clinic today.      As a matter of history the following is reviewed. 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  She is on Bisoprolol only as she had OH associated with medication. Fortunately, she has had normalization of her LV EF. She continues with exercise intolerance and MAURICIO.  She uses Furosemide daily.  She's medication compliant.  Denies side effects. She has had no ED visits or admissions for ADHF.   She did have normalization of her LV EF.     In January she was diagnosed with CAP. She reports orthopnea and PND. She states her PCP gave Lasix injections due to hypervolemia. She reports a marked diuresis. She had a TTE recently which showed her LV EF: 47%. She is now on bisoprolol and lasix 40mg once a day. She reports stable dyspnea at this point. She went for invasive coronary angiography which showed a  and otherwise nonobstructive disease. However, she had a RPDA that possibly could have explained her CPS and her abnormal stress echo.   She was recommended to go to  medication management.  She is currently prescribed aspirin, bisoprolol and isosorbide.She had atorvastatin, pravastatin. She has tried Crestor. RHC showed normal filling pressures.         Review of Systems   Cardiovascular: Positive for dyspnea on exertion. Negative for chest pain, claudication, cyanosis, irregular heartbeat, leg swelling, near-syncope, orthopnea, palpitations, paroxysmal nocturnal dyspnea and syncope.   Respiratory: Positive for shortness of breath. Negative for cough, hemoptysis, sleep disturbances due to breathing, snoring and wheezing.    All other systems  reviewed and are negative.        Current Outpatient Medications on File Prior to Visit   Medication Sig Dispense Refill   • albuterol sulfate HFA (ProAir HFA) 108 (90 Base) MCG/ACT inhaler Inhale 2 puffs 4 (Four) Times a Day As Needed.     • aspirin 81 MG tablet Take 1 tablet by mouth Daily. 30 tablet 11   • atomoxetine (STRATTERA) 40 MG capsule Take 1 capsule by mouth Daily. 90 capsule 3   • bisoprolol (ZEBeta) 5 MG tablet Take 1 tablet by mouth Daily. 90 tablet 2   • denosumab (PROLIA) 60 MG/ML solution prefilled syringe syringe Inject 60 mg under the skin into the appropriate area as directed Every 6 (Six) Months.     • desloratadine (Clarinex) 5 MG tablet Take 1 tablet by mouth Daily. 30 tablet 2   • docusate sodium (COLACE) 100 MG capsule Take 200 mg by mouth every night. at bedtime     • esomeprazole (nexIUM) 40 MG capsule TAKE 1 CAPSULE BY MOUTH EVERY DAY 90 capsule 3   • Evolocumab (REPATHA) solution auto-injector SureClick injection Inject 1 mL under the skin into the appropriate area as directed Every 14 (Fourteen) Days. 2.1 mL 6   • furosemide (LASIX) 40 MG tablet TAKE 1 TABLET BY MOUTH EVERY DAY 90 tablet 3   • gabapentin (NEURONTIN) 300 MG capsule Take 1 capsule by mouth 2 (Two) Times a Day. 180 capsule 0   • hydroxychloroquine (PLAQUENIL) 200 MG tablet Take 1 tablet by mouth Daily for 90 days. Indications: Rheumatoid Arthritis 90 tablet 3   • isosorbide mononitrate (IMDUR) 30 MG 24 hr tablet Take 1 tablet by mouth Daily. 90 tablet 2   • levothyroxine (SYNTHROID, LEVOTHROID) 88 MCG tablet Take 1 tablet by mouth Daily. Brand name only 90 tablet 3   • lubiprostone (Amitiza) 24 MCG capsule Take 1 capsule by mouth 2 (Two) Times a Day. 180 capsule 3   • metoclopramide (REGLAN) 10 MG tablet Take 1 tablet by mouth 4 (Four) Times a Day. 360 tablet 2   • NARCAN 4 MG/0.1ML nasal spray 1 spray into the nostril(s) as directed by provider As Needed.  0   • OxyCODONE HCl ER (OXYCONTIN) 30 MG tablet extended-release  12 hour Take 10 mg by mouth 4 (Four) Times a Day. OxyContin 30 mg tablet,crush resistant,extended release      • polyethylene glycol (MIRALAX) packet Take 17 g by mouth Daily.     • traZODone (DESYREL) 150 MG tablet TAKE 1 TABLET BY MOUTH EVERY DAY AT BEDTIME 90 tablet 3   • umeclidinium-vilanterol (ANORO ELLIPTA) 62.5-25 MCG/INH aerosol powder  inhaler Inhale 1 puff Daily. 1 each 11   • venlafaxine XR (EFFEXOR-XR) 150 MG 24 hr capsule TAKE 1 CAPSULE BY MOUTH EVERY DAY 90 capsule 3   • venlafaxine XR (EFFEXOR-XR) 75 MG 24 hr capsule Take 1 capsule by mouth Daily. 90 capsule 3     No current facility-administered medications on file prior to visit.     Allergies   Allergen Reactions   • Cephalosporins Nausea And Vomiting   • Erythromycin Nausea Only   • Morphine And Related Itching   • Other Nausea And Vomiting     Cipro   • Penicillins Hives   • Shellfish-Derived Products Hives and Other (See Comments)     Other reaction(s): SOA   • Zithromax [Azithromycin] Hives and Nausea And Vomiting   • Zolpidem Mental Status Change   • Honey Bee Treatment [Bee Venom] Hives   • Hydrocodone-Acetaminophen Anxiety   • Sulfa Antibiotics Hives and Rash     Sulfa (Sulfonamide Antibiotics)     Social History     Socioeconomic History   • Marital status:      Spouse name: Not on file   • Number of children: Not on file   • Years of education: Not on file   • Highest education level: Not on file   Tobacco Use   • Smoking status: Former Smoker     Years: 30.00     Quit date: 2008     Years since quittin.6   • Smokeless tobacco: Never Used   • Tobacco comment: PT SMOKED FOR 30 YEARS AND QUIT IN    Substance and Sexual Activity   • Alcohol use: No   • Drug use: No   • Sexual activity: Defer         Physical Exam:  Vitals:    21 0940   BP: 140/68   Pulse: 93   SpO2: 99%     Body mass index is 26.37 kg/m².    GEN: alert, appears stated age and cooperative  Body Habitus: well-nourished  HEENT: Head: Normocephalic, no  lesions, without obvious abnormality.  JVP: 6 cm of water at 45 degrees HJR: absent      [place]:  normal size and placement Palpable S4: Not present.   Heart rate: normal  Heart Rhythm: regular     Heart Sounds: S1: normal intensity  S2: normal intensity  S3: absent   S4: absent  Opening Snap: absent  A2-OS:  N/A  Pericardial rub: absent    Ejection click: None      Murmurs: Systolic: II/VI ELIZABET at apex  Diastolic: none  Extremity: Moves spontaneously      DATA:      Sinus with  Ventricular bigeminy        Results for orders placed in visit on 08/27/20    Adult Transthoracic Echo Complete W/ Cont if Necessary Per Protocol    Interpretation Summary  · Left ventricular systolic function is mildly reduced. LVEF is 47%. Pseudonormal diastolic function.  · Right ventricular systolic function is normal. RV cavity is mildly dilated.  · Aortic valve thickening and calcification.  · Mild tricuspid regurgitation.  · Dilated mitral annulus with moderate to severe mitral regurgitation.  · No pericardial effusion.    TTE with LV EF: 47%. She now has mod to severe MR.           RHC    Resistance:  SVR:      1504 dynes · sec/cm5       PVR:      112 dynes · sec/cm5     Saturations:  SVC:                    64.6%  High RA:             69%  Low RA:               66.2%  PA:                      68.4%  RV:                      68.3     Cardiac Outputs:  Thermal CO:       5  Thermal CI:         3.1  Regan CO:              5  Reagn CI:                3.1        RV Status:  RVSWI:  5.74 gm-m/m2/beat       RECOMMENDATIONS:       1. Nonischemic cardiomyopathy (CMS/HCC).  NYHA stage C; functional class II.  Today, euvolemic and perfused.  Today, we discussed continuing bisoprolol as her only agent as she has  had problems with orthostasis in the past with ACE inhibitors.   I have encouraged medication compliance and a low-sodium diet.  I have asked her to contact me with any issues. Her LV EF has had interval decrease. This could be from  worsening MR or even increasing PVC burden. She has bigeminy on examination today. Will proceed with ANNIE and follow-up Dr. Thompson.     2. Nonrheumatic mitral valve regurgitation. ACC stage B, approaching C . She had a reduction in her LV EF. She will need close monitoring and follow-up.   · Signs and symptoms of worsening valve disease discussed.  I've asked the patient contact me for an earlier appointment if these develop.  No indication based on 2017 ACC/AHA guidelines for IE prophylaxis for dental procedures: Optimal oral health is recommended through regular professional dental care and the use of appropriate dental products, such as manual, powered, and ultrasonic toothbrushes; dental floss; and other plaque-removal devices    3. ASCAD. CCS class III. The patient has not been revascularized. Recent angiography with no need for intervention.   -Bisoprolol  -ASA  -Statin intolerance: Repatha.   -Imdur  -Call 911 immediately for concerning symptoms.    4. Cardiac Risk Assessments based on 2019 ACCF guidelines:  A team-based care approach is recommended for the control of risk factors associated with ASCAD.  As such, Daysi Pa was requested to have ongoing follow-up with their PCP. Continue follow-up visits with PCP for monitoring of labs; diet emphasizing intake of vegetables, fruits, nuts, whole grains and fish is recommended. Physical activity recommendations were provided. Essential HTN is a significant risk factor for stroke, heart disease and vascular disease. I've recommended the patient continue current medications, if any, as prescribed by the primary care provider. I recommended they have close follow-up for ongoing mgmt of this and the medical comorbidities associated with HTN with their PCP.  They were also provided with information regarding maintaining a healthy weight, heart-healthy dietary pattern DASH information.  Goal blood pressure less than 130/80.  The patient's BMI is  recommended to be calculated at least annually.  The patient's BMI is Body mass index is 26.37 kg/m²..  This places the patient in weight class:  Normal: 18.5-24.9kg/m2. They have been asked to have close PCP follow-up.  Tobacco status assessed at every visits.  The patient's nicotine status: is a former smoker    5. Ventricular bigeminy: ZIO for burden.     Transfer care to Dr. Thompson within 2 months.

## 2021-03-25 NOTE — PATIENT INSTRUCTIONS
Mitral Valve Regurgitation    Mitral valve regurgitation, also called mitral regurgitation, is a condition in which some blood leaks back (regurgitates) through the mitral valve in the heart. The mitral valve is located between the upper left chamber (left atrium) and the lower left chamber (left ventricle) of the heart. When blood travels through the heart, it goes from the left atrium to the left ventricle and then out to the body. Normally, the mitral valve opens when the atrium pumps blood into the ventricle, and it closes when the ventricle pumps blood out to the body.  Mitral valve regurgitation happens when the mitral valve does not close properly. As a result, blood in the ventricle leaks back into the atrium. Mitral valve regurgitation causes the heart to work harder to pump blood. If the condition is mild, a person may not have symptoms. However, over time, this can lead to heart failure.  What are the causes?  This condition may be caused by:  · A condition in which the mitral valve does not close completely when the heart pumps blood (mitral valve prolapse).  · Heart valve infection (endocarditis).  · Certain types of heart disease.  · A condition that causes inflammation of the heart, blood vessels, or joints (rheumatic fever).  · Certain conditions that are present at birth (congenital heart defect).  · Previous radiation therapy to the chest area.  · Damage to the mitral valve, such as from injury (trauma) to the heart or a heart attack.  · Certain combinations of weight-loss (anti-obesity) medicines.  What are the signs or symptoms?  In some cases of mild to moderate mitral regurgitation, there are no symptoms.  Symptoms of this condition include:  · Shortness of breath.  · Fatigue.  · Activity intolerance.  · Cough.  Severe symptoms of this condition include:  · Suddenly waking up at night with difficulty breathing.  · Fast or irregular heartbeat.  · Swelling in the lower legs, ankles, and  feet.  · Fluid in the lungs that makes it very hard to breathe (pulmonary edema).  How is this diagnosed?  This condition may be diagnosed based on:  · A physical exam. Your health care provider will listen to your heart for an abnormal heart sound (murmur).  · Tests to confirm the diagnosis. These may include:  ? A test that creates ultrasound images of the heart (echocardiogram). This test allows your health care provider to see how the heart valves work while your heart is beating.  ? Chest X-ray.  ? A test that records the electrical impulses of the heart (electrocardiogram, or ECG).  ? A test that looks at the structure and function of the heart (cardiac catheterization).  How is this treated?  This condition may be treated with:  · Medicines. These may be given to treat symptoms and prevent complications.  · Surgery or catheter-based procedures to repair or replace the mitral valve. This may be done in severe, long-term (chronic) cases.  Follow these instructions at home:  Eating and drinking    · Eat a heart-healthy diet that includes whole grains, fresh fruits and vegetables, low-fat (lean) proteins, and low-fat or nonfat dairy products. Consider working with a dietitian to help you make healthy food choices.  · Limit how much salt (sodium) you eat as told by your health care provider. Follow instructions from your health care provider about any other eating or drinking restrictions, such as limiting foods that are high in fat and processed sugars.  · Use healthy cooking methods, such as roasting, grilling, broiling, baking, poaching, steaming, or stir-frying.  Alcohol use  · Do not drink alcohol if:  ? Your health care provider tells you not to drink.  ? You are pregnant, may be pregnant, or are planning to become pregnant.  · If you drink alcohol:  ? Limit how much you use to:  § 0-1 drink a day for women.  § 0-2 drinks a day for men.  ? Be aware of how much alcohol is in your drink. In the [place] one drink  equals one 12 oz bottle of beer (355 mL), one 5 oz glass of wine (148 mL), or one 1½ oz glass of hard liquor (44 mL).  Activity  · Return to your normal activities as told by your health care provider. Ask your health care provider what activities are safe for you.  · Regular exercise is important for the health of your heart and for maintaining a healthy weight. Ask your health care provider what type of exercise is safe for you. You may need to avoid strenuous exercise.  Lifestyle  · Maintain a healthy weight.  · Do not use any products that contain nicotine or tobacco, such as cigarettes, e-cigarettes, and chewing tobacco. If you need help quitting, ask your health care provider.  · Find ways to manage stress. If you need help with this, ask your health care provider.  General instructions  · Take over-the-counter and prescription medicines only as told by your health care provider.  · Work closely with your health care provider to manage any other health conditions you have, such as diabetes or high blood pressure.  · If you plan to become pregnant, talk with your health care provider first.  · Keep all follow-up visits as told by your health care provider. This is important.  Contact a health care provider if you:  · Have a fever.  · Feel more tired than usual when doing physical activity.  · Have a dry cough.  Get help right away if you:  · Have shortness of breath.  · Develop chest pain.  · Have swelling in your hands, feet, ankles, or abdomen that is getting worse.  · Have trouble staying awake or you faint.  · Feel dizzy or unsteady.  · Suddenly gain weight.  · Feel confused.  These symptoms may represent a serious problem that is an emergency. Do not wait to see if the symptoms will go away. Get medical help right away. Call your local emergency services (911 in the [place] Do not drive yourself to the hospital.  Summary  · Mitral valve regurgitation, also called mitral regurgitation, is a condition in  which some blood leaks back (regurgitates) through the mitral valve in the heart.  · Depending on how severe your condition is, you may be treated with medicines, catheter-based procedures, or surgery.  · Practice heart-healthy habits to manage this condition. These include limiting alcohol, avoiding nicotine and tobacco, and eating a heart-healthy diet that is low in salt (sodium).  This information is not intended to replace advice given to you by your health care provider. Make sure you discuss any questions you have with your health care provider.  Document Revised: 12/01/2019 Document Reviewed: 12/01/2019  Elsevier Patient Education © 2021 [employer]

## 2021-03-30 LAB
QT INTERVAL: 400 MS
QTC INTERVAL: 497 MS

## 2021-04-01 ENCOUNTER — PATIENT OUTREACH (OUTPATIENT)
Dept: FAMILY MEDICINE CLINIC | Facility: CLINIC | Age: 66
End: 2021-04-01

## 2021-04-01 ENCOUNTER — TELEPHONE (OUTPATIENT)
Dept: FAMILY MEDICINE CLINIC | Facility: CLINIC | Age: 66
End: 2021-04-01

## 2021-04-01 NOTE — TELEPHONE ENCOUNTER
PATIENT CALLED ASKING FOR REFILLS ON HER MEDICATION, SHE IS COMPLETELY OUT OF THE MEDICATION BELOW.  lubiprostone (Amitiza) 24 MCG capsule, IS NOT BEING COVERED BY HER INSURANCE. THEY WILL ONLY COVER HALF OD THE COST AND SHE CAN NOT AFFORD THE SECOND HALF.   LANESS - WAS A SAMPLE SHE HAS RECEIVED AND HAS REQUESTED MORE OF BUT WILL NEED A PA.  TRILOGY - ALSO WILL NEED A PA THROUGH INSURANCE     CALL BACK NUMBER FOR HER [phone].    THANKS,  PORTER

## 2021-04-20 ENCOUNTER — PATIENT OUTREACH (OUTPATIENT)
Dept: FAMILY MEDICINE CLINIC | Facility: CLINIC | Age: 66
End: 2021-04-20

## 2021-04-20 ENCOUNTER — TELEPHONE (OUTPATIENT)
Dept: FAMILY MEDICINE CLINIC | Facility: CLINIC | Age: 66
End: 2021-04-20

## 2021-04-20 RX ORDER — METOCLOPRAMIDE 10 MG/1
10 TABLET ORAL 4 TIMES DAILY
Qty: 360 TABLET | Refills: 2 | Status: SHIPPED | OUTPATIENT
Start: 2021-04-20 | End: 2021-11-15

## 2021-04-20 NOTE — TELEPHONE ENCOUNTER
PT CALLED REQUESTING REFILLS OF  metoclopramide (REGLAN) 10 MG tablet AS A 90 DAY SUPPLY SENT TO [facility] IN [place].    SHE IS ALSO WANTING TO KNOW IF HER PROLIA SHOT CAN GO AHEAD AND BE DONE SINCE DR. LIM IS LEAVING.    HER CALL BACK NUMBER [phone]

## 2021-04-23 ENCOUNTER — DOCUMENTATION (OUTPATIENT)
Dept: CARDIOLOGY | Facility: CLINIC | Age: 66
End: 2021-04-23

## 2021-04-23 DIAGNOSIS — I49.3 PVC (PREMATURE VENTRICULAR CONTRACTION): Primary | ICD-10-CM

## 2021-04-23 NOTE — PROGRESS NOTES
Stuart Sears MD PhD  Gardenia Jamison, RN  Yes, I can put in the referral           Previous Messages       ----- Message -----   From: Gardenia Jamison RN   Sent: 4/23/2021   9:20 AM CDT   To: Stuart Sears MD PhD     She wants to see Dr Parker.....she doesn;t really want to see both. Can we just transfer care to [facility]   ----- Message -----   From: Stuart Sears MD PhD   Sent: 4/22/2021   9:37 AM CDT   To: Gardenia Jamison RN     Could you let her know that her ZIO was abnormal?  Her PVCs are very frequent around 24% of the time.  It should be less than 1%.  Typically, we talked about treating these if the burden is as high as hers is.  There are 2 options.  She can continue her current medications and see Dr. Thompson, as scheduled to discuss this further.  Second option is that she can also continue her current medications and also establish care with Dr. Thompson, but I can also place an EP consult Elena.  Just let me know how she would like to proceed.        Patient notified and the above was discussed.

## 2021-05-03 RX ORDER — DESLORATADINE 5 MG/1
TABLET ORAL
Qty: 90 TABLET | Refills: 3 | Status: SHIPPED | OUTPATIENT
Start: 2021-05-03 | End: 2022-02-15

## 2021-05-12 ENCOUNTER — OFFICE VISIT (OUTPATIENT)
Dept: CARDIOLOGY | Facility: CLINIC | Age: 66
End: 2021-05-12

## 2021-05-12 VITALS
HEIGHT: 62 IN | OXYGEN SATURATION: 97 % | SYSTOLIC BLOOD PRESSURE: 118 MMHG | HEART RATE: 92 BPM | BODY MASS INDEX: 25.95 KG/M2 | WEIGHT: 141 LBS | DIASTOLIC BLOOD PRESSURE: 72 MMHG | TEMPERATURE: 97.1 F

## 2021-05-12 DIAGNOSIS — R07.9 CHEST PAIN, UNSPECIFIED TYPE: Primary | ICD-10-CM

## 2021-05-12 PROCEDURE — 99214 OFFICE O/P EST MOD 30 MIN: CPT | Performed by: INTERNAL MEDICINE

## 2021-05-12 PROCEDURE — 93000 ELECTROCARDIOGRAM COMPLETE: CPT | Performed by: INTERNAL MEDICINE

## 2021-05-12 RX ORDER — METOPROLOL SUCCINATE 50 MG/1
50 TABLET, EXTENDED RELEASE ORAL DAILY
Qty: 30 TABLET | Refills: 11 | Status: SHIPPED | OUTPATIENT
Start: 2021-05-12 | End: 2022-02-15

## 2021-05-12 RX ORDER — RANOLAZINE 500 MG/1
500 TABLET, EXTENDED RELEASE ORAL 2 TIMES DAILY
Qty: 60 TABLET | Refills: 3 | Status: SHIPPED | OUTPATIENT
Start: 2021-05-12 | End: 2021-09-03

## 2021-05-12 NOTE — PROGRESS NOTES
[facility] Cardiology  OFFICE NOTE    Cardiovascular Medicine  Carmen Thompson M.D., RPVI         No referring provider defined for this encounter.         Thank you for asking me to see Daysi Pa for CAD.    History of Present Illness  This is a 65 y.o. female with:    1.  Hypertension  2.  Hyperlipidemia  3.  Coronary artery disease  4. Viral cardiomyopathy with subsequent improvement in her ejection fraction to normal.  5. PVCs.    Daysi Pa is a 65 y.o. female who presents for consultation today.  Patient was previously seen by Dr. Sears for chest pain.  She had an echocardiogram which was concerning for ischemia inferior wall.  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  She also had an ostial PDA lesion which was treated medically. He also has moderate disease in a small caliber LAD after the takeoff of the diagonal.  She had more chest pain was taken back to the Cath Lab, the ostial RPDA was interrogated. FFR was negative at 0.95.  Medical management is recommended.    She has been on aspirin/bisoprolol, Imdur and has done well.  She is on PCSK9 him better for her hyperlipidemia.    Recent Holter monitor showed significant PVCs her PVC burden was 24%.  Last echocardiogram in March 2021 with a EF of 47%,    Patient has been complaining of lack of energy, has occasional chest pains.  She has palpitations.  She does drink excessive amount of caffeine.      Review of Systems - ROS  Constitution: Negative for weakness, weight gain and weight loss.   HENT: Negative for congestion.    Eyes: Negative for blurred vision.   Cardiovascular: As mentioned above  Respiratory: Negative for cough and hemoptysis.    Endocrine: Negative for polydipsia and polyuria.   Hematologic/Lymphatic: Negative for bleeding problem. Does not bruise/bleed easily.   Skin: Negative for flushing.   Musculoskeletal: Negative  for neck pain and stiffness.   Gastrointestinal: Negative for abdominal pain, diarrhea, jaundice, melena, nausea and vomiting.   Genitourinary: Negative for dysuria and hematuria.   Neurological: Negative for dizziness, focal weakness and numbness.   Psychiatric/Behavioral: Negative for altered mental status and depression.          All other systems were reviewed and were negative.    family history includes Breast cancer in her sister; COPD in her paternal grandfather and paternal grandmother; Cancer in her maternal aunt; Coronary artery disease in her father; Diabetes in her father; Heart disease in an other family member; Hyperlipidemia in her father; Hypertension in her mother and another family member; Migraines in her mother; Osteoporosis in her mother; Rectal cancer in an other family member; Thyroid disease in an other family member.     reports that she quit smoking about 12 years ago. She quit after 30.00 years of use. She has never used smokeless tobacco. She reports that she does not drink alcohol and does not use drugs.    Allergies   Allergen Reactions   • Cephalosporins Nausea And Vomiting   • Erythromycin Nausea Only   • Morphine And Related Itching   • Other Nausea And Vomiting     Cipro   • Penicillins Hives   • Shellfish-Derived Products Hives and Other (See Comments)     Other reaction(s): SOA   • Zithromax [Azithromycin] Hives and Nausea And Vomiting   • Zolpidem Mental Status Change   • Honey Bee Treatment [Bee Venom] Hives   • Hydrocodone-Acetaminophen Anxiety   • Sulfa Antibiotics Hives and Rash     Sulfa (Sulfonamide Antibiotics)         Current Outpatient Medications:   •  albuterol sulfate HFA (ProAir HFA) 108 (90 Base) MCG/ACT inhaler, Inhale 2 puffs 4 (Four) Times a Day As Needed., Disp: , Rfl:   •  aspirin 81 MG tablet, Take 1 tablet by mouth Daily., Disp: 30 tablet, Rfl: 11  •  atomoxetine (STRATTERA) 40 MG capsule, Take 1 capsule by mouth Daily., Disp: 90 capsule, Rfl: 3  •   denosumab (PROLIA) 60 MG/ML solution prefilled syringe syringe, Inject 60 mg under the skin into the appropriate area as directed Every 6 (Six) Months., Disp: , Rfl:   •  desloratadine (CLARINEX) 5 MG tablet, TAKE 1 TABLET BY MOUTH EVERY DAY, Disp: 90 tablet, Rfl: 3  •  docusate sodium (COLACE) 100 MG capsule, Take 200 mg by mouth every night. at bedtime, Disp: , Rfl:   •  esomeprazole (nexIUM) 40 MG capsule, TAKE 1 CAPSULE BY MOUTH EVERY DAY, Disp: 90 capsule, Rfl: 3  •  Evolocumab (REPATHA) solution auto-injector SureClick injection, Inject 1 mL under the skin into the appropriate area as directed Every 14 (Fourteen) Days., Disp: 2.1 mL, Rfl: 6  •  furosemide (LASIX) 40 MG tablet, TAKE 1 TABLET BY MOUTH EVERY DAY, Disp: 90 tablet, Rfl: 3  •  gabapentin (NEURONTIN) 300 MG capsule, Take 1 capsule by mouth 2 (Two) Times a Day., Disp: 180 capsule, Rfl: 0  •  isosorbide mononitrate (IMDUR) 30 MG 24 hr tablet, Take 1 tablet by mouth Daily., Disp: 90 tablet, Rfl: 2  •  levothyroxine (SYNTHROID, LEVOTHROID) 88 MCG tablet, Take 1 tablet by mouth Daily. Brand name only, Disp: 90 tablet, Rfl: 3  •  lubiprostone (Amitiza) 24 MCG capsule, Take 1 capsule by mouth 2 (Two) Times a Day., Disp: 180 capsule, Rfl: 3  •  metoclopramide (REGLAN) 10 MG tablet, Take 1 tablet by mouth 4 (Four) Times a Day., Disp: 360 tablet, Rfl: 2  •  NARCAN 4 MG/0.1ML nasal spray, 1 spray into the nostril(s) as directed by provider As Needed., Disp: , Rfl: 0  •  OxyCODONE HCl ER (OXYCONTIN) 30 MG tablet extended-release 12 hour, Take 10 mg by mouth 4 (Four) Times a Day. OxyContin 30 mg tablet,crush resistant,extended release , Disp: , Rfl:   •  polyethylene glycol (MIRALAX) packet, Take 17 g by mouth Daily., Disp: , Rfl:   •  traZODone (DESYREL) 150 MG tablet, TAKE 1 TABLET BY MOUTH EVERY DAY AT BEDTIME, Disp: 90 tablet, Rfl: 3  •  umeclidinium-vilanterol (ANORO ELLIPTA) 62.5-25 MCG/INH aerosol powder  inhaler, Inhale 1 puff Daily., Disp: 1 each, Rfl:  "11  •  venlafaxine XR (EFFEXOR-XR) 150 MG 24 hr capsule, TAKE 1 CAPSULE BY MOUTH EVERY DAY, Disp: 90 capsule, Rfl: 3  •  venlafaxine XR (EFFEXOR-XR) 75 MG 24 hr capsule, Take 1 capsule by mouth Daily., Disp: 90 capsule, Rfl: 3  •  metoprolol succinate XL (TOPROL-XL) 50 MG 24 hr tablet, Take 1 tablet by mouth Daily., Disp: 30 tablet, Rfl: 11  •  ranolazine (Ranexa) 500 MG 12 hr tablet, Take 1 tablet by mouth 2 (Two) Times a Day., Disp: 60 tablet, Rfl: 3    Physical Exam:  Vitals:    05/12/21 1046 05/12/21 1050   BP: 118/72 118/72   BP Location: Left arm Right arm   Patient Position: Sitting Sitting   Cuff Size: Adult Adult   Pulse: 92    Temp: 97.1 °F (36.2 °C)    TempSrc: Temporal    SpO2: 97%    Weight: 64 kg (141 lb)    Height: 157.5 cm (62.01\")    PainSc:   8    PainLoc: Chest        GEN: alert, appears stated age and cooperative  Body Habitus: well-nourished  HEENT: Head: Normocephalic, no lesions, without obvious abnormality.  JVP: 6 cm of water at 45 degrees      HJR: absent      [place]:  normal size and placement    Palpable S4: Not present.   Heart rate: normal  Heart Rhythm: regular                                     Heart Sounds: S1: normal intensity  S2: normal intensity  S3: absent                               S4: absent  Opening Snap: absent          A2-OS:  N/A  Pericardial rub: absent                       Ejection click: None                                        Murmurs: Systolic: none  Diastolic: none  Extremity: Moves spontaneously      DATA REVIEWED:       ECG/EMG Results (all)     None        ---------------------------------------------------  TTE/JORDAN:  Results for orders placed in visit on 08/27/20    Adult Transthoracic Echo Complete W/ Cont if Necessary Per Protocol    Interpretation Summary  · Left ventricular systolic function is mildly reduced. LVEF is 47%. Pseudonormal diastolic function.  · Right ventricular systolic function is normal. RV cavity is mildly dilated.  · Aortic valve " thickening and calcification.  · Mild tricuspid regurgitation.  · Dilated mitral annulus with moderate to severe mitral regurgitation.  · No pericardial effusion.      CT:   No radiology results for the last 30 days.        --------------------------------------------------------------------------------------------------  LABS:     The CVD Risk score (Diogenes et al., 2008) failed to calculate for the following reasons:    The patient has a prior MI, stroke, CHF, or peripheral vascular disease diagnosis         Lab Results   Component Value Date    GLUCOSE 109 (H) 02/03/2021    BUN 7 (L) 02/03/2021    CREATININE 0.73 02/03/2021    EGFRIFNONA 80 02/03/2021    BCR 9.6 02/03/2021    K 4.0 02/03/2021    CO2 26.0 02/03/2021    CALCIUM 9.2 02/03/2021    ALBUMIN 4.70 12/12/2018    AST 21 12/12/2018    ALT 17 12/12/2018     Lab Results   Component Value Date    WBC 8.37 06/30/2020    HGB 10.2 (L) 06/30/2020    HCT 29.9 (L) 06/30/2020    MCV 85.7 06/30/2020     06/30/2020     Lab Results   Component Value Date    CHOL 159 02/03/2021    CHLPL 201 (H) 09/22/2016    TRIG 252 (H) 02/03/2021    HDL 64 (H) 02/03/2021    LDL 56 02/03/2021     Lab Results   Component Value Date    TSH 2.180 02/03/2021     No results found for: CKTOTAL, CKMB, CKMBINDEX, TROPONINI, TROPONINT  Lab Results   Component Value Date    HGBA1C 5.60 02/27/2020     No results found for: DDIMER  Lab Results   Component Value Date    ALT 17 12/12/2018     Lab Results   Component Value Date    HGBA1C 5.60 02/27/2020    HGBA1C 5.6 12/14/2017     Lab Results   Component Value Date    MICROALBUR <0.6 11/13/2017    CREATININE 0.73 02/03/2021     No results found for: IRON, TIBC, FERRITIN  Lab Results   Component Value Date    INR 1.01 06/30/2020    INR 1.05 01/21/2020    PROTIME 13.1 06/30/2020    PROTIME 13.5 01/21/2020       Assessment/Plan     1. CAD:  Cardiac cath showed  of small caliber RPL and ostial RPDA lesion. Ostial RPDA was negative on  FFR.  Also moderate disease in the small caliber LAD after the takeoff of the diagonal. Continue medical management.  Continue aspirin/Imdur.  We will add Ranexa    2. HTN:  Better controlled on current regimen    3. HLD: On PCSK9 him inhibitor. LDL is down to less than 50..    4. Cardiomyopathy: Previously had severe cardiomyopathy but EF is improved 52%. Recent repeat echocardiogram showed EF of 47%. He does have significant PVC burden now as well.  She has been on bisoprolol, will consider switching her to metoprolol XL and see if that improves her PVC burden. If continues to have PVC burden more than 15% will refer to EP for consideration for ablation.  Could not tolerate ACE inhibitor previously.  I advised her to cut back on her caffeine to help with her PVCs.    Prevention:  Patient's Body mass index is 25.78 kg/m². BMI is within normal parameters. No follow-up required..      Daysi Johnson Thierno  reports that she quit smoking about 12 years ago. She quit after 30.00 years of use. She has never used smokeless tobacco.                This document has been electronically signed by Carmen Thompson MD on May 12, 2021 11:09 CDT

## 2021-05-20 LAB
QT INTERVAL: 380 MS
QTC INTERVAL: 469 MS

## 2021-05-26 ENCOUNTER — OFFICE VISIT (OUTPATIENT)
Dept: FAMILY MEDICINE CLINIC | Facility: CLINIC | Age: 66
End: 2021-05-26

## 2021-05-26 VITALS
BODY MASS INDEX: 25.76 KG/M2 | HEIGHT: 62 IN | HEART RATE: 45 BPM | WEIGHT: 140 LBS | DIASTOLIC BLOOD PRESSURE: 70 MMHG | OXYGEN SATURATION: 98 % | SYSTOLIC BLOOD PRESSURE: 120 MMHG

## 2021-05-26 DIAGNOSIS — F90.0 ATTENTION DEFICIT HYPERACTIVITY DISORDER (ADHD), PREDOMINANTLY INATTENTIVE TYPE: Primary | ICD-10-CM

## 2021-05-26 DIAGNOSIS — R00.1 BRADYCARDIA: ICD-10-CM

## 2021-05-26 PROCEDURE — 99214 OFFICE O/P EST MOD 30 MIN: CPT | Performed by: FAMILY MEDICINE

## 2021-05-30 NOTE — PROGRESS NOTES
Subjective:     Daysi Pa is a 65 y.o. female who presents for checkup of her ADHD.  She has been taking Strattera 40 mg daily which she finds benefit in.    She recently established with a new cardiologist.  Her medications were changed and patient is now not feeling well.  Her heart rate today is 45.  She says she feels bad and is lightheaded.      The following portions of the patient's history were reviewed and updated as appropriate: allergies, current medications, past family history, past medical history, past social history, past surgical history and problem list.    Past Medical Hx:  Past Medical History:   Diagnosis Date   • Acquired hypothyroidism    • Allergic rhinitis    • Allergy 04/17/2016    Consult, Allergy (1) - Ordered By: BENIGNO LIM ([facility])    • Anxiety state    • Attention deficit hyperactivity disorder    • Benign essential hypertension    • Chronic pain    • Chronic pain disorder    • Congestive heart failure (CMS/HCC)     primarily systrolic dysfunction EF 17-20% repeat echo 55%   • Depressive disorder    • Dyslipidemia    • Dysphagia    • Gastroesophageal reflux disease    • Generalized abdominal pain    • History of echocardiogram 03/23/2016    Echocardiogram W/ color flow 68309 (3) - Normal LV function wiht Ef of 60%.Normal RV size and function.Normal diastolic function.No significant valvular regurgitation or stenosis.   • History of echocardiogram 04/27/2012    Echocardiogram W/O color flow 66515 (1) - Normal left ventricular systolic function. EF 55%. No evidence of regional wall motion abnormalities. Abnormal relaxation of the left ventricular myocardium.   • History of EKG 03/07/2016    EKG Tracing and Interpretation 18323 (3) - Ordered By: LILLIE BRADY ([facility])    • History of mammogram 06/05/2013    MAMMOGRAM SCREENING 20190 - [facility] (1) - birads 0    • Hyperlipidemia    • Intraductal carcinoma in situ of breast     hx in past and s/p bilateral simple  mastectomies      • Irritable bowel syndrome    • Low back pain    • Malaise and fatigue    • Microalbuminuria 07/16/2015    POS MICROALBUMINURIA RESULT DOC AND REVIEWED 3060F (1) - [facility]   • Mitral valve regurgitation     Mild-Moderate      • Myopia    • Non-organic sleep disorder    • Osteoporosis    • Pain management 04/17/2016    Consult, Pain Management (1) - Ordered By: BENIGNO LIM ([facility])    • Pneumococcal vaccination indicated 07/08/2016    PNEUMOC VAC/ADMIN/RCVD 4040F (11) - Ordered By: BENIGNO LIM ([facility])   • Primary fibromyalgia syndrome    • Rheumatoid arthritis (CMS/HCC)        Past Surgical Hx:  Past Surgical History:   Procedure Laterality Date   • APPENDECTOMY  2008    Appendectomy (1)   • BREAST BIOPSY  06/12/2013    Stereotactic breast biopsy (1) - stereotactic left mammotome biopsy with 11 gauge needle.   • BREAST IMPLANT REMOVAL  2003    Remove breast implant (1)   • BREAST IMPLANT SURGERY  2000    Breast implantation (1)   • CARDIAC CATHETERIZATION N/A 1/21/2020    Procedure: Right Heart Cath;  Surgeon: Stuart Sears MD PhD;  Location: [facility] INVASIVE LOCATION;  Service: Cardiology   • CARDIAC CATHETERIZATION N/A 1/21/2020    Procedure: LEFT HEART CATH;  Surgeon: Carmen Thompson MD;  Location: [facility] CATH INVASIVE LOCATION;  Service: Cardiology   • CARDIAC CATHETERIZATION N/A 6/30/2020    Procedure: Left Heart Cath/PCI;  Surgeon: Carmen Thompson MD;  Location: [facility] CATH INVASIVE LOCATION;  Service: Cardiology;  Laterality: N/A;   • COLONOSCOPY  01/20/2014    Colonoscopy, diagnostic (screening) 96394 (1)   • COLONOSCOPY W/ POLYPECTOMY  2008    Colonoscopy remove polyps 29345 (1)    • EXCISION LESION  05/01/2014    Remove chest wall lesion (1) - Excision of subcutaneous mass, left chest wall, Subcutaneous mass left chest wall, status post mastectomy.   • INJECTION OF MEDICATION  07/05/2016    B12 (44) - [facility]    • INJECTION OF MEDICATION  11/12/2015     Celestone (betamethasone) (1) - Ordered By: BENIGNO LIM ([facility])    • INJECTION OF MEDICATION  02/11/2016    Kenalog (2) - Ordered By: BENIGNO LIM ([facility])    • INJECTION OF MEDICATION  11/17/2017    Prolia   • MASTECTOMY  09/05/2013    Mastectomy (2) - Right simple prophylactic mastectomy.   • OTHER SURGICAL HISTORY  10/29/2014    SONOGRAM THYROID, NECK 13209 (1) - Ordered By: BENIGNO LIM ([facility])   • PAP SMEAR  06/03/2013     PAP SMEAR (1)   • SUBTOTAL HYSTERECTOMY  1998     Partial hyst (1)     • TONSILLECTOMY  1969    Tonsillectomy (1)   • VASCULAR SURGERY  12/24/2014    Consult, Vascular Surgery (1) - Ordered By: BENIGNO LIM ([facility])        Health Maintenance:  Health Maintenance   Topic Date Due   • INFLUENZA VACCINE  08/01/2021   • LIPID PANEL  02/03/2022   • ANNUAL WELLNESS VISIT  03/10/2022   • DXA SCAN  03/25/2023   • COLORECTAL CANCER SCREENING  06/19/2023   • TDAP/TD VACCINES (2 - Td or Tdap) 10/16/2024   • HEPATITIS C SCREENING  Completed   • COVID-19 Vaccine  Completed   • Pneumococcal Vaccine 65+  Completed   • ZOSTER VACCINE  Completed       Current Meds:    Current Outpatient Medications:   •  albuterol sulfate HFA (ProAir HFA) 108 (90 Base) MCG/ACT inhaler, Inhale 2 puffs 4 (Four) Times a Day As Needed., Disp: , Rfl:   •  aspirin 81 MG tablet, Take 1 tablet by mouth Daily., Disp: 30 tablet, Rfl: 11  •  atomoxetine (STRATTERA) 40 MG capsule, Take 1 capsule by mouth Daily., Disp: 90 capsule, Rfl: 3  •  denosumab (PROLIA) 60 MG/ML solution prefilled syringe syringe, Inject 60 mg under the skin into the appropriate area as directed Every 6 (Six) Months., Disp: , Rfl:   •  desloratadine (CLARINEX) 5 MG tablet, TAKE 1 TABLET BY MOUTH EVERY DAY, Disp: 90 tablet, Rfl: 3  •  docusate sodium (COLACE) 100 MG capsule, Take 200 mg by mouth every night. at bedtime, Disp: , Rfl:   •  esomeprazole (nexIUM) 40 MG capsule, TAKE 1 CAPSULE BY MOUTH EVERY DAY, Disp: 90 capsule, Rfl: 3  •  Evolocumab (REPATHA) solution auto-injector SureClick  injection, Inject 1 mL under the skin into the appropriate area as directed Every 14 (Fourteen) Days., Disp: 2.1 mL, Rfl: 6  •  furosemide (LASIX) 40 MG tablet, TAKE 1 TABLET BY MOUTH EVERY DAY, Disp: 90 tablet, Rfl: 3  •  gabapentin (NEURONTIN) 300 MG capsule, Take 1 capsule by mouth 2 (Two) Times a Day., Disp: 180 capsule, Rfl: 0  •  isosorbide mononitrate (IMDUR) 30 MG 24 hr tablet, Take 1 tablet by mouth Daily., Disp: 90 tablet, Rfl: 2  •  levothyroxine (SYNTHROID, LEVOTHROID) 88 MCG tablet, Take 1 tablet by mouth Daily. Brand name only, Disp: 90 tablet, Rfl: 3  •  lubiprostone (Amitiza) 24 MCG capsule, Take 1 capsule by mouth 2 (Two) Times a Day., Disp: 180 capsule, Rfl: 3  •  metoclopramide (REGLAN) 10 MG tablet, Take 1 tablet by mouth 4 (Four) Times a Day., Disp: 360 tablet, Rfl: 2  •  metoprolol succinate XL (TOPROL-XL) 50 MG 24 hr tablet, Take 1 tablet by mouth Daily., Disp: 30 tablet, Rfl: 11  •  NARCAN 4 MG/0.1ML nasal spray, 1 spray into the nostril(s) as directed by provider As Needed., Disp: , Rfl: 0  •  OxyCODONE HCl ER (OXYCONTIN) 30 MG tablet extended-release 12 hour, Take 10 mg by mouth 4 (Four) Times a Day. OxyContin 30 mg tablet,crush resistant,extended release , Disp: , Rfl:   •  polyethylene glycol (MIRALAX) packet, Take 17 g by mouth Daily., Disp: , Rfl:   •  ranolazine (Ranexa) 500 MG 12 hr tablet, Take 1 tablet by mouth 2 (Two) Times a Day., Disp: 60 tablet, Rfl: 3  •  traZODone (DESYREL) 150 MG tablet, TAKE 1 TABLET BY MOUTH EVERY DAY AT BEDTIME, Disp: 90 tablet, Rfl: 3  •  umeclidinium-vilanterol (ANORO ELLIPTA) 62.5-25 MCG/INH aerosol powder  inhaler, Inhale 1 puff Daily., Disp: 1 each, Rfl: 11  •  venlafaxine XR (EFFEXOR-XR) 150 MG 24 hr capsule, TAKE 1 CAPSULE BY MOUTH EVERY DAY, Disp: 90 capsule, Rfl: 3  •  venlafaxine XR (EFFEXOR-XR) 75 MG 24 hr capsule, Take 1 capsule by mouth Daily., Disp: 90 capsule, Rfl: 3    Allergies:  Cephalosporins, Erythromycin, Morphine and related, Other,  Penicillins, Shellfish-derived products, Zithromax [azithromycin], Zolpidem, Honey bee treatment [bee venom], Hydrocodone-acetaminophen, and Sulfa antibiotics    Family Hx:  Family History   Problem Relation Age of Onset   • Breast cancer Sister    • Rectal cancer Other         Colorectal cancer   • Heart disease Other    • Hypertension Other    • Thyroid disease Other         Thyroid disorder   • Hypertension Mother    • Migraines Mother    • Osteoporosis Mother    • Diabetes Father    • Coronary artery disease Father    • Hyperlipidemia Father    • Cancer Maternal Aunt    • COPD Paternal Grandmother    • COPD Paternal Grandfather         Social History:  Social History     Socioeconomic History   • Marital status:      Spouse name: Not on file   • Number of children: Not on file   • Years of education: Not on file   • Highest education level: Not on file   Tobacco Use   • Smoking status: Former Smoker     Years: 30.00     Quit date: 2008     Years since quittin.7   • Smokeless tobacco: Never Used   • Tobacco comment: PT SMOKED FOR 30 YEARS AND QUIT IN    Vaping Use   • Vaping Use: Never used   Substance and Sexual Activity   • Alcohol use: No   • Drug use: No   • Sexual activity: Defer       Review of Systems  Review of Systems   Constitutional: Positive for fatigue. Negative for activity change, appetite change and fever.   HENT: Negative for ear pain and sore throat.    Eyes: Negative for pain and visual disturbance.   Cardiovascular: Positive for leg swelling. Negative for chest pain and palpitations.   Gastrointestinal: Negative for abdominal pain and nausea.   Endocrine: Negative for cold intolerance and heat intolerance.   Genitourinary: Negative for difficulty urinating and dysuria.   Musculoskeletal: Positive for arthralgias, back pain, joint swelling and myalgias. Negative for gait problem.   Skin: Negative for color change and rash.   Neurological: Positive for light-headedness and  "headaches. Negative for dizziness and weakness.   Hematological: Negative for adenopathy. Does not bruise/bleed easily.   Psychiatric/Behavioral: Negative for agitation, confusion and sleep disturbance.             Objective:     /70 (BP Location: Left arm, Patient Position: Sitting, Cuff Size: Adult)   Pulse (!) 45   Ht 157.5 cm (62\")   Wt 63.5 kg (140 lb)   SpO2 98%   BMI 25.61 kg/m²     Physical Exam  Constitutional:       Appearance: Normal appearance. She is well-developed and normal weight.   HENT:      Head: Normocephalic and atraumatic.      Right Ear: Tympanic membrane, ear canal and external ear normal.      Left Ear: Tympanic membrane, ear canal and external ear normal.      Nose: Nose normal.      Mouth/Throat:      Mouth: Mucous membranes are moist.      Pharynx: Oropharynx is clear.   Eyes:      Extraocular Movements: Extraocular movements intact.      Conjunctiva/sclera: Conjunctivae normal.      Pupils: Pupils are equal, round, and reactive to light.   Cardiovascular:      Rate and Rhythm: Regular rhythm. Bradycardia present.      Heart sounds: Normal heart sounds.   Pulmonary:      Effort: Pulmonary effort is normal.      Breath sounds: Normal breath sounds.   Abdominal:      General: Bowel sounds are normal. There is no distension.      Palpations: Abdomen is soft.      Tenderness: There is no abdominal tenderness.   Musculoskeletal:         General: Normal range of motion.      Cervical back: Normal range of motion and neck supple.      Comments: Clubbing of all hands   Skin:     General: Skin is warm and dry.   Neurological:      General: No focal deficit present.      Mental Status: She is alert and oriented to person, place, and time.   Psychiatric:         Mood and Affect: Mood normal.         Speech: Speech normal.         Behavior: Behavior normal.         Thought Content: Thought content normal.         Judgment: Judgment normal.                            Assessment:     1. " Attention deficit hyperactivity disorder (ADHD), predominantly inattentive type    2. Bradycardia         Plan:     1.  Rx changes: Decrease Toprol-XL to 25 mg daily.  Discussed with Dr. Thompson regarding the medication change.  2.  Education: Provided reassurance. Provided reassurance and Recommended medication management  Continue with daily weights.  EKG obtained today.  3.  Compliance at present is estimated to be excellent.  4.  Follow up: 12 weeks and as needed.  Patient to establish care with Dr. De Souza     Goals        Diet    •  Eat more fruits and vegetables (pt-stated)       Barriers to goals: None         Exercise    •  Exercise 150 minutes per week (moderate activity) (pt-stated)       Barriers to goal: RA and chronic pain      •  Exercise 3x per week (30 min per time)       Increase physical activity       •  Exercise 3x per week (30 min per time)       Walking, stretching, light weight lifting       •  Exercise 3x per week (30 min per time)       Exercise more, increase stamina, wants to walk 15 mins 4 times a week          Lifestyle    •  Other (pt-stated)       Less fatigue  Barriers to goals: Multiple medical problems                Preventative:  Patient's Body mass index is 25.61 kg/m². BMI is within normal parameters. No follow-up required.  Recommended:none  patient is a non smoker  does not drink  plan meals, eat breakfast and have 3 meals a day    RISK SCORE: 3       This document has been electronically signed by Olimpia Robison MD on May 30, 2021 14:35 CDT

## 2021-06-01 RX ORDER — LEVOTHYROXINE SODIUM 88 MCG
TABLET ORAL
Qty: 90 TABLET | Refills: 3 | Status: SHIPPED | OUTPATIENT
Start: 2021-06-01 | End: 2022-02-15

## 2021-06-07 ENCOUNTER — TELEPHONE (OUTPATIENT)
Dept: CARDIOLOGY | Facility: CLINIC | Age: 66
End: 2021-06-07

## 2021-06-07 NOTE — TELEPHONE ENCOUNTER
----- Message from Jeanette Narayanan sent at 6/7/2021  8:43 AM CDT -----  Contact: [phone]  Pt was given a RX for Ranolazine 500 mg. It was filled the first time with no problem. When she tried to refill it, the pharmacy ([facility]) said it will require a PA    Pa to be sent to [facility].

## 2021-06-08 ENCOUNTER — TELEPHONE (OUTPATIENT)
Dept: CARDIOLOGY | Facility: CLINIC | Age: 66
End: 2021-06-08

## 2021-06-08 DIAGNOSIS — Z79.899 ON LONG TERM DRUG THERAPY: Primary | ICD-10-CM

## 2021-06-08 NOTE — TELEPHONE ENCOUNTER
Patient will have an ekg tomorrow per dr. Thompson. Confirmed with the patient she is taking plaquenill.

## 2021-06-09 ENCOUNTER — CLINICAL SUPPORT (OUTPATIENT)
Dept: CARDIOLOGY | Facility: CLINIC | Age: 66
End: 2021-06-09

## 2021-06-09 DIAGNOSIS — M81.0 OSTEOPOROSIS WITHOUT CURRENT PATHOLOGICAL FRACTURE, UNSPECIFIED OSTEOPOROSIS TYPE: Primary | ICD-10-CM

## 2021-06-09 DIAGNOSIS — Z79.899 ON LONG TERM DRUG THERAPY: ICD-10-CM

## 2021-06-09 PROCEDURE — 93000 ELECTROCARDIOGRAM COMPLETE: CPT | Performed by: INTERNAL MEDICINE

## 2021-06-09 NOTE — NURSING NOTE
Message sent to ordering provide for lab/Prolia orders.  Johanna Marcos RN  June 9, 2021  11:43 CDT

## 2021-06-10 ENCOUNTER — LAB (OUTPATIENT)
Dept: ONCOLOGY | Facility: HOSPITAL | Age: 66
End: 2021-06-10

## 2021-06-10 ENCOUNTER — TELEPHONE (OUTPATIENT)
Dept: CARDIOLOGY | Facility: CLINIC | Age: 66
End: 2021-06-10

## 2021-06-10 DIAGNOSIS — M81.0 OSTEOPOROSIS WITHOUT CURRENT PATHOLOGICAL FRACTURE, UNSPECIFIED OSTEOPOROSIS TYPE: ICD-10-CM

## 2021-06-10 LAB
CALCIUM SPEC-SCNC: 9.7 MG/DL (ref 8.6–10.5)
MAGNESIUM SERPL-MCNC: 1.6 MG/DL (ref 1.6–2.4)
PHOSPHATE SERPL-MCNC: 4 MG/DL (ref 2.5–4.5)

## 2021-06-10 PROCEDURE — 82310 ASSAY OF CALCIUM: CPT

## 2021-06-10 PROCEDURE — 36415 COLL VENOUS BLD VENIPUNCTURE: CPT | Performed by: NURSE PRACTITIONER

## 2021-06-10 PROCEDURE — 83735 ASSAY OF MAGNESIUM: CPT

## 2021-06-10 PROCEDURE — 84100 ASSAY OF PHOSPHORUS: CPT

## 2021-06-10 NOTE — TELEPHONE ENCOUNTER
Insurance denied ranexa due to interaction between it and plaquenil. She will contact her pcp to see if plaquenil can be changed to another medication. If it can be changed we can revisit trying ranexa again. She will contact our office.

## 2021-06-14 ENCOUNTER — PATIENT OUTREACH (OUTPATIENT)
Dept: FAMILY MEDICINE CLINIC | Facility: CLINIC | Age: 66
End: 2021-06-14

## 2021-06-14 DIAGNOSIS — M81.0 AGE-RELATED OSTEOPOROSIS WITHOUT CURRENT PATHOLOGICAL FRACTURE: Primary | ICD-10-CM

## 2021-06-15 ENCOUNTER — INFUSION (OUTPATIENT)
Dept: ONCOLOGY | Facility: HOSPITAL | Age: 66
End: 2021-06-15

## 2021-06-15 VITALS
RESPIRATION RATE: 16 BRPM | HEART RATE: 101 BPM | OXYGEN SATURATION: 98 % | DIASTOLIC BLOOD PRESSURE: 98 MMHG | SYSTOLIC BLOOD PRESSURE: 151 MMHG | TEMPERATURE: 97.2 F

## 2021-06-15 DIAGNOSIS — M81.0 AGE-RELATED OSTEOPOROSIS WITHOUT CURRENT PATHOLOGICAL FRACTURE: Primary | ICD-10-CM

## 2021-06-15 PROCEDURE — 96372 THER/PROPH/DIAG INJ SC/IM: CPT | Performed by: NURSE PRACTITIONER

## 2021-06-15 PROCEDURE — 25010000002 DENOSUMAB 60 MG/ML SOLUTION PREFILLED SYRINGE: Performed by: FAMILY MEDICINE

## 2021-06-15 RX ADMIN — DENOSUMAB 60 MG: 60 INJECTION SUBCUTANEOUS at 15:35

## 2021-06-17 LAB
QT INTERVAL: 422 MS
QTC INTERVAL: 510 MS

## 2021-06-28 RX ORDER — LUBIPROSTONE 24 UG/1
CAPSULE, GELATIN COATED ORAL
Qty: 180 CAPSULE | Refills: 3 | Status: SHIPPED | OUTPATIENT
Start: 2021-06-28 | End: 2022-02-15

## 2021-08-23 RX ORDER — EVOLOCUMAB 140 MG/ML
INJECTION, SOLUTION SUBCUTANEOUS
Qty: 1 ML | Refills: 6 | Status: SHIPPED | OUTPATIENT
Start: 2021-08-23 | End: 2022-01-27 | Stop reason: SDUPTHER

## 2021-08-23 RX ORDER — ESOMEPRAZOLE MAGNESIUM 40 MG/1
CAPSULE, DELAYED RELEASE ORAL
Qty: 90 CAPSULE | Refills: 3 | Status: SHIPPED | OUTPATIENT
Start: 2021-08-23 | End: 2022-02-15

## 2021-09-03 RX ORDER — RANOLAZINE 500 MG/1
TABLET, EXTENDED RELEASE ORAL
Qty: 180 TABLET | Refills: 3 | Status: SHIPPED | OUTPATIENT
Start: 2021-09-03 | End: 2021-10-01

## 2021-09-16 RX ORDER — TRAZODONE HYDROCHLORIDE 150 MG/1
TABLET ORAL
Qty: 90 TABLET | Refills: 3 | Status: SHIPPED | OUTPATIENT
Start: 2021-09-16 | End: 2022-02-15

## 2021-10-01 ENCOUNTER — OFFICE VISIT (OUTPATIENT)
Dept: CARDIOLOGY | Facility: CLINIC | Age: 66
End: 2021-10-01

## 2021-10-01 VITALS
OXYGEN SATURATION: 98 % | BODY MASS INDEX: 26.09 KG/M2 | SYSTOLIC BLOOD PRESSURE: 110 MMHG | WEIGHT: 141.8 LBS | DIASTOLIC BLOOD PRESSURE: 66 MMHG | HEIGHT: 62 IN | TEMPERATURE: 96.8 F | HEART RATE: 66 BPM

## 2021-10-01 DIAGNOSIS — I49.3 PVC (PREMATURE VENTRICULAR CONTRACTION): Primary | ICD-10-CM

## 2021-10-01 DIAGNOSIS — I25.10 CORONARY ARTERY DISEASE INVOLVING NATIVE CORONARY ARTERY OF NATIVE HEART WITHOUT ANGINA PECTORIS: Primary | ICD-10-CM

## 2021-10-01 PROCEDURE — 93000 ELECTROCARDIOGRAM COMPLETE: CPT | Performed by: INTERNAL MEDICINE

## 2021-10-01 PROCEDURE — 99214 OFFICE O/P EST MOD 30 MIN: CPT | Performed by: INTERNAL MEDICINE

## 2021-10-01 NOTE — PROGRESS NOTES
[facility] Cardiology  OFFICE NOTE    Cardiovascular Medicine  Carmen Thompson M.D., RPVI         No referring provider defined for this encounter.         Thank you for asking me to see Daysi Pa for CAD.    History of Present Illness  This is a 66 y.o. female with:    1.  Hypertension  2.  Hyperlipidemia  3.  Coronary artery disease  4. Viral cardiomyopathy with subsequent improvement in her ejection fraction to normal.  5. PVCs.    Daysi Pa is a 66 y.o. female who presents for consultation today.  Patient was previously seen by Dr. Sears for chest pain.  She had an echocardiogram which was concerning for ischemia inferior wall.  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  She also had an ostial PDA lesion which was treated medically. He also has moderate disease in a small caliber LAD after the takeoff of the diagonal.  She had more chest pain was taken back to the Cath Lab, the ostial RPDA was interrogated. FFR was negative at 0.95.  Medical management is recommended.    She has been on aspirin/metoprolol, Imdur and has done well.  She is on PCSK9 him better for her hyperlipidemia.    Recent Holter monitor showed significant PVCs her PVC burden was 24%.  Last echocardiogram in March 2021 with a EF of 47%,    Patient has been complaining of lack of energy, has occasional chest pains.  She has palpitations.  She does drink excessive amount of caffeine.  No other acute issues since last visit.  She had to cut back on her metoprolol from 50 to 25 mg.      Review of Systems - ROS  Constitution: Negative for weakness, weight gain and weight loss.   HENT: Negative for congestion.    Eyes: Negative for blurred vision.   Cardiovascular: As mentioned above  Respiratory: Negative for cough and hemoptysis.    Endocrine: Negative for polydipsia and polyuria.   Hematologic/Lymphatic: Negative for bleeding  problem. Does not bruise/bleed easily.   Skin: Negative for flushing.   Musculoskeletal: Negative for neck pain and stiffness.   Gastrointestinal: Negative for abdominal pain, diarrhea, jaundice, melena, nausea and vomiting.   Genitourinary: Negative for dysuria and hematuria.   Neurological: Negative for dizziness, focal weakness and numbness.   Psychiatric/Behavioral: Negative for altered mental status and depression.          All other systems were reviewed and were negative.    family history includes Breast cancer in her sister; COPD in her paternal grandfather and paternal grandmother; Cancer in her maternal aunt; Coronary artery disease in her father; Diabetes in her father; Heart disease in an other family member; Hyperlipidemia in her father; Hypertension in her mother and another family member; Migraines in her mother; Osteoporosis in her mother; Rectal cancer in an other family member; Thyroid disease in an other family member.     reports that she quit smoking about 13 years ago. She quit after 30.00 years of use. She has never used smokeless tobacco. She reports that she does not drink alcohol and does not use drugs.    Allergies   Allergen Reactions   • Cephalosporins Nausea And Vomiting   • Erythromycin Nausea Only   • Morphine And Related Itching   • Other Nausea And Vomiting     Cipro   • Penicillins Hives   • Shellfish-Derived Products Hives and Other (See Comments)     Other reaction(s): SOA   • Zithromax [Azithromycin] Hives and Nausea And Vomiting   • Zolpidem Mental Status Change   • Honey Bee Treatment [Bee Venom] Hives   • Hydrocodone-Acetaminophen Anxiety   • Sulfa Antibiotics Hives and Rash     Sulfa (Sulfonamide Antibiotics)         Current Outpatient Medications:   •  albuterol sulfate HFA (ProAir HFA) 108 (90 Base) MCG/ACT inhaler, Inhale 2 puffs 4 (Four) Times a Day As Needed., Disp: , Rfl:   •  Amitiza 24 MCG capsule, TAKE 1 CAPSULE BY MOUTH TWICE A DAY, Disp: 180 capsule, Rfl: 3  •   aspirin 81 MG tablet, Take 1 tablet by mouth Daily., Disp: 30 tablet, Rfl: 11  •  atomoxetine (STRATTERA) 40 MG capsule, Take 1 capsule by mouth Daily., Disp: 90 capsule, Rfl: 3  •  denosumab (PROLIA) 60 MG/ML solution prefilled syringe syringe, Inject 60 mg under the skin into the appropriate area as directed Every 6 (Six) Months., Disp: , Rfl:   •  desloratadine (CLARINEX) 5 MG tablet, TAKE 1 TABLET BY MOUTH EVERY DAY, Disp: 90 tablet, Rfl: 3  •  docusate sodium (COLACE) 100 MG capsule, Take 200 mg by mouth every night. at bedtime, Disp: , Rfl:   •  esomeprazole (nexIUM) 40 MG capsule, TAKE 1 CAPSULE BY MOUTH EVERY DAY, Disp: 90 capsule, Rfl: 3  •  furosemide (LASIX) 40 MG tablet, TAKE 1 TABLET BY MOUTH EVERY DAY, Disp: 90 tablet, Rfl: 3  •  gabapentin (NEURONTIN) 300 MG capsule, Take 1 capsule by mouth 2 (Two) Times a Day., Disp: 180 capsule, Rfl: 0  •  hydroxychloroquine (PLAQUENIL) 200 MG tablet, Take 200 mg by mouth Daily., Disp: , Rfl:   •  isosorbide mononitrate (IMDUR) 30 MG 24 hr tablet, Take 1 tablet by mouth Daily., Disp: 90 tablet, Rfl: 2  •  metoclopramide (REGLAN) 10 MG tablet, Take 1 tablet by mouth 4 (Four) Times a Day., Disp: 360 tablet, Rfl: 2  •  metoprolol succinate XL (TOPROL-XL) 50 MG 24 hr tablet, Take 1 tablet by mouth Daily. (Patient taking differently: Take 25 mg by mouth Daily.), Disp: 30 tablet, Rfl: 11  •  OxyCODONE HCl ER (OXYCONTIN) 30 MG tablet extended-release 12 hour, Take 10 mg by mouth 3 (Three) Times a Day. OxyContin 30 mg tablet,crush resistant,extended release , Disp: , Rfl:   •  polyethylene glycol (MIRALAX) packet, Take 17 g by mouth Daily., Disp: , Rfl:   •  Repatha SureClick solution auto-injector SureClick injection, INJECT 1 ML UNDER THE SKIN INTO THE APPROPRIATE AREA AS DIRECTED EVERY 14 (FOURTEEN) DAYS., Disp: 1 mL, Rfl: 6  •  Synthroid 88 MCG tablet, TAKE 1 TABLET BY MOUTH EVERY DAY, Disp: 90 tablet, Rfl: 3  •  traZODone (DESYREL) 150 MG tablet, TAKE 1 TABLET BY MOUTH  "EVERYDAY AT BEDTIME, Disp: 90 tablet, Rfl: 3  •  umeclidinium-vilanterol (ANORO ELLIPTA) 62.5-25 MCG/INH aerosol powder  inhaler, Inhale 1 puff Daily., Disp: 1 each, Rfl: 11  •  venlafaxine XR (EFFEXOR-XR) 150 MG 24 hr capsule, TAKE 1 CAPSULE BY MOUTH EVERY DAY, Disp: 90 capsule, Rfl: 3  •  venlafaxine XR (EFFEXOR-XR) 75 MG 24 hr capsule, Take 1 capsule by mouth Daily., Disp: 90 capsule, Rfl: 3  •  ranolazine (RANEXA) 500 MG 12 hr tablet, TAKE 1 TABLET BY MOUTH TWICE A DAY, Disp: 180 tablet, Rfl: 3    Physical Exam:  Vitals:    10/01/21 1114   BP: 110/66   BP Location: Left arm   Patient Position: Sitting   Cuff Size: Adult   Pulse: 66   Temp: 96.8 °F (36 °C)   SpO2: 98%   Weight: 64.3 kg (141 lb 12.8 oz)   Height: 157.5 cm (62\")   PainSc:   6   PainLoc: Back  Comment: muscles       GEN: alert, appears stated age and cooperative  Body Habitus: well-nourished  HEENT: Head: Normocephalic, no lesions, without obvious abnormality.  JVP: 6 cm of water at 45 degrees      HJR: absent      Katy:  normal size and placement    Palpable S4: Not present.   Heart rate: normal  Heart Rhythm: regular                                     Heart Sounds: S1: normal intensity  S2: normal intensity  S3: absent                               S4: absent  Opening Snap: absent          A2-OS:  N/A  Pericardial rub: absent                       Ejection click: None                                        Murmurs: Systolic: none  Diastolic: none  Extremity: Moves spontaneously      DATA REVIEWED:       ECG/EMG Results (all)     None        ---------------------------------------------------  TTE/JORDAN:  Results for orders placed in visit on 08/27/20    Adult Transthoracic Echo Complete W/ Cont if Necessary Per Protocol    Interpretation Summary  · Left ventricular systolic function is mildly reduced. LVEF is 47%. Pseudonormal diastolic function.  · Right ventricular systolic function is normal. RV cavity is mildly dilated.  · Aortic valve " thickening and calcification.  · Mild tricuspid regurgitation.  · Dilated mitral annulus with moderate to severe mitral regurgitation.  · No pericardial effusion.      CT:   No radiology results for the last 30 days.        --------------------------------------------------------------------------------------------------  LABS:     The CVD Risk score (Diogenes et al., 2008) failed to calculate for the following reasons:    The patient has a prior MI, stroke, CHF, or peripheral vascular disease diagnosis         Lab Results   Component Value Date    GLUCOSE 109 (H) 02/03/2021    BUN 7 (L) 02/03/2021    CREATININE 0.73 02/03/2021    EGFRIFNONA 80 02/03/2021    BCR 9.6 02/03/2021    K 4.0 02/03/2021    CO2 26.0 02/03/2021    CALCIUM 9.7 06/10/2021    ALBUMIN 4.70 12/12/2018    AST 21 12/12/2018    ALT 17 12/12/2018     Lab Results   Component Value Date    WBC 8.37 06/30/2020    HGB 10.2 (L) 06/30/2020    HCT 29.9 (L) 06/30/2020    MCV 85.7 06/30/2020     06/30/2020     Lab Results   Component Value Date    CHOL 159 02/03/2021    CHLPL 201 (H) 09/22/2016    TRIG 252 (H) 02/03/2021    HDL 64 (H) 02/03/2021    LDL 56 02/03/2021     Lab Results   Component Value Date    TSH 2.180 02/03/2021     No results found for: CKTOTAL, CKMB, CKMBINDEX, TROPONINI, TROPONINT  Lab Results   Component Value Date    HGBA1C 5.60 02/27/2020     No results found for: DDIMER  Lab Results   Component Value Date    ALT 17 12/12/2018     Lab Results   Component Value Date    HGBA1C 5.60 02/27/2020    HGBA1C 5.6 12/14/2017     Lab Results   Component Value Date    MICROALBUR <0.6 11/13/2017    CREATININE 0.73 02/03/2021     No results found for: IRON, TIBC, FERRITIN  Lab Results   Component Value Date    INR 1.01 06/30/2020    INR 1.05 01/21/2020    PROTIME 13.1 06/30/2020    PROTIME 13.5 01/21/2020       Assessment/Plan     1. CAD:  Cardiac cath showed  of small caliber RPL and ostial RPDA lesion. Ostial RPDA was negative on  FFR.  Also moderate disease in the small caliber LAD after the takeoff of the diagonal. Continue medical management.  Continue aspirin/Imdur.  added Ranexa however it was stopped because she has been on Plaquenil.    2. HTN:  Better controlled on current regimen    3. HLD: On PCSK9 him inhibitor. LDL is down to less than 50..    4. Cardiomyopathy: Previously had severe cardiomyopathy but EF is improved 52%. Recent repeat echocardiogram showed EF of 47%. He does have significant PVC burden now as well.  She has been on bisoprolol,  switched her to metoprolol XL and see if that improves her PVC burden.   We will repeat Holter for 48 hours.  If continues to have PVC burden more than 15% will refer to EP for consideration for ablation.  Could not tolerate ACE inhibitor previously.  I advised her to cut back on her caffeine to help with her PVCs.    Prevention:  Patient's Body mass index is 25.94 kg/m². BMI is within normal parameters. No follow-up required..      Daysi Johnson Thierno  reports that she quit smoking about 13 years ago. She quit after 30.00 years of use. She has never used smokeless tobacco.                This document has been electronically signed by Carmen Thompson MD on October 1, 2021 11:31 CDT

## 2021-10-04 LAB
QT INTERVAL: 398 MS
QTC INTERVAL: 450 MS

## 2021-10-12 ENCOUNTER — OFFICE VISIT (OUTPATIENT)
Dept: FAMILY MEDICINE CLINIC | Facility: CLINIC | Age: 66
End: 2021-10-12

## 2021-10-12 ENCOUNTER — LAB (OUTPATIENT)
Dept: LAB | Facility: HOSPITAL | Age: 66
End: 2021-10-12

## 2021-10-12 VITALS
HEIGHT: 62 IN | DIASTOLIC BLOOD PRESSURE: 70 MMHG | SYSTOLIC BLOOD PRESSURE: 128 MMHG | HEART RATE: 79 BPM | WEIGHT: 140 LBS | OXYGEN SATURATION: 97 % | BODY MASS INDEX: 25.76 KG/M2

## 2021-10-12 DIAGNOSIS — M05.732 RHEUMATOID ARTHRITIS INVOLVING BOTH WRISTS WITH POSITIVE RHEUMATOID FACTOR (HCC): ICD-10-CM

## 2021-10-12 DIAGNOSIS — M79.7 PRIMARY FIBROMYALGIA SYNDROME: ICD-10-CM

## 2021-10-12 DIAGNOSIS — K21.9 GASTROESOPHAGEAL REFLUX DISEASE WITHOUT ESOPHAGITIS: ICD-10-CM

## 2021-10-12 DIAGNOSIS — J44.9 STAGE 2 MODERATE COPD BY GOLD CLASSIFICATION (HCC): ICD-10-CM

## 2021-10-12 DIAGNOSIS — R53.81 MALAISE AND FATIGUE: ICD-10-CM

## 2021-10-12 DIAGNOSIS — G89.29 CHRONIC LOW BACK PAIN, UNSPECIFIED BACK PAIN LATERALITY, UNSPECIFIED WHETHER SCIATICA PRESENT: ICD-10-CM

## 2021-10-12 DIAGNOSIS — F90.0 ATTENTION DEFICIT HYPERACTIVITY DISORDER (ADHD), PREDOMINANTLY INATTENTIVE TYPE: ICD-10-CM

## 2021-10-12 DIAGNOSIS — E03.9 ACQUIRED HYPOTHYROIDISM: ICD-10-CM

## 2021-10-12 DIAGNOSIS — R53.83 MALAISE AND FATIGUE: ICD-10-CM

## 2021-10-12 DIAGNOSIS — M54.50 CHRONIC LOW BACK PAIN, UNSPECIFIED BACK PAIN LATERALITY, UNSPECIFIED WHETHER SCIATICA PRESENT: ICD-10-CM

## 2021-10-12 DIAGNOSIS — E78.2 MIXED HYPERLIPIDEMIA: Primary | ICD-10-CM

## 2021-10-12 DIAGNOSIS — M05.731 RHEUMATOID ARTHRITIS INVOLVING BOTH WRISTS WITH POSITIVE RHEUMATOID FACTOR (HCC): ICD-10-CM

## 2021-10-12 LAB — TSH SERPL DL<=0.05 MIU/L-ACNC: 2.41 UIU/ML (ref 0.27–4.2)

## 2021-10-12 PROCEDURE — 99214 OFFICE O/P EST MOD 30 MIN: CPT | Performed by: FAMILY MEDICINE

## 2021-10-12 PROCEDURE — 84443 ASSAY THYROID STIM HORMONE: CPT

## 2021-10-12 PROCEDURE — 84100 ASSAY OF PHOSPHORUS: CPT | Performed by: FAMILY MEDICINE

## 2021-10-12 PROCEDURE — 83735 ASSAY OF MAGNESIUM: CPT | Performed by: FAMILY MEDICINE

## 2021-10-12 PROCEDURE — 80053 COMPREHEN METABOLIC PANEL: CPT | Performed by: FAMILY MEDICINE

## 2021-10-12 NOTE — PROGRESS NOTES
Daysi Pa 10/12/2021  Subjective:     Daysi Pa is a 66 y.o. female who presents for   Chief Complaint   Patient presents with   • Establish Care       66-year-old female with history of coronary artery disease hypertension depression with features of anxiety CHF with 37% EF currently followed by cardiology and hypothyroidism on thyroid replacement here today for follow-up to establish care.  Patient recently saw her cardiologist and was advised that her EF had dropped to 37% she continues on Lasix and did see her cardiologist recently.  Symptomatically the patient does complain of ongoing issues with fatigue she also notes some degree of orthopnea having to sleep with at least one pillow she states she is unable to lie flat and she does get short of breath with even modest exertion patient is up-to-date on her Covid vaccine although will be due for a booster she states that her weight has been hovering around 140 or as previously she usually ran between 110 and 115.  She denies lower extremity edema however.  She is currently on thyroid replacement and has had to have her thyroid medication adjusted in the past.        Past Medical Hx:  Past Medical History:   Diagnosis Date   • Acquired hypothyroidism    • Allergic rhinitis    • Allergy 04/17/2016    Consult, Allergy (1) - Ordered By: BENIGNO LIM ([facility])    • Anxiety state    • Attention deficit hyperactivity disorder    • Benign essential hypertension    • Chronic pain    • Chronic pain disorder    • Congestive heart failure (HCC)     primarily systrolic dysfunction EF 17-20% repeat echo 55%   • Depressive disorder    • Dyslipidemia    • Dysphagia    • Gastroesophageal reflux disease    • Generalized abdominal pain    • History of echocardiogram 03/23/2016    Echocardiogram W/ color flow 97339 (3) - Normal LV function wiht Ef of 60%.Normal RV size and function.Normal diastolic function.No significant valvular regurgitation or  stenosis.   • History of echocardiogram 04/27/2012    Echocardiogram W/O color flow 45173 (1) - Normal left ventricular systolic function. EF 55%. No evidence of regional wall motion abnormalities. Abnormal relaxation of the left ventricular myocardium.   • History of EKG 03/07/2016    EKG Tracing and Interpretation 10501 (3) - Ordered By: LILLIE SEARS ([facility])    • History of mammogram 06/05/2013    MAMMOGRAM SCREENING 84547 - [facility] (1) - birads 0    • Hyperlipidemia    • Intraductal carcinoma in situ of breast     hx in past and s/p bilateral simple mastectomies      • Irritable bowel syndrome    • Low back pain    • Malaise and fatigue    • Microalbuminuria 07/16/2015    POS MICROALBUMINURIA RESULT DOC AND REVIEWED 3060F (1) - Ordered By: BENIGNO LIM ([facility])   • Mitral valve regurgitation     Mild-Moderate      • Myopia    • Non-organic sleep disorder    • Osteoporosis    • Pain management 04/17/2016    Consult, Pain Management (1) - Ordered By: BENIGNO LIM ([facility])    • Pneumococcal vaccination indicated 07/08/2016    PNEUMOC VAC/ADMIN/RCVD 4040F (11) - Ordered By: BENIGNO LIM ([facility])   • Primary fibromyalgia syndrome    • Rheumatoid arthritis (HCC)        Past Surgical Hx:  Past Surgical History:   Procedure Laterality Date   • APPENDECTOMY  2008    Appendectomy (1)   • BREAST BIOPSY  06/12/2013    Stereotactic breast biopsy (1) - stereotactic left mammotome biopsy with 11 gauge needle.   • BREAST IMPLANT REMOVAL  2003    Remove breast implant (1)   • BREAST IMPLANT SURGERY  2000    Breast implantation (1)   • CARDIAC CATHETERIZATION N/A 1/21/2020    Procedure: Right Heart Cath;  Surgeon: Stuart Sears MD PhD;  Location: [facility] CATH INVASIVE LOCATION;  Service: Cardiology   • CARDIAC CATHETERIZATION N/A 1/21/2020    Procedure: LEFT HEART CATH;  Surgeon: Carmen Thompson MD;  Location: [facility] CATH INVASIVE LOCATION;  Service: Cardiology   • CARDIAC CATHETERIZATION N/A 6/30/2020    Procedure: Left Heart  Cath/PCI;  Surgeon: Carmen Thompson MD;  Location: [facility] INVASIVE LOC[facility];  Service: Cardiology;  Laterality: N/A;   • COLONOSCOPY  01/20/2014    Colonoscopy, diagnostic (screening) 39842 (1)   • COLONOSCOPY W/ POLYPECTOMY  2008    Colonoscopy remove polyps 10211 (1)    • EXCISION LESION  05/01/2014    Remove chest wall lesion (1) - Excision of subcutaneous mass, left chest wall, Subcutaneous mass left chest wall, status post mastectomy.   • INJECTION OF MEDICATION  07/05/2016    B12 (44) - Ordered By: BENIGNO LIM ([facility])    • INJECTION OF MEDICATION  11/12/2015    Celestone (betamethasone) (1) - Ordered By: BENIGNO LIM ([facility])    • INJECTION OF MEDICATION  02/11/2016    Kenalog (2) - Ordered By: BENIGNO LIM ([facility])    • INJECTION OF MEDICATION  11/17/2017    Prolia   • MASTECTOMY  09/05/2013    Mastectomy (2) - Right simple prophylactic mastectomy.   • OTHER SURGICAL HISTORY  10/29/2014    SONOGRAM THYROID, NECK 97528 (1) - Ordered By: BENIGNO LIM ([facility])   • PAP SMEAR  06/03/2013     PAP SMEAR (1)   • SUBTOTAL HYSTERECTOMY  1998     Partial hyst (1)     • TONSILLECTOMY  1969    Tonsillectomy (1)   • VASCULAR SURGERY  12/24/2014    Consult, Vascular Surgery (1) - Ordered By: BENIGNO LIM ([facility])        Health Maintenance:  Health Maintenance   Topic Date Due   • INFLUENZA VACCINE  08/01/2021   • LIPID PANEL  02/03/2022   • ANNUAL WELLNESS VISIT  03/10/2022   • DXA SCAN  03/25/2023   • COLORECTAL CANCER SCREENING  06/19/2023   • TDAP/TD VACCINES (2 - Td or Tdap) 10/16/2024   • HEPATITIS C SCREENING  Completed   • COVID-19 Vaccine  Completed   • Pneumococcal Vaccine 65+  Completed   • ZOSTER VACCINE  Completed       Current Meds:    Current Outpatient Medications:   •  albuterol sulfate HFA (ProAir HFA) 108 (90 Base) MCG/ACT inhaler, Inhale 2 puffs 4 (Four) Times a Day As Needed., Disp: , Rfl:   •  Amitiza 24 MCG capsule, TAKE 1 CAPSULE BY MOUTH TWICE A DAY, Disp: 180 capsule, Rfl: 3  •  aspirin 81 MG tablet, Take 1 tablet by mouth Daily.,  Disp: 30 tablet, Rfl: 11  •  atomoxetine (STRATTERA) 40 MG capsule, Take 1 capsule by mouth Daily., Disp: 90 capsule, Rfl: 3  •  denosumab (PROLIA) 60 MG/ML solution prefilled syringe syringe, Inject 60 mg under the skin into the appropriate area as directed Every 6 (Six) Months., Disp: , Rfl:   •  desloratadine (CLARINEX) 5 MG tablet, TAKE 1 TABLET BY MOUTH EVERY DAY, Disp: 90 tablet, Rfl: 3  •  docusate sodium (COLACE) 100 MG capsule, Take 200 mg by mouth every night. at bedtime, Disp: , Rfl:   •  esomeprazole (nexIUM) 40 MG capsule, TAKE 1 CAPSULE BY MOUTH EVERY DAY, Disp: 90 capsule, Rfl: 3  •  furosemide (LASIX) 40 MG tablet, TAKE 1 TABLET BY MOUTH EVERY DAY, Disp: 90 tablet, Rfl: 3  •  gabapentin (NEURONTIN) 300 MG capsule, Take 1 capsule by mouth 2 (Two) Times a Day., Disp: 180 capsule, Rfl: 0  •  hydroxychloroquine (PLAQUENIL) 200 MG tablet, Take 200 mg by mouth Daily., Disp: , Rfl:   •  isosorbide mononitrate (IMDUR) 30 MG 24 hr tablet, Take 1 tablet by mouth Daily., Disp: 90 tablet, Rfl: 2  •  metoclopramide (REGLAN) 10 MG tablet, Take 1 tablet by mouth 4 (Four) Times a Day., Disp: 360 tablet, Rfl: 2  •  metoprolol succinate XL (TOPROL-XL) 50 MG 24 hr tablet, Take 1 tablet by mouth Daily. (Patient taking differently: Take 25 mg by mouth Daily.), Disp: 30 tablet, Rfl: 11  •  OxyCODONE HCl ER (OXYCONTIN) 30 MG tablet extended-release 12 hour, Take 10 mg by mouth 3 (Three) Times a Day. OxyContin 30 mg tablet,crush resistant,extended release , Disp: , Rfl:   •  polyethylene glycol (MIRALAX) packet, Take 17 g by mouth Daily., Disp: , Rfl:   •  Repatha SureClick solution auto-injector SureClick injection, INJECT 1 ML UNDER THE SKIN INTO THE APPROPRIATE AREA AS DIRECTED EVERY 14 (FOURTEEN) DAYS., Disp: 1 mL, Rfl: 6  •  Synthroid 88 MCG tablet, TAKE 1 TABLET BY MOUTH EVERY DAY, Disp: 90 tablet, Rfl: 3  •  traZODone (DESYREL) 150 MG tablet, TAKE 1 TABLET BY MOUTH EVERYDAY AT BEDTIME, Disp: 90 tablet, Rfl: 3  •   "umeclidinium-vilanterol (ANORO ELLIPTA) 62.5-25 MCG/INH aerosol powder  inhaler, Inhale 1 puff Daily., Disp: 1 each, Rfl: 11  •  venlafaxine XR (EFFEXOR-XR) 150 MG 24 hr capsule, TAKE 1 CAPSULE BY MOUTH EVERY DAY, Disp: 90 capsule, Rfl: 3  •  venlafaxine XR (EFFEXOR-XR) 75 MG 24 hr capsule, Take 1 capsule by mouth Daily., Disp: 90 capsule, Rfl: 3    Allergies:  Cephalosporins, Erythromycin, Morphine and related, Other, Penicillins, Shellfish-derived products, Zithromax [azithromycin], Zolpidem, Honey bee treatment [bee venom], Hydrocodone-acetaminophen, and Sulfa antibiotics    Family Hx:  Family History   Problem Relation Age of Onset   • Breast cancer Sister    • Rectal cancer Other         Colorectal cancer   • Heart disease Other    • Hypertension Other    • Thyroid disease Other         Thyroid disorder   • Hypertension Mother    • Migraines Mother    • Osteoporosis Mother    • Diabetes Father    • Coronary artery disease Father    • Hyperlipidemia Father    • Cancer Maternal Aunt    • COPD Paternal Grandmother    • COPD Paternal Grandfather         Social History:  Social History     Socioeconomic History   • Marital status:    Tobacco Use   • Smoking status: Former Smoker     Years: 30.00     Quit date: 2008     Years since quittin.1   • Smokeless tobacco: Never Used   • Tobacco comment: PT SMOKED FOR 30 YEARS AND QUIT IN    Vaping Use   • Vaping Use: Never used   Substance and Sexual Activity   • Alcohol use: No   • Drug use: No   • Sexual activity: Defer       Review of Systems  Review of Systems    Objective:     /70 (BP Location: Left arm, Patient Position: Sitting, Cuff Size: Large Adult)   Pulse 79   Ht 157.5 cm (62\")   Wt 63.5 kg (140 lb)   SpO2 97%   BMI 25.61 kg/m²   Physical Exam General appearance alert no distress HEENT grossly normal no asymmetry neck supple without JVD lungs clear to auscultation and percussion without wheezes rales rhonchi tactile fremitus or " pleural friction rub heart regular without murmur rub gallop or extrasystole PMI diffuse abdomen soft nontender extremities show no edema no tremor neuro nonfocal      Lab Review  Results for orders placed or performed in visit on 10/01/21   ECG 12 Lead   Result Value Ref Range    QT Interval 398 ms    QTC Interval 450 ms            Assessment:     Diagnoses and all orders for this visit:    1. Mixed hyperlipidemia (Primary)    2. Acquired hypothyroidism  -     TSH; Future    3. Attention deficit hyperactivity disorder (ADHD), predominantly inattentive type    4. Rheumatoid arthritis involving both wrists with positive rheumatoid factor (HCC)    5. Malaise and fatigue  -     TSH; Future    6. Gastroesophageal reflux disease without esophagitis    7. Chronic low back pain, unspecified back pain laterality, unspecified whether sciatica present    8. Primary fibromyalgia syndrome    9. Stage 2 moderate COPD by GOLD classification ([facility])        Plan:   We will go ahead and check a TSH along with other labs that have previously been ordered flu vaccine status is updated patient will also get her Covid booster through her pharmacy continue current regimen otherwise recheck 3 months or as needed    Ju[facility]o request #[id number] reviewed and appropriate.            This document has been electronically signed by Tony Hi MD on October 12, 2021 13:46 CDT

## 2021-10-13 ENCOUNTER — TELEPHONE (OUTPATIENT)
Dept: CARDIOLOGY | Facility: CLINIC | Age: 66
End: 2021-10-13

## 2021-10-13 NOTE — TELEPHONE ENCOUNTER
Patient contacted with monitor results per dr. Thompson. pvc's have improved. She was instructed to continue current meds.

## 2021-11-01 RX ORDER — VENLAFAXINE HYDROCHLORIDE 150 MG/1
CAPSULE, EXTENDED RELEASE ORAL
Qty: 90 CAPSULE | Refills: 3 | Status: SHIPPED | OUTPATIENT
Start: 2021-11-01 | End: 2022-02-15

## 2021-11-15 RX ORDER — ISOSORBIDE MONONITRATE 30 MG/1
30 TABLET, EXTENDED RELEASE ORAL DAILY
Qty: 90 TABLET | Refills: 3 | Status: SHIPPED | OUTPATIENT
Start: 2021-11-15 | End: 2021-11-23 | Stop reason: SDUPTHER

## 2021-11-15 RX ORDER — FUROSEMIDE 20 MG/1
TABLET ORAL
Qty: 30 TABLET | Refills: 0 | Status: SHIPPED | OUTPATIENT
Start: 2021-11-15 | End: 2021-11-30

## 2021-11-15 RX ORDER — METOCLOPRAMIDE 10 MG/1
10 TABLET ORAL 4 TIMES DAILY
Qty: 360 TABLET | Refills: 2 | Status: SHIPPED | OUTPATIENT
Start: 2021-11-15 | End: 2022-08-08

## 2021-11-15 RX ORDER — VENLAFAXINE HYDROCHLORIDE 75 MG/1
CAPSULE, EXTENDED RELEASE ORAL
Qty: 90 CAPSULE | Refills: 3 | Status: SHIPPED | OUTPATIENT
Start: 2021-11-15 | End: 2022-02-15

## 2021-11-18 RX ORDER — HYDROXYCHLOROQUINE SULFATE 200 MG/1
TABLET, FILM COATED ORAL
Qty: 90 TABLET | Refills: 3 | Status: SHIPPED | OUTPATIENT
Start: 2021-11-18 | End: 2022-02-15

## 2021-11-23 RX ORDER — ISOSORBIDE MONONITRATE 30 MG/1
30 TABLET, EXTENDED RELEASE ORAL DAILY
Qty: 90 TABLET | Refills: 3 | Status: SHIPPED | OUTPATIENT
Start: 2021-11-23 | End: 2022-07-20

## 2021-11-30 RX ORDER — FUROSEMIDE 20 MG/1
TABLET ORAL
Qty: 30 TABLET | Refills: 0 | Status: SHIPPED | OUTPATIENT
Start: 2021-11-30 | End: 2022-01-03

## 2021-12-08 ENCOUNTER — LAB (OUTPATIENT)
Dept: LAB | Facility: HOSPITAL | Age: 66
End: 2021-12-08

## 2021-12-10 ENCOUNTER — TELEPHONE (OUTPATIENT)
Dept: FAMILY MEDICINE CLINIC | Facility: CLINIC | Age: 66
End: 2021-12-10

## 2021-12-15 ENCOUNTER — INFUSION (OUTPATIENT)
Dept: ONCOLOGY | Facility: HOSPITAL | Age: 66
End: 2021-12-15

## 2021-12-15 VITALS
RESPIRATION RATE: 18 BRPM | SYSTOLIC BLOOD PRESSURE: 139 MMHG | TEMPERATURE: 97.3 F | HEART RATE: 88 BPM | DIASTOLIC BLOOD PRESSURE: 71 MMHG

## 2021-12-15 DIAGNOSIS — M81.0 AGE-RELATED OSTEOPOROSIS WITHOUT CURRENT PATHOLOGICAL FRACTURE: Primary | ICD-10-CM

## 2021-12-15 PROCEDURE — 25010000002 DENOSUMAB 60 MG/ML SOLUTION PREFILLED SYRINGE: Performed by: FAMILY MEDICINE

## 2021-12-15 PROCEDURE — 96372 THER/PROPH/DIAG INJ SC/IM: CPT | Performed by: NURSE PRACTITIONER

## 2021-12-15 RX ADMIN — DENOSUMAB 60 MG: 60 INJECTION SUBCUTANEOUS at 15:49

## 2021-12-22 RX ORDER — ATOMOXETINE 40 MG/1
CAPSULE ORAL
Qty: 90 CAPSULE | Refills: 3 | Status: SHIPPED | OUTPATIENT
Start: 2021-12-22 | End: 2022-02-15

## 2022-01-03 DIAGNOSIS — E78.2 MIXED HYPERLIPIDEMIA: Primary | ICD-10-CM

## 2022-01-03 RX ORDER — FUROSEMIDE 20 MG/1
TABLET ORAL
Qty: 30 TABLET | Refills: 0 | Status: SHIPPED | OUTPATIENT
Start: 2022-01-03 | End: 2022-02-01

## 2022-01-10 ENCOUNTER — OFFICE VISIT (OUTPATIENT)
Dept: FAMILY MEDICINE CLINIC | Facility: CLINIC | Age: 67
End: 2022-01-10

## 2022-01-10 VITALS
BODY MASS INDEX: 26.87 KG/M2 | HEIGHT: 62 IN | SYSTOLIC BLOOD PRESSURE: 130 MMHG | OXYGEN SATURATION: 98 % | WEIGHT: 146 LBS | HEART RATE: 90 BPM | DIASTOLIC BLOOD PRESSURE: 80 MMHG

## 2022-01-10 DIAGNOSIS — M51.36 DDD (DEGENERATIVE DISC DISEASE), LUMBAR: ICD-10-CM

## 2022-01-10 DIAGNOSIS — I42.8 NONISCHEMIC CARDIOMYOPATHY: ICD-10-CM

## 2022-01-10 DIAGNOSIS — I25.118 CORONARY ARTERY DISEASE OF NATIVE ARTERY OF NATIVE HEART WITH STABLE ANGINA PECTORIS: Primary | ICD-10-CM

## 2022-01-10 DIAGNOSIS — G89.29 CHRONIC LOW BACK PAIN, UNSPECIFIED BACK PAIN LATERALITY, UNSPECIFIED WHETHER SCIATICA PRESENT: ICD-10-CM

## 2022-01-10 DIAGNOSIS — G89.4 CHRONIC PAIN SYNDROME: ICD-10-CM

## 2022-01-10 DIAGNOSIS — J44.9 STAGE 2 MODERATE COPD BY GOLD CLASSIFICATION: ICD-10-CM

## 2022-01-10 DIAGNOSIS — R53.81 MALAISE AND FATIGUE: ICD-10-CM

## 2022-01-10 DIAGNOSIS — F90.0 ATTENTION DEFICIT HYPERACTIVITY DISORDER (ADHD), PREDOMINANTLY INATTENTIVE TYPE: ICD-10-CM

## 2022-01-10 DIAGNOSIS — M79.7 PRIMARY FIBROMYALGIA SYNDROME: ICD-10-CM

## 2022-01-10 DIAGNOSIS — R53.83 MALAISE AND FATIGUE: ICD-10-CM

## 2022-01-10 DIAGNOSIS — M54.50 CHRONIC LOW BACK PAIN, UNSPECIFIED BACK PAIN LATERALITY, UNSPECIFIED WHETHER SCIATICA PRESENT: ICD-10-CM

## 2022-01-10 PROCEDURE — 99214 OFFICE O/P EST MOD 30 MIN: CPT | Performed by: FAMILY MEDICINE

## 2022-01-10 NOTE — PROGRESS NOTES
Daysi Johnson Thierno 1/10/2022   Chief Complaint   Patient presents with   • 3 MONTH FOLLOW UP   • ADHD              66-year-old female with history of nonischemic cardiomyopathy with HFrEF most recent EF equal to 35% currently followed by cardiology COPD stable on current regimen of as needed albuterol and Anoro Ellipta routinely reactive depression with chronic fatigue currently managed on Effexor to 25 daily hypothyroidism and ADHD currently managed on non-STEM with atomoxetine 40 mg chronic constipation on Amitiza chronic pain on opioid and adjuvant therapy with Neurontin RA on DMARD currently taking Plaquenil for which she has been seen by ophthalmology and managed for cataracts along with retinal pathology chronic limb-girdle pain previously managed by rheumatology chronic fatigue here today for scheduled follow-up visit.  She notes that she sleeps a lot through the day and does have ongoing issues with chronic fatigue she has follow-up appointment with cardiology coming up in April she notes persistent hip pain and left hip pain stiffness patient did recently get her Covid booster and is up-to-date on Covid status ADHD is stable on her current regimen patient does weigh herself daily and notes occasional fluid retention she typically gets up to void about every 2 hours through the night  Current Outpatient Medications on File Prior to Visit   Medication Sig Dispense Refill   • albuterol sulfate HFA (ProAir HFA) 108 (90 Base) MCG/ACT inhaler Inhale 2 puffs 4 (Four) Times a Day As Needed.     • Amitiza 24 MCG capsule TAKE 1 CAPSULE BY MOUTH TWICE A  capsule 3   • aspirin 81 MG tablet Take 1 tablet by mouth Daily. 30 tablet 11   • atomoxetine (STRATTERA) 40 MG capsule TAKE 1 CAPSULE BY MOUTH EVERY DAY 90 capsule 3   • denosumab (PROLIA) 60 MG/ML solution prefilled syringe syringe Inject 60 mg under the skin into the appropriate area as directed Every 6 (Six) Months.     • desloratadine (CLARINEX) 5  MG tablet TAKE 1 TABLET BY MOUTH EVERY DAY 90 tablet 3   • docusate sodium (COLACE) 100 MG capsule Take 200 mg by mouth every night. at bedtime     • esomeprazole (nexIUM) 40 MG capsule TAKE 1 CAPSULE BY MOUTH EVERY DAY 90 capsule 3   • furosemide (LASIX) 20 MG tablet TAKE 1 TABLET BY MOUTH DAILY UNTIL BACK TO BASELINE WEIGHT 30 tablet 0   • furosemide (LASIX) 40 MG tablet TAKE 1 TABLET BY MOUTH EVERY DAY 90 tablet 3   • gabapentin (NEURONTIN) 300 MG capsule Take 1 capsule by mouth 2 (Two) Times a Day. 180 capsule 0   • hydroxychloroquine (PLAQUENIL) 200 MG tablet TAKE 1 TABLET BY MOUTH EVERY DAY 90 tablet 3   • isosorbide mononitrate (IMDUR) 30 MG 24 hr tablet Take 1 tablet by mouth Daily. 90 tablet 3   • metoclopramide (REGLAN) 10 MG tablet TAKE 1 TABLET BY MOUTH 4 (FOUR) TIMES A DAY. 360 tablet 2   • metoprolol succinate XL (TOPROL-XL) 50 MG 24 hr tablet Take 1 tablet by mouth Daily. (Patient taking differently: Take 25 mg by mouth Daily.) 30 tablet 11   • OxyCODONE HCl ER (OXYCONTIN) 30 MG tablet extended-release 12 hour Take 10 mg by mouth 3 (Three) Times a Day. OxyContin 30 mg tablet,crush resistant,extended release      • polyethylene glycol (MIRALAX) packet Take 17 g by mouth Daily.     • Repatha SureClick solution auto-injector SureClick injection INJECT 1 ML UNDER THE SKIN INTO THE APPROPRIATE AREA AS DIRECTED EVERY 14 (FOURTEEN) DAYS. 1 mL 6   • Synthroid 88 MCG tablet TAKE 1 TABLET BY MOUTH EVERY DAY 90 tablet 3   • traZODone (DESYREL) 150 MG tablet TAKE 1 TABLET BY MOUTH EVERYDAY AT BEDTIME 90 tablet 3   • umeclidinium-vilanterol (ANORO ELLIPTA) 62.5-25 MCG/INH aerosol powder  inhaler Inhale 1 puff Daily. 1 each 11   • venlafaxine XR (EFFEXOR-XR) 150 MG 24 hr capsule TAKE 1 CAPSULE BY MOUTH EVERY DAY 90 capsule 3   • venlafaxine XR (EFFEXOR-XR) 75 MG 24 hr capsule TAKE 1 CAPSULE BY MOUTH EVERY DAY 90 capsule 3     No current facility-administered medications on file prior to visit.     Allergies  "  Allergen Reactions   • Cephalosporins Nausea And Vomiting   • Erythromycin Nausea Only   • Morphine And Related Itching   • Other Nausea And Vomiting     Cipro   • Penicillins Hives   • Shellfish-Derived Products Hives and Other (See Comments)     Other reaction(s): SOA   • Zithromax [Azithromycin] Hives and Nausea And Vomiting   • Zolpidem Mental Status Change   • Honey Bee Treatment [Bee Venom] Hives   • Hydrocodone-Acetaminophen Anxiety   • Sulfa Antibiotics Hives and Rash     Sulfa (Sulfonamide Antibiotics)     Exam alert /80 (BP Location: Left arm, Patient Position: Sitting, Cuff Size: Adult)   Pulse 90   Ht 157.5 cm (62\")   Wt 66.2 kg (146 lb)   SpO2 98%   BMI 26.70 kg/m²   HEENT grossly normal without asymmetry neck supple without JVD breath sounds are diminished with scattered rhonchi and crackles in both bases heart regular no extrasystole no murmur PMI diffuse abdomen soft nontender extremities show trace pedal and ankle edema no tremor neuro nonfocal no ataxia    Impression   Encounter Diagnoses   Name Primary?   • Coronary artery disease of native artery of native heart with stable angina pectoris (HCC) Yes   • Primary fibromyalgia syndrome    • DDD (degenerative disc disease), lumbar    • Stage 2 moderate COPD by GOLD classification ([facility])    • Nonischemic cardiomyopathy (HCC)    • Attention deficit hyperactivity disorder (ADHD), predominantly inattentive type    • Chronic low back pain, unspecified back pain laterality, unspecified whether sciatica present    • Chronic pain syndrome    • Malaise and fatigue      Plan: Continue current regimen reviewed recent surveillance labs patient has follow-up with cardiology in 3 months we will see her shortly afterward as advised can take an occasional extra dose of Lasix as needed for fluid retention or weight gain we will add topical analgesics with diclofenac gel OTC as needed recheck otherwise as needed      This document has been electronically " signed by Tony Hi MD on January 10, 2022 14:27 CST

## 2022-01-27 ENCOUNTER — TELEPHONE (OUTPATIENT)
Dept: CARDIOLOGY | Facility: CLINIC | Age: 67
End: 2022-01-27

## 2022-01-27 RX ORDER — EVOLOCUMAB 140 MG/ML
140 INJECTION, SOLUTION SUBCUTANEOUS
Qty: 2 ML | Refills: 6 | Status: SHIPPED | OUTPATIENT
Start: 2022-01-27 | End: 2022-08-08

## 2022-01-27 NOTE — TELEPHONE ENCOUNTER
----- Message from Axel Santiago sent at 1/24/2022 10:49 AM CST -----  Dr. Thompson     She called and said her insurance called her and they need a PA on the repatha and a new prescription.  She is due for her next shot on 01/30/22.      She uses [facility] pharmacy in [place].     If you need to call  her call [phone].     Thanks    Axel     Patient advised approval for repatha obtained. New rx sent to [facility] pharmacy.   A Catheter Bln Nc Spntr Fstrc 2.4/2.6-1.9fr 3mm 9mm was inflated in the circumflex artery.     The balloon was inflated at 14 thuy for 14 seconds at 2/9/2018 4:15 PM.  The balloon was used for 2nd inflation at 14 thuy for 20 seconds.

## 2022-02-01 DIAGNOSIS — E78.2 MIXED HYPERLIPIDEMIA: ICD-10-CM

## 2022-02-01 RX ORDER — FUROSEMIDE 20 MG/1
TABLET ORAL
Qty: 30 TABLET | Refills: 0 | Status: SHIPPED | OUTPATIENT
Start: 2022-02-01 | End: 2022-02-15

## 2022-02-15 RX ORDER — TRAZODONE HYDROCHLORIDE 150 MG/1
150 TABLET ORAL NIGHTLY
COMMUNITY
End: 2022-08-08

## 2022-02-15 RX ORDER — VENLAFAXINE HYDROCHLORIDE 75 MG/1
75 CAPSULE, EXTENDED RELEASE ORAL DAILY
COMMUNITY
End: 2022-10-21

## 2022-02-15 RX ORDER — ATOMOXETINE 40 MG/1
40 CAPSULE ORAL DAILY
COMMUNITY
End: 2022-04-12

## 2022-02-15 RX ORDER — ESOMEPRAZOLE MAGNESIUM 40 MG/1
40 CAPSULE, DELAYED RELEASE ORAL
COMMUNITY
End: 2022-08-08

## 2022-02-15 RX ORDER — DESLORATADINE 5 MG/1
5 TABLET ORAL DAILY
COMMUNITY
End: 2022-02-17

## 2022-02-15 RX ORDER — METOPROLOL SUCCINATE 25 MG/1
25 TABLET, EXTENDED RELEASE ORAL DAILY
COMMUNITY
End: 2023-01-18 | Stop reason: SDUPTHER

## 2022-02-15 RX ORDER — HYDROXYCHLOROQUINE SULFATE 200 MG/1
200 TABLET, FILM COATED ORAL DAILY
COMMUNITY
End: 2022-12-02

## 2022-02-15 RX ORDER — FUROSEMIDE 40 MG/1
40 TABLET ORAL DAILY
COMMUNITY
End: 2022-03-07

## 2022-02-15 RX ORDER — VENLAFAXINE HYDROCHLORIDE 150 MG/1
150 CAPSULE, EXTENDED RELEASE ORAL DAILY
COMMUNITY
End: 2022-08-08

## 2022-02-15 RX ORDER — LUBIPROSTONE 24 UG/1
24 CAPSULE ORAL 2 TIMES DAILY WITH MEALS
COMMUNITY
End: 2022-05-09

## 2022-02-15 RX ORDER — LEVOTHYROXINE SODIUM 88 UG/1
88 TABLET ORAL DAILY
COMMUNITY
End: 2022-03-15

## 2022-02-17 RX ORDER — DESLORATADINE 5 MG/1
TABLET ORAL
Qty: 90 TABLET | Refills: 3 | Status: SHIPPED | OUTPATIENT
Start: 2022-02-17 | End: 2023-01-30

## 2022-02-18 ENCOUNTER — ANESTHESIA EVENT (OUTPATIENT)
Dept: PERIOP | Facility: HOSPITAL | Age: 67
End: 2022-02-18

## 2022-02-18 ENCOUNTER — ANESTHESIA (OUTPATIENT)
Dept: PERIOP | Facility: HOSPITAL | Age: 67
End: 2022-02-18

## 2022-02-18 ENCOUNTER — HOSPITAL ENCOUNTER (OUTPATIENT)
Facility: HOSPITAL | Age: 67
Setting detail: HOSPITAL OUTPATIENT SURGERY
Discharge: HOME | End: 2022-02-18
Attending: OPHTHALMOLOGY | Admitting: OPHTHALMOLOGY

## 2022-02-18 VITALS
RESPIRATION RATE: 18 BRPM | BODY MASS INDEX: 27.79 KG/M2 | HEIGHT: 62 IN | WEIGHT: 151.01 LBS | SYSTOLIC BLOOD PRESSURE: 131 MMHG | HEART RATE: 95 BPM | OXYGEN SATURATION: 94 % | TEMPERATURE: 97.1 F | DIASTOLIC BLOOD PRESSURE: 80 MMHG

## 2022-02-18 PROCEDURE — 25010000002 FENTANYL CITRATE (PF) 50 MCG/ML SOLUTION: Performed by: NURSE ANESTHETIST, CERTIFIED REGISTERED

## 2022-02-18 PROCEDURE — V2632 POST CHMBR INTRAOCULAR LENS: HCPCS | Performed by: OPHTHALMOLOGY

## 2022-02-18 PROCEDURE — 25010000002 VANCOMYCIN 1 G RECONSTITUTED SOLUTION 1 EACH VIAL: Performed by: OPHTHALMOLOGY

## 2022-02-18 PROCEDURE — 25010000002 MIDAZOLAM PER 1 MG: Performed by: NURSE ANESTHETIST, CERTIFIED REGISTERED

## 2022-02-18 PROCEDURE — 25010000002 EPINEPHRINE PER 0.1 MG: Performed by: OPHTHALMOLOGY

## 2022-02-18 DEVICE — LENS ACRYSOF IQ 6X13MM SN60WF 19.0: Type: IMPLANTABLE DEVICE | Site: EYE | Status: FUNCTIONAL

## 2022-02-18 RX ORDER — SODIUM CHLORIDE 0.9 % (FLUSH) 0.9 %
10 SYRINGE (ML) INJECTION AS NEEDED
Status: DISCONTINUED | OUTPATIENT
Start: 2022-02-18 | End: 2022-02-18 | Stop reason: HOSPADM

## 2022-02-18 RX ORDER — PREDNISOLONE ACETATE 10 MG/ML
SUSPENSION/ DROPS OPHTHALMIC AS NEEDED
Status: DISCONTINUED | OUTPATIENT
Start: 2022-02-18 | End: 2022-02-18 | Stop reason: HOSPADM

## 2022-02-18 RX ORDER — TETRACAINE HYDROCHLORIDE 5 MG/ML
SOLUTION OPHTHALMIC AS NEEDED
Status: DISCONTINUED | OUTPATIENT
Start: 2022-02-18 | End: 2022-02-18 | Stop reason: HOSPADM

## 2022-02-18 RX ORDER — METOPROLOL SUCCINATE 50 MG/1
TABLET, EXTENDED RELEASE ORAL
Qty: 90 TABLET | Refills: 1 | Status: SHIPPED | OUTPATIENT
Start: 2022-02-18 | End: 2022-02-23

## 2022-02-18 RX ORDER — PHENYLEPHRINE HCL 2.5 %
1 DROPS OPHTHALMIC (EYE)
Status: COMPLETED | OUTPATIENT
Start: 2022-02-18 | End: 2022-02-18

## 2022-02-18 RX ORDER — MOXIFLOXACIN 5 MG/ML
SOLUTION/ DROPS OPHTHALMIC AS NEEDED
Status: DISCONTINUED | OUTPATIENT
Start: 2022-02-18 | End: 2022-02-18 | Stop reason: HOSPADM

## 2022-02-18 RX ORDER — MIDAZOLAM HYDROCHLORIDE 1 MG/ML
INJECTION INTRAMUSCULAR; INTRAVENOUS AS NEEDED
Status: DISCONTINUED | OUTPATIENT
Start: 2022-02-18 | End: 2022-02-18 | Stop reason: SURG

## 2022-02-18 RX ORDER — TETRACAINE HYDROCHLORIDE 5 MG/ML
1 SOLUTION OPHTHALMIC
Status: COMPLETED | OUTPATIENT
Start: 2022-02-18 | End: 2022-02-18

## 2022-02-18 RX ORDER — CYCLOPENTOLATE HYDROCHLORIDE 20 MG/ML
1 SOLUTION/ DROPS OPHTHALMIC
Status: COMPLETED | OUTPATIENT
Start: 2022-02-18 | End: 2022-02-18

## 2022-02-18 RX ORDER — BRIMONIDINE TARTRATE 0.15 %
DROPS OPHTHALMIC (EYE) AS NEEDED
Status: DISCONTINUED | OUTPATIENT
Start: 2022-02-18 | End: 2022-02-18 | Stop reason: HOSPADM

## 2022-02-18 RX ORDER — FENTANYL CITRATE 50 UG/ML
INJECTION, SOLUTION INTRAMUSCULAR; INTRAVENOUS AS NEEDED
Status: DISCONTINUED | OUTPATIENT
Start: 2022-02-18 | End: 2022-02-18 | Stop reason: SURG

## 2022-02-18 RX ADMIN — TETRACAINE HYDROCHLORIDE 1 DROP: 5 SOLUTION OPHTHALMIC at 06:30

## 2022-02-18 RX ADMIN — FENTANYL CITRATE 25 MCG: 50 INJECTION INTRAMUSCULAR; INTRAVENOUS at 08:33

## 2022-02-18 RX ADMIN — FENTANYL CITRATE 50 MCG: 50 INJECTION INTRAMUSCULAR; INTRAVENOUS at 08:25

## 2022-02-18 RX ADMIN — MIDAZOLAM HYDROCHLORIDE 1 MG: 1 INJECTION, SOLUTION INTRAMUSCULAR; INTRAVENOUS at 08:25

## 2022-02-18 RX ADMIN — TETRACAINE HYDROCHLORIDE 1 DROP: 5 SOLUTION OPHTHALMIC at 06:54

## 2022-02-18 RX ADMIN — PHENYLEPHRINE HYDROCHLORIDE 1 DROP: 25 SOLUTION/ DROPS OPHTHALMIC at 06:40

## 2022-02-18 RX ADMIN — SODIUM CHLORIDE, PRESERVATIVE FREE 10 ML: 5 INJECTION INTRAVENOUS at 06:38

## 2022-02-18 RX ADMIN — CYCLOPENTOLATE HYDROCHLORIDE 1 DROP: 20 SOLUTION/ DROPS OPHTHALMIC at 06:30

## 2022-02-18 RX ADMIN — CYCLOPENTOLATE HYDROCHLORIDE 1 DROP: 20 SOLUTION/ DROPS OPHTHALMIC at 06:55

## 2022-02-18 RX ADMIN — PHENYLEPHRINE HYDROCHLORIDE 1 DROP: 25 SOLUTION/ DROPS OPHTHALMIC at 06:55

## 2022-02-18 RX ADMIN — CYCLOPENTOLATE HYDROCHLORIDE 1 DROP: 20 SOLUTION/ DROPS OPHTHALMIC at 06:40

## 2022-02-18 RX ADMIN — PHENYLEPHRINE HYDROCHLORIDE 1 DROP: 25 SOLUTION/ DROPS OPHTHALMIC at 06:30

## 2022-02-18 NOTE — ANESTHESIA POSTPROCEDURE EVALUATION
Patient: Daysi Pa    Procedure Summary     Date: 02/18/22 Room / Location: [facility] OR  / [facility] OR    Anesthesia Start: 0828 Anesthesia Stop: 0841    Procedure: REMOVE CATARACT AND IMPLANT INTRAOCULAR LENS (Left Eye) Diagnosis:       Age-related nuclear cataract of left eye      (Age-related nuclear cataract of left eye [H25.12])    Surgeons: Cesar Cordon MD Provider: Emi Hoyt CRNA    Anesthesia Type: MAC ASA Status: 4          Anesthesia Type: MAC    Vitals  No vitals data found for the desired time range.          Post Anesthesia Care and Evaluation    Patient location during evaluation: PHASE II  Patient participation: complete - patient participated  Level of consciousness: awake and alert  Pain score: 0  Pain management: adequate  Airway patency: patent  Anesthetic complications: No anesthetic complications  PONV Status: none  Cardiovascular status: hemodynamically stable  Respiratory status: spontaneous ventilation and room air  Hydration status: acceptable    Comments: 154/85 16 98 97%

## 2022-02-18 NOTE — ANESTHESIA PREPROCEDURE EVALUATION
Anesthesia Evaluation     Patient summary reviewed and Nursing notes reviewed   no history of anesthetic complications:  NPO Solid Status: > 8 hours  NPO Liquid Status: > 8 hours           Airway   Mallampati: III  TM distance: >3 FB  Neck ROM: full  Possible difficult intubation  Dental    (+) upper dentures and lower dentures    Pulmonary     breath sounds clear to auscultation  (+) a smoker Former, COPD mild,   (-) asthma, shortness of breath, rhonchi, decreased breath sounds, wheezes, no home oxygen  Cardiovascular   Exercise tolerance: poor (<4 METS)    ECG reviewed  Patient on routine beta blocker and Beta blocker given within 24 hours of surgery  Rhythm: regular  Rate: normal    (+) hypertension 2 medications or greater, valvular problems/murmurs TI and MR, CAD, CHF Systolic <55%, murmur, hyperlipidemia,   (-) pacemaker, past MI, angina, peripheral edema, cardiac stents, CABG, DVT    ROS comment: EKG 10/1/2021:  Normal sinus rhythm  Normal ECG  When compared with ECG of 09-JUN-2021 08:55,    TTE 3/2/2021:  · Left ventricular systolic function is mildly reduced. LVEF is 47%. Pseudonormal diastolic function.  · Right ventricular systolic function is normal. RV cavity is mildly dilated.  · Aortic valve thickening and calcification.  · Mild tricuspid regurgitation.  · Dilated mitral annulus with moderate to severe mitral regurgitation.  · No pericardial effusion.    Left heart cath 6/30/2020:      Neuro/Psych  (+) psychiatric history ADHD and Anxiety,    (-) seizures, TIA, CVA  GI/Hepatic/Renal/Endo    (+)  GERD well controlled,  thyroid problem hypothyroidism    Musculoskeletal     (+) back pain,   Abdominal  - normal exam   Substance History - negative use     OB/GYN negative ob/gyn ROS         Other   arthritis,    history of cancer remission    ROS/Med Hx Other: Hx of GERD controlled on daily nexium    Hx of COPD: per pt she hasn't used albuterol inhaler in a few months.     Moderate to severe mitral valve  regurgitation- was previously being followed by Dr. Sears but now seen by Dr. Thompson. Sees Dr. Thompson every 6 months. Mild tricuspid valve regurgitation. Hx of ventricular bigeminy that's been controlled on metoprolol    Hx of ADHD- pt on strattera    Hx of breast CA                        Anesthesia Plan    ASA 4     MAC   (Morphine (pruritis)    1st eye)  intravenous induction     Anesthetic plan, all risks, benefits, and alternatives have been provided, discussed and informed consent has been obtained with: patient.    Plan discussed with CRNA.        CODE STATUS:

## 2022-02-25 ENCOUNTER — ANESTHESIA (OUTPATIENT)
Dept: PERIOP | Facility: HOSPITAL | Age: 67
End: 2022-02-25

## 2022-02-25 ENCOUNTER — ANESTHESIA EVENT (OUTPATIENT)
Dept: PERIOP | Facility: HOSPITAL | Age: 67
End: 2022-02-25

## 2022-02-25 ENCOUNTER — HOSPITAL ENCOUNTER (OUTPATIENT)
Facility: HOSPITAL | Age: 67
Setting detail: HOSPITAL OUTPATIENT SURGERY
Discharge: HOME | End: 2022-02-25
Attending: OPHTHALMOLOGY | Admitting: OPHTHALMOLOGY

## 2022-02-25 VITALS
OXYGEN SATURATION: 97 % | DIASTOLIC BLOOD PRESSURE: 65 MMHG | WEIGHT: 153.88 LBS | SYSTOLIC BLOOD PRESSURE: 135 MMHG | RESPIRATION RATE: 18 BRPM | BODY MASS INDEX: 28.32 KG/M2 | TEMPERATURE: 98.3 F | HEIGHT: 62 IN | HEART RATE: 95 BPM

## 2022-02-25 PROCEDURE — 25010000002 MIDAZOLAM PER 1 MG: Performed by: NURSE ANESTHETIST, CERTIFIED REGISTERED

## 2022-02-25 PROCEDURE — 25010000002 VANCOMYCIN 1 G RECONSTITUTED SOLUTION 1 EACH VIAL: Performed by: OPHTHALMOLOGY

## 2022-02-25 PROCEDURE — V2632 POST CHMBR INTRAOCULAR LENS: HCPCS | Performed by: OPHTHALMOLOGY

## 2022-02-25 PROCEDURE — 25010000002 FENTANYL CITRATE (PF) 50 MCG/ML SOLUTION: Performed by: NURSE ANESTHETIST, CERTIFIED REGISTERED

## 2022-02-25 PROCEDURE — 25010000002 EPINEPHRINE PER 0.1 MG: Performed by: OPHTHALMOLOGY

## 2022-02-25 DEVICE — LENS ACRYSOF IQ 6X13MM SN60WF 19.0: Type: IMPLANTABLE DEVICE | Site: EYE | Status: FUNCTIONAL

## 2022-02-25 RX ORDER — PHENYLEPHRINE HCL 2.5 %
1 DROPS OPHTHALMIC (EYE)
Status: COMPLETED | OUTPATIENT
Start: 2022-02-25 | End: 2022-02-25

## 2022-02-25 RX ORDER — MOXIFLOXACIN 5 MG/ML
SOLUTION/ DROPS OPHTHALMIC AS NEEDED
Status: DISCONTINUED | OUTPATIENT
Start: 2022-02-25 | End: 2022-02-25 | Stop reason: HOSPADM

## 2022-02-25 RX ORDER — FENTANYL CITRATE 50 UG/ML
INJECTION, SOLUTION INTRAMUSCULAR; INTRAVENOUS AS NEEDED
Status: DISCONTINUED | OUTPATIENT
Start: 2022-02-25 | End: 2022-02-25 | Stop reason: SURG

## 2022-02-25 RX ORDER — PREDNISOLONE ACETATE 10 MG/ML
SUSPENSION/ DROPS OPHTHALMIC AS NEEDED
Status: DISCONTINUED | OUTPATIENT
Start: 2022-02-25 | End: 2022-02-25 | Stop reason: HOSPADM

## 2022-02-25 RX ORDER — SODIUM CHLORIDE 0.9 % (FLUSH) 0.9 %
10 SYRINGE (ML) INJECTION AS NEEDED
Status: DISCONTINUED | OUTPATIENT
Start: 2022-02-25 | End: 2022-02-25 | Stop reason: HOSPADM

## 2022-02-25 RX ORDER — CYCLOPENTOLATE HYDROCHLORIDE 20 MG/ML
1 SOLUTION/ DROPS OPHTHALMIC
Status: COMPLETED | OUTPATIENT
Start: 2022-02-25 | End: 2022-02-25

## 2022-02-25 RX ORDER — BRIMONIDINE TARTRATE 0.15 %
DROPS OPHTHALMIC (EYE) AS NEEDED
Status: DISCONTINUED | OUTPATIENT
Start: 2022-02-25 | End: 2022-02-25 | Stop reason: HOSPADM

## 2022-02-25 RX ORDER — MIDAZOLAM HYDROCHLORIDE 1 MG/ML
INJECTION INTRAMUSCULAR; INTRAVENOUS AS NEEDED
Status: DISCONTINUED | OUTPATIENT
Start: 2022-02-25 | End: 2022-02-25 | Stop reason: SURG

## 2022-02-25 RX ORDER — TETRACAINE HYDROCHLORIDE 5 MG/ML
1 SOLUTION OPHTHALMIC AS NEEDED
Status: COMPLETED | OUTPATIENT
Start: 2022-02-25 | End: 2022-02-25

## 2022-02-25 RX ORDER — ONDANSETRON 2 MG/ML
4 INJECTION INTRAMUSCULAR; INTRAVENOUS ONCE AS NEEDED
Status: DISCONTINUED | OUTPATIENT
Start: 2022-02-25 | End: 2022-02-25 | Stop reason: HOSPADM

## 2022-02-25 RX ADMIN — TETRACAINE HYDROCHLORIDE 1 DROP: 5 SOLUTION OPHTHALMIC at 09:20

## 2022-02-25 RX ADMIN — FENTANYL CITRATE 100 MCG: 50 INJECTION INTRAMUSCULAR; INTRAVENOUS at 10:50

## 2022-02-25 RX ADMIN — PHENYLEPHRINE HYDROCHLORIDE 1 DROP: 25 SOLUTION/ DROPS OPHTHALMIC at 09:30

## 2022-02-25 RX ADMIN — MIDAZOLAM HYDROCHLORIDE 2 MG: 1 INJECTION, SOLUTION INTRAMUSCULAR; INTRAVENOUS at 10:50

## 2022-02-25 RX ADMIN — CYCLOPENTOLATE HYDROCHLORIDE 1 DROP: 20 SOLUTION/ DROPS OPHTHALMIC at 09:40

## 2022-02-25 RX ADMIN — CYCLOPENTOLATE HYDROCHLORIDE 1 DROP: 20 SOLUTION/ DROPS OPHTHALMIC at 09:30

## 2022-02-25 RX ADMIN — PHENYLEPHRINE HYDROCHLORIDE 1 DROP: 25 SOLUTION/ DROPS OPHTHALMIC at 09:20

## 2022-02-25 RX ADMIN — PHENYLEPHRINE HYDROCHLORIDE 1 DROP: 25 SOLUTION/ DROPS OPHTHALMIC at 09:40

## 2022-02-25 RX ADMIN — CYCLOPENTOLATE HYDROCHLORIDE 1 DROP: 20 SOLUTION/ DROPS OPHTHALMIC at 09:20

## 2022-02-25 RX ADMIN — TETRACAINE HYDROCHLORIDE 1 DROP: 5 SOLUTION OPHTHALMIC at 09:40

## 2022-02-25 NOTE — ANESTHESIA POSTPROCEDURE EVALUATION
Patient: Daysi Pa    Procedure Summary     Date: 02/25/22 Room / Location: [facility] OR 06 / [facility] OR    Anesthesia Start: 1052 Anesthesia Stop: 1105    Procedure: REMOVE CATARACT AND IMPLANT  INTRAOCULAR LENS IMPLANT (Right Eye) Diagnosis:       Age-related nuclear cataract of right eye      (Age-related nuclear cataract of right eye [H25.11])    Surgeons: Cesar Cordon MD Provider: Tony Hamilton MD    Anesthesia Type: MAC ASA Status: 4          Anesthesia Type: MAC    Vitals  No vitals data found for the desired time range.          Post Anesthesia Care and Evaluation    Patient location during evaluation: PHASE II  Patient participation: complete - patient participated  Level of consciousness: awake  Pain score: 0  Pain management: adequate  Airway patency: patent  Anesthetic complications: No anesthetic complications  PONV Status: none  Cardiovascular status: acceptable and hemodynamically stable  Respiratory status: room air  Hydration status: acceptable

## 2022-02-25 NOTE — ANESTHESIA PREPROCEDURE EVALUATION
Anesthesia Evaluation     Patient summary reviewed and Nursing notes reviewed   no history of anesthetic complications:  NPO Solid Status: > 8 hours  NPO Liquid Status: > 8 hours           Airway   Mallampati: III  TM distance: >3 FB  Neck ROM: full  Possible difficult intubation  Dental    (+) upper dentures and lower dentures    Pulmonary     breath sounds clear to auscultation  (+) a smoker Former, COPD mild,   (-) asthma, shortness of breath, rhonchi, decreased breath sounds, wheezes, no home oxygen  Cardiovascular   Exercise tolerance: poor (<4 METS)    ECG reviewed  Patient on routine beta blocker and Beta blocker given within 24 hours of surgery  Rhythm: regular  Rate: normal    (+) hypertension 2 medications or greater, valvular problems/murmurs TI and MR, CAD, CHF Systolic <55%, murmur, hyperlipidemia,   (-) pacemaker, past MI, angina, peripheral edema, cardiac stents, CABG, DVT    ROS comment: EKG 10/1/2021:  Normal sinus rhythm  Normal ECG  When compared with ECG of 09-JUN-2021 08:55,    TTE 3/2/2021:  · Left ventricular systolic function is mildly reduced. LVEF is 47%. Pseudonormal diastolic function.  · Right ventricular systolic function is normal. RV cavity is mildly dilated.  · Aortic valve thickening and calcification.  · Mild tricuspid regurgitation.  · Dilated mitral annulus with moderate to severe mitral regurgitation.  · No pericardial effusion.    Left heart cath 6/30/2020:      Neuro/Psych  (+) psychiatric history ADHD and Anxiety,    (-) seizures, TIA, CVA  GI/Hepatic/Renal/Endo    (+)  GERD well controlled,  thyroid problem hypothyroidism    Musculoskeletal     (+) back pain,   Abdominal    Substance History - negative use     OB/GYN negative ob/gyn ROS         Other   arthritis,    history of cancer remission    ROS/Med Hx Other: Hx of GERD controlled on daily nexium    Hx of COPD: per pt she hasn't used albuterol inhaler in a few months.     Moderate to severe mitral valve regurgitation-  was previously being followed by Dr. Sears but now seen by Dr. Thompson. Sees Dr. Thompson every 6 months. Mild tricuspid valve regurgitation. Hx of ventricular bigeminy that's been controlled on metoprolol    Hx of ADHD- pt on strattera    Hx of breast CA                          Anesthesia Plan    ASA 4     MAC   (Morphine (pruritis))  intravenous induction     Anesthetic plan, all risks, benefits, and alternatives have been provided, discussed and informed consent has been obtained with: patient and spouse/significant other.        CODE STATUS:

## 2022-02-27 DIAGNOSIS — E78.2 MIXED HYPERLIPIDEMIA: ICD-10-CM

## 2022-02-28 RX ORDER — FUROSEMIDE 20 MG/1
TABLET ORAL
Qty: 30 TABLET | Refills: 0 | Status: SHIPPED | OUTPATIENT
Start: 2022-02-28 | End: 2022-03-24

## 2022-03-07 RX ORDER — FUROSEMIDE 40 MG/1
TABLET ORAL
Qty: 90 TABLET | Refills: 3 | Status: SHIPPED | OUTPATIENT
Start: 2022-03-07 | End: 2023-01-12

## 2022-03-15 RX ORDER — LEVOTHYROXINE SODIUM 88 MCG
TABLET ORAL
Qty: 90 TABLET | Refills: 3 | Status: SHIPPED | OUTPATIENT
Start: 2022-03-15 | End: 2022-10-20 | Stop reason: DRUGHIGH

## 2022-03-24 DIAGNOSIS — E78.2 MIXED HYPERLIPIDEMIA: ICD-10-CM

## 2022-03-24 RX ORDER — FUROSEMIDE 20 MG/1
TABLET ORAL
Qty: 30 TABLET | Refills: 0 | Status: SHIPPED | OUTPATIENT
Start: 2022-03-24 | End: 2022-04-20

## 2022-03-31 ENCOUNTER — TELEPHONE (OUTPATIENT)
Dept: FAMILY MEDICINE CLINIC | Facility: CLINIC | Age: 67
End: 2022-03-31

## 2022-03-31 NOTE — TELEPHONE ENCOUNTER
Incoming Refill Request      Medication requested (name and dose): lubiprostone (AMITIZA) 24 MCG capsule    Pharmacy where request should be sent: [facility]    Additional details provided by patient: PT STATED IT NEEDED A PRIOR AUTHORIZATION    Best call back number: [phone]    Does the patient have less than a 3 day supply:  [x] Yes  [] No    Erica Fulton  03/31/22, 09:15 CDT

## 2022-04-01 ENCOUNTER — OFFICE VISIT (OUTPATIENT)
Dept: CARDIOLOGY | Facility: CLINIC | Age: 67
End: 2022-04-01

## 2022-04-01 VITALS
DIASTOLIC BLOOD PRESSURE: 74 MMHG | WEIGHT: 155 LBS | HEART RATE: 77 BPM | BODY MASS INDEX: 28.52 KG/M2 | HEIGHT: 62 IN | OXYGEN SATURATION: 96 % | SYSTOLIC BLOOD PRESSURE: 126 MMHG

## 2022-04-01 DIAGNOSIS — I25.10 CORONARY ARTERY DISEASE INVOLVING NATIVE CORONARY ARTERY OF NATIVE HEART WITHOUT ANGINA PECTORIS: Primary | ICD-10-CM

## 2022-04-01 DIAGNOSIS — I49.3 PVC (PREMATURE VENTRICULAR CONTRACTION): ICD-10-CM

## 2022-04-01 PROCEDURE — 93000 ELECTROCARDIOGRAM COMPLETE: CPT | Performed by: INTERNAL MEDICINE

## 2022-04-01 PROCEDURE — 99214 OFFICE O/P EST MOD 30 MIN: CPT | Performed by: INTERNAL MEDICINE

## 2022-04-01 NOTE — PROGRESS NOTES
[facility] Cardiology  OFFICE NOTE    Cardiovascular Medicine  Carmen Thompson M.D., RPVI         No referring provider defined for this encounter.         Thank you for asking me to see Daysi Pa for CAD.    Coronary Artery Disease      This is a 66 y.o. female with:    1.  Hypertension  2.  Hyperlipidemia  3.  Coronary artery disease  4. Viral cardiomyopathy with subsequent improvement in her ejection fraction to normal.  5. PVCs.    Daysi Pa is a 66 y.o. female who presents for consultation today.  Patient was previously seen by Dr. Sears for chest pain.  She had an echocardiogram which was concerning for ischemia inferior wall.  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  She also had an ostial PDA lesion which was treated medically. He also has moderate disease in a small caliber LAD after the takeoff of the diagonal.  She had more chest pain was taken back to the Cath Lab, the ostial RPDA was interrogated. FFR was negative at 0.95.  Medical management is recommended.    She has been on aspirin/metoprolol, Imdur and has done well.  She is on PCSK9 him better for her hyperlipidemia.    Recent Holter monitor showed significant PVCs her PVC burden was 24%.  Last echocardiogram in March 2021 with a EF of 47%,    Patient has been complaining of lack of energy, has occasional chest pains.  She has palpitations.  She does drink excessive amount of caffeine.  No other acute issues since last visit.  She had to cut back on her metoprolol from 50 to 25 mg.    04/01/2022:  No acute issues since last visit.  Patient feels like she has slowed down since starting metoprolol and has gained weight.  Has occasional shortness of breath.  Has occasional sharp stabbing pains lasting for a few seconds.      Review of Systems - ROS  Constitution: Negative for weakness, weight gain and weight loss.   HENT:  Negative for congestion.    Eyes: Negative for blurred vision.   Cardiovascular: As mentioned above  Respiratory: Negative for cough and hemoptysis.    Endocrine: Negative for polydipsia and polyuria.   Hematologic/Lymphatic: Negative for bleeding problem. Does not bruise/bleed easily.   Skin: Negative for flushing.   Musculoskeletal: Negative for neck pain and stiffness.   Gastrointestinal: Negative for abdominal pain, diarrhea, jaundice, melena, nausea and vomiting.   Genitourinary: Negative for dysuria and hematuria.   Neurological: Negative for dizziness, focal weakness and numbness.   Psychiatric/Behavioral: Negative for altered mental status and depression.          All other systems were reviewed and were negative.    family history includes Breast cancer in her sister; COPD in her paternal grandfather and paternal grandmother; Cancer in her maternal aunt; Coronary artery disease in her father; Diabetes in her father; Heart disease in an other family member; Hyperlipidemia in her father; Hypertension in her mother and another family member; Migraines in her mother; Osteoporosis in her mother; Rectal cancer in an other family member; Thyroid disease in an other family member.     reports that she quit smoking about 12 years ago. She quit after 30.00 years of use. She has never used smokeless tobacco. She reports that she does not drink alcohol and does not use drugs.    Allergies   Allergen Reactions   • Cephalosporins Nausea And Vomiting   • Erythromycin Nausea Only   • Morphine And Related Itching   • Other Nausea And Vomiting     Cipro   • Penicillins Hives   • Shellfish-Derived Products Hives and Other (See Comments)     Other reaction(s): SOA   • Zithromax [Azithromycin] Hives and Nausea And Vomiting   • Zolpidem Mental Status Change   • Honey Bee Treatment [Bee Venom] Hives   • Hydrocodone-Acetaminophen Anxiety   • Sulfa Antibiotics Hives and Rash     Sulfa (Sulfonamide Antibiotics)         Current  Outpatient Medications:   •  albuterol sulfate HFA (ProAir HFA) 108 (90 Base) MCG/ACT inhaler, Inhale 2 puffs 4 (Four) Times a Day As Needed., Disp: , Rfl:   •  aspirin 81 MG tablet, Take 1 tablet by mouth Daily., Disp: 30 tablet, Rfl: 11  •  atomoxetine (STRATTERA) 40 MG capsule, Take 40 mg by mouth Daily., Disp: , Rfl:   •  denosumab (PROLIA) 60 MG/ML solution prefilled syringe syringe, Inject 60 mg under the skin into the appropriate area as directed Every 6 (Six) Months., Disp: , Rfl:   •  desloratadine (CLARINEX) 5 MG tablet, TAKE 1 TABLET BY MOUTH EVERY DAY (Patient taking differently: Take 5 mg by mouth Daily As Needed.), Disp: 90 tablet, Rfl: 3  •  docusate sodium (COLACE) 100 MG capsule, Take 200 mg by mouth Every Night. at bedtime, Disp: , Rfl:   •  esomeprazole (nexIUM) 40 MG capsule, Take 40 mg by mouth Every Morning Before Breakfast., Disp: , Rfl:   •  Evolocumab (Repatha SureClick) solution auto-injector SureClick injection, Inject 1 mL under the skin into the appropriate area as directed Every 14 (Fourteen) Days., Disp: 2 mL, Rfl: 6  •  furosemide (LASIX) 20 MG tablet, TAKE 1 TABLET BY MOUTH DAILY UNTIL BACK TO BASELINE WEIGHT, Disp: 30 tablet, Rfl: 0  •  furosemide (LASIX) 40 MG tablet, TAKE 1 TABLET BY MOUTH EVERY DAY, Disp: 90 tablet, Rfl: 3  •  gabapentin (NEURONTIN) 300 MG capsule, Take 1 capsule by mouth 2 (Two) Times a Day., Disp: 180 capsule, Rfl: 0  •  hydroxychloroquine (PLAQUENIL) 200 MG tablet, Take 200 mg by mouth Daily., Disp: , Rfl:   •  isosorbide mononitrate (IMDUR) 30 MG 24 hr tablet, Take 1 tablet by mouth Daily., Disp: 90 tablet, Rfl: 3  •  lubiprostone (AMITIZA) 24 MCG capsule, Take 24 mcg by mouth 2 (Two) Times a Day With Meals., Disp: , Rfl:   •  metoclopramide (REGLAN) 10 MG tablet, TAKE 1 TABLET BY MOUTH 4 (FOUR) TIMES A DAY., Disp: 360 tablet, Rfl: 2  •  metoprolol succinate XL (TOPROL-XL) 25 MG 24 hr tablet, Take 25 mg by mouth Daily., Disp: , Rfl:   •  oxyCODONE (oxyCONTIN)  "10 MG 12 hr tablet, Take 10 mg by mouth 3 (Three) Times a Day. OxyContin 30 mg tablet,crush resistant,extended release, Disp: , Rfl:   •  polyethylene glycol (MIRALAX) packet, Take 17 g by mouth Daily., Disp: , Rfl:   •  Synthroid 88 MCG tablet, TAKE 1 TABLET BY MOUTH EVERY DAY, Disp: 90 tablet, Rfl: 3  •  traZODone (DESYREL) 150 MG tablet, Take 150 mg by mouth Every Night., Disp: , Rfl:   •  venlafaxine XR (EFFEXOR-XR) 150 MG 24 hr capsule, Take 150 mg by mouth Daily., Disp: , Rfl:   •  venlafaxine XR (EFFEXOR-XR) 75 MG 24 hr capsule, Take 75 mg by mouth Daily., Disp: , Rfl:     Physical Exam:  Vitals:    04/01/22 1111   BP: 126/74   BP Location: Right arm   Patient Position: Sitting   Cuff Size: Adult   Pulse: 77   SpO2: 96%   Weight: 70.3 kg (155 lb)   Height: 157.5 cm (62\")   PainSc: 0-No pain       GEN: alert, appears stated age and cooperative  Body Habitus: well-nourished  HEENT: Head: Normocephalic, no lesions, without obvious abnormality.  JVP: 6 cm of water at 45 degrees      HJR: absent      [place]:  normal size and placement    Palpable S4: Not present.   Heart rate: normal  Heart Rhythm: regular                                     Heart Sounds: S1: normal intensity  S2: normal intensity  S3: absent                               S4: absent  Opening Snap: absent          A2-OS:  N/A  Pericardial rub: absent                       Ejection click: None                                        Murmurs: Systolic: none  Diastolic: none  Extremity: Moves spontaneously      DATA REVIEWED:       ECG/EMG Results (all)     None        ---------------------------------------------------  TTE/JORDAN:  Results for orders placed in visit on 08/27/20    Adult Transthoracic Echo Complete W/ Cont if Necessary Per Protocol    Interpretation Summary  · Left ventricular systolic function is mildly reduced. LVEF is 47%. Pseudonormal diastolic function.  · Right ventricular systolic function is normal. RV cavity is mildly dilated.  · " Aortic valve thickening and calcification.  · Mild tricuspid regurgitation.  · Dilated mitral annulus with moderate to severe mitral regurgitation.  · No pericardial effusion.      CT:   No radiology results for the last 30 days.        --------------------------------------------------------------------------------------------------  LABS:     The CVD Risk score (Diogenes et al., 2008) failed to calculate for the following reasons:    The patient has a prior MI, stroke, CHF, or peripheral vascular disease diagnosis         Lab Results   Component Value Date    GLUCOSE 102 (H) 12/08/2021    BUN 7 (L) 12/08/2021    CREATININE 0.84 12/08/2021    EGFRIFNONA 68 12/08/2021    BCR 8.3 12/08/2021    K 4.1 12/08/2021    CO2 28.0 12/08/2021    CALCIUM 9.1 12/08/2021    ALBUMIN 4.00 12/08/2021    AST 18 12/08/2021    ALT 21 12/08/2021     Lab Results   Component Value Date    WBC 8.37 06/30/2020    HGB 10.2 (L) 06/30/2020    HCT 29.9 (L) 06/30/2020    MCV 85.7 06/30/2020     06/30/2020     Lab Results   Component Value Date    CHOL 159 02/03/2021    CHLPL 201 (H) 09/22/2016    TRIG 252 (H) 02/03/2021    HDL 64 (H) 02/03/2021    LDL 56 02/03/2021     Lab Results   Component Value Date    TSH 2.410 10/12/2021     No results found for: CKTOTAL, CKMB, CKMBINDEX, TROPONINI, TROPONINT  Lab Results   Component Value Date    HGBA1C 5.60 02/27/2020     No results found for: DDIMER  Lab Results   Component Value Date    ALT 21 12/08/2021     Lab Results   Component Value Date    HGBA1C 5.60 02/27/2020    HGBA1C 5.6 12/14/2017     Lab Results   Component Value Date    MICROALBUR <0.6 11/13/2017    CREATININE 0.84 12/08/2021     No results found for: IRON, TIBC, FERRITIN  Lab Results   Component Value Date    INR 1.01 06/30/2020    INR 1.05 01/21/2020    PROTIME 13.1 06/30/2020    PROTIME 13.5 01/21/2020       Assessment/Plan     1. CAD:  Cardiac cath showed  of small caliber RPL and ostial RPDA lesion. Ostial RPDA was  negative on FFR.  Also moderate disease in the small caliber LAD after the takeoff of the diagonal. Continue medical management.  Continue aspirin/Imdur.  added Ranexa however it was stopped because she has been on Plaquenil.    2. HTN:  Better controlled on current regimen    3. HLD: On PCSK9 him inhibitor. LDL is down to less than 50..    4. Cardiomyopathy: Previously had severe cardiomyopathy but EF is improved 52%. Recent repeat echocardiogram showed EF of 47%. He does have significant PVC burden now as well.  She has been on bisoprolol,  switched her to metoprolol XL and see if that improves her PVC burden.   Repeat Holter showed improvement in PVC burden.  Could not tolerate ACE inhibitor previously.  I advised her to cut back on her caffeine to help with her PVCs.    Prevention:  Patient's Body mass index is 28.35 kg/m². BMI is within normal parameters. No follow-up required..      Daysi Johnson Thierno  reports that she quit smoking about 12 years ago. She quit after 30.00 years of use. She has never used smokeless tobacco.                This document has been electronically signed by Carmen Thompson MD on April 1, 2022 11:30 CDT

## 2022-04-02 LAB
QT INTERVAL: 394 MS
QTC INTERVAL: 481 MS

## 2022-04-12 ENCOUNTER — LAB (OUTPATIENT)
Dept: LAB | Facility: HOSPITAL | Age: 67
End: 2022-04-12

## 2022-04-12 ENCOUNTER — OFFICE VISIT (OUTPATIENT)
Dept: FAMILY MEDICINE CLINIC | Facility: CLINIC | Age: 67
End: 2022-04-12

## 2022-04-12 VITALS
DIASTOLIC BLOOD PRESSURE: 80 MMHG | WEIGHT: 157 LBS | HEART RATE: 98 BPM | HEIGHT: 62 IN | SYSTOLIC BLOOD PRESSURE: 120 MMHG | BODY MASS INDEX: 28.89 KG/M2 | OXYGEN SATURATION: 94 %

## 2022-04-12 DIAGNOSIS — R06.09 DYSPNEA ON EXERTION: ICD-10-CM

## 2022-04-12 DIAGNOSIS — R53.83 FATIGUE, UNSPECIFIED TYPE: Primary | ICD-10-CM

## 2022-04-12 DIAGNOSIS — G89.4 CHRONIC PAIN SYNDROME: ICD-10-CM

## 2022-04-12 DIAGNOSIS — R53.83 FATIGUE, UNSPECIFIED TYPE: ICD-10-CM

## 2022-04-12 DIAGNOSIS — I25.118 CORONARY ARTERY DISEASE OF NATIVE ARTERY OF NATIVE HEART WITH STABLE ANGINA PECTORIS: ICD-10-CM

## 2022-04-12 DIAGNOSIS — F32.A DEPRESSIVE DISORDER: ICD-10-CM

## 2022-04-12 DIAGNOSIS — M81.0 AGE-RELATED OSTEOPOROSIS WITHOUT CURRENT PATHOLOGICAL FRACTURE: ICD-10-CM

## 2022-04-12 DIAGNOSIS — M79.18 MYOFASCIAL MUSCLE PAIN: ICD-10-CM

## 2022-04-12 DIAGNOSIS — F90.0 ATTENTION DEFICIT HYPERACTIVITY DISORDER (ADHD), PREDOMINANTLY INATTENTIVE TYPE: ICD-10-CM

## 2022-04-12 PROCEDURE — 82746 ASSAY OF FOLIC ACID SERUM: CPT

## 2022-04-12 PROCEDURE — 80053 COMPREHEN METABOLIC PANEL: CPT

## 2022-04-12 PROCEDURE — 82607 VITAMIN B-12: CPT

## 2022-04-12 PROCEDURE — 99214 OFFICE O/P EST MOD 30 MIN: CPT | Performed by: FAMILY MEDICINE

## 2022-04-12 PROCEDURE — 36415 COLL VENOUS BLD VENIPUNCTURE: CPT

## 2022-04-12 PROCEDURE — 85025 COMPLETE CBC W/AUTO DIFF WBC: CPT

## 2022-04-12 PROCEDURE — 82306 VITAMIN D 25 HYDROXY: CPT

## 2022-04-12 PROCEDURE — 80061 LIPID PANEL: CPT

## 2022-04-12 RX ORDER — ATOMOXETINE 60 MG/1
60 CAPSULE ORAL
Status: SHIPPED | COMMUNITY
Start: 2022-04-12 | End: 2022-04-12 | Stop reason: SDUPTHER

## 2022-04-12 RX ORDER — ATOMOXETINE 60 MG/1
60 CAPSULE ORAL DAILY
Qty: 30 CAPSULE | Refills: 2 | Status: SHIPPED | OUTPATIENT
Start: 2022-04-12 | End: 2022-04-20 | Stop reason: SDUPTHER

## 2022-04-12 NOTE — PROGRESS NOTES
"Daysi Crabtreecamden 4/12/2022   Chief Complaint   Patient presents with   • ADHD   • Coronary Artery Disease               66-year-old female with history of ADHD on atomoxetine 40 mg COPD coronary artery disease currently followed by cardiology and anxiety here today for scheduled follow-up visit.  Patient relates there are times where she has \"an overwhelming feeling like I cannot breathe and I am really sad\".  Patient has tried various antidepressant regimen including Zoloft and is currently on Effexor to 25 along with trazodone 150 and she currently is also taking Neurontin along with oxycodone through pain management she continues to follow with cardiology and has had difficulty tolerating metoprolol in fact she attributes some of her fatigue that she has been reporting to taking metoprolol she currently is on 25 mg daily along with Repatha and Lasix she is scheduled for an echocardiogram on April 29 she is due for her Prolia shot in June.  She states that she has periods of time where she feels \"like the world is coming to an end\" and she still has some difficulty with focusing she notes an overwhelming sense of dread and states she is still having difficulty sleeping at night and gets out of breath easily she continues with as needed albuterol.  Current Outpatient Medications on File Prior to Visit   Medication Sig Dispense Refill   • albuterol sulfate HFA (ProAir HFA) 108 (90 Base) MCG/ACT inhaler Inhale 2 puffs 4 (Four) Times a Day As Needed.     • aspirin 81 MG tablet Take 1 tablet by mouth Daily. 30 tablet 11   • denosumab (PROLIA) 60 MG/ML solution prefilled syringe syringe Inject 60 mg under the skin into the appropriate area as directed Every 6 (Six) Months.     • desloratadine (CLARINEX) 5 MG tablet TAKE 1 TABLET BY MOUTH EVERY DAY (Patient taking differently: Take 5 mg by mouth Daily As Needed.) 90 tablet 3   • docusate sodium (COLACE) 100 MG capsule Take 200 mg by mouth Every Night. at " bedtime     • esomeprazole (nexIUM) 40 MG capsule Take 40 mg by mouth Every Morning Before Breakfast.     • Evolocumab (Repatha SureClick) solution auto-injector SureClick injection Inject 1 mL under the skin into the appropriate area as directed Every 14 (Fourteen) Days. 2 mL 6   • furosemide (LASIX) 20 MG tablet TAKE 1 TABLET BY MOUTH DAILY UNTIL BACK TO BASELINE WEIGHT 30 tablet 0   • furosemide (LASIX) 40 MG tablet TAKE 1 TABLET BY MOUTH EVERY DAY 90 tablet 3   • gabapentin (NEURONTIN) 300 MG capsule Take 1 capsule by mouth 2 (Two) Times a Day. 180 capsule 0   • hydroxychloroquine (PLAQUENIL) 200 MG tablet Take 200 mg by mouth Daily.     • isosorbide mononitrate (IMDUR) 30 MG 24 hr tablet Take 1 tablet by mouth Daily. 90 tablet 3   • lubiprostone (AMITIZA) 24 MCG capsule Take 24 mcg by mouth 2 (Two) Times a Day With Meals.     • metoclopramide (REGLAN) 10 MG tablet TAKE 1 TABLET BY MOUTH 4 (FOUR) TIMES A DAY. 360 tablet 2   • metoprolol succinate XL (TOPROL-XL) 25 MG 24 hr tablet Take 25 mg by mouth Daily.     • oxyCODONE (oxyCONTIN) 10 MG 12 hr tablet Take 10 mg by mouth 3 (Three) Times a Day. OxyContin 30 mg tablet,crush resistant,extended release     • polyethylene glycol (MIRALAX) packet Take 17 g by mouth Daily.     • Synthroid 88 MCG tablet TAKE 1 TABLET BY MOUTH EVERY DAY 90 tablet 3   • traZODone (DESYREL) 150 MG tablet Take 150 mg by mouth Every Night.     • venlafaxine XR (EFFEXOR-XR) 150 MG 24 hr capsule Take 150 mg by mouth Daily.     • venlafaxine XR (EFFEXOR-XR) 75 MG 24 hr capsule Take 75 mg by mouth Daily.     • [DISCONTINUED] atomoxetine (STRATTERA) 40 MG capsule Take 40 mg by mouth Daily.     • [DISCONTINUED] atomoxetine (STRATTERA) 60 MG capsule Take 1 capsule by mouth.       No current facility-administered medications on file prior to visit.     Allergies   Allergen Reactions   • Cephalosporins Nausea And Vomiting   • Erythromycin Nausea Only   • Morphine And Related Itching   • Other Nausea  "And Vomiting     Cipro   • Penicillins Hives   • Shellfish-Derived Products Hives and Other (See Comments)     Other reaction(s): SOA   • Zithromax [Azithromycin] Hives and Nausea And Vomiting   • Zolpidem Mental Status Change   • Honey Bee Treatment [Bee Venom] Hives   • Hydrocodone-Acetaminophen Anxiety   • Sulfa Antibiotics Hives and Rash     Sulfa (Sulfonamide Antibiotics)     Exam alert /80   Pulse 98   Ht 157.5 cm (62\")   Wt 71.2 kg (157 lb)   SpO2 94%   BMI 28.72 kg/m²   HEENT grossly normal without asymmetry neck supple without JVD lungs clear without wheezes rales or rhonchi no tactile fremitus or pleural friction rub heart regular without murmur rub gallop or extrasystole PMI diffuse abdomen soft nontender extremities show no tremor no edema neuro nonfocal    Encounter Diagnoses   Name Primary?   • Fatigue, unspecified type Yes   • Chronic pain syndrome    • Myofacial muscle pain    • Depressive disorder    • Attention deficit hyperactivity disorder (ADHD), predominantly inattentive type    • Dyspnea on exertion    • Coronary artery disease of native artery of native heart with stable angina pectoris (HCC)    • Age-related osteoporosis without current pathological fracture      Plan: Continue current regimen refill given on atomoxetine we will check surveillance labs including CBC CMP B12 folate and vitamin D and lipid panel increase Strattera to 60 mg consider adding LABA ICS to current regimen patient is scheduled for echocardiogram next month will call me in 1 to 2 weeks with update on higher dose of Strattera recheck otherwise 3 months or as needed.      This document has been electronically signed by Tony Hi MD on April 12, 2022 17:21 CDT      "

## 2022-04-13 LAB
25(OH)D3 SERPL-MCNC: 35.4 NG/ML (ref 30–100)
ALBUMIN SERPL-MCNC: 4.6 G/DL (ref 3.5–5.2)
ALBUMIN/GLOB SERPL: 1.5 G/DL
ALP SERPL-CCNC: 75 U/L (ref 39–117)
ALT SERPL W P-5'-P-CCNC: 20 U/L (ref 1–33)
ANION GAP SERPL CALCULATED.3IONS-SCNC: 11 MMOL/L (ref 5–15)
AST SERPL-CCNC: 17 U/L (ref 1–32)
BASOPHILS # BLD AUTO: 0.03 10*3/MM3 (ref 0–0.2)
BASOPHILS NFR BLD AUTO: 0.4 % (ref 0–1.5)
BILIRUB SERPL-MCNC: <0.2 MG/DL (ref 0–1.2)
BUN SERPL-MCNC: 6 MG/DL (ref 8–23)
BUN/CREAT SERPL: 6.3 (ref 7–25)
CALCIUM SPEC-SCNC: 9.2 MG/DL (ref 8.6–10.5)
CHLORIDE SERPL-SCNC: 103 MMOL/L (ref 98–107)
CHOLEST SERPL-MCNC: 180 MG/DL (ref 0–200)
CO2 SERPL-SCNC: 25 MMOL/L (ref 22–29)
CREAT SERPL-MCNC: 0.96 MG/DL (ref 0.57–1)
DEPRECATED RDW RBC AUTO: 41.4 FL (ref 37–54)
EGFRCR SERPLBLD CKD-EPI 2021: 65 ML/MIN/1.73
EOSINOPHIL # BLD AUTO: 0.22 10*3/MM3 (ref 0–0.4)
EOSINOPHIL NFR BLD AUTO: 2.8 % (ref 0.3–6.2)
ERYTHROCYTE [DISTWIDTH] IN BLOOD BY AUTOMATED COUNT: 13.6 % (ref 12.3–15.4)
FOLATE SERPL-MCNC: 8.76 NG/ML (ref 4.78–24.2)
GLOBULIN UR ELPH-MCNC: 3.1 GM/DL
GLUCOSE SERPL-MCNC: 101 MG/DL (ref 65–99)
HCT VFR BLD AUTO: 36.3 % (ref 34–46.6)
HDLC SERPL-MCNC: 72 MG/DL (ref 40–60)
HGB BLD-MCNC: 12 G/DL (ref 12–15.9)
IMM GRANULOCYTES # BLD AUTO: 0.06 10*3/MM3 (ref 0–0.05)
IMM GRANULOCYTES NFR BLD AUTO: 0.8 % (ref 0–0.5)
LDLC SERPL CALC-MCNC: 77 MG/DL (ref 0–100)
LDLC/HDLC SERPL: 0.98 {RATIO}
LYMPHOCYTES # BLD AUTO: 1.9 10*3/MM3 (ref 0.7–3.1)
LYMPHOCYTES NFR BLD AUTO: 24.6 % (ref 19.6–45.3)
MCH RBC QN AUTO: 27.6 PG (ref 26.6–33)
MCHC RBC AUTO-ENTMCNC: 33.1 G/DL (ref 31.5–35.7)
MCV RBC AUTO: 83.6 FL (ref 79–97)
MONOCYTES # BLD AUTO: 0.71 10*3/MM3 (ref 0.1–0.9)
MONOCYTES NFR BLD AUTO: 9.2 % (ref 5–12)
NEUTROPHILS NFR BLD AUTO: 4.8 10*3/MM3 (ref 1.7–7)
NEUTROPHILS NFR BLD AUTO: 62.2 % (ref 42.7–76)
NRBC BLD AUTO-RTO: 0 /100 WBC (ref 0–0.2)
PLATELET # BLD AUTO: 388 10*3/MM3 (ref 140–450)
PMV BLD AUTO: 11.2 FL (ref 6–12)
POTASSIUM SERPL-SCNC: 4.4 MMOL/L (ref 3.5–5.2)
PROT SERPL-MCNC: 7.7 G/DL (ref 6–8.5)
RBC # BLD AUTO: 4.34 10*6/MM3 (ref 3.77–5.28)
SODIUM SERPL-SCNC: 139 MMOL/L (ref 136–145)
TRIGL SERPL-MCNC: 189 MG/DL (ref 0–150)
VIT B12 BLD-MCNC: 241 PG/ML (ref 211–946)
VLDLC SERPL-MCNC: 31 MG/DL (ref 5–40)
WBC NRBC COR # BLD: 7.72 10*3/MM3 (ref 3.4–10.8)

## 2022-04-20 DIAGNOSIS — E78.2 MIXED HYPERLIPIDEMIA: ICD-10-CM

## 2022-04-20 RX ORDER — FUROSEMIDE 20 MG/1
TABLET ORAL
Qty: 30 TABLET | Refills: 0 | Status: SHIPPED | OUTPATIENT
Start: 2022-04-20 | End: 2022-05-09

## 2022-04-21 RX ORDER — ATOMOXETINE 60 MG/1
60 CAPSULE ORAL DAILY
Qty: 90 CAPSULE | Refills: 0 | Status: SHIPPED | OUTPATIENT
Start: 2022-04-21 | End: 2022-06-16 | Stop reason: SDUPTHER

## 2022-04-22 ENCOUNTER — TELEPHONE (OUTPATIENT)
Dept: CARDIOLOGY | Facility: CLINIC | Age: 67
End: 2022-04-22

## 2022-04-22 LAB
BH CV ECHO MEAS - ACS: 1.43 CM
BH CV ECHO MEAS - AO MAX PG: 12.1 MMHG
BH CV ECHO MEAS - AO MEAN PG: 8 MMHG
BH CV ECHO MEAS - AO ROOT DIAM: 2.08 CM
BH CV ECHO MEAS - AO V2 MAX: 174.2 CM/SEC
BH CV ECHO MEAS - AO V2 VTI: 37.3 CM
BH CV ECHO MEAS - AVA(I,D): 1.56 CM2
BH CV ECHO MEAS - EDV(CUBED): 70.4 ML
BH CV ECHO MEAS - EDV(MOD-SP2): 53.6 ML
BH CV ECHO MEAS - EDV(MOD-SP4): 47 ML
BH CV ECHO MEAS - EF(MOD-SP2): 51.3 %
BH CV ECHO MEAS - EF(MOD-SP4): 46.2 %
BH CV ECHO MEAS - EPSS: 0.5 CM
BH CV ECHO MEAS - ESV(CUBED): 22.2 ML
BH CV ECHO MEAS - ESV(MOD-SP2): 26.1 ML
BH CV ECHO MEAS - ESV(MOD-SP4): 25.3 ML
BH CV ECHO MEAS - FS: 31.9 %
BH CV ECHO MEAS - IVS/LVPW: 1.25 CM
BH CV ECHO MEAS - IVSD: 1.03 CM
BH CV ECHO MEAS - LA DIMENSION: 4.1 CM
BH CV ECHO MEAS - LAT PEAK E' VEL: 7.7 CM/SEC
BH CV ECHO MEAS - LV DIASTOLIC VOL/BSA (35-75): 27.3 CM2
BH CV ECHO MEAS - LV MASS(C)D: 121.1 GRAMS
BH CV ECHO MEAS - LV MAX PG: 3.3 MMHG
BH CV ECHO MEAS - LV MEAN PG: 2.13 MMHG
BH CV ECHO MEAS - LV SYSTOLIC VOL/BSA (12-30): 14.7 CM2
BH CV ECHO MEAS - LV V1 MAX: 91.2 CM/SEC
BH CV ECHO MEAS - LV V1 VTI: 19 CM
BH CV ECHO MEAS - LVIDD: 4.1 CM
BH CV ECHO MEAS - LVIDS: 2.8 CM
BH CV ECHO MEAS - LVOT AREA: 3.1 CM2
BH CV ECHO MEAS - LVOT DIAM: 1.97 CM
BH CV ECHO MEAS - LVPWD: 0.83 CM
BH CV ECHO MEAS - MED PEAK E' VEL: 5.9 CM/SEC
BH CV ECHO MEAS - MR MAX PG: 103.6 MMHG
BH CV ECHO MEAS - MR MAX VEL: 508.8 CM/SEC
BH CV ECHO MEAS - MV A MAX VEL: 93.8 CM/SEC
BH CV ECHO MEAS - MV DEC SLOPE: 466.8 CM/SEC2
BH CV ECHO MEAS - MV DEC TIME: 0.16 MSEC
BH CV ECHO MEAS - MV E MAX VEL: 52.4 CM/SEC
BH CV ECHO MEAS - MV E/A: 0.56
BH CV ECHO MEAS - MV MAX PG: 3.8 MMHG
BH CV ECHO MEAS - MV MEAN PG: 2.19 MMHG
BH CV ECHO MEAS - MV V2 VTI: 16.8 CM
BH CV ECHO MEAS - MVA(VTI): 3.5 CM2
BH CV ECHO MEAS - PA V2 MAX: 88.2 CM/SEC
BH CV ECHO MEAS - RV MAX PG: 2.9 MMHG
BH CV ECHO MEAS - RV V1 MAX: 85.4 CM/SEC
BH CV ECHO MEAS - RV V1 VTI: 17.6 CM
BH CV ECHO MEAS - RVDD: 3.2 CM
BH CV ECHO MEAS - SI(MOD-SP2): 15.9 ML/M2
BH CV ECHO MEAS - SI(MOD-SP4): 12.6 ML/M2
BH CV ECHO MEAS - SV(LVOT): 58.1 ML
BH CV ECHO MEAS - SV(MOD-SP2): 27.5 ML
BH CV ECHO MEAS - SV(MOD-SP4): 21.7 ML
BH CV ECHO MEASUREMENTS AVERAGE E/E' RATIO: 7.71
BH CV XLRA - RV BASE: 1.89 CM
BH CV XLRA - RV LENGTH: 5.4 CM
BH CV XLRA - RV MID: 1.61 CM
MAXIMAL PREDICTED HEART RATE: 153 BPM
STRESS TARGET HR: 130 BPM

## 2022-04-22 NOTE — TELEPHONE ENCOUNTER
Carmen Thompson MD Oldham, Donna L, MA  Mild diastolic dysfunction ,mild mitral regurgitation, continue current medica management    Echo results given to the patient per dr. Thompson.

## 2022-05-09 DIAGNOSIS — E78.2 MIXED HYPERLIPIDEMIA: ICD-10-CM

## 2022-05-09 RX ORDER — LUBIPROSTONE 24 UG/1
CAPSULE ORAL
Qty: 180 CAPSULE | Refills: 3 | Status: SHIPPED | OUTPATIENT
Start: 2022-05-09

## 2022-05-09 RX ORDER — FUROSEMIDE 20 MG/1
TABLET ORAL
Qty: 30 TABLET | Refills: 0 | Status: SHIPPED | OUTPATIENT
Start: 2022-05-09 | End: 2022-05-17

## 2022-05-10 ENCOUNTER — LAB (OUTPATIENT)
Dept: LAB | Facility: HOSPITAL | Age: 67
End: 2022-05-10

## 2022-05-10 DIAGNOSIS — M81.0 AGE-RELATED OSTEOPOROSIS WITHOUT CURRENT PATHOLOGICAL FRACTURE: ICD-10-CM

## 2022-05-10 LAB
ALBUMIN SERPL-MCNC: 4.3 G/DL (ref 3.5–5.2)
ALBUMIN/GLOB SERPL: 1.5 G/DL
ALP SERPL-CCNC: 79 U/L (ref 39–117)
ALT SERPL W P-5'-P-CCNC: 16 U/L (ref 1–33)
ANION GAP SERPL CALCULATED.3IONS-SCNC: 13 MMOL/L (ref 5–15)
AST SERPL-CCNC: 14 U/L (ref 1–32)
BILIRUB SERPL-MCNC: 0.2 MG/DL (ref 0–1.2)
BUN SERPL-MCNC: 7 MG/DL (ref 8–23)
BUN/CREAT SERPL: 7.4 (ref 7–25)
CALCIUM SPEC-SCNC: 9.7 MG/DL (ref 8.6–10.5)
CHLORIDE SERPL-SCNC: 99 MMOL/L (ref 98–107)
CO2 SERPL-SCNC: 27 MMOL/L (ref 22–29)
CREAT SERPL-MCNC: 0.94 MG/DL (ref 0.57–1)
EGFRCR SERPLBLD CKD-EPI 2021: 66.6 ML/MIN/1.73
GLOBULIN UR ELPH-MCNC: 2.8 GM/DL
GLUCOSE SERPL-MCNC: 112 MG/DL (ref 65–99)
MAGNESIUM SERPL-MCNC: 1.9 MG/DL (ref 1.6–2.4)
PHOSPHATE SERPL-MCNC: 4.8 MG/DL (ref 2.5–4.5)
POTASSIUM SERPL-SCNC: 3.6 MMOL/L (ref 3.5–5.2)
PROT SERPL-MCNC: 7.1 G/DL (ref 6–8.5)
SODIUM SERPL-SCNC: 139 MMOL/L (ref 136–145)

## 2022-05-10 PROCEDURE — 84100 ASSAY OF PHOSPHORUS: CPT

## 2022-05-10 PROCEDURE — 83735 ASSAY OF MAGNESIUM: CPT

## 2022-05-10 PROCEDURE — 36415 COLL VENOUS BLD VENIPUNCTURE: CPT

## 2022-05-10 PROCEDURE — 80053 COMPREHEN METABOLIC PANEL: CPT

## 2022-05-17 DIAGNOSIS — E78.2 MIXED HYPERLIPIDEMIA: ICD-10-CM

## 2022-05-17 RX ORDER — FUROSEMIDE 20 MG/1
TABLET ORAL
Qty: 30 TABLET | Refills: 0 | Status: SHIPPED | OUTPATIENT
Start: 2022-05-17 | End: 2022-07-01

## 2022-06-09 ENCOUNTER — LAB (OUTPATIENT)
Dept: LAB | Facility: HOSPITAL | Age: 67
End: 2022-06-09

## 2022-06-09 DIAGNOSIS — M81.0 AGE-RELATED OSTEOPOROSIS WITHOUT CURRENT PATHOLOGICAL FRACTURE: ICD-10-CM

## 2022-06-09 LAB
ALBUMIN SERPL-MCNC: 4.4 G/DL (ref 3.5–5.2)
ALBUMIN/GLOB SERPL: 1.5 G/DL
ALP SERPL-CCNC: 77 U/L (ref 39–117)
ALT SERPL W P-5'-P-CCNC: 18 U/L (ref 1–33)
ANION GAP SERPL CALCULATED.3IONS-SCNC: 11 MMOL/L (ref 5–15)
AST SERPL-CCNC: 17 U/L (ref 1–32)
BILIRUB SERPL-MCNC: 0.2 MG/DL (ref 0–1.2)
BUN SERPL-MCNC: 8 MG/DL (ref 8–23)
BUN/CREAT SERPL: 8.2 (ref 7–25)
CALCIUM SPEC-SCNC: 9.6 MG/DL (ref 8.6–10.5)
CHLORIDE SERPL-SCNC: 94 MMOL/L (ref 98–107)
CO2 SERPL-SCNC: 27 MMOL/L (ref 22–29)
CREAT SERPL-MCNC: 0.97 MG/DL (ref 0.57–1)
EGFRCR SERPLBLD CKD-EPI 2021: 64.2 ML/MIN/1.73
GLOBULIN UR ELPH-MCNC: 2.9 GM/DL
GLUCOSE SERPL-MCNC: 122 MG/DL (ref 65–99)
MAGNESIUM SERPL-MCNC: 2.1 MG/DL (ref 1.6–2.4)
PHOSPHATE SERPL-MCNC: 4 MG/DL (ref 2.5–4.5)
POTASSIUM SERPL-SCNC: 3.9 MMOL/L (ref 3.5–5.2)
PROT SERPL-MCNC: 7.3 G/DL (ref 6–8.5)
SODIUM SERPL-SCNC: 132 MMOL/L (ref 136–145)

## 2022-06-09 PROCEDURE — 84100 ASSAY OF PHOSPHORUS: CPT

## 2022-06-09 PROCEDURE — 80053 COMPREHEN METABOLIC PANEL: CPT

## 2022-06-09 PROCEDURE — 36415 COLL VENOUS BLD VENIPUNCTURE: CPT

## 2022-06-09 PROCEDURE — 83735 ASSAY OF MAGNESIUM: CPT

## 2022-06-10 DIAGNOSIS — M81.0 AGE-RELATED OSTEOPOROSIS WITHOUT CURRENT PATHOLOGICAL FRACTURE: Primary | ICD-10-CM

## 2022-06-16 ENCOUNTER — INFUSION (OUTPATIENT)
Dept: ONCOLOGY | Facility: HOSPITAL | Age: 67
End: 2022-06-16

## 2022-06-16 VITALS — HEART RATE: 100 BPM | RESPIRATION RATE: 18 BRPM | SYSTOLIC BLOOD PRESSURE: 115 MMHG | DIASTOLIC BLOOD PRESSURE: 64 MMHG

## 2022-06-16 DIAGNOSIS — M81.0 AGE-RELATED OSTEOPOROSIS WITHOUT CURRENT PATHOLOGICAL FRACTURE: Primary | ICD-10-CM

## 2022-06-16 PROCEDURE — 25010000002 DENOSUMAB 60 MG/ML SOLUTION PREFILLED SYRINGE: Performed by: NURSE PRACTITIONER

## 2022-06-16 PROCEDURE — 96372 THER/PROPH/DIAG INJ SC/IM: CPT | Performed by: NURSE PRACTITIONER

## 2022-06-16 RX ORDER — ATOMOXETINE 60 MG/1
60 CAPSULE ORAL DAILY
Qty: 90 CAPSULE | Refills: 0 | Status: SHIPPED | OUTPATIENT
Start: 2022-06-16 | End: 2022-10-18

## 2022-06-16 RX ADMIN — DENOSUMAB 60 MG: 60 INJECTION SUBCUTANEOUS at 13:20

## 2022-07-01 DIAGNOSIS — E78.2 MIXED HYPERLIPIDEMIA: ICD-10-CM

## 2022-07-01 RX ORDER — FUROSEMIDE 20 MG/1
TABLET ORAL
Qty: 30 TABLET | Refills: 0 | Status: SHIPPED | OUTPATIENT
Start: 2022-07-01 | End: 2022-07-25

## 2022-07-20 ENCOUNTER — OFFICE VISIT (OUTPATIENT)
Dept: FAMILY MEDICINE CLINIC | Facility: CLINIC | Age: 67
End: 2022-07-20

## 2022-07-20 VITALS
HEART RATE: 94 BPM | BODY MASS INDEX: 27.6 KG/M2 | WEIGHT: 150 LBS | DIASTOLIC BLOOD PRESSURE: 80 MMHG | SYSTOLIC BLOOD PRESSURE: 120 MMHG | OXYGEN SATURATION: 98 % | HEIGHT: 62 IN

## 2022-07-20 DIAGNOSIS — J44.9 STAGE 2 MODERATE COPD BY GOLD CLASSIFICATION: ICD-10-CM

## 2022-07-20 DIAGNOSIS — I25.118 CORONARY ARTERY DISEASE OF NATIVE ARTERY OF NATIVE HEART WITH STABLE ANGINA PECTORIS: ICD-10-CM

## 2022-07-20 DIAGNOSIS — M79.7 PRIMARY FIBROMYALGIA SYNDROME: Primary | ICD-10-CM

## 2022-07-20 DIAGNOSIS — F90.0 ATTENTION DEFICIT HYPERACTIVITY DISORDER (ADHD), PREDOMINANTLY INATTENTIVE TYPE: ICD-10-CM

## 2022-07-20 DIAGNOSIS — E78.5 DYSLIPIDEMIA: ICD-10-CM

## 2022-07-20 DIAGNOSIS — R53.83 FATIGUE, UNSPECIFIED TYPE: ICD-10-CM

## 2022-07-20 PROCEDURE — 99214 OFFICE O/P EST MOD 30 MIN: CPT | Performed by: FAMILY MEDICINE

## 2022-07-20 RX ORDER — ISOSORBIDE MONONITRATE 60 MG/1
60 TABLET, EXTENDED RELEASE ORAL DAILY
Qty: 90 TABLET | Refills: 3 | Status: SHIPPED | OUTPATIENT
Start: 2022-07-20 | End: 2023-01-12

## 2022-07-21 NOTE — PROGRESS NOTES
"Daysi Johnson Thierno 7/20/22  Chief Complaint   Patient presents with   • ADHD     FOLLOW UP               66-year-old female history of COPD coronary artery disease with stable angina chronic pain chronic back pain and myofascial pain RA on DMARD followed by rheumatology chronic fatigue here today for scheduled follow-up visit.  She notes persistent generalized myalgia and ongoing fatigue stating \"I sleep all the time\" in spite of this she also paradoxically complains of insomnia other history significant for hyperlipidemia on Repatha she is on chronic long-acting nitrates for her angina in spite of this she continues to have intermittent angina on a daily basis at times it is severe she describes ongoing fatigue and a \"real heavy feeling\" she relates that they have not pursued PTCA because it is \"only 98%\" patient did get a steroid shot in her left hip last week with modest relief she continues to follow with pain management for her chronic pain reportedly her ADHD symptoms are stable she relates that her panicky symptoms in the past have more or less resolved.  Pulmonary status is stable she is denies any dyspnea at rest although she does get short of breath with exertion.  She denies cough fever or chills nausea or vomiting.    Current Outpatient Medications on File Prior to Visit   Medication Sig Dispense Refill   • albuterol sulfate HFA (ProAir HFA) 108 (90 Base) MCG/ACT inhaler Inhale 2 puffs 4 (Four) Times a Day As Needed.     • aspirin 81 MG tablet Take 1 tablet by mouth Daily. 30 tablet 11   • atomoxetine (STRATTERA) 60 MG capsule Take 1 capsule by mouth Daily. 90 capsule 0   • denosumab (PROLIA) 60 MG/ML solution prefilled syringe syringe Inject 60 mg under the skin into the appropriate area as directed Every 6 (Six) Months.     • desloratadine (CLARINEX) 5 MG tablet TAKE 1 TABLET BY MOUTH EVERY DAY (Patient taking differently: Take 5 mg by mouth Daily As Needed.) 90 tablet 3   • docusate sodium " (COLACE) 100 MG capsule Take 200 mg by mouth Every Night. at bedtime     • esomeprazole (nexIUM) 40 MG capsule Take 40 mg by mouth Every Morning Before Breakfast.     • Evolocumab (Repatha SureClick) solution auto-injector SureClick injection Inject 1 mL under the skin into the appropriate area as directed Every 14 (Fourteen) Days. 2 mL 6   • furosemide (LASIX) 20 MG tablet TAKE 1 TABLET BY MOUTH DAILY UNTIL BACK TO BASELINE WEIGHT 30 tablet 0   • furosemide (LASIX) 40 MG tablet TAKE 1 TABLET BY MOUTH EVERY DAY 90 tablet 3   • gabapentin (NEURONTIN) 300 MG capsule Take 1 capsule by mouth 2 (Two) Times a Day. 180 capsule 0   • hydroxychloroquine (PLAQUENIL) 200 MG tablet Take 200 mg by mouth Daily.     • lubiprostone (AMITIZA) 24 MCG capsule TAKE 1 CAPSULE BY MOUTH TWICE A  capsule 3   • metoclopramide (REGLAN) 10 MG tablet TAKE 1 TABLET BY MOUTH 4 (FOUR) TIMES A DAY. 360 tablet 2   • metoprolol succinate XL (TOPROL-XL) 25 MG 24 hr tablet Take 25 mg by mouth Daily.     • oxyCODONE (oxyCONTIN) 10 MG 12 hr tablet Take 10 mg by mouth 3 (Three) Times a Day. OxyContin 30 mg tablet,crush resistant,extended release     • polyethylene glycol (MIRALAX) packet Take 17 g by mouth Daily.     • Synthroid 88 MCG tablet TAKE 1 TABLET BY MOUTH EVERY DAY 90 tablet 3   • traZODone (DESYREL) 150 MG tablet Take 150 mg by mouth Every Night.     • venlafaxine XR (EFFEXOR-XR) 150 MG 24 hr capsule Take 150 mg by mouth Daily.     • venlafaxine XR (EFFEXOR-XR) 75 MG 24 hr capsule Take 75 mg by mouth Daily.       No current facility-administered medications on file prior to visit.     Allergies   Allergen Reactions   • Cephalosporins Nausea And Vomiting   • Erythromycin Nausea Only   • Morphine And Related Itching   • Other Nausea And Vomiting     Cipro   • Penicillins Hives   • Shellfish-Derived Products Hives and Other (See Comments)     Other reaction(s): SOA   • Zithromax [Azithromycin] Hives and Nausea And Vomiting   • Zolpidem  "Mental Status Change   • Honey Bee Treatment [Bee Venom] Hives   • Hydrocodone-Acetaminophen Anxiety   • Sulfa Antibiotics Hives and Rash     Sulfa (Sulfonamide Antibiotics)     Exam alert /80 (BP Location: Left arm, Patient Position: Sitting, Cuff Size: Adult)   Pulse 94   Ht 157.5 cm (62\")   Wt 68 kg (150 lb)   SpO2 98%   BMI 27.44 kg/m²   General appearance alert no distress he has a slightly dusky complexion HEENT grossly normal without asymmetry neck supple without JVD breath sounds are diminished lungs are otherwise clear without wheezes rales rhonchi tactile fremitus or pleural friction rub heart regular without murmur rub gallop or extrasystole PMI diffuse abdomen soft nontender extremities show mild muscle wasting no spasticity no edema onychogryphosis noted on the left hand neuro nonfocal no tremor no ataxia    Impression   Encounter Diagnoses   Name Primary?   • Primary fibromyalgia syndrome Yes   • Coronary artery disease of native artery of native heart with stable angina pectoris ([facility])    • Stage 2 moderate COPD by GOLD classification ([facility])    • Attention deficit hyperactivity disorder (ADHD), predominantly inattentive type    • Dyslipidemia    • Fatigue, unspecified type      Plan: Above problem list is chronic and ongoing but stable we will increase her Imdur to 60 mg patient is planning to get her second COVID booster in September continue current regimen otherwise recheck 3 months or as needed.  Patient did have surveillance labs last month and will have follow-up labs scheduled in 3 months      This document has been electronically signed by Tony Hi MD on July 21, 2022 10:40 CDT      "

## 2022-07-23 DIAGNOSIS — E78.2 MIXED HYPERLIPIDEMIA: ICD-10-CM

## 2022-07-25 RX ORDER — FUROSEMIDE 20 MG/1
TABLET ORAL
Qty: 30 TABLET | Refills: 0 | Status: SHIPPED | OUTPATIENT
Start: 2022-07-25

## 2022-08-08 RX ORDER — ESOMEPRAZOLE MAGNESIUM 40 MG/1
CAPSULE, DELAYED RELEASE ORAL
Qty: 90 CAPSULE | Refills: 3 | Status: SHIPPED | OUTPATIENT
Start: 2022-08-08

## 2022-08-08 RX ORDER — EVOLOCUMAB 140 MG/ML
140 INJECTION, SOLUTION SUBCUTANEOUS
Qty: 2 PEN | Refills: 10 | Status: SHIPPED | OUTPATIENT
Start: 2022-08-08 | End: 2023-03-13

## 2022-08-08 RX ORDER — METOCLOPRAMIDE 10 MG/1
TABLET ORAL
Qty: 360 TABLET | Refills: 2 | Status: SHIPPED | OUTPATIENT
Start: 2022-08-08

## 2022-08-08 RX ORDER — TRAZODONE HYDROCHLORIDE 150 MG/1
TABLET ORAL
Qty: 90 TABLET | Refills: 3 | Status: SHIPPED | OUTPATIENT
Start: 2022-08-08

## 2022-08-08 RX ORDER — VENLAFAXINE HYDROCHLORIDE 150 MG/1
CAPSULE, EXTENDED RELEASE ORAL
Qty: 90 CAPSULE | Refills: 3 | Status: SHIPPED | OUTPATIENT
Start: 2022-08-08

## 2022-10-05 ENCOUNTER — OFFICE VISIT (OUTPATIENT)
Dept: CARDIOLOGY | Facility: CLINIC | Age: 67
End: 2022-10-05

## 2022-10-05 VITALS
HEIGHT: 62 IN | OXYGEN SATURATION: 98 % | SYSTOLIC BLOOD PRESSURE: 128 MMHG | BODY MASS INDEX: 27.6 KG/M2 | HEART RATE: 95 BPM | DIASTOLIC BLOOD PRESSURE: 78 MMHG | WEIGHT: 150 LBS

## 2022-10-05 DIAGNOSIS — I42.8 NONISCHEMIC CARDIOMYOPATHY: ICD-10-CM

## 2022-10-05 DIAGNOSIS — I25.118 CORONARY ARTERY DISEASE OF NATIVE ARTERY OF NATIVE HEART WITH STABLE ANGINA PECTORIS: Primary | ICD-10-CM

## 2022-10-05 PROCEDURE — 99214 OFFICE O/P EST MOD 30 MIN: CPT | Performed by: INTERNAL MEDICINE

## 2022-10-05 PROCEDURE — 93000 ELECTROCARDIOGRAM COMPLETE: CPT | Performed by: INTERNAL MEDICINE

## 2022-10-05 NOTE — PROGRESS NOTES
[facility] Cardiology  OFFICE NOTE    Cardiovascular Medicine  Carmen Thompson M.D., RPVI         No referring provider defined for this encounter.         Thank you for asking me to see Daysi Pa for CAD.    This is a 67 y.o. female with:    1.  Hypertension  2.  Hyperlipidemia  3.  Coronary artery disease  4. Viral cardiomyopathy with subsequent improvement in her ejection fraction to normal.  5. PVCs.    Daysi Pa is a 67 y.o. female who presents for consultation today.  Patient was previously seen by Dr. Sears for chest pain.  She had an echocardiogram which was concerning for ischemia inferior wall.  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  She also had an ostial PDA lesion which was treated medically. He also has moderate disease in a small caliber LAD after the takeoff of the diagonal.  She had more chest pain was taken back to the Cath Lab, the ostial RPDA was interrogated. FFR was negative at 0.95.  Medical management is recommended.    She has been on aspirin/metoprolol, Imdur and has done well.  She is on PCSK9 him better for her hyperlipidemia.    Recent Holter monitor showed significant PVCs her PVC burden was 24%.  Last echocardiogram in March 2021 with a EF of 47%,    Patient has been complaining of lack of energy, has occasional chest pains.  She has palpitations.  She does drink excessive amount of caffeine.  No other acute issues since last visit.  She had to cut back on her metoprolol from 50 to 25 mg.    10/05/2022:  Has done well since last visit.  Has occasional palpitations.  Occasional sharp, short lasting chest pains.  Nonexertional.    04/01/2022:  No acute issues since last visit.  Patient feels like she has slowed down since starting metoprolol and has gained weight.  Has occasional shortness of breath.  Has occasional sharp stabbing pains lasting for a few  seconds.      Review of Systems - ROS  Constitution: Negative for weakness, weight gain and weight loss.   HENT: Negative for congestion.    Eyes: Negative for blurred vision.   Cardiovascular: As mentioned above  Respiratory: Negative for cough and hemoptysis.    Endocrine: Negative for polydipsia and polyuria.   Hematologic/Lymphatic: Negative for bleeding problem. Does not bruise/bleed easily.   Skin: Negative for flushing.   Musculoskeletal: Negative for neck pain and stiffness.   Gastrointestinal: Negative for abdominal pain, diarrhea, jaundice, melena, nausea and vomiting.   Genitourinary: Negative for dysuria and hematuria.   Neurological: Negative for dizziness, focal weakness and numbness.   Psychiatric/Behavioral: Negative for altered mental status and depression.     I reviewed the ROS as documented here and confirmed the accuracy of it with the patient today. 10/5/2022        All other systems were reviewed and were negative.    family history includes Breast cancer in her sister; COPD in her paternal grandfather and paternal grandmother; Cancer in her maternal aunt; Coronary artery disease in her father; Diabetes in her father; Heart disease in an other family member; Hyperlipidemia in her father; Hypertension in her mother and another family member; Migraines in her mother; Osteoporosis in her mother; Rectal cancer in an other family member; Thyroid disease in an other family member.     reports that she quit smoking about 12 years ago. She quit after 30.00 years of use. She has never used smokeless tobacco. She reports that she does not drink alcohol and does not use drugs.    Allergies   Allergen Reactions   • Cephalosporins Nausea And Vomiting   • Erythromycin Nausea Only   • Morphine And Related Itching   • Other Nausea And Vomiting     Cipro   • Penicillins Hives   • Shellfish-Derived Products Hives and Other (See Comments)     Other reaction(s): SOA   • Zithromax [Azithromycin] Hives and Nausea  And Vomiting   • Zolpidem Mental Status Change   • Honey Bee Treatment [Bee Venom] Hives   • Hydrocodone-Acetaminophen Anxiety   • Sulfa Antibiotics Hives and Rash     Sulfa (Sulfonamide Antibiotics)         Current Outpatient Medications:   •  albuterol sulfate HFA (ProAir HFA) 108 (90 Base) MCG/ACT inhaler, Inhale 2 puffs 4 (Four) Times a Day As Needed., Disp: , Rfl:   •  aspirin 81 MG tablet, Take 1 tablet by mouth Daily., Disp: 30 tablet, Rfl: 11  •  atomoxetine (STRATTERA) 60 MG capsule, Take 1 capsule by mouth Daily., Disp: 90 capsule, Rfl: 0  •  denosumab (PROLIA) 60 MG/ML solution prefilled syringe syringe, Inject 60 mg under the skin into the appropriate area as directed Every 6 (Six) Months., Disp: , Rfl:   •  desloratadine (CLARINEX) 5 MG tablet, TAKE 1 TABLET BY MOUTH EVERY DAY (Patient taking differently: Take 5 mg by mouth Daily As Needed.), Disp: 90 tablet, Rfl: 3  •  docusate sodium (COLACE) 100 MG capsule, Take 200 mg by mouth Every Night. at bedtime, Disp: , Rfl:   •  esomeprazole (nexIUM) 40 MG capsule, TAKE 1 CAPSULE BY MOUTH EVERY DAY, Disp: 90 capsule, Rfl: 3  •  furosemide (LASIX) 20 MG tablet, TAKE 1 TABLET BY MOUTH DAILY UNTIL BACK TO BASELINE WEIGHT, Disp: 30 tablet, Rfl: 0  •  furosemide (LASIX) 40 MG tablet, TAKE 1 TABLET BY MOUTH EVERY DAY, Disp: 90 tablet, Rfl: 3  •  gabapentin (NEURONTIN) 300 MG capsule, Take 1 capsule by mouth 2 (Two) Times a Day., Disp: 180 capsule, Rfl: 0  •  hydroxychloroquine (PLAQUENIL) 200 MG tablet, Take 200 mg by mouth Daily., Disp: , Rfl:   •  isosorbide mononitrate (IMDUR) 60 MG 24 hr tablet, Take 1 tablet by mouth Daily., Disp: 90 tablet, Rfl: 3  •  lubiprostone (AMITIZA) 24 MCG capsule, TAKE 1 CAPSULE BY MOUTH TWICE A DAY, Disp: 180 capsule, Rfl: 3  •  metoclopramide (REGLAN) 10 MG tablet, TAKE 1 TABLET BY MOUTH 4 TIMES A DAY., Disp: 360 tablet, Rfl: 2  •  metoprolol succinate XL (TOPROL-XL) 25 MG 24 hr tablet, Take 25 mg by mouth Daily., Disp: , Rfl:   •   "oxyCODONE (oxyCONTIN) 10 MG 12 hr tablet, Take 10 mg by mouth 3 (Three) Times a Day. OxyContin 30 mg tablet,crush resistant,extended release, Disp: , Rfl:   •  polyethylene glycol (MIRALAX) packet, Take 17 g by mouth Daily., Disp: , Rfl:   •  Repatha SureClick solution auto-injector SureClick injection, INJECT 1 ML UNDER THE SKIN INTO THE APPROPRIATE AREA AS DIRECTED EVERY 14 (FOURTEEN) DAYS., Disp: 2 pen, Rfl: 10  •  Synthroid 88 MCG tablet, TAKE 1 TABLET BY MOUTH EVERY DAY, Disp: 90 tablet, Rfl: 3  •  traZODone (DESYREL) 150 MG tablet, TAKE 1 TABLET BY MOUTH EVERYDAY AT BEDTIME, Disp: 90 tablet, Rfl: 3  •  venlafaxine XR (EFFEXOR-XR) 150 MG 24 hr capsule, TAKE 1 CAPSULE BY MOUTH EVERY DAY, Disp: 90 capsule, Rfl: 3  •  venlafaxine XR (EFFEXOR-XR) 75 MG 24 hr capsule, Take 75 mg by mouth Daily., Disp: , Rfl:     Physical Exam:  Vitals:    10/05/22 0947   BP: 128/78   BP Location: Left arm   Patient Position: Sitting   Cuff Size: Adult   Pulse: 95   SpO2: 98%   Weight: 68 kg (150 lb)   Height: 157.5 cm (62\")   PainSc: 0-No pain   PainLoc: Chest       GEN: alert, appears stated age and cooperative  Body Habitus: well-nourished  HEENT: Head: Normocephalic, no lesions, without obvious abnormality.  JVP: 6 cm of water at 45 degrees      HJR: absent      [place]:  normal size and placement    Palpable S4: Not present.   Heart rate: normal  Heart Rhythm: regular                                     Heart Sounds: S1: normal intensity  S2: normal intensity  S3: absent                               S4: absent  Opening Snap: absent          A2-OS:  N/A  Pericardial rub: absent                       Ejection click: None                                        Murmurs: Systolic: none  Diastolic: none  Extremity: Moves spontaneously    I have reexamined the patient and the results are consistent with the previously documented exam. Carmen Thompson MD       DATA REVIEWED:       ECG/EMG Results (all)     None    "     ---------------------------------------------------  TTE/JORDAN:  Results for orders placed in visit on 04/01/22    Adult Transthoracic Echo Complete W/ Cont if Necessary Per Protocol    Interpretation Summary  · Left ventricular ejection fraction appears to be 51 - 55%. Left ventricular systolic function is normal.  · Left ventricular diastolic function is consistent with (grade Ia w/high LAP) impaired relaxation.  · There is mild, bileaflet mitral valve thickening present. Mild mitral valve regurgitation is present. No significant mitral valve stenosis is present.      CT:   No radiology results for the last 30 days.        --------------------------------------------------------------------------------------------------  LABS:     The CVD Risk score (Diogenes et al., 2008) failed to calculate for the following reasons:    The patient has a prior MI, stroke, CHF, or peripheral vascular disease diagnosis         Lab Results   Component Value Date    GLUCOSE 122 (H) 06/09/2022    BUN 8 06/09/2022    CREATININE 0.97 06/09/2022    EGFRIFNONA 68 12/08/2021    BCR 8.2 06/09/2022    K 3.9 06/09/2022    CO2 27.0 06/09/2022    CALCIUM 9.6 06/09/2022    ALBUMIN 4.40 06/09/2022    AST 17 06/09/2022    ALT 18 06/09/2022     Lab Results   Component Value Date    WBC 7.72 04/12/2022    HGB 12.0 04/12/2022    HCT 36.3 04/12/2022    MCV 83.6 04/12/2022     04/12/2022     Lab Results   Component Value Date    CHOL 180 04/12/2022    CHLPL 201 (H) 09/22/2016    TRIG 189 (H) 04/12/2022    HDL 72 (H) 04/12/2022    LDL 77 04/12/2022     Lab Results   Component Value Date    TSH 2.410 10/12/2021     No results found for: CKTOTAL, CKMB, CKMBINDEX, TROPONINI, TROPONINT  Lab Results   Component Value Date    HGBA1C 5.60 02/27/2020     No results found for: DDIMER  Lab Results   Component Value Date    ALT 18 06/09/2022     Lab Results   Component Value Date    HGBA1C 5.60 02/27/2020    HGBA1C 5.6 12/14/2017     Lab Results    Component Value Date    MICROALBUR <0.6 11/13/2017    CREATININE 0.97 06/09/2022     No results found for: IRON, TIBC, FERRITIN  Lab Results   Component Value Date    INR 1.01 06/30/2020    INR 1.05 01/21/2020    PROTIME 13.1 06/30/2020    PROTIME 13.5 01/21/2020       Assessment/Plan     1. CAD:  Cardiac cath showed  of small caliber RPL and ostial RPDA lesion. Ostial RPDA was negative on FFR.  Also moderate disease in the small caliber LAD after the takeoff of the diagonal. Continue medical management.  Continue aspirin/Imdur/metoprolol  added Ranexa however it was stopped because she has been on Plaquenil.    2. HTN:  Better controlled on current regimen    3. HLD: On PCSK9 him inhibitor. LDL is down to less than 50..    4. Cardiomyopathy: Previously had severe cardiomyopathy but EF is improved 52%. Recent repeat echocardiogram showed EF of 47%. She does have significant PVC burden now as well.  She has been on bisoprolol,  switched her to metoprolol XL and see if that improves her PVC burden.   Repeat Holter showed improvement in PVC burden.  Could not tolerate ACE inhibitor previously.  I advised her to cut back on her caffeine to help with her PVCs.  If worsening symptoms, will refer to EP for consideration for ablation.     Prevention:  Patient's Body mass index is 27.44 kg/m². BMI is within normal parameters. No follow-up required..      Daysi Johnson Thierno  reports that she quit smoking about 12 years ago. She quit after 30.00 years of use. She has never used smokeless tobacco.                This document has been electronically signed by Carmen Thompson MD on October 5, 2022 09:53 CDT

## 2022-10-16 LAB
QT INTERVAL: 388 MS
QTC INTERVAL: 487 MS

## 2022-10-18 RX ORDER — ATOMOXETINE 60 MG/1
CAPSULE ORAL
Qty: 90 CAPSULE | Refills: 0 | Status: SHIPPED | OUTPATIENT
Start: 2022-10-18 | End: 2022-10-21

## 2022-10-19 ENCOUNTER — OFFICE VISIT (OUTPATIENT)
Dept: FAMILY MEDICINE CLINIC | Facility: CLINIC | Age: 67
End: 2022-10-19

## 2022-10-19 ENCOUNTER — LAB (OUTPATIENT)
Dept: LAB | Facility: HOSPITAL | Age: 67
End: 2022-10-19

## 2022-10-19 VITALS
DIASTOLIC BLOOD PRESSURE: 80 MMHG | BODY MASS INDEX: 27.6 KG/M2 | OXYGEN SATURATION: 97 % | HEIGHT: 62 IN | HEART RATE: 110 BPM | SYSTOLIC BLOOD PRESSURE: 130 MMHG | WEIGHT: 150 LBS

## 2022-10-19 DIAGNOSIS — M51.36 DDD (DEGENERATIVE DISC DISEASE), LUMBAR: ICD-10-CM

## 2022-10-19 DIAGNOSIS — R53.81 MALAISE AND FATIGUE: ICD-10-CM

## 2022-10-19 DIAGNOSIS — R53.83 MALAISE AND FATIGUE: ICD-10-CM

## 2022-10-19 DIAGNOSIS — J44.9 STAGE 2 MODERATE COPD BY GOLD CLASSIFICATION: ICD-10-CM

## 2022-10-19 DIAGNOSIS — E03.9 ACQUIRED HYPOTHYROIDISM: Primary | ICD-10-CM

## 2022-10-19 DIAGNOSIS — F32.A DEPRESSIVE DISORDER: ICD-10-CM

## 2022-10-19 DIAGNOSIS — Z79.891 LONG TERM (CURRENT) USE OF OPIATE ANALGESIC: ICD-10-CM

## 2022-10-19 DIAGNOSIS — M79.7 PRIMARY FIBROMYALGIA SYNDROME: ICD-10-CM

## 2022-10-19 DIAGNOSIS — G89.4 CHRONIC PAIN SYNDROME: ICD-10-CM

## 2022-10-19 DIAGNOSIS — I25.118 CORONARY ARTERY DISEASE OF NATIVE ARTERY OF NATIVE HEART WITH STABLE ANGINA PECTORIS: ICD-10-CM

## 2022-10-19 DIAGNOSIS — E78.5 DYSLIPIDEMIA: ICD-10-CM

## 2022-10-19 DIAGNOSIS — F90.0 ATTENTION DEFICIT HYPERACTIVITY DISORDER (ADHD), PREDOMINANTLY INATTENTIVE TYPE: ICD-10-CM

## 2022-10-19 DIAGNOSIS — D05.10 DUCTAL CARCINOMA IN SITU (DCIS) OF BREAST, UNSPECIFIED LATERALITY: ICD-10-CM

## 2022-10-19 PROCEDURE — 85025 COMPLETE CBC W/AUTO DIFF WBC: CPT

## 2022-10-19 PROCEDURE — 36415 COLL VENOUS BLD VENIPUNCTURE: CPT

## 2022-10-19 PROCEDURE — 84443 ASSAY THYROID STIM HORMONE: CPT

## 2022-10-19 PROCEDURE — 99214 OFFICE O/P EST MOD 30 MIN: CPT | Performed by: FAMILY MEDICINE

## 2022-10-19 PROCEDURE — G0008 ADMIN INFLUENZA VIRUS VAC: HCPCS | Performed by: FAMILY MEDICINE

## 2022-10-19 PROCEDURE — 90662 IIV NO PRSV INCREASED AG IM: CPT | Performed by: FAMILY MEDICINE

## 2022-10-20 LAB
BASOPHILS # BLD AUTO: 0.05 10*3/MM3 (ref 0–0.2)
BASOPHILS NFR BLD AUTO: 0.5 % (ref 0–1.5)
DEPRECATED RDW RBC AUTO: 39.3 FL (ref 37–54)
EOSINOPHIL # BLD AUTO: 0.11 10*3/MM3 (ref 0–0.4)
EOSINOPHIL NFR BLD AUTO: 1.1 % (ref 0.3–6.2)
ERYTHROCYTE [DISTWIDTH] IN BLOOD BY AUTOMATED COUNT: 13.8 % (ref 12.3–15.4)
HCT VFR BLD AUTO: 35 % (ref 34–46.6)
HGB BLD-MCNC: 12.3 G/DL (ref 12–15.9)
IMM GRANULOCYTES # BLD AUTO: 0.14 10*3/MM3 (ref 0–0.05)
IMM GRANULOCYTES NFR BLD AUTO: 1.4 % (ref 0–0.5)
LYMPHOCYTES # BLD AUTO: 2.29 10*3/MM3 (ref 0.7–3.1)
LYMPHOCYTES NFR BLD AUTO: 23.7 % (ref 19.6–45.3)
MCH RBC QN AUTO: 27.8 PG (ref 26.6–33)
MCHC RBC AUTO-ENTMCNC: 35.1 G/DL (ref 31.5–35.7)
MCV RBC AUTO: 79 FL (ref 79–97)
MONOCYTES # BLD AUTO: 1 10*3/MM3 (ref 0.1–0.9)
MONOCYTES NFR BLD AUTO: 10.3 % (ref 5–12)
NEUTROPHILS NFR BLD AUTO: 6.08 10*3/MM3 (ref 1.7–7)
NEUTROPHILS NFR BLD AUTO: 63 % (ref 42.7–76)
NRBC BLD AUTO-RTO: 0.1 /100 WBC (ref 0–0.2)
PLATELET # BLD AUTO: 453 10*3/MM3 (ref 140–450)
PMV BLD AUTO: 10.7 FL (ref 6–12)
RBC # BLD AUTO: 4.43 10*6/MM3 (ref 3.77–5.28)
TSH SERPL DL<=0.05 MIU/L-ACNC: 4.64 UIU/ML (ref 0.27–4.2)
WBC NRBC COR # BLD: 9.67 10*3/MM3 (ref 3.4–10.8)

## 2022-10-20 RX ORDER — LEVOTHYROXINE SODIUM 0.1 MG/1
100 TABLET ORAL DAILY
Qty: 90 TABLET | Refills: 3 | Status: SHIPPED | OUTPATIENT
Start: 2022-10-20

## 2022-10-20 NOTE — PROGRESS NOTES
[facility]  [facility]  Chief Complaint   Patient presents with   • ADHD     Follow up          67-year-old female with history of hypothyroidism COPD RA on DMARD chronic back pain coronary artery disease on Repatha osteoporosis on Prolia here today for scheduled follow-up visit.  Patient also has a history of adult onset ADHD and is currently stable on atomoxetine.  She continues on Plaquenil along with OxyContin and gabapentin for chronic pain.  Her [place] is reviewed it is appropriate.  Patient also has a history of breast cancer but has not seen oncology since her oncologist retired.  She currently is followed by Dr. Yun Lopez for chronic pain.  Patient currently is complaining of ongoing fatigue and hypersomnolence during the day.  Previously reported insomnia is improved she averages about 6 hours of sleep per night typically awakening 0-1 times per night she has no difficulty getting back to sleep in spite of this she continues to feel extremely fatigued during the day.  Pulmonary status is currently stable patient notes her anxiety is improved.    Current Outpatient Medications on File Prior to Visit   Medication Sig Dispense Refill   • albuterol sulfate HFA (ProAir HFA) 108 (90 Base) MCG/ACT inhaler Inhale 2 puffs 4 (Four) Times a Day As Needed.     • aspirin 81 MG tablet Take 1 tablet by mouth Daily. 30 tablet 11   • atomoxetine (STRATTERA) 60 MG capsule TAKE 1 CAPSULE BY MOUTH EVERY DAY 90 capsule 0   • denosumab (PROLIA) 60 MG/ML solution prefilled syringe syringe Inject 60 mg under the skin into the appropriate area as directed Every 6 (Six) Months.     • desloratadine (CLARINEX) 5 MG tablet TAKE 1 TABLET BY MOUTH EVERY DAY (Patient taking differently: Take 1 tablet by mouth Daily As Needed.) 90 tablet 3   • docusate sodium (COLACE) 100 MG capsule Take 200 mg by mouth Every Night. at bedtime     • esomeprazole (nexIUM) 40 MG capsule TAKE 1 CAPSULE BY MOUTH EVERY DAY 90 capsule 3   •  furosemide (LASIX) 20 MG tablet TAKE 1 TABLET BY MOUTH DAILY UNTIL BACK TO BASELINE WEIGHT 30 tablet 0   • furosemide (LASIX) 40 MG tablet TAKE 1 TABLET BY MOUTH EVERY DAY 90 tablet 3   • gabapentin (NEURONTIN) 300 MG capsule Take 1 capsule by mouth 2 (Two) Times a Day. 180 capsule 0   • hydroxychloroquine (PLAQUENIL) 200 MG tablet Take 200 mg by mouth Daily.     • isosorbide mononitrate (IMDUR) 60 MG 24 hr tablet Take 1 tablet by mouth Daily. 90 tablet 3   • lubiprostone (AMITIZA) 24 MCG capsule TAKE 1 CAPSULE BY MOUTH TWICE A  capsule 3   • metoclopramide (REGLAN) 10 MG tablet TAKE 1 TABLET BY MOUTH 4 TIMES A DAY. 360 tablet 2   • metoprolol succinate XL (TOPROL-XL) 25 MG 24 hr tablet Take 25 mg by mouth Daily.     • oxyCODONE (oxyCONTIN) 10 MG 12 hr tablet Take 10 mg by mouth 3 (Three) Times a Day. OxyContin 30 mg tablet,crush resistant,extended release     • polyethylene glycol (MIRALAX) packet Take 17 g by mouth Daily.     • Repatha SureClick solution auto-injector SureClick injection INJECT 1 ML UNDER THE SKIN INTO THE APPROPRIATE AREA AS DIRECTED EVERY 14 (FOURTEEN) DAYS. 2 pen 10   • traZODone (DESYREL) 150 MG tablet TAKE 1 TABLET BY MOUTH EVERYDAY AT BEDTIME 90 tablet 3   • venlafaxine XR (EFFEXOR-XR) 150 MG 24 hr capsule TAKE 1 CAPSULE BY MOUTH EVERY DAY 90 capsule 3   • venlafaxine XR (EFFEXOR-XR) 75 MG 24 hr capsule Take 75 mg by mouth Daily.     • [DISCONTINUED] Synthroid 88 MCG tablet TAKE 1 TABLET BY MOUTH EVERY DAY 90 tablet 3     No current facility-administered medications on file prior to visit.     Allergies   Allergen Reactions   • Cephalosporins Nausea And Vomiting   • Erythromycin Nausea Only   • Morphine And Related Itching   • Other Nausea And Vomiting     Cipro   • Penicillins Hives   • Shellfish-Derived Products Hives and Other (See Comments)     Other reaction(s): SOA   • Zithromax [Azithromycin] Hives and Nausea And Vomiting   • Zolpidem Mental Status Change   • Honey Bee  "Treatment [Bee Venom] Hives   • Hydrocodone-Acetaminophen Anxiety   • Sulfa Antibiotics Hives and Rash     Sulfa (Sulfonamide Antibiotics)       Exam  /80 (BP Location: Left arm, Patient Position: Sitting, Cuff Size: Adult)   Pulse 110   Ht 157.5 cm (62\")   Wt 68 kg (150 lb)   SpO2 97%   BMI 27.44 kg/m²    General appearance awake and alert oriented x3 HEENT grossly normal without asymmetry neck supple without JVD lungs clear without wheezes rales or rhonchi heart irregularly irregular with increased rate no extrasystole no murmur PMI diffuse abdomen soft nontender extremities show no tremor no edema neuro nonfocal    Impression   Encounter Diagnoses   Name Primary?   • Acquired hypothyroidism Yes   • Primary fibromyalgia syndrome    • DDD (degenerative disc disease), lumbar    • Attention deficit hyperactivity disorder (ADHD), predominantly inattentive type    • Chronic pain syndrome    • Stage 2 moderate COPD by GOLD classification ([facility])    • Malaise and fatigue    • Coronary artery disease of native artery of native heart with stable angina pectoris ([facility])    • Dyslipidemia    • Ductal carcinoma in situ (DCIS) of breast, unspecified laterality    • Depressive disorder    • Long term (current) use of opiate analgesic      Plan: Above list is active and ongoing but stable we will check TSH and CBC patient already has CMP ordered for follow-up for her Prolia injection.  Will refer back to oncology for follow-up for her breast cancer patient reportedly is status post bilateral mastectomy flu vaccine status is updated continue current regimen recheck otherwise 4 months or as needed      This document has been electronically signed by Tony Hi MD on October 20, 2022 10:18 CDT          "

## 2022-10-21 RX ORDER — VENLAFAXINE HYDROCHLORIDE 75 MG/1
CAPSULE, EXTENDED RELEASE ORAL
Qty: 90 CAPSULE | Refills: 3 | Status: SHIPPED | OUTPATIENT
Start: 2022-10-21

## 2022-10-21 RX ORDER — ATOMOXETINE 60 MG/1
CAPSULE ORAL
Qty: 90 CAPSULE | Refills: 0 | Status: SHIPPED | OUTPATIENT
Start: 2022-10-21 | End: 2023-01-30

## 2022-12-02 RX ORDER — HYDROXYCHLOROQUINE SULFATE 200 MG/1
TABLET, FILM COATED ORAL
Qty: 90 TABLET | Refills: 3 | Status: SHIPPED | OUTPATIENT
Start: 2022-12-02 | End: 2023-03-02

## 2022-12-13 ENCOUNTER — APPOINTMENT (OUTPATIENT)
Dept: LAB | Facility: HOSPITAL | Age: 67
End: 2022-12-13

## 2022-12-14 ENCOUNTER — LAB (OUTPATIENT)
Dept: LAB | Facility: HOSPITAL | Age: 67
End: 2022-12-14

## 2022-12-14 DIAGNOSIS — M81.0 AGE-RELATED OSTEOPOROSIS WITHOUT CURRENT PATHOLOGICAL FRACTURE: ICD-10-CM

## 2022-12-14 LAB
ALBUMIN SERPL-MCNC: 4.2 G/DL (ref 3.5–5.2)
ALBUMIN/GLOB SERPL: 1.8 G/DL
ALP SERPL-CCNC: 81 U/L (ref 39–117)
ALT SERPL W P-5'-P-CCNC: 18 U/L (ref 1–33)
ANION GAP SERPL CALCULATED.3IONS-SCNC: 13 MMOL/L (ref 5–15)
AST SERPL-CCNC: 15 U/L (ref 1–32)
BILIRUB SERPL-MCNC: 0.2 MG/DL (ref 0–1.2)
BUN SERPL-MCNC: 5 MG/DL (ref 8–23)
BUN/CREAT SERPL: 6.5 (ref 7–25)
CALCIUM SPEC-SCNC: 9.4 MG/DL (ref 8.6–10.5)
CHLORIDE SERPL-SCNC: 96 MMOL/L (ref 98–107)
CO2 SERPL-SCNC: 27 MMOL/L (ref 22–29)
CREAT SERPL-MCNC: 0.77 MG/DL (ref 0.57–1)
EGFRCR SERPLBLD CKD-EPI 2021: 84.1 ML/MIN/1.73
GLOBULIN UR ELPH-MCNC: 2.4 GM/DL
GLUCOSE SERPL-MCNC: 91 MG/DL (ref 65–99)
PHOSPHATE SERPL-MCNC: 3.8 MG/DL (ref 2.5–4.5)
POTASSIUM SERPL-SCNC: 4 MMOL/L (ref 3.5–5.2)
PROT SERPL-MCNC: 6.6 G/DL (ref 6–8.5)
SODIUM SERPL-SCNC: 136 MMOL/L (ref 136–145)

## 2022-12-14 PROCEDURE — 80053 COMPREHEN METABOLIC PANEL: CPT

## 2022-12-14 PROCEDURE — 84100 ASSAY OF PHOSPHORUS: CPT

## 2022-12-20 ENCOUNTER — APPOINTMENT (OUTPATIENT)
Dept: ONCOLOGY | Facility: HOSPITAL | Age: 67
End: 2022-12-20

## 2022-12-21 ENCOUNTER — INFUSION (OUTPATIENT)
Dept: ONCOLOGY | Facility: HOSPITAL | Age: 67
End: 2022-12-21

## 2022-12-21 VITALS — DIASTOLIC BLOOD PRESSURE: 66 MMHG | HEART RATE: 116 BPM | SYSTOLIC BLOOD PRESSURE: 108 MMHG | TEMPERATURE: 97.3 F

## 2022-12-21 DIAGNOSIS — M81.0 AGE-RELATED OSTEOPOROSIS WITHOUT CURRENT PATHOLOGICAL FRACTURE: Primary | ICD-10-CM

## 2022-12-21 PROCEDURE — 96372 THER/PROPH/DIAG INJ SC/IM: CPT | Performed by: NURSE PRACTITIONER

## 2022-12-21 PROCEDURE — 25010000002 DENOSUMAB 60 MG/ML SOLUTION PREFILLED SYRINGE: Performed by: NURSE PRACTITIONER

## 2022-12-21 RX ADMIN — DENOSUMAB 60 MG: 60 INJECTION SUBCUTANEOUS at 09:36

## 2023-01-03 RX ORDER — ATOMOXETINE 40 MG/1
40 CAPSULE ORAL DAILY
Qty: 30 CAPSULE | Refills: 2 | Status: SHIPPED | OUTPATIENT
Start: 2023-01-03

## 2023-01-12 RX ORDER — ISOSORBIDE MONONITRATE 30 MG/1
TABLET, EXTENDED RELEASE ORAL
Qty: 90 TABLET | Refills: 3 | Status: SHIPPED | OUTPATIENT
Start: 2023-01-12

## 2023-01-12 RX ORDER — FUROSEMIDE 40 MG/1
TABLET ORAL
Qty: 90 TABLET | Refills: 3 | Status: SHIPPED | OUTPATIENT
Start: 2023-01-12

## 2023-01-18 RX ORDER — METOPROLOL SUCCINATE 50 MG/1
TABLET, EXTENDED RELEASE ORAL
Qty: 30 TABLET | Refills: 6 | Status: SHIPPED | OUTPATIENT
Start: 2023-01-18

## 2023-01-30 RX ORDER — DESLORATADINE 5 MG/1
TABLET ORAL
Qty: 90 TABLET | Refills: 3 | Status: SHIPPED | OUTPATIENT
Start: 2023-01-30

## 2023-01-30 RX ORDER — ATOMOXETINE 60 MG/1
CAPSULE ORAL
Qty: 90 CAPSULE | Refills: 0 | Status: SHIPPED | OUTPATIENT
Start: 2023-01-30

## 2023-02-16 ENCOUNTER — OFFICE VISIT (OUTPATIENT)
Dept: FAMILY MEDICINE CLINIC | Facility: CLINIC | Age: 68
End: 2023-02-16
Payer: MEDICARE

## 2023-02-16 VITALS
DIASTOLIC BLOOD PRESSURE: 70 MMHG | WEIGHT: 134 LBS | BODY MASS INDEX: 24.66 KG/M2 | OXYGEN SATURATION: 97 % | HEART RATE: 84 BPM | HEIGHT: 62 IN | SYSTOLIC BLOOD PRESSURE: 120 MMHG

## 2023-02-16 DIAGNOSIS — E03.9 ACQUIRED HYPOTHYROIDISM: ICD-10-CM

## 2023-02-16 DIAGNOSIS — E78.2 MIXED HYPERLIPIDEMIA: Primary | ICD-10-CM

## 2023-02-16 DIAGNOSIS — G89.29 CHRONIC LOW BACK PAIN, UNSPECIFIED BACK PAIN LATERALITY, UNSPECIFIED WHETHER SCIATICA PRESENT: ICD-10-CM

## 2023-02-16 DIAGNOSIS — F90.0 ATTENTION DEFICIT HYPERACTIVITY DISORDER (ADHD), PREDOMINANTLY INATTENTIVE TYPE: ICD-10-CM

## 2023-02-16 DIAGNOSIS — M54.50 CHRONIC LOW BACK PAIN, UNSPECIFIED BACK PAIN LATERALITY, UNSPECIFIED WHETHER SCIATICA PRESENT: ICD-10-CM

## 2023-02-16 DIAGNOSIS — J44.9 STAGE 2 MODERATE COPD BY GOLD CLASSIFICATION: ICD-10-CM

## 2023-02-16 PROCEDURE — 99214 OFFICE O/P EST MOD 30 MIN: CPT | Performed by: FAMILY MEDICINE

## 2023-02-16 NOTE — PROGRESS NOTES
[facility]   Chief Complaint   Patient presents with   • ADHD               67-year-old female with history of COPD ADHD with primarily inattentiveness chronic back pain fibromyalgia ASCVD/CAD and history of intraductal breast cancer in situ osteoporosis on Prolia here today for scheduled follow-up visit.  Since adjusting her levothyroxine to 100 mcg she notes improved energy levels and has lost 16 pounds since last October.  She feels more energetic during the day and is sleeping less.  Patient did see her cardiologist back in October and was released to 6 months follow-up.  She still has an occasional fleeting chest pain and occasional palpitations pulmonary status is otherwise reportedly stable.  Patient continues on atomoxetine for her ADHD and she feels that it is helping although she has periods of time where she has difficulty staying on task.    Current Outpatient Medications on File Prior to Visit   Medication Sig Dispense Refill   • metoprolol succinate XL (TOPROL-XL) 50 MG 24 hr tablet Patient takes 1/2 tab daily 30 tablet 6   • albuterol sulfate HFA (ProAir HFA) 108 (90 Base) MCG/ACT inhaler Inhale 2 puffs 4 (Four) Times a Day As Needed.     • aspirin 81 MG tablet Take 1 tablet by mouth Daily. 30 tablet 11   • atomoxetine (STRATTERA) 40 MG capsule Take 1 capsule by mouth Daily. 30 capsule 2   • atomoxetine (STRATTERA) 60 MG capsule TAKE 1 CAPSULE BY MOUTH EVERY DAY 90 capsule 0   • denosumab (PROLIA) 60 MG/ML solution prefilled syringe syringe Inject 60 mg under the skin into the appropriate area as directed Every 6 (Six) Months.     • desloratadine (CLARINEX) 5 MG tablet TAKE 1 TABLET BY MOUTH EVERY DAY 90 tablet 3   • docusate sodium (COLACE) 100 MG capsule Take 200 mg by mouth Every Night. at bedtime     • esomeprazole (nexIUM) 40 MG capsule TAKE 1 CAPSULE BY MOUTH EVERY DAY 90 capsule 3   • furosemide (LASIX) 20 MG tablet TAKE 1 TABLET BY MOUTH DAILY UNTIL BACK TO BASELINE  WEIGHT 30 tablet 0   • furosemide (LASIX) 40 MG tablet TAKE 1 TABLET BY MOUTH EVERY DAY 90 tablet 3   • gabapentin (NEURONTIN) 300 MG capsule Take 1 capsule by mouth 2 (Two) Times a Day. 180 capsule 0   • hydroxychloroquine (PLAQUENIL) 200 MG tablet TAKE 1 TABLET BY MOUTH EVERY DAY 90 tablet 3   • isosorbide mononitrate (IMDUR) 30 MG 24 hr tablet TAKE 1 TABLET BY MOUTH EVERY DAY 90 tablet 3   • levothyroxine (Synthroid) 100 MCG tablet Take 1 tablet by mouth Daily. 90 tablet 3   • lubiprostone (AMITIZA) 24 MCG capsule TAKE 1 CAPSULE BY MOUTH TWICE A  capsule 3   • metoclopramide (REGLAN) 10 MG tablet TAKE 1 TABLET BY MOUTH 4 TIMES A DAY. 360 tablet 2   • oxyCODONE (oxyCONTIN) 10 MG 12 hr tablet Take 10 mg by mouth 3 (Three) Times a Day. OxyContin 30 mg tablet,crush resistant,extended release     • polyethylene glycol (MIRALAX) packet Take 17 g by mouth Daily.     • Repatha SureClick solution auto-injector SureClick injection INJECT 1 ML UNDER THE SKIN INTO THE APPROPRIATE AREA AS DIRECTED EVERY 14 (FOURTEEN) DAYS. 2 pen 10   • traZODone (DESYREL) 150 MG tablet TAKE 1 TABLET BY MOUTH EVERYDAY AT BEDTIME 90 tablet 3   • venlafaxine XR (EFFEXOR-XR) 150 MG 24 hr capsule TAKE 1 CAPSULE BY MOUTH EVERY DAY 90 capsule 3   • venlafaxine XR (EFFEXOR-XR) 75 MG 24 hr capsule TAKE 1 CAPSULE BY MOUTH EVERY DAY 90 capsule 3     No current facility-administered medications on file prior to visit.     Allergies   Allergen Reactions   • Cephalosporins Nausea And Vomiting   • Erythromycin Nausea Only   • Morphine And Related Itching   • Other Nausea And Vomiting     Cipro   • Penicillins Hives   • Shellfish-Derived Products Hives and Other (See Comments)     Other reaction(s): SOA   • Zithromax [Azithromycin] Hives and Nausea And Vomiting   • Zolpidem Mental Status Change   • Honey Bee Treatment [Bee Venom] Hives   • Hydrocodone-Acetaminophen Anxiety   • Sulfa Antibiotics Hives and Rash     Sulfa (Sulfonamide Antibiotics)  "    Exam alert /70   Pulse 84   Ht 157.5 cm (62\")   Wt 60.8 kg (134 lb)   SpO2 97%   BMI 24.51 kg/m²   HEENT grossly normal bilateral TM prominence noted no facial asymmetry neck supple without JVD lungs clear to auscultation without wheezes rales or rhonchi no tactile fremitus or pleural friction rub heart regular without murmur rub gallop or extrasystole PMI diffuse abdomen soft nontender extremities show no tremor no edema neuro nonfocal no ataxia    Impression   Encounter Diagnoses   Name Primary?   • Mixed hyperlipidemia Yes   • Acquired hypothyroidism    • Attention deficit hyperactivity disorder (ADHD), predominantly inattentive type    • Chronic low back pain, unspecified back pain laterality, unspecified whether sciatica present    • Stage 2 moderate COPD by GOLD classification (HCC)      Plan: Above problem list is active and ongoing but stable.  We will continue current regimen recheck 3 to 4 months for annual wellness visit otherwise recheck as needed        This document has been electronically signed by Tony Hi MD on February 16, 2023 15:20 CST      "

## 2023-03-13 RX ORDER — EVOLOCUMAB 140 MG/ML
140 INJECTION, SOLUTION SUBCUTANEOUS
Qty: 1 ML | Refills: 3 | Status: SHIPPED | OUTPATIENT
Start: 2023-03-13

## 2023-04-27 RX ORDER — ATOMOXETINE 60 MG/1
CAPSULE ORAL
Qty: 90 CAPSULE | Refills: 0 | Status: SHIPPED | OUTPATIENT
Start: 2023-04-27

## 2023-05-11 ENCOUNTER — LAB (OUTPATIENT)
Dept: LAB | Facility: HOSPITAL | Age: 68
End: 2023-05-11
Payer: MEDICARE

## 2023-05-11 ENCOUNTER — OFFICE VISIT (OUTPATIENT)
Dept: FAMILY MEDICINE CLINIC | Facility: CLINIC | Age: 68
End: 2023-05-11
Payer: MEDICARE

## 2023-05-11 VITALS
OXYGEN SATURATION: 98 % | WEIGHT: 131 LBS | DIASTOLIC BLOOD PRESSURE: 68 MMHG | BODY MASS INDEX: 24.11 KG/M2 | HEIGHT: 62 IN | HEART RATE: 90 BPM | SYSTOLIC BLOOD PRESSURE: 120 MMHG

## 2023-05-11 DIAGNOSIS — E78.5 DYSLIPIDEMIA: ICD-10-CM

## 2023-05-11 DIAGNOSIS — I25.118 CORONARY ARTERY DISEASE OF NATIVE ARTERY OF NATIVE HEART WITH STABLE ANGINA PECTORIS: ICD-10-CM

## 2023-05-11 DIAGNOSIS — E03.9 ACQUIRED HYPOTHYROIDISM: ICD-10-CM

## 2023-05-11 DIAGNOSIS — F32.A DEPRESSIVE DISORDER: ICD-10-CM

## 2023-05-11 DIAGNOSIS — M05.731 RHEUMATOID ARTHRITIS INVOLVING BOTH WRISTS WITH POSITIVE RHEUMATOID FACTOR: ICD-10-CM

## 2023-05-11 DIAGNOSIS — M81.0 OSTEOPOROSIS, UNSPECIFIED OSTEOPOROSIS TYPE, UNSPECIFIED PATHOLOGICAL FRACTURE PRESENCE: ICD-10-CM

## 2023-05-11 DIAGNOSIS — M79.7 PRIMARY FIBROMYALGIA SYNDROME: ICD-10-CM

## 2023-05-11 DIAGNOSIS — M05.732 RHEUMATOID ARTHRITIS INVOLVING BOTH WRISTS WITH POSITIVE RHEUMATOID FACTOR: ICD-10-CM

## 2023-05-11 DIAGNOSIS — R06.09 DYSPNEA ON EXERTION: ICD-10-CM

## 2023-05-11 DIAGNOSIS — M47.817 LUMBOSACRAL SPONDYLOSIS WITHOUT MYELOPATHY: ICD-10-CM

## 2023-05-11 DIAGNOSIS — I25.118 CORONARY ARTERY DISEASE OF NATIVE ARTERY OF NATIVE HEART WITH STABLE ANGINA PECTORIS: Primary | ICD-10-CM

## 2023-05-11 LAB
ALBUMIN SERPL-MCNC: 4.2 G/DL (ref 3.5–5.2)
ALBUMIN/GLOB SERPL: 1.8 G/DL
ALP SERPL-CCNC: 54 U/L (ref 39–117)
ALT SERPL W P-5'-P-CCNC: 16 U/L (ref 1–33)
ANION GAP SERPL CALCULATED.3IONS-SCNC: 10 MMOL/L (ref 5–15)
AST SERPL-CCNC: 18 U/L (ref 1–32)
BASOPHILS # BLD AUTO: 0.05 10*3/MM3 (ref 0–0.2)
BASOPHILS NFR BLD AUTO: 0.5 % (ref 0–1.5)
BILIRUB SERPL-MCNC: 0.3 MG/DL (ref 0–1.2)
BUN SERPL-MCNC: 7 MG/DL (ref 8–23)
BUN/CREAT SERPL: 8.8 (ref 7–25)
CALCIUM SPEC-SCNC: 8.7 MG/DL (ref 8.6–10.5)
CHLORIDE SERPL-SCNC: 89 MMOL/L (ref 98–107)
CO2 SERPL-SCNC: 28 MMOL/L (ref 22–29)
CREAT SERPL-MCNC: 0.8 MG/DL (ref 0.57–1)
CRP SERPL-MCNC: <0.3 MG/DL (ref 0–0.5)
DEPRECATED RDW RBC AUTO: 40.7 FL (ref 37–54)
EGFRCR SERPLBLD CKD-EPI 2021: 80.4 ML/MIN/1.73
EOSINOPHIL # BLD AUTO: 0.05 10*3/MM3 (ref 0–0.4)
EOSINOPHIL NFR BLD AUTO: 0.5 % (ref 0.3–6.2)
ERYTHROCYTE [DISTWIDTH] IN BLOOD BY AUTOMATED COUNT: 15 % (ref 12.3–15.4)
GLOBULIN UR ELPH-MCNC: 2.3 GM/DL
GLUCOSE SERPL-MCNC: 98 MG/DL (ref 65–99)
HCT VFR BLD AUTO: 33.2 % (ref 34–46.6)
HGB BLD-MCNC: 11.7 G/DL (ref 12–15.9)
IMM GRANULOCYTES # BLD AUTO: 0.25 10*3/MM3 (ref 0–0.05)
IMM GRANULOCYTES NFR BLD AUTO: 2.3 % (ref 0–0.5)
LYMPHOCYTES # BLD AUTO: 1.99 10*3/MM3 (ref 0.7–3.1)
LYMPHOCYTES NFR BLD AUTO: 18.1 % (ref 19.6–45.3)
MCH RBC QN AUTO: 27.3 PG (ref 26.6–33)
MCHC RBC AUTO-ENTMCNC: 35.2 G/DL (ref 31.5–35.7)
MCV RBC AUTO: 77.6 FL (ref 79–97)
MONOCYTES # BLD AUTO: 1 10*3/MM3 (ref 0.1–0.9)
MONOCYTES NFR BLD AUTO: 9.1 % (ref 5–12)
NEUTROPHILS NFR BLD AUTO: 69.5 % (ref 42.7–76)
NEUTROPHILS NFR BLD AUTO: 7.66 10*3/MM3 (ref 1.7–7)
NRBC BLD AUTO-RTO: 0.1 /100 WBC (ref 0–0.2)
PLATELET # BLD AUTO: 434 10*3/MM3 (ref 140–450)
PMV BLD AUTO: 9.7 FL (ref 6–12)
POTASSIUM SERPL-SCNC: 3.7 MMOL/L (ref 3.5–5.2)
PROT SERPL-MCNC: 6.5 G/DL (ref 6–8.5)
RBC # BLD AUTO: 4.28 10*6/MM3 (ref 3.77–5.28)
SODIUM SERPL-SCNC: 127 MMOL/L (ref 136–145)
TSH SERPL DL<=0.05 MIU/L-ACNC: 1.49 UIU/ML (ref 0.27–4.2)
URATE SERPL-MCNC: 4.3 MG/DL (ref 2.4–5.7)
WBC NRBC COR # BLD: 11 10*3/MM3 (ref 3.4–10.8)

## 2023-05-11 PROCEDURE — 84550 ASSAY OF BLOOD/URIC ACID: CPT

## 2023-05-11 PROCEDURE — 36415 COLL VENOUS BLD VENIPUNCTURE: CPT

## 2023-05-11 PROCEDURE — 84443 ASSAY THYROID STIM HORMONE: CPT

## 2023-05-11 PROCEDURE — 86140 C-REACTIVE PROTEIN: CPT

## 2023-05-11 PROCEDURE — 85025 COMPLETE CBC W/AUTO DIFF WBC: CPT

## 2023-05-11 PROCEDURE — 80053 COMPREHEN METABOLIC PANEL: CPT

## 2023-05-11 RX ORDER — VENLAFAXINE HYDROCHLORIDE 150 MG/1
150 CAPSULE, EXTENDED RELEASE ORAL 2 TIMES DAILY
Qty: 180 CAPSULE | Refills: 3 | Status: SHIPPED | OUTPATIENT
Start: 2023-05-11

## 2023-05-11 NOTE — PROGRESS NOTES
[place] 5/11/2023.  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  She relates that the procedure was a limited success and notes only slight improvement in her back pain she notes that her migraines are coming back she had previously taken Imitrex without relief on a positive note the patient reports she continues to lose weight weight today is down 3 pounds from 3 months ago she continues with left lower extremity pain ever since she had the ablation she notes that she is breathing hard at night gets short of breath with exertion and continues to have intermittent chest discomfort relieved with rest worsened with exertion she continues on Repatha every 2 weeks patient also notes increasing symptoms of depression she continues on Effexor Exar 150 daily and 75 at at bedtime patient is also on trazodone not only for depression but also to help her sleep at night she continues on as needed oxycodone and routine gabapentin for neuropathic symptoms.  Current Outpatient Medications on File Prior to Visit   Medication Sig Dispense Refill   • albuterol sulfate HFA (ProAir HFA) 108 (90 Base) MCG/ACT inhaler Inhale 2 puffs 4 (Four) Times a Day As Needed.     • aspirin 81 MG tablet Take 1 tablet by mouth Daily. 30 tablet 11   • atomoxetine (STRATTERA) 40 MG capsule Take 1 capsule by mouth Daily. 30 capsule 2   • atomoxetine (STRATTERA) 60 MG capsule TAKE 1 CAPSULE BY MOUTH EVERY DAY 90 capsule 0   • denosumab (PROLIA) 60 MG/ML solution prefilled syringe syringe Inject 1 mL under the skin into the appropriate area as directed Every 6 (Six) Months.     • desloratadine (CLARINEX) 5 MG tablet  TAKE 1 TABLET BY MOUTH EVERY DAY 90 tablet 3   • docusate sodium (COLACE) 100 MG capsule Take 2 capsules by mouth Every Night. at bedtime     • esomeprazole (nexIUM) 40 MG capsule TAKE 1 CAPSULE BY MOUTH EVERY DAY 90 capsule 3   • furosemide (LASIX) 20 MG tablet TAKE 1 TABLET BY MOUTH DAILY UNTIL BACK TO BASELINE WEIGHT 30 tablet 0   • furosemide (LASIX) 40 MG tablet TAKE 1 TABLET BY MOUTH EVERY DAY 90 tablet 3   • gabapentin (NEURONTIN) 300 MG capsule Take 1 capsule by mouth 2 (Two) Times a Day. 180 capsule 0   • isosorbide mononitrate (IMDUR) 30 MG 24 hr tablet TAKE 1 TABLET BY MOUTH EVERY DAY 90 tablet 3   • levothyroxine (Synthroid) 100 MCG tablet Take 1 tablet by mouth Daily. 90 tablet 3   • lubiprostone (AMITIZA) 24 MCG capsule TAKE 1 CAPSULE BY MOUTH TWICE A  capsule 3   • metoclopramide (REGLAN) 10 MG tablet TAKE 1 TABLET BY MOUTH 4 TIMES A DAY. 360 tablet 2   • metoprolol succinate XL (TOPROL-XL) 50 MG 24 hr tablet Patient takes 1/2 tab daily 30 tablet 6   • oxyCODONE (oxyCONTIN) 10 MG 12 hr tablet Take 1 tablet by mouth 3 (Three) Times a Day. OxyContin 30 mg tablet,crush resistant,extended release     • polyethylene glycol (MIRALAX) packet Take 17 g by mouth Daily.     • Repatha SureClick solution auto-injector SureClick injection INJECT 1 ML UNDER THE SKIN INTO THE APPROPRIATE AREA AS DIRECTED EVERY 14 (FOURTEEN) DAYS. 1 mL 3   • traZODone (DESYREL) 150 MG tablet TAKE 1 TABLET BY MOUTH EVERYDAY AT BEDTIME 90 tablet 3   • venlafaxine XR (EFFEXOR-XR) 150 MG 24 hr capsule TAKE 1 CAPSULE BY MOUTH EVERY DAY 90 capsule 3   • venlafaxine XR (EFFEXOR-XR) 75 MG 24 hr capsule TAKE 1 CAPSULE BY MOUTH EVERY DAY 90 capsule 3     No current facility-administered medications on file prior to visit.     Allergies   Allergen Reactions   • Cephalosporins Nausea And Vomiting   • Erythromycin Nausea Only   • Morphine And Related Itching   • Other Nausea And Vomiting     Cipro   • Penicillins Hives   •  "Shellfish-Derived Products Hives and Other (See Comments)     Other reaction(s): SOA   • Zithromax [Azithromycin] Hives and Nausea And Vomiting   • Zolpidem Mental Status Change   • Honey Bee Treatment [Bee Venom] Hives   • Hydrocodone-Acetaminophen Anxiety   • Sulfa Antibiotics Hives and Rash     Sulfa (Sulfonamide Antibiotics)     Exam alert /68 (BP Location: Left arm, Patient Position: Sitting, Cuff Size: Adult)   Pulse 90   Ht 157.5 cm (62\")   Wt 59.4 kg (131 lb)   SpO2 98%   BMI 23.96 kg/m²   HEENT remarkable for TM prominence bilaterally without facial asymmetry neck supple no JVD lungs are clear to auscultation without wheezes rales or rhonchi no tactile fremitus or pleural friction rub heart regular no murmur rub gallop or extrasystole PMI diffuse abdomen soft nontender extremities show proximal muscle wasting without spasticity clubbing cyanosis or edema no tremor neuro nonfocal no ataxia    Impression   Encounter Diagnoses   Name Primary?   • Coronary artery disease of native artery of native heart with stable angina pectoris Yes   • Dyslipidemia    • Dyspnea on exertion    • Acquired hypothyroidism    • Depressive disorder    • Primary fibromyalgia syndrome    • Rheumatoid arthritis involving both wrists with positive rheumatoid factor    • Lumbosacral spondylosis without myelopathy    • Osteoporosis, unspecified osteoporosis type, unspecified pathological fracture presence      Plan: Above problem list is active and ongoing but stable we will defer to pain management on further treatment of her lower extremity pain increase her Effexor XR to 150 twice daily and will check surveillance labs today recheck otherwise 6 weeks for wellness otherwise as needed      This document has been electronically signed by Tony Hi MD on May 11, 2023 15:03 CDT      "

## 2023-05-30 RX ORDER — LUBIPROSTONE 24 UG/1
CAPSULE ORAL
Qty: 180 CAPSULE | Refills: 3 | Status: SHIPPED | OUTPATIENT
Start: 2023-05-30

## 2023-05-30 RX ORDER — METOCLOPRAMIDE 10 MG/1
TABLET ORAL
Qty: 360 TABLET | Refills: 2 | Status: SHIPPED | OUTPATIENT
Start: 2023-05-30

## 2023-05-30 RX ORDER — LEVOTHYROXINE SODIUM 88 MCG
TABLET ORAL
Qty: 90 TABLET | Refills: 3 | Status: SHIPPED | OUTPATIENT
Start: 2023-05-30

## 2023-05-30 RX ORDER — ESOMEPRAZOLE MAGNESIUM 40 MG/1
CAPSULE, DELAYED RELEASE ORAL
Qty: 90 CAPSULE | Refills: 3 | Status: SHIPPED | OUTPATIENT
Start: 2023-05-30

## 2023-06-20 ENCOUNTER — TELEPHONE (OUTPATIENT)
Dept: ONCOLOGY | Facility: CLINIC | Age: 68
End: 2023-06-20

## 2023-08-01 ENCOUNTER — OFFICE VISIT (OUTPATIENT)
Dept: FAMILY MEDICINE CLINIC | Facility: CLINIC | Age: 68
End: 2023-08-01
Payer: MEDICARE

## 2023-08-01 VITALS
BODY MASS INDEX: 22.45 KG/M2 | HEIGHT: 62 IN | HEART RATE: 89 BPM | DIASTOLIC BLOOD PRESSURE: 100 MMHG | SYSTOLIC BLOOD PRESSURE: 150 MMHG | OXYGEN SATURATION: 94 % | WEIGHT: 122 LBS

## 2023-08-01 DIAGNOSIS — I10 ESSENTIAL HYPERTENSION: ICD-10-CM

## 2023-08-01 DIAGNOSIS — F33.1 MODERATE EPISODE OF RECURRENT MAJOR DEPRESSIVE DISORDER: Primary | ICD-10-CM

## 2023-08-01 DIAGNOSIS — F41.9 ANXIETY: ICD-10-CM

## 2023-08-01 DIAGNOSIS — L30.9 DERMATITIS: ICD-10-CM

## 2023-08-01 DIAGNOSIS — F90.0 ATTENTION DEFICIT HYPERACTIVITY DISORDER (ADHD), PREDOMINANTLY INATTENTIVE TYPE: ICD-10-CM

## 2023-08-01 RX ORDER — METOPROLOL SUCCINATE 50 MG/1
50 TABLET, EXTENDED RELEASE ORAL DAILY
Qty: 30 TABLET | Refills: 2 | Status: SHIPPED | OUTPATIENT
Start: 2023-08-01

## 2023-08-01 RX ORDER — BUSPIRONE HYDROCHLORIDE 5 MG/1
2.5 TABLET ORAL 3 TIMES DAILY
Qty: 45 TABLET | Refills: 2 | Status: SHIPPED | OUTPATIENT
Start: 2023-08-01

## 2023-08-25 DIAGNOSIS — I10 ESSENTIAL HYPERTENSION: ICD-10-CM

## 2023-08-25 RX ORDER — ATOMOXETINE 60 MG/1
CAPSULE ORAL
Qty: 90 CAPSULE | Refills: 0 | Status: SHIPPED | OUTPATIENT
Start: 2023-08-25

## 2023-08-25 RX ORDER — EVOLOCUMAB 140 MG/ML
140 INJECTION, SOLUTION SUBCUTANEOUS
Qty: 30 ML | Refills: 3 | Status: SHIPPED | OUTPATIENT
Start: 2023-08-25

## 2023-08-25 RX ORDER — METOPROLOL SUCCINATE 50 MG/1
TABLET, EXTENDED RELEASE ORAL
Qty: 90 TABLET | Refills: 3 | Status: SHIPPED | OUTPATIENT
Start: 2023-08-25

## 2023-08-25 RX ORDER — LEVOTHYROXINE SODIUM 100 MCG
TABLET ORAL
Qty: 90 TABLET | Refills: 3 | Status: SHIPPED | OUTPATIENT
Start: 2023-08-25

## 2023-08-29 ENCOUNTER — OFFICE VISIT (OUTPATIENT)
Dept: FAMILY MEDICINE CLINIC | Facility: CLINIC | Age: 68
End: 2023-08-29
Payer: MEDICARE

## 2023-08-29 ENCOUNTER — LAB (OUTPATIENT)
Dept: LAB | Facility: HOSPITAL | Age: 68
End: 2023-08-29
Payer: MEDICARE

## 2023-08-29 VITALS
BODY MASS INDEX: 23.74 KG/M2 | SYSTOLIC BLOOD PRESSURE: 120 MMHG | HEIGHT: 62 IN | HEART RATE: 83 BPM | WEIGHT: 129 LBS | DIASTOLIC BLOOD PRESSURE: 80 MMHG | OXYGEN SATURATION: 94 %

## 2023-08-29 DIAGNOSIS — F41.9 ANXIETY: Primary | ICD-10-CM

## 2023-08-29 DIAGNOSIS — M81.0 AGE-RELATED OSTEOPOROSIS WITHOUT CURRENT PATHOLOGICAL FRACTURE: ICD-10-CM

## 2023-08-29 DIAGNOSIS — E78.2 MIXED HYPERLIPIDEMIA: ICD-10-CM

## 2023-08-29 DIAGNOSIS — L30.9 DERMATITIS: ICD-10-CM

## 2023-08-29 LAB
ALBUMIN SERPL-MCNC: 4.1 G/DL (ref 3.5–5.2)
ALBUMIN/GLOB SERPL: 1.4 G/DL
ALP SERPL-CCNC: 53 U/L (ref 39–117)
ALT SERPL W P-5'-P-CCNC: 11 U/L (ref 1–33)
ANION GAP SERPL CALCULATED.3IONS-SCNC: 12 MMOL/L (ref 5–15)
AST SERPL-CCNC: 12 U/L (ref 1–32)
BILIRUB SERPL-MCNC: 0.2 MG/DL (ref 0–1.2)
BUN SERPL-MCNC: 7 MG/DL (ref 8–23)
BUN/CREAT SERPL: 8.9 (ref 7–25)
CALCIUM SPEC-SCNC: 9.1 MG/DL (ref 8.6–10.5)
CHLORIDE SERPL-SCNC: 90 MMOL/L (ref 98–107)
CHOLEST SERPL-MCNC: 183 MG/DL (ref 0–200)
CO2 SERPL-SCNC: 25 MMOL/L (ref 22–29)
CREAT SERPL-MCNC: 0.79 MG/DL (ref 0.57–1)
EGFRCR SERPLBLD CKD-EPI 2021: 81.6 ML/MIN/1.73
GLOBULIN UR ELPH-MCNC: 3 GM/DL
GLUCOSE SERPL-MCNC: 82 MG/DL (ref 65–99)
HDLC SERPL-MCNC: 75 MG/DL (ref 40–60)
LDLC SERPL CALC-MCNC: 83 MG/DL (ref 0–100)
LDLC/HDLC SERPL: 1.05 {RATIO}
PHOSPHATE SERPL-MCNC: 3.6 MG/DL (ref 2.5–4.5)
POTASSIUM SERPL-SCNC: 4.3 MMOL/L (ref 3.5–5.2)
PROT SERPL-MCNC: 7.1 G/DL (ref 6–8.5)
SODIUM SERPL-SCNC: 127 MMOL/L (ref 136–145)
TRIGL SERPL-MCNC: 148 MG/DL (ref 0–150)
VLDLC SERPL-MCNC: 25 MG/DL (ref 5–40)

## 2023-08-29 PROCEDURE — 80053 COMPREHEN METABOLIC PANEL: CPT

## 2023-08-29 PROCEDURE — 80061 LIPID PANEL: CPT

## 2023-08-29 PROCEDURE — 36415 COLL VENOUS BLD VENIPUNCTURE: CPT

## 2023-08-29 PROCEDURE — 84100 ASSAY OF PHOSPHORUS: CPT

## 2023-08-29 RX ORDER — BUSPIRONE HYDROCHLORIDE 5 MG/1
5 TABLET ORAL 3 TIMES DAILY
Qty: 90 TABLET | Refills: 2 | Status: SHIPPED | OUTPATIENT
Start: 2023-08-29 | End: 2023-08-30 | Stop reason: SDUPTHER

## 2023-08-29 NOTE — PROGRESS NOTES
[facility]  Moe De Souza MD    Subjective:     Daysi Pa is a 67 y.o. female who presents for follow up on anxiety. She reports that since taking the Buspar, she has noticed an improvement in anxiety however she continues to have episodes of generalized anxiety with a diminished mood at times. No suicidal or homicidal ideations. No panic attacks reported. She states that she does not feel any drowsiness with starting Buspar. She reports that she had used the Kenalog ointment for her rash on the skin and it is not as helpful, the lesion continues to persist, itching at times. She is also due for a lipid screen.     She follows up with a Gastroenterologist at outside hospital, has had her last screening colonoscopy back in 2022 and states that she is due for her next one in 5 years.     The following portions of the patient's history were reviewed and updated as appropriate: allergies, current medications, past family history, past medical history, past social history, past surgical history, and problem list.    Past Medical Hx:  Past Medical History:   Diagnosis Date    Acquired hypothyroidism     Allergic rhinitis     Allergy 04/17/2016    Consult, Allergy (1) - Ordered By: BENIGNO LIM ([facility])     Anxiety state     Attention deficit hyperactivity disorder     Benign essential hypertension     Chronic pain     Chronic pain disorder     Congestive heart failure     primarily systrolic dysfunction EF 17-20% repeat echo 55%    Depressive disorder     Dyslipidemia     Dysphagia     Gastroesophageal reflux disease     Generalized abdominal pain     History of echocardiogram 03/23/2016    Echocardiogram W/ color flow 79419 (3) - Normal LV function wiht Ef of 60%.Normal RV size and function.Normal diastolic function.No significant valvular regurgitation or stenosis.    History of echocardiogram 04/27/2012    Echocardiogram W/O color flow 37784 (1) - Normal left ventricular systolic  function. EF 55%. No evidence of regional wall motion abnormalities. Abnormal relaxation of the left ventricular myocardium.    History of EKG 03/07/2016    EKG Tracing and Interpretation 18197 (3) - Ordered By: LILLIE BRADY ([facility])     History of mammogram 06/05/2013    MAMMOGRAM SCREENING 67545 - [facility] (1) - birads 0     Hyperlipidemia     Intraductal carcinoma in situ of breast     hx in past and s/p bilateral simple mastectomies       Irritable bowel syndrome     Low back pain     Malaise and fatigue     Microalbuminuria 07/16/2015    POS MICROALBUMINURIA RESULT DOC AND REVIEWED 3060F (1) - Ordered By: BENIGNO LIM ([facility])    Mitral valve regurgitation     Mild-Moderate       Myopia     Non-organic sleep disorder     Osteoporosis     Pain management 04/17/2016    Consult, Pain Management (1) - [facility]     Pneumococcal vaccination indicated 07/08/2016    PNEUMOC VAC/ADMIN/RCVD 4040F (11) - [facility]    Primary fibromyalgia syndrome     Rheumatoid arthritis        Past Surgical Hx:  Past Surgical History:   Procedure Laterality Date    APPENDECTOMY  2008    Appendectomy (1)    BACK SURGERY      BREAST BIOPSY  06/12/2013    Stereotactic breast biopsy (1) - stereotactic left mammotome biopsy with 11 gauge needle.    BREAST IMPLANT REMOVAL  2003    Remove breast implant (1)    BREAST IMPLANT SURGERY  2000    Breast implantation (1)    CARDIAC CATHETERIZATION N/A 1/21/2020    Procedure: Right Heart Cath;  Surgeon: Stuart Brady MD PhD;  Location: [facility] CATH INVASIVE LOCATION;  Service: Cardiology    CARDIAC CATHETERIZATION N/A 1/21/2020    Procedure: LEFT HEART CATH;  Surgeon: Carmen Thompson MD;  Location: [facility] CATH INVASIVE LOCATION;  Service: Cardiology    CARDIAC CATHETERIZATION N/A 6/30/2020    Procedure: Left Heart Cath/PCI;  Surgeon: Carmen Thompson MD;  Location: [facility] CATH INVASIVE LOCATION;  Service: Cardiology;  Laterality: N/A;    CATARACT EXTRACTION W/  INTRAOCULAR LENS IMPLANT Left 2/18/2022    Procedure: REMOVE CATARACT AND IMPLANT INTRAOCULAR LENS;  Surgeon: Cesar Cordon MD;  Location: [facility] OR;  Service: Ophthalmology;  Laterality: Left;    CATARACT EXTRACTION W/ INTRAOCULAR LENS IMPLANT Right 2/25/2022    Procedure: REMOVE CATARACT AND IMPLANT  INTRAOCULAR LENS IMPLANT;  Surgeon: Cesar Cordon MD;  Location: [facility] OR;  Service: Ophthalmology;  Laterality: Right;    COLONOSCOPY  01/20/2014    Colonoscopy, diagnostic (screening) 63123 (1)    COLONOSCOPY W/ POLYPECTOMY  2008    Colonoscopy remove polyps 66369 (1)     EXCISION LESION  05/01/2014    Remove chest wall lesion (1) - Excision of subcutaneous mass, left chest wall, Subcutaneous mass left chest wall, status post mastectomy.    INJECTION OF MEDICATION  02/11/2016    Kenalog (2) - Ordered By: BENIGNO LIM ([facility])     INJECTION OF MEDICATION  11/17/2017    Prolia    MASTECTOMY Bilateral 09/05/2013    Mastectomy (2) - Right simple prophylactic mastectomy.    OTHER SURGICAL HISTORY  10/29/2014    SONOGRAM THYROID, NECK 38910 (1) - Ordered By: BENIGNO LIM ([facility])    PAP SMEAR  06/03/2013     PAP SMEAR (1)    SUBTOTAL HYSTERECTOMY  1998     Partial hyst (1)      TONSILLECTOMY  1969    Tonsillectomy (1)    VASCULAR SURGERY  12/24/2014    Consult, Vascular Surgery (1) - Ordered By: BENIGNO LIM ([facility])        Current Meds:    Current Outpatient Medications:     albuterol sulfate HFA (ProAir HFA) 108 (90 Base) MCG/ACT inhaler, Inhale 2 puffs 4 (Four) Times a Day As Needed., Disp: , Rfl:     aspirin 81 MG tablet, Take 1 tablet by mouth Daily., Disp: 30 tablet, Rfl: 11    atomoxetine (STRATTERA) 60 MG capsule, TAKE 1 CAPSULE BY MOUTH EVERY DAY, Disp: 90 capsule, Rfl: 0    busPIRone (BUSPAR) 5 MG tablet, Take 1 tablet by mouth 3 (Three) Times a Day., Disp: 90 tablet, Rfl: 2    denosumab (PROLIA) 60 MG/ML solution prefilled syringe syringe, Inject 1 mL under the skin into the appropriate area as directed Every 6  (Six) Months., Disp: , Rfl:     desloratadine (CLARINEX) 5 MG tablet, TAKE 1 TABLET BY MOUTH EVERY DAY, Disp: 90 tablet, Rfl: 3    docusate sodium (COLACE) 100 MG capsule, Take 2 capsules by mouth Every Night. at bedtime, Disp: , Rfl:     esomeprazole (nexIUM) 40 MG capsule, TAKE 1 CAPSULE BY MOUTH EVERY DAY, Disp: 90 capsule, Rfl: 3    Evolocumab (Repatha SureClick) solution auto-injector SureClick injection, INJECT 1 ML UNDER THE SKIN INTO THE APPROPRIATE AREA AS DIRECTED EVERY 14 (FOURTEEN) DAYS., Disp: 30 mL, Rfl: 3    furosemide (LASIX) 40 MG tablet, TAKE 1 TABLET BY MOUTH EVERY DAY, Disp: 90 tablet, Rfl: 3    gabapentin (NEURONTIN) 300 MG capsule, Take 1 capsule by mouth 2 (Two) Times a Day., Disp: 180 capsule, Rfl: 0    isosorbide mononitrate (IMDUR) 30 MG 24 hr tablet, TAKE 1 TABLET BY MOUTH EVERY DAY, Disp: 90 tablet, Rfl: 3    lubiprostone (AMITIZA) 24 MCG capsule, TAKE 1 CAPSULE BY MOUTH TWICE A DAY, Disp: 180 capsule, Rfl: 3    metoclopramide (REGLAN) 10 MG tablet, TAKE 1 TABLET BY MOUTH FOUR TIMES A DAY, Disp: 360 tablet, Rfl: 2    metoprolol succinate XL (TOPROL-XL) 50 MG 24 hr tablet, TAKE 1 TABLET BY MOUTH EVERY DAY, Disp: 90 tablet, Rfl: 3    nitroglycerin (NITROSTAT) 0.3 MG SL tablet, 1 under the tongue as needed for angina, may repeat q5mins for up three doses, Disp: 100 tablet, Rfl: 11    oxyCODONE (oxyCONTIN) 10 MG 12 hr tablet, Take 1 tablet by mouth 3 (Three) Times a Day. OxyContin 30 mg tablet,crush resistant,extended release, Disp: , Rfl:     polyethylene glycol (MIRALAX) packet, Take 17 g by mouth Daily., Disp: , Rfl:     Synthroid 100 MCG tablet, TAKE 1 TABLET BY MOUTH EVERY DAY, Disp: 90 tablet, Rfl: 3    Synthroid 88 MCG tablet, TAKE 1 TABLET BY MOUTH EVERY DAY, Disp: 90 tablet, Rfl: 3    traZODone (DESYREL) 150 MG tablet, TAKE 1 TABLET BY MOUTH EVERYDAY AT BEDTIME, Disp: 90 tablet, Rfl: 3    venlafaxine XR (EFFEXOR-XR) 150 MG 24 hr capsule, Take 1 capsule by mouth 2 (Two) Times a Day.,  Disp: 180 capsule, Rfl: 3    betamethasone valerate (VALISONE) 0.1 % ointment, Apply 1 application  topically to the appropriate area as directed 2 (Two) Times a Day., Disp: 45 g, Rfl: 0    Allergies:  Allergies   Allergen Reactions    Cephalosporins Nausea And Vomiting    Erythromycin Nausea Only    Morphine And Related Itching    Other Nausea And Vomiting     Cipro    Penicillins Hives    Shellfish-Derived Products Hives and Other (See Comments)     Other reaction(s): SOA    Zithromax [Azithromycin] Hives and Nausea And Vomiting    Zolpidem Mental Status Change    Honey Bee Treatment [Bee Venom] Hives    Hydrocodone-Acetaminophen Anxiety    Sulfa Antibiotics Hives and Rash     Sulfa (Sulfonamide Antibiotics)       Family Hx:  Family History   Problem Relation Age of Onset    Breast cancer Sister     Rectal cancer Other         Colorectal cancer    Heart disease Other     Hypertension Other     Thyroid disease Other         Thyroid disorder    Hypertension Mother     Migraines Mother     Osteoporosis Mother     Diabetes Father     Coronary artery disease Father     Hyperlipidemia Father     Cancer Maternal Aunt     COPD Paternal Grandmother     COPD Paternal Grandfather         Social History:  Social History     Socioeconomic History    Marital status:    Tobacco Use    Smoking status: Former     Years: 30.00     Types: Cigarettes     Quit date:      Years since quittin.6    Smokeless tobacco: Never   Vaping Use    Vaping Use: Never used   Substance and Sexual Activity    Alcohol use: No    Drug use: No    Sexual activity: Defer       Review of Systems  Review of Systems   Constitutional:  Negative for chills and fever.   Respiratory:  Negative for shortness of breath.    Cardiovascular:  Negative for chest pain.   Gastrointestinal:  Negative for abdominal pain, diarrhea, nausea and vomiting.   Genitourinary:  Negative for dysuria.   Skin:  Positive for rash.   Neurological:  Negative for  "dizziness.     Objective:     /80   Pulse 83   Ht 157.5 cm (62\")   Wt 58.5 kg (129 lb)   SpO2 94%   BMI 23.59 kg/mý   Physical Exam  Constitutional:       Appearance: She is well-developed.   Cardiovascular:      Rate and Rhythm: Normal rate and regular rhythm.      Heart sounds: Normal heart sounds. No murmur heard.    No friction rub. No gallop.   Pulmonary:      Effort: Pulmonary effort is normal. No respiratory distress.      Breath sounds: Normal breath sounds. No wheezing.   Abdominal:      General: Bowel sounds are normal.      Palpations: Abdomen is soft.      Tenderness: There is no abdominal tenderness.   Musculoskeletal:         General: Normal range of motion.   Skin:     General: Skin is warm and dry.      Findings: Rash present.   Neurological:      Mental Status: She is alert and oriented to person, place, and time.   Psychiatric:         Behavior: Behavior normal.        Assessment/Plan:     Diagnoses and all orders for this visit:    1. Anxiety (Primary)  -     busPIRone (BUSPAR) 5 MG tablet; Take 1 tablet by mouth 3 (Three) Times a Day.  Dispense: 90 tablet; Refill: 2    2. Mixed hyperlipidemia  -     Lipid panel; Future    3. Dermatitis  -     betamethasone valerate (VALISONE) 0.1 % ointment; Apply 1 application  topically to the appropriate area as directed 2 (Two) Times a Day.  Dispense: 45 g; Refill: 0        Plan: Increase Buspar to 5mg, continue with Effexor for maintenance for anxiety management. Continue stress management techniques. Will start patient on Valisone daily for a higher potency steroid ointment. Lipid panel ordered.    Patient Education: N/A  Compliance at present is estimated to be good.   Efforts to improve compliance (if necessary) will be directed at  N/A .     Follow-up:     Return in about 4 weeks (around 9/26/2023) for Next scheduled follow up.    Preventative:  Health Maintenance   Topic Date Due    COVID-19 Vaccine (4 - Pfizer series) 03/02/2022    ANNUAL " WELLNESS VISIT  03/10/2022    LIPID PANEL  04/12/2023    COLORECTAL CANCER SCREENING  06/19/2023    INFLUENZA VACCINE  10/01/2023    TDAP/TD VACCINES (2 - Td or Tdap) 10/16/2024    DXA SCAN  05/17/2025    HEPATITIS C SCREENING  Completed    Pneumococcal Vaccine 65+  Completed    ZOSTER VACCINE  Completed         Weight  -BMI is within normal parameters. No other follow-up for BMI required.       Alcohol use:  reports no history of alcohol use.  Nicotine status  reports that she quit smoking about 13 years ago. Her smoking use included cigarettes. She has never used smokeless tobacco.     Goals          Patient Stated      Eat more fruits and vegetables (pt-stated)       Barriers to goals: None        Exercise 150 minutes per week (moderate activity) (pt-stated)       Barriers to goal: RA and chronic pain        Other (pt-stated)       Less fatigue  Barriers to goals: Multiple medical problems         Other      Exercise 3x per week (30 min per time)       Increase physical activity         Exercise 3x per week (30 min per time)       Walking, stretching, light weight lifting         Exercise 3x per week (30 min per time)       Exercise more, increase stamina, wants to walk 15 mins 4 times a week               RISK SCORE: 3

## 2023-08-30 DIAGNOSIS — F41.9 ANXIETY: ICD-10-CM

## 2023-08-30 RX ORDER — BUSPIRONE HYDROCHLORIDE 5 MG/1
5 TABLET ORAL 3 TIMES DAILY
Qty: 90 TABLET | Refills: 2 | Status: SHIPPED | OUTPATIENT
Start: 2023-08-30

## 2023-09-20 NOTE — TELEPHONE ENCOUNTER
Have her come see me on Monday. Double book please   Thank you for visiting our immediate care for your health care needs. Please follow up with your regular doctor in the next 1-2 days. If you have any additional problems please return to the immediate care.

## 2025-01-17 NOTE — TELEPHONE ENCOUNTER
Patient has ongoing ADHD. [facility] #[id number]         This document has been electronically signed by Olimpia Robison MD on April 10, 2017 1:50 PM     
Family

## (undated) DEVICE — GW PRESSUREWIRE X WIRELESS FFR 175CM

## (undated) DEVICE — ANGIOGRAPHIC CATHETER: Brand: EXPO™

## (undated) DEVICE — GW PERIPH GUIDERIGHT STD/EXCHNG/J/TIP SS 0.038IN 5X260CM

## (undated) DEVICE — A2000 MULTI-USE SYRINGE KIT, P/N 701277-003KIT CONTENTS: 100ML CONTRAST RESERVOIR AND TUBING WITH CONTRAST SPIKE AND CLAMP: Brand: A2000 MULTI-USE SYRINGE KIT

## (undated) DEVICE — Device

## (undated) DEVICE — MODEL BT2000 P/N 700287-012KIT CONTENTS: MANIFOLD WITH SALINE AND CONTRAST PORTS, SALINE TUBING WITH SPIKE AND HAND SYRINGE, TRANSDUCER: Brand: BT2000 AUTOMATED MANIFOLD KIT

## (undated) DEVICE — STERILE POLYISOPRENE POWDER-FREE SURGICAL GLOVES WITH EMOLLIENT COATING: Brand: PROTEXIS

## (undated) DEVICE — GLV SURG SENSICARE MICRO PF LF 7.5 STRL

## (undated) DEVICE — TR BAND RADIAL ARTERY COMPRESSION DEVICE: Brand: TR BAND

## (undated) DEVICE — GLV SURG SENSICARE POLYISPRN W/ALOE PF LF 6.5 GRN STRL

## (undated) DEVICE — PK CATH LAB 60

## (undated) DEVICE — GLIDESHEATH SLENDER STAINLESS STEEL KIT: Brand: GLIDESHEATH SLENDER

## (undated) DEVICE — ANGIO-SEAL VIP VASCULAR CLOSURE DEVICE: Brand: ANGIO-SEAL

## (undated) DEVICE — ST CVR PROB PULLUP ULTRASND 5X48IN

## (undated) DEVICE — COPILOT KIT INCLUDES BLEEDBACK CONTROL VALVE / GUIDE WIRE INTRODUCER / TORQUE DEVICE: Brand: ACCESSORIES

## (undated) DEVICE — Device: Brand: ASAHI SION BLUE

## (undated) DEVICE — RADIFOCUS OPTITORQUE ANGIOGRAPHIC CATHETER: Brand: OPTITORQUE

## (undated) DEVICE — ELECTRODE,RT,STRESS,FOAM,50PK: Brand: MEDLINE

## (undated) DEVICE — SOL IRR H2O BTL 1000ML STRL

## (undated) DEVICE — SWAN-GANZ THERMODILUTION CATHETER: Brand: SWAN-GANZ

## (undated) DEVICE — RUNTHROUGH NS EXTRA FLOPPY PTCA GUIDEWIRE: Brand: RUNTHROUGH

## (undated) DEVICE — INTRO SHEATH ART/FEM ENGAGE .038 6F12CM

## (undated) DEVICE — GLV SURG SENSICARE PI PF LF 7 GRN STRL

## (undated) DEVICE — KT INTRO MINISTICK MAX W/GW NITNL/TUNG ECHO 4F 21G 7CM

## (undated) DEVICE — CATH GUIDE LAUNCHER JR4.0 6F 100CM

## (undated) DEVICE — PINNACLE INTRODUCER SHEATH: Brand: PINNACLE

## (undated) DEVICE — DEV INFL MONARCH 20ML

## (undated) DEVICE — GW PERIPH GUIDERIGHT STD/J/TP PTFE/PCOAT SS 0.038IN 5X150CM